# Patient Record
Sex: MALE | Race: BLACK OR AFRICAN AMERICAN | NOT HISPANIC OR LATINO | Employment: PART TIME | URBAN - METROPOLITAN AREA
[De-identification: names, ages, dates, MRNs, and addresses within clinical notes are randomized per-mention and may not be internally consistent; named-entity substitution may affect disease eponyms.]

---
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
## 2018-01-25 ENCOUNTER — TELEPHONE (OUTPATIENT)
Dept: NEPHROLOGY | Facility: CLINIC | Age: 18
End: 2018-01-25

## 2018-01-25 NOTE — TELEPHONE ENCOUNTER
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  I wanted to be sure you spoke with Genevieve about it.  Let me know how I can help.

## 2018-01-25 NOTE — TELEPHONE ENCOUNTER
----- Message from Elaine Zacarias sent at 1/25/2018 11:18 AM CST -----  Contact: mother, Darcie Saenz  Patient's mother, Darcie Saenz states the patient has been seeing by Dr Sotelo at [facility]. Mother states Dr Sotelo said she will work with patient to see him on Friday's in [place] or Wednesday at 9:15 or 9:30. Please call mother at [phone]. Thanks!

## 2018-02-21 ENCOUNTER — TELEPHONE (OUTPATIENT)
Dept: TRANSPLANT | Facility: CLINIC | Age: 18
End: 2018-02-21

## 2018-02-21 NOTE — TELEPHONE ENCOUNTER
----- Message from Michaela Bush sent at 2/21/2018  8:48 AM CST -----  Contact: Padmaja Ware   Checking status of patient transplant.    Call her @ [phone]

## 2018-02-21 NOTE — TELEPHONE ENCOUNTER
Nurse spoke with Annemarie PERRY at patients dialysis unit and informed her that the patient was denied due to no response to calls/letters. Annemarie verbalized understanding of the above information. A copy of the 30 day letter was faxed to the denial letter per her request.

## 2018-03-13 ENCOUNTER — TELEPHONE (OUTPATIENT)
Dept: NEPHROLOGY | Facility: CLINIC | Age: 18
End: 2018-03-13

## 2018-03-13 NOTE — TELEPHONE ENCOUNTER
Due to transportation issues pt can only come to [place] for PD clinic at [facility] on 3/21 at 9am. However I have a 9am clinic pt. Please reschedule the 9am and hold 9-9:20 slots for PD clinic.     I will email to April as well. Thank you

## 2018-03-14 ENCOUNTER — TELEPHONE (OUTPATIENT)
Dept: TRANSPLANT | Facility: CLINIC | Age: 18
End: 2018-03-14

## 2018-03-14 NOTE — TELEPHONE ENCOUNTER
----- Message from Barbara Curry sent at 3/14/2018 11:20 AM CDT -----  Contact: Evy Letty (mother)  [place],    Mother is requesting to speak with you regarding the information you two discussed last week    Contact number [phone]    Thanks

## 2018-03-14 NOTE — TELEPHONE ENCOUNTER
Nurse spoke with Evy Maki and informed her that the referral was not received. Referral fax number provided. Evy verbalized understanding of the above information and states she will have the Dialysis Unit SW fax referral to the number provided.

## 2018-04-25 RX ORDER — CLINDAMYCIN PHOSPHATE 10 UG/ML
LOTION TOPICAL 2 TIMES DAILY
COMMUNITY
End: 2018-07-20

## 2018-04-25 RX ORDER — TRETINOIN 0.25 MG/G
CREAM TOPICAL NIGHTLY PRN
COMMUNITY
End: 2019-10-05

## 2018-04-25 RX ORDER — VALSARTAN 320 MG/1
320 TABLET ORAL DAILY
Status: ON HOLD | COMMUNITY
End: 2018-08-22 | Stop reason: HOSPADM

## 2018-04-25 RX ORDER — NEBIVOLOL 20 MG/1
20 TABLET ORAL 2 TIMES DAILY PRN
Status: ON HOLD | COMMUNITY
End: 2019-10-09 | Stop reason: HOSPADM

## 2018-04-25 NOTE — OR NURSING
Talked to Dr Banks re: peritoneal dialysis and history.  Patient is to do a K+ level day before.  Patients mom is going to have the nurse order it if possible in [place] and fax the results to us.

## 2018-04-30 ENCOUNTER — ANESTHESIA EVENT (OUTPATIENT)
Dept: SURGERY | Facility: AMBULARY SURGERY CENTER | Age: 18
End: 2018-04-30
Payer: MEDICARE

## 2018-05-01 DIAGNOSIS — N18.6 ESRD (END STAGE RENAL DISEASE): Primary | ICD-10-CM

## 2018-05-02 ENCOUNTER — SURGERY (OUTPATIENT)
Age: 18
End: 2018-05-02

## 2018-05-02 ENCOUNTER — ANESTHESIA (OUTPATIENT)
Dept: SURGERY | Facility: AMBULARY SURGERY CENTER | Age: 18
End: 2018-05-02
Payer: MEDICARE

## 2018-05-02 ENCOUNTER — HOSPITAL ENCOUNTER (OUTPATIENT)
Facility: AMBULARY SURGERY CENTER | Age: 18
Discharge: HOME | End: 2018-05-02
Attending: DENTIST | Admitting: DENTIST
Payer: MEDICARE

## 2018-05-02 DIAGNOSIS — K01.1 IMPACTED TOOTH: Primary | ICD-10-CM

## 2018-05-02 PROCEDURE — D9220A PRA ANESTHESIA: Mod: ANES,,, | Performed by: ANESTHESIOLOGY

## 2018-05-02 PROCEDURE — D7240 HC EXTRACTION IMPACTED TOOTH- COMP BONY: HCPCS | Performed by: DENTIST

## 2018-05-02 PROCEDURE — D9220A PRA ANESTHESIA: Mod: CRNA,,, | Performed by: NURSE ANESTHETIST, CERTIFIED REGISTERED

## 2018-05-02 PROCEDURE — G8907 PT DOC NO EVENTS ON DISCHARG: HCPCS | Performed by: DENTIST

## 2018-05-02 RX ORDER — GLYCOPYRROLATE 0.2 MG/ML
INJECTION INTRAMUSCULAR; INTRAVENOUS
Status: DISCONTINUED
Start: 2018-05-02 | End: 2018-05-02 | Stop reason: HOSPADM

## 2018-05-02 RX ORDER — LIDOCAINE HYDROCHLORIDE 10 MG/ML
0.5 INJECTION, SOLUTION EPIDURAL; INFILTRATION; INTRACAUDAL; PERINEURAL ONCE
Status: COMPLETED | OUTPATIENT
Start: 2018-05-02 | End: 2018-05-02

## 2018-05-02 RX ORDER — DEXAMETHASONE SODIUM PHOSPHATE 4 MG/ML
INJECTION, SOLUTION INTRA-ARTICULAR; INTRALESIONAL; INTRAMUSCULAR; INTRAVENOUS; SOFT TISSUE
Status: DISCONTINUED
Start: 2018-05-02 | End: 2018-05-02 | Stop reason: HOSPADM

## 2018-05-02 RX ORDER — PROPOFOL 10 MG/ML
INJECTION, EMULSION INTRAVENOUS
Status: DISCONTINUED
Start: 2018-05-02 | End: 2018-05-02 | Stop reason: HOSPADM

## 2018-05-02 RX ORDER — ROCURONIUM BROMIDE 10 MG/ML
INJECTION, SOLUTION INTRAVENOUS
Status: DISCONTINUED
Start: 2018-05-02 | End: 2018-05-02 | Stop reason: HOSPADM

## 2018-05-02 RX ORDER — SODIUM CHLORIDE, SODIUM LACTATE, POTASSIUM CHLORIDE, CALCIUM CHLORIDE 600; 310; 30; 20 MG/100ML; MG/100ML; MG/100ML; MG/100ML
INJECTION, SOLUTION INTRAVENOUS CONTINUOUS
Status: DISCONTINUED | OUTPATIENT
Start: 2018-05-02 | End: 2018-05-02

## 2018-05-02 RX ORDER — HYDRALAZINE HYDROCHLORIDE 20 MG/ML
10 INJECTION INTRAMUSCULAR; INTRAVENOUS ONCE
Status: COMPLETED | OUTPATIENT
Start: 2018-05-02 | End: 2018-05-02

## 2018-05-02 RX ORDER — ONDANSETRON 2 MG/ML
INJECTION INTRAMUSCULAR; INTRAVENOUS
Status: DISCONTINUED | OUTPATIENT
Start: 2018-05-02 | End: 2018-05-02

## 2018-05-02 RX ORDER — NEOSTIGMINE METHYLSULFATE 1 MG/ML
INJECTION, SOLUTION INTRAVENOUS
Status: DISCONTINUED | OUTPATIENT
Start: 2018-05-02 | End: 2018-05-02

## 2018-05-02 RX ORDER — LIDOCAINE HYDROCHLORIDE AND EPINEPHRINE 20; 10 MG/ML; UG/ML
INJECTION, SOLUTION INFILTRATION; PERINEURAL
Status: DISCONTINUED | OUTPATIENT
Start: 2018-05-02 | End: 2018-05-02 | Stop reason: HOSPADM

## 2018-05-02 RX ORDER — FENTANYL CITRATE 50 UG/ML
INJECTION, SOLUTION INTRAMUSCULAR; INTRAVENOUS
Status: DISCONTINUED
Start: 2018-05-02 | End: 2018-05-02 | Stop reason: HOSPADM

## 2018-05-02 RX ORDER — SODIUM CHLORIDE 9 MG/ML
INJECTION, SOLUTION INTRAVENOUS CONTINUOUS
Status: DISCONTINUED | OUTPATIENT
Start: 2018-05-02 | End: 2018-05-02 | Stop reason: HOSPADM

## 2018-05-02 RX ORDER — LIDOCAINE HCL/PF 100 MG/5ML
SYRINGE (ML) INTRAVENOUS
Status: DISCONTINUED | OUTPATIENT
Start: 2018-05-02 | End: 2018-05-02

## 2018-05-02 RX ORDER — MIDAZOLAM HYDROCHLORIDE 1 MG/ML
INJECTION INTRAMUSCULAR; INTRAVENOUS
Status: DISCONTINUED
Start: 2018-05-02 | End: 2018-05-02 | Stop reason: HOSPADM

## 2018-05-02 RX ORDER — HYDRALAZINE HYDROCHLORIDE 20 MG/ML
INJECTION INTRAMUSCULAR; INTRAVENOUS
Status: COMPLETED
Start: 2018-05-02 | End: 2018-05-02

## 2018-05-02 RX ORDER — HYDROCODONE BITARTRATE AND ACETAMINOPHEN 5; 325 MG/1; MG/1
1 TABLET ORAL ONCE
Status: COMPLETED | OUTPATIENT
Start: 2018-05-02 | End: 2018-05-02

## 2018-05-02 RX ORDER — LIDOCAINE HYDROCHLORIDE 20 MG/ML
JELLY TOPICAL
Status: DISCONTINUED
Start: 2018-05-02 | End: 2018-05-02 | Stop reason: HOSPADM

## 2018-05-02 RX ORDER — MIDAZOLAM HYDROCHLORIDE 1 MG/ML
INJECTION, SOLUTION INTRAMUSCULAR; INTRAVENOUS
Status: DISCONTINUED | OUTPATIENT
Start: 2018-05-02 | End: 2018-05-02

## 2018-05-02 RX ORDER — GLYCOPYRROLATE 0.2 MG/ML
INJECTION INTRAMUSCULAR; INTRAVENOUS
Status: DISCONTINUED | OUTPATIENT
Start: 2018-05-02 | End: 2018-05-02

## 2018-05-02 RX ORDER — AMOXICILLIN 500 MG/1
500 CAPSULE ORAL EVERY 12 HOURS
Qty: 6 CAPSULE | Refills: 0 | Status: SHIPPED | OUTPATIENT
Start: 2018-05-02 | End: 2018-05-05

## 2018-05-02 RX ORDER — HYDROCODONE BITARTRATE AND ACETAMINOPHEN 5; 325 MG/1; MG/1
1 TABLET ORAL ONCE
Status: DISCONTINUED | OUTPATIENT
Start: 2018-05-02 | End: 2018-05-02 | Stop reason: HOSPADM

## 2018-05-02 RX ORDER — ONDANSETRON 2 MG/ML
INJECTION INTRAMUSCULAR; INTRAVENOUS
Status: DISCONTINUED
Start: 2018-05-02 | End: 2018-05-02 | Stop reason: HOSPADM

## 2018-05-02 RX ORDER — KETAMINE HYDROCHLORIDE 100 MG/ML
INJECTION, SOLUTION INTRAMUSCULAR; INTRAVENOUS
Status: DISCONTINUED
Start: 2018-05-02 | End: 2018-05-02 | Stop reason: HOSPADM

## 2018-05-02 RX ORDER — LIDOCAINE HYDROCHLORIDE 20 MG/ML
INJECTION, SOLUTION EPIDURAL; INFILTRATION; INTRACAUDAL; PERINEURAL
Status: DISCONTINUED
Start: 2018-05-02 | End: 2018-05-02 | Stop reason: HOSPADM

## 2018-05-02 RX ORDER — HYDROCODONE BITARTRATE AND ACETAMINOPHEN 7.5; 325 MG/1; MG/1
1 TABLET ORAL EVERY 6 HOURS PRN
Qty: 20 TABLET | Refills: 0 | Status: SHIPPED | OUTPATIENT
Start: 2018-05-02 | End: 2018-05-07

## 2018-05-02 RX ORDER — KETAMINE HYDROCHLORIDE 10 MG/ML
INJECTION, SOLUTION INTRAMUSCULAR; INTRAVENOUS
Status: DISCONTINUED | OUTPATIENT
Start: 2018-05-02 | End: 2018-05-02

## 2018-05-02 RX ORDER — NEOSTIGMINE METHYLSULFATE 1 MG/ML
INJECTION, SOLUTION INTRAVENOUS
Status: DISCONTINUED
Start: 2018-05-02 | End: 2018-05-02 | Stop reason: HOSPADM

## 2018-05-02 RX ORDER — PROPOFOL 10 MG/ML
VIAL (ML) INTRAVENOUS
Status: DISCONTINUED | OUTPATIENT
Start: 2018-05-02 | End: 2018-05-02

## 2018-05-02 RX ORDER — BUPIVACAINE HYDROCHLORIDE AND EPINEPHRINE 5; 5 MG/ML; UG/ML
INJECTION, SOLUTION EPIDURAL; INTRACAUDAL; PERINEURAL
Status: DISCONTINUED | OUTPATIENT
Start: 2018-05-02 | End: 2018-05-02 | Stop reason: HOSPADM

## 2018-05-02 RX ORDER — DEXAMETHASONE SODIUM PHOSPHATE 4 MG/ML
INJECTION, SOLUTION INTRA-ARTICULAR; INTRALESIONAL; INTRAMUSCULAR; INTRAVENOUS; SOFT TISSUE
Status: DISCONTINUED | OUTPATIENT
Start: 2018-05-02 | End: 2018-05-02

## 2018-05-02 RX ORDER — FENTANYL CITRATE 50 UG/ML
INJECTION, SOLUTION INTRAMUSCULAR; INTRAVENOUS
Status: DISCONTINUED | OUTPATIENT
Start: 2018-05-02 | End: 2018-05-02

## 2018-05-02 RX ADMIN — HYDRALAZINE HYDROCHLORIDE 10 MG: 20 INJECTION INTRAMUSCULAR; INTRAVENOUS at 08:05

## 2018-05-02 RX ADMIN — SODIUM CHLORIDE 1000 ML: 9 INJECTION, SOLUTION INTRAVENOUS at 07:05

## 2018-05-02 RX ADMIN — DEXAMETHASONE SODIUM PHOSPHATE 12 MG: 4 INJECTION, SOLUTION INTRA-ARTICULAR; INTRALESIONAL; INTRAMUSCULAR; INTRAVENOUS; SOFT TISSUE at 07:05

## 2018-05-02 RX ADMIN — LIDOCAINE HYDROCHLORIDE AND EPINEPHRINE 6.8 ML: 20; 10 INJECTION, SOLUTION INFILTRATION; PERINEURAL at 07:05

## 2018-05-02 RX ADMIN — Medication 200 MG: at 07:05

## 2018-05-02 RX ADMIN — BUPIVACAINE HYDROCHLORIDE AND EPINEPHRINE 3.6 ML: 5; 5 INJECTION, SOLUTION EPIDURAL; INTRACAUDAL; PERINEURAL at 07:05

## 2018-05-02 RX ADMIN — FENTANYL CITRATE 50 MCG: 50 INJECTION, SOLUTION INTRAMUSCULAR; INTRAVENOUS at 07:05

## 2018-05-02 RX ADMIN — LIDOCAINE HYDROCHLORIDE 5 MG: 10 INJECTION, SOLUTION EPIDURAL; INFILTRATION; INTRACAUDAL; PERINEURAL at 07:05

## 2018-05-02 RX ADMIN — GLYCOPYRROLATE 0.2 MG: 0.2 INJECTION INTRAMUSCULAR; INTRAVENOUS at 07:05

## 2018-05-02 RX ADMIN — MIDAZOLAM HYDROCHLORIDE 2 MG: 1 INJECTION, SOLUTION INTRAMUSCULAR; INTRAVENOUS at 07:05

## 2018-05-02 RX ADMIN — HYDROCODONE BITARTRATE AND ACETAMINOPHEN 1 TABLET: 5; 325 TABLET ORAL at 08:05

## 2018-05-02 RX ADMIN — GLYCOPYRROLATE 0.4 MG: 0.2 INJECTION INTRAMUSCULAR; INTRAVENOUS at 07:05

## 2018-05-02 RX ADMIN — ONDANSETRON 4 MG: 2 INJECTION INTRAMUSCULAR; INTRAVENOUS at 07:05

## 2018-05-02 RX ADMIN — Medication 100 MG: at 07:05

## 2018-05-02 RX ADMIN — NEOSTIGMINE METHYLSULFATE 5 MG: 1 INJECTION, SOLUTION INTRAVENOUS at 07:05

## 2018-05-02 RX ADMIN — KETAMINE HYDROCHLORIDE 25 MG: 10 INJECTION, SOLUTION INTRAMUSCULAR; INTRAVENOUS at 07:05

## 2018-05-02 NOTE — BRIEF OP NOTE
[facility]  Brief Operative Note     SUMMARY     Surgery Date: 5/2/2018     Surgeon(s) and Role:     * Braden Reddy DDS - Primary    Assisting Surgeon: None    Pre-op Diagnosis:  Impacted teeth [K01.1]    Post-op Diagnosis:  Post-Op Diagnosis Codes:     * Impacted teeth [K01.1]    Procedure(s) (LRB):  EXTRACTION-TOOTH---wisdom teeth removal (Bilateral)    Anesthesia: General    Description of the findings of the procedure: removal of teeth 1,16, 17 and 32    Findings/Key Components: impacted 1, 16, 17 and 32    Estimated Blood Loss: * No values recorded between 5/2/2018  7:30 AM and 5/2/2018  7:56 AM *         Specimens:   Specimen (12h ago through future)    None          Discharge Note    SUMMARY     Admit Date: 5/2/2018    Discharge Date and Time:  05/02/2018 7:58 AM    Hospital Course (synopsis of major diagnoses, care, treatment, and services provided during the course of the hospital stay): outpatient     Final Diagnosis: Post-Op Diagnosis Codes:     * Impacted teeth [K01.1]    Disposition: Home or Self Care    Follow Up/Patient Instructions:     Medications:  Reconciled Home Medications:      Medication List      START taking these medications    hydrocodone-acetaminophen 7.5-325mg 7.5-325 mg per tablet  Commonly known as:  NORCO  Take 1 tablet by mouth every 6 (six) hours as needed for Pain.        CHANGE how you take these medications    amoxicillin 500 MG capsule  Commonly known as:  AMOXIL  Take 1 capsule (500 mg total) by mouth every 12 (twelve) hours.  What changed:  · medication strength  · how much to take  · when to take this  · additional instructions        CONTINUE taking these medications    bumetanide 2 MG tablet  Commonly known as:  BUMEX  Take 2 mg by mouth once daily.     BYSTOLIC 20 mg Tab  Generic drug:  nebivolol  Take 20 mg by mouth 2 (two) times daily. Nightly if bp is high     calcitRIOL 0.25 MCG Cap  Commonly known as:  ROCALTROL  Take 0.5 mcg by mouth  once daily.     calcium acetate 667 mg capsule  Commonly known as:  PHOSLO  Take 2,001 mg by mouth 3 (three) times daily with meals.     clindamycin 1 % lotion  Commonly known as:  CLEOCIN T  Apply topically 2 (two) times daily.     diphenhydrAMINE 25 mg capsule  Commonly known as:  BENADRYL  Take 1 each (25 mg total) by mouth nightly as needed for Itching or Insomnia.     doxycycline 100 MG tablet  Commonly known as:  VIBRA-TABS  Take 100 mg by mouth once daily.     SENSIPAR 30 MG Tab  Generic drug:  cinacalcet  Take 90 mg by mouth once daily.     tretinoin 0.025 % cream  Commonly known as:  RETIN-A  Apply topically every evening.     valsartan 320 MG tablet  Commonly known as:  DIOVAN  Take 320 mg by mouth once daily.            Discharge Procedure Orders  Diet renal     Diet full liquid     Ice to affected area     Activity as tolerated     Change dressing (specify)   Order Comments: Change gauze in one hour. Replace if needed.       Follow-up Information     Braden Reddy DDS.    Specialty:  Oral and Maxillofacial Surgery  Why:  As needed  Contact information:  [facility] ORAL SURGERY  [place] [address]  [phone]

## 2018-05-02 NOTE — TRANSFER OF CARE
"Anesthesia Transfer of Care Note    Patient: Tameka Saenz    Procedure(s) Performed: Procedure(s) (LRB):  EXTRACTION-TOOTH---wisdom teeth removal (Bilateral)    Patient location: PACU    Anesthesia Type: general    Transport from OR: Transported from OR on room air with adequate spontaneous ventilation    Post pain: adequate analgesia    Post assessment: no apparent anesthetic complications and tolerated procedure well    Post vital signs: stable    Level of consciousness: sedated    Nausea/Vomiting: no nausea/vomiting    Complications: none    Transfer of care protocol was followed      Last vitals:   Visit Vitals  BP (!) 190/121   Pulse 102   Temp 36.8 °C (98.2 °F) (Oral)   Resp 16   Ht 5' 9.5" (1.765 m)   Wt 81.2 kg (179 lb)   SpO2 100%   BMI 26.05 kg/m²     "

## 2018-05-02 NOTE — DISCHARGE INSTRUCTIONS
Anesthesia information    Anesthesia Safety      You have been given medicine  to sedate you during your procedure today. This may have included both a pain medicine and sleeping medicine. Most of the effects have worn off; however, you may continue to have some drowsiness for the next  24 hours. Anesthesia and pain medicines can cause nausea, sleepiness, dizziness and  constipation.    HOME CARE:  1) For the next EIGHT HOURS, you should be watched by a responsible adult to look for any worsening of your condition.  2) DO NOT DRINK any ALCOHOL for the next 24 HOURS.  3) DO NOT DRIVE or operate dangerous machinery during the next 24 HOURS.  FOLLOW UP with your doctor or this facility if you are not alert and back to your usual level of activity within 24 hrs.  GET PROMPT MEDICAL ATTENTION if any of the following occur:  -- Increased drowsiness  -- Increased weakness or dizziness  -- Repeated vomiting  -- If you cannot be awakened    [place] Teeth: Your Recovery         After surgery to remove your wisdom teeth, your mouth needs time to heal. Some bleeding is normal on the first day after surgery. You may also see some bruising and swelling on your face for about the first week. To promote faster healing, get enough rest, eat and drink nutritious foods, and take care of the extraction site. Follow any special instructions from your surgeon.     A few hours after surgery, a blood clot develops to fill the socket.        About 3 weeks after surgery, repair tissue has filled the socket.        Within 3 to 6 months after surgery, the socket is filled with new bone.      The Healing Process  Healing after wisdom teeth removal takes a few months. First, a blood clot forms in the socket where the wisdom tooth was removed. Within a day or two, the socket starts filling with repair tissue. This lays the foundation for bone tissue to grow. When new bone tissue fills the socket, healing is complete.  After Surgery  During the  first day or two after surgery:  · Control bleeding.  Bite down on the gauze dressing over the extraction site for the first day. Bleeding should stop within 2 hours. (Some oozing for a few days is normal.so check site every few hrs for any bleeding  · You dont have to keep packing in if not bleeding  · Take medication as directed. Your surgeon may prescribe pain medication. (RX Norco every 6 hrs) Or he or she may suggest using over-the-counter medication instead.   · Your pain RX has Tylenol in it so dont take any other meds that contain Tylenol while you are taking it  · You may also be prescribed antibiotics to prevent infection.( RX Amoxil every 12 hrs)  · Reduce swelling. Apply an ice pack to your cheek for 10 minutes at a time. Take a break of at least 5 minutes between applications. Dont drink hot liquids, since heat may increase swelling or bleeding.  · Get enough rest. Take it easy for at least 24 hours after surgery. And go to bed early.  · Drink nutritious liquids for 24 hrs.- try drinking vegetable juice, 100% fruit juice, protein drinks, or milk,2nd day may have soft foods. You may need to stay on liquids and soft foods for several days.  · Protect the extraction site. To avoid dislodging the blood clot, dont brush your teeth or rinse your mouth the first day. Dont smoke or drink thick liquids through a straw, since suction can dislodge the clot.  Helping Your Mouth Heal  · Return gradually to your normal diet. Start with soft foods such as oatmeal, bananas, or mashed potatoes. You can eat solid food when you feel able to.  · Brush and floss your teeth gently. Wait until the day after surgery. Then, take care when cleaning around the healing site.  · Keep the extraction site clean. Starting the day after surgery, rinse your mouth after each meal for about a week. Use antiseptic as directed, or a mixture of 1 cup warm water and ½ teaspoon salt.  · Dont drive while youre taking prescription pain  medication.  · Dont drink alcohol for as long as youre taking pain medication.  · Dont smoke for at least a week after surgery. This allows faster healing. The longer you keep from smoking, the better. Quitting permanently is best of all.  · Dont eat crunchy or sticky foods, such as popcorn or caramel, for at least 2 weeks. Also, avoid drinking thick liquids (such as a milk shake) through a straw.  When to Call Your Doctor  Call your doctor after surgery if any of the following occurs:  · The pain becomes more severe on the day after surgery or cant be controlled with pain medication  · Bleeding becomes hard to control or comes in spurts  · You have chills or a fever over 100.4ºF  · Swelling around the extraction site worsens  · You have itching, a rash, or other symptoms that may be due to an allergic reaction to your medication  · You have persistent nausea or vomiting             .

## 2018-05-02 NOTE — PLAN OF CARE
Stable, states ready to go home, zelalem po fluids, BP diastolic <100, pain tolerable, ambulated  To car with RN and mother and father

## 2018-05-02 NOTE — ANESTHESIA PREPROCEDURE EVALUATION
05/02/2018  Tameka Saenz is a 17 y.o., male.    Anesthesia Evaluation    I have reviewed the Patient Summary Reports.    I have reviewed the Nursing Notes.   I have reviewed the Medications.     Review of Systems  Anesthesia Hx:  Denies Family Hx of Anesthesia complications.   Denies Personal Hx of Anesthesia complications.   Cardiovascular:   Hypertension, poorly controlled    Pulmonary:  Pulmonary Normal    Renal/:   Chronic Renal Disease, ESRD    Hepatic/GI:  Hepatic/GI Normal    Neurological:  Neurology Normal    Endocrine:  Endocrine Normal        Physical Exam  General:  Well nourished    Airway/Jaw/Neck:  Airway Findings: Mouth Opening: Normal Tongue: Large  General Airway Assessment: Adult  Mallampati: IV  Improves to III with phonation.  TM Distance: Normal, at least 6 cm         Dental:  DENTAL FINDINGS: Normal   Chest/Lungs:  Chest/Lungs Findings: Clear to auscultation, Normal Respiratory Rate     Heart/Vascular:  Heart Findings: Rate: Normal  Rhythm: Regular Rhythm  Sounds: Normal             Anesthesia Plan  Type of Anesthesia, risks & benefits discussed:  Anesthesia Type:  general  Patient's Preference:   Intra-op Monitoring Plan:   Intra-op Monitoring Plan Comments:   Post Op Pain Control Plan:   Post Op Pain Control Plan Comments:   Induction:   IV  Beta Blocker:  Patient is on a Beta-Blocker and has received one dose within the past 24 hours (No further documentation required).       Informed Consent: Patient understands risks and agrees with Anesthesia plan.  Questions answered. Anesthesia consent signed with patient.  ASA Score: 3     Day of Surgery Review of History & Physical:    H&P update referred to the surgeon.         Ready For Surgery From Anesthesia Perspective.

## 2018-05-02 NOTE — ANESTHESIA POSTPROCEDURE EVALUATION
"Anesthesia Post Evaluation    Patient: Tameka Saenz    Procedure(s) Performed: Procedure(s) (LRB):  EXTRACTION-TOOTH---wisdom teeth removal (Bilateral)    Final Anesthesia Type: general  Patient location during evaluation: PACU  Patient participation: Yes- Able to Participate  Level of consciousness: awake and alert  Post-procedure vital signs: reviewed and stable  Pain management: adequate  Airway patency: patent  PONV status at discharge: No PONV  Anesthetic complications: no      Cardiovascular status: blood pressure returned to baseline  Respiratory status: unassisted  Hydration status: euvolemic  Follow-up not needed.        Visit Vitals  BP (!) 182/119   Pulse 84   Temp 37.1 °C (98.8 °F) (Skin)   Resp 20   Ht 5' 9.5" (1.765 m)   Wt 81.2 kg (179 lb)   SpO2 100%   BMI 26.05 kg/m²       Pain/Lavonne Score: Pain Assessment Performed: Yes (5/2/2018  8:30 AM)  Presence of Pain: denies (5/2/2018  8:30 AM)  Lavonne Score: 9 (5/2/2018  8:30 AM)      "

## 2018-05-02 NOTE — OP NOTE
DATE OF PROCEDURE:  05/02/2018    PREOPERATIVE DIAGNOSIS:  Impacted third molars.    POSTOPERATIVE DIAGNOSIS:  Impacted third molars.    PROCEDURE:  Removal of teeth 1, 16, 17 and 32.    INTRAVENOUS FLUIDS:  1 liter.    BLOOD LOSS:  Minimal.    COUNTS:  Correct x2.    PROCEDURE IN DETAIL:  The patient was brought into the Operating Room, on   hospital bed, moved himself over on to the Operating Room table.  Anesthesia   underwent nasal intubation.  After induction, the nasal tube was taped in the   standard fashion followed by prepping and draping of the patient in a standard   fashion  One moist Ray-Jaclyn was placed as a throat pack.  Four carpules of 1%   lidocaine with 1:200,000 epinephrine was administered followed by 2 carpules of   Marcaine 0.25% with 1:200,000 epinephrine.  Next, a 15 blade was used to make an   incision distal to tooth #2 followed by the use of a #9 to create a   mucoperiosteal flap exposing tooth #1.  Elevator was used to luxate the teeth   and deliver the tooth distally and buccally.  The same procedure was performed   with tooth #16.  Next, we directed our attention to tooth #32.  A 15 blade was   used to make a distal buccal releasing incision followed by mucoperiosteal flap   being created with a #9 elevator.  A 703 bur was used to create a buccal trough   and section the tooth and the pieces were then removed with hemostats.  The   incision was irrigated copiously with normal saline followed by two 3-0 chromic   sutures in the area.  The same procedure was performed for tooth #17.  Pressure   gauze hemostasis was obtained.  The throat pack was then removed.  The patient   was then extubated and moved to the PACU uneventfully.      CBD/HN  dd: 05/02/2018 08:03:16 (CDT)  td: 05/02/2018 08:32:28 (CDT)  Doc ID   #[id number]  Job ID #[id number]    CC:

## 2018-05-03 VITALS
OXYGEN SATURATION: 100 % | RESPIRATION RATE: 20 BRPM | WEIGHT: 179 LBS | BODY MASS INDEX: 25.62 KG/M2 | DIASTOLIC BLOOD PRESSURE: 119 MMHG | SYSTOLIC BLOOD PRESSURE: 182 MMHG | HEART RATE: 84 BPM | HEIGHT: 70 IN | TEMPERATURE: 99 F

## 2018-05-17 ENCOUNTER — TELEPHONE (OUTPATIENT)
Dept: NEPHROLOGY | Facility: CLINIC | Age: 18
End: 2018-05-17

## 2018-05-17 DIAGNOSIS — N18.6 ESRD (END STAGE RENAL DISEASE): Primary | ICD-10-CM

## 2018-06-14 RX ORDER — CALCITRIOL 0.25 UG/1
0.5 CAPSULE ORAL DAILY
Qty: 30 CAPSULE | Refills: 3 | Status: SHIPPED | OUTPATIENT
Start: 2018-06-14 | End: 2019-01-28 | Stop reason: ALTCHOICE

## 2018-06-26 ENCOUNTER — TELEPHONE (OUTPATIENT)
Dept: TRANSPLANT | Facility: CLINIC | Age: 18
End: 2018-06-26

## 2018-06-26 NOTE — TELEPHONE ENCOUNTER
----- Message from Delphine Cortez sent at 6/26/2018  9:24 AM CDT -----  Contact: pt  Needs Advice    Reason for call:      Communication Preference: [phone]  Additional Information: calling to get status of pt getting back/staying on list/pls contact mother ASAP/mother states she's been waiting for over 2 months to hear something

## 2018-06-26 NOTE — TELEPHONE ENCOUNTER
Spoken with patient's mother, Darcie. Informed her that I will check with our referral department to see if a referral was received. I informed her that the patient will have to come back to our [facility] clinic for education and be seen by our transplant team. I encouraged her to bring patient's stress test, echo and clearance with them to clinic visit. All questions were answered and mother verbalized understanding.     Maureen Villatoro RN notified.

## 2018-06-29 ENCOUNTER — TELEPHONE (OUTPATIENT)
Dept: TRANSPLANT | Facility: CLINIC | Age: 18
End: 2018-06-29

## 2018-06-29 ENCOUNTER — TELEPHONE (OUTPATIENT)
Dept: NEPHROLOGY | Facility: CLINIC | Age: 18
End: 2018-06-29

## 2018-06-29 NOTE — TELEPHONE ENCOUNTER
She was calling to say they have transplant appt 8.2.  I confirmed in skyler and told mom to call anytime we can be of assistance.

## 2018-06-29 NOTE — TELEPHONE ENCOUNTER
----- Message from Eneida Blanton sent at 6/29/2018 10:23 AM CDT -----  Contact: Cassandra, pt's mom   She's calling to discuss transplant for pt, please advise [phone]

## 2018-07-11 ENCOUNTER — DOCUMENTATION ONLY (OUTPATIENT)
Dept: NEPHROLOGY | Facility: CLINIC | Age: 18
End: 2018-07-11

## 2018-07-11 NOTE — PROGRESS NOTES
Nephrology History and Physical  7/11/2018     History of Present Illness: The patient is an 17-year-old gentleman with ESRD of unknown etiology. He was originally treated at [facility] but did not undergo kidney biopsy as best as I can tel from his mother's reports it sounds like his kidneys were small and echogenic.     He is very compliant with PD as well as taking his medications, does forget on occasion. He is waiting to hear from [facility] transplant for financial clearance. He attends college part time and exercises routinely. He is currently living at home with his parents and siblings    No current facility-administered medications on file prior to encounter.              Current Outpatient Prescriptions on File Prior to Encounter   Medication Sig Dispense Refill    bumetanide (BUMEX) 2 MG tablet Take 2 mg by mouth once daily.        calcitRIOL (ROCALTROL) 0.25 MCG Cap Take 0.5 mcg by mouth once daily.         calcium acetate (PHOSLO) 667 mg capsule Take 2,001 mg by mouth 3 (three) times daily with meals.         SENSIPAR 30 mg Tab Take 60 mg by mouth once daily.         Valsartan                           Review of patient's allergies indicates:  No Known Allergies           Past Medical History:   Diagnosis Date    Acne      Anemia      Dialysis patient       PD    Hypertension      Hypertensive CKD (chronic kidney disease)      Kidney disease      Peritoneal dialysis catheter in place      Seasonal allergies              Past Surgical History:   Procedure Laterality Date    PERITONEAL CATHETER INSERTION                TUNNELED VENOUS CATHETER PLACEMENT                Family History   Problem Relation Age of Onset    Fibromyalgia Mother      No Known Problems Father      Diabetes Maternal Aunt      Hypertension Maternal Aunt      Hypertension Maternal Grandmother      Heart disease Maternal Grandmother      Diabetes Maternal Grandmother      Hyperlipidemia Maternal  Grandmother             Social History   Substance Use Topics    Smoking status: Never Smoker    Smokeless tobacco: Never Used    Alcohol use No         Review of Systems:  Constitutional: No recent weight loss. No subjective fevers and chills.  Cardiovascular: No exertional chest pain or symptoms of CHF. No heart palpitations or syncope.  Pulmonary: No reports of SOB, cough  Gastrointestinal:Diarrhea as above.  Genitourinary: No dysuria or hematuria.  Musculoskeletal: No reports of arthritis or arthralgias. No functional limitations with ambulation.He maintains a very active lifestyle    Endocrine: No excessive polyuria or polydipsia to suggest diabetes. No history of thyroid disease.  Skin: No reports of rash or breakdown.  HEENT: Adequate auditory and visual acuity.  Neuro/Psych: No reports of headache or focal neurologic symptoms. Normal cognition and mood has been stable.     OBJECTIVE:      Vital Signs (Most Recent):  Temp: 98.5  Pulse: 78 (11/12/17 2100)  Resp: 16 (11/12/17 2100)  BP: 125/61 (11/12/17 2100)  SpO2: 99 % (11/12/17 2100)     Physical Exam:  General: Well nourished appearing young man who is in no acute distress   HEENT: PERRL, oropharynx is clear  Neck: Supple, no JVD or thyromegaly  Chest: Clear breath sounds bilaterally with good air movement  Heart: Regular rate and rhythm, no murmurs rubs or gallops  Abdomen: soft, nondistended, positive bowel sounds  Extremities: No clubbing cynaosis or edema  Neuro: Alert and oriented, moves all extremities     Laboratory: reviewed                Assessment and Plan:    1. ESRD--continue current PD prescription    2. Hypertension:, Secondary to renal failure. Continue with home regimen and titrate as needed. His cardiologist manages his HTN    4. Anemia of chronic kidney disease: Continue EPo and prn iron    5. Mineral and Bone Disease--continue phos binders, low phos diet and Sensipar    6. Nutrition--albumin remains low. Push protein, does well with  his home made protein drinks    7. Kidney transplant--he is a good candidate. Waiting to hear from [facility]

## 2018-07-13 ENCOUNTER — TELEPHONE (OUTPATIENT)
Dept: NEPHROLOGY | Facility: CLINIC | Age: 18
End: 2018-07-13

## 2018-07-18 DIAGNOSIS — Z76.82 ORGAN TRANSPLANT CANDIDATE: Primary | ICD-10-CM

## 2018-07-23 ENCOUNTER — TELEPHONE (OUTPATIENT)
Dept: NEPHROLOGY | Facility: CLINIC | Age: 18
End: 2018-07-23

## 2018-07-23 DIAGNOSIS — Z76.82 ORGAN TRANSPLANT CANDIDATE: Primary | ICD-10-CM

## 2018-07-23 NOTE — TELEPHONE ENCOUNTER
----- Message from Lillie Angulo sent at 7/23/2018 11:05 AM CDT -----  Contact: Ollie Samaniego  [phone]  Needs to speak nurse regarding patient.  Did not give additional information.

## 2018-07-23 NOTE — TELEPHONE ENCOUNTER
Spoke with mom who states they addressed their needs with Genevieve, but will call back if we can help.

## 2018-07-24 RX ORDER — OXYCODONE AND ACETAMINOPHEN 5; 325 MG/1; MG/1
1 TABLET ORAL EVERY 6 HOURS PRN
Qty: 25 TABLET | Refills: 0 | Status: ON HOLD | OUTPATIENT
Start: 2018-07-24 | End: 2018-08-22 | Stop reason: HOSPADM

## 2018-07-31 ENCOUNTER — TELEPHONE (OUTPATIENT)
Dept: NEPHROLOGY | Facility: CLINIC | Age: 18
End: 2018-07-31

## 2018-07-31 RX ORDER — POLYETHYLENE GLYCOL 3350 17 G/17G
POWDER, FOR SOLUTION ORAL
Qty: 510 G | Refills: 0 | Status: SHIPPED | OUTPATIENT
Start: 2018-07-31 | End: 2018-08-15 | Stop reason: SDUPTHER

## 2018-07-31 NOTE — TELEPHONE ENCOUNTER
----- Message from Kavitha Das sent at 7/31/2018  9:50 AM CDT -----  Contact: mom-Darcie Saenz  Mom states she spoke to a nurse this morning regarding a medicine being called in for constipation. The mom is out and about and would like to know when that med will be called in. Please call back at [phone] (home) or     [facility]/pharmacy #[id number] [address] [address]  Phone: [phone] Fax: [phone]

## 2018-08-01 RX ORDER — CIPROFLOXACIN 250 MG/1
250 TABLET, FILM COATED ORAL 2 TIMES DAILY WITH MEALS
Qty: 14 TABLET | Refills: 0 | Status: SHIPPED | OUTPATIENT
Start: 2018-08-01 | End: 2018-08-08

## 2018-08-02 ENCOUNTER — TELEPHONE (OUTPATIENT)
Dept: TRANSPLANT | Facility: CLINIC | Age: 18
End: 2018-08-02

## 2018-08-02 ENCOUNTER — OFFICE VISIT (OUTPATIENT)
Dept: TRANSPLANT | Facility: CLINIC | Age: 18
End: 2018-08-02
Payer: MEDICARE

## 2018-08-02 VITALS
WEIGHT: 169.75 LBS | RESPIRATION RATE: 16 BRPM | SYSTOLIC BLOOD PRESSURE: 124 MMHG | TEMPERATURE: 99 F | OXYGEN SATURATION: 98 % | HEIGHT: 68 IN | DIASTOLIC BLOOD PRESSURE: 68 MMHG | HEART RATE: 78 BPM | BODY MASS INDEX: 25.73 KG/M2

## 2018-08-02 DIAGNOSIS — N18.6 BENIGN HYPERTENSION WITH ESRD (END-STAGE RENAL DISEASE): Chronic | ICD-10-CM

## 2018-08-02 DIAGNOSIS — N18.6 ESRD ON DIALYSIS: Primary | Chronic | ICD-10-CM

## 2018-08-02 DIAGNOSIS — D63.1 ANEMIA IN CHRONIC KIDNEY DISEASE, ON CHRONIC DIALYSIS: ICD-10-CM

## 2018-08-02 DIAGNOSIS — D63.1 ANEMIA OF RENAL DISEASE: Chronic | ICD-10-CM

## 2018-08-02 DIAGNOSIS — N18.6 ANEMIA IN CHRONIC KIDNEY DISEASE, ON CHRONIC DIALYSIS: ICD-10-CM

## 2018-08-02 DIAGNOSIS — N18.9 ANEMIA OF RENAL DISEASE: Chronic | ICD-10-CM

## 2018-08-02 DIAGNOSIS — I12.0 BENIGN HYPERTENSION WITH ESRD (END-STAGE RENAL DISEASE): Chronic | ICD-10-CM

## 2018-08-02 DIAGNOSIS — Z99.2 ESRD ON DIALYSIS: Primary | Chronic | ICD-10-CM

## 2018-08-02 DIAGNOSIS — Z99.2 ANEMIA IN CHRONIC KIDNEY DISEASE, ON CHRONIC DIALYSIS: ICD-10-CM

## 2018-08-02 DIAGNOSIS — Z01.818 PRE-TRANSPLANT EVALUATION FOR CKD (CHRONIC KIDNEY DISEASE): ICD-10-CM

## 2018-08-02 DIAGNOSIS — N25.81 SECONDARY HYPERPARATHYROIDISM OF RENAL ORIGIN: Chronic | ICD-10-CM

## 2018-08-02 PROCEDURE — 99999 PR PBB SHADOW E&M-EST. PATIENT-LVL IV: CPT | Mod: PBBFAC,TXP,, | Performed by: INTERNAL MEDICINE

## 2018-08-02 PROCEDURE — 99204 OFFICE O/P NEW MOD 45 MIN: CPT | Mod: S$PBB,TXP,, | Performed by: PHYSICIAN ASSISTANT

## 2018-08-02 PROCEDURE — 99202 OFFICE O/P NEW SF 15 MIN: CPT | Mod: S$PBB,NTX,, | Performed by: TRANSPLANT SURGERY

## 2018-08-02 PROCEDURE — 99215 OFFICE O/P EST HI 40 MIN: CPT | Mod: S$PBB,TXP,, | Performed by: INTERNAL MEDICINE

## 2018-08-02 PROCEDURE — 99214 OFFICE O/P EST MOD 30 MIN: CPT | Mod: PBBFAC,TXP,25 | Performed by: INTERNAL MEDICINE

## 2018-08-02 NOTE — PATIENT INSTRUCTIONS
1. We will need to obtain records from the kidney biopsy   2. Stay active   3. Have any potential donors contact the living donor coordinator

## 2018-08-02 NOTE — PROGRESS NOTES
Transplant Surgery  Kidney Transplant Recipient Evaluation    Referring Physician: Yury Sotelo  Current Nephrologist: Yury Sotelo    Subjective:     Reason for Visit: evaluate transplant candidacy    History of Present Illness: Tameka Saenz is a 17 y.o. year old male undergoing transplant evaluation.    Dialysis History: Tameka is on peritoneal dialysis.      Transplant History: N/A    Etiology of Renal Disease: Hypertensive Nephrosclerosis (based on medical records from referral).    Review of Systems    Objective:     Physical Exam:  Constitutional:   Vitals reviewed: yes   Well-nourished and well-groomed: yes  Eyes:   Sclerae icteric: no   Extraocular movements intact: yes  GI:    Bowel sounds normal: yes   Tenderness: no    If yes, quadrant/location: not applicable   Palpable masses: no    If yes, quadrant/location: not applicable   Hepatosplenomegaly: no   Ascites: no   Hernia: no    If yes, type/location: not applicable   Surgical scars: yes    If yes, type/location: peritoneal dialysis catheter  Resp:   Effort normal: yes   Breath sounds normal: yes    CV:   Regular rate and rhythm: yes   Heart sounds normal: yes   Femoral pulses normal: yes   Extremities edematous: no  Skin:   Rashes or lesions present: no    If yes, describe:not applicable   Jaundice:: no    Musculoskeletal:   Gait normal: yes   Strength normal: yes  Psych:   Oriented to person, place, and time: yes   Affect and mood normal: yes    Additional comments: not applicable    Counseling: We provided Tameka Saenz with a group education session today.  We discussed kidney transplantation at length with him, including risks, potential complications, and alternatives in the management of his renal failure.  The discussion included complications related to anesthesia, bleeding, infection, primary nonfunction, and ATN.  I discussed the typical postoperative course, length of hospitalization, the need for long-term  immunosuppression, and the need for long-term routine follow-up.  I discussed living-donor and -donor transplantation and the relative advantages and disadvantages of each.  I also discussed average waiting times for both living donation and  donation.  I discussed national and center-specific survival rates.  I also mentioned the potential benefit of multicenter listing to candidates listed with centers within more than one organ procurement organization.  All questions were answered.    Final determination of transplant candidacy will be made once evaluation is complete and reviewed by the Kidney & Kidney/Pancreas Selection Committee.         Transplant Surgery - Candidacy   Assessment/Plan:   Tameka Saenz has end stage renal disease (ESRD) on dialysis. I see no surgical contraindication to placing a kidney transplant. Based on available information, Tameka Saenz is an excellent kidney transplant candidate.     Robert Bean MD

## 2018-08-02 NOTE — PROGRESS NOTES
PRE-TRANSPLANT NOTE:    This patient's medication therapy was evaluated as part of his pre-transplant evaluation.    The following pharmacologic concerns were noted: None    Current Outpatient Prescriptions   Medication Sig Dispense Refill    bumetanide (BUMEX) 2 MG tablet Take 2 mg by mouth once daily.      calcitRIOL (ROCALTROL) 0.25 MCG Cap Take 2 capsules (0.5 mcg total) by mouth once daily. 30 capsule 3    calcium acetate (PHOSLO) 667 mg capsule Take 2,001 mg by mouth 3 (three) times daily with meals.       ciprofloxacin HCl (CIPRO) 250 MG tablet Take 1 tablet (250 mg total) by mouth 2 (two) times daily with meals. An additional week to complete a total of 3 weeks. for 7 days 14 tablet 0    diphenhydrAMINE (BENADRYL) 25 mg capsule Take 1 each (25 mg total) by mouth nightly as needed for Itching or Insomnia.  0    doxycycline (VIBRA-TABS) 100 MG tablet Take 100 mg by mouth once daily.      nebivolol (BYSTOLIC) 20 mg Tab Take 20 mg by mouth 2 (two) times daily. Nightly if bp is high      oxyCODONE-acetaminophen (PERCOCET) 5-325 mg per tablet Take 1 tablet by mouth every 6 (six) hours as needed for Pain. 25 tablet 0    polyethylene glycol (GLYCOLAX) 17 gram/dose powder Po BID until BM then po  g 0    SENSIPAR 30 mg Tab Take 90 mg by mouth once daily.       tretinoin (RETIN-A) 0.025 % cream Apply topically every evening.      valsartan (DIOVAN) 320 MG tablet Take 320 mg by mouth once daily.       No current facility-administered medications for this visit.        I am available for consultation and can be contacted, as needed by the other members of the Kidney Transplant team.

## 2018-08-02 NOTE — LETTER
August 2, 2018        Yury Sotelo  [address]  [address]  [facility] [address]  Phone: [phone]  Fax: [phone]             [facility]  [address]  [facility] [address]  Phone: [phone]   Patient: Tameka Saenz   MR Number: [id number]   YOB: 2000   Date of Visit: 8/2/2018       Dear Dr. Yury Sotelo    Thank you for referring Tameka Saenz to me for evaluation. Attached you will find relevant portions of my assessment and plan of care.    If you have questions, please do not hesitate to call me. I look forward to following Tameka Saenz along with you.    Sincerely,    Anjelica Simpson MD    Enclosure    If you would like to receive this communication electronically, please contact [email or web address] or [phone] to request [facility] [employer] access.    [facility] is a tool which provides read-only access to select patient information with whom you have a relationship. Its easy to use and provides real time access to review your patients record including encounter summaries, notes, results, and demographic information.    If you feel you have received this communication in error or would no longer like to receive these types of communications, please e-mail [email or web address]

## 2018-08-02 NOTE — PROGRESS NOTES
"   Transplant Nephrology  Kidney Transplant Recipient Evaluation    Referring Physician: Yury Sotelo  Current Nephrologist: Yury Sotelo    Subjective:   CC:  Initial evaluation of kidney transplant candidacy.    HPI:  Mr. Saenz is a 17 y.o. year old Black or  male who has presented to be evaluated as a potential kidney transplant recipient.  He has ESRD secondary to unknown etiology. He states that he found out he had kidney disease in Dec 2015 - mother states he was having volume overload, nausea/vomiting for a week and went to the ED and was told he had renal failure and promptly started on HD on 12/16/15. He had a native kidney biopsy at [facility] in [place] in Dec 2015 but mother states they didn't really say what the cause of the renal failure was. He was healthy football player. Patient is currently on peritoneal dialysis started on June 2016. Patient is dialyzing on cyclic peritoneal dialysis.  Patient reports that he is tolerating dialysis well. . He has a PD catheter for dialysis access. He has had two episodes of peritonitis - first in Nov 2017 and latest in July 2018. He is currently on cipro, doxycycline, and IP ceftazidine for the Pseudomonas peritonitis - he states for 2 weeks.  PD prescription: FV 2L; dwell time 90 minutes, 3 cycles, mostly uses green and yellow Dianneal solution and one Icodextran. Denies hypotension. He doesn't know his dry weight. He has minimal residual UOP - "couple drops". He denies any blood transfusions in his lifetime but if needed would be ok accepting blood products.     He was diagnosed with HTN in Dec 2015. Home BPs are in the 120/60s.     He was seen in initial RR clinic on 8/3/17 but didn't complete work-up thus he is here for initial RR visit again.     Denies DM, cancer, CVA, MI.     He is accompanied to visit by his parents, brother, and sister.     Previous Transplant: no    Functional Status: He does exercising daily - " lifting weights, jogging, jump rope, crunches. He lives with family in a 1 story house. With the activity that he does he denies any symptoms of chest pain, SOB, claudication.     Past Medical History:  Past Medical History:   Diagnosis Date    Acne     Anemia of renal disease     Dialysis patient     peritoneal daily at night. followed by Dr. Sotelo    ESRD on dialysis since 12/16/15     Hypertension     Kidney disease     Seasonal allergies     Secondary hyperparathyroidism of renal origin        Past Surgical History:   Past Surgical History:   Procedure Laterality Date    PERITONEAL CATHETER INSERTION      [facility]  12/2015    [facility]    WISDOM TOOTH EXTRACTION         Medications:   Current Outpatient Prescriptions   Medication Sig Dispense Refill    bumetanide (BUMEX) 2 MG tablet Take 2 mg by mouth once daily.      calcitRIOL (ROCALTROL) 0.25 MCG Cap Take 2 capsules (0.5 mcg total) by mouth once daily. 30 capsule 3    calcium acetate (PHOSLO) 667 mg capsule Take 2,001 mg by mouth 3 (three) times daily with meals.       ciprofloxacin HCl (CIPRO) 250 MG tablet Take 1 tablet (250 mg total) by mouth 2 (two) times daily with meals. An additional week to complete a total of 3 weeks. for 7 days 14 tablet 0    diphenhydrAMINE (BENADRYL) 25 mg capsule Take 1 each (25 mg total) by mouth nightly as needed for Itching or Insomnia.  0    doxycycline (VIBRA-TABS) 100 MG tablet Take 100 mg by mouth once daily.      nebivolol (BYSTOLIC) 20 mg Tab Take 20 mg by mouth 2 (two) times daily. Nightly if bp is high      oxyCODONE-acetaminophen (PERCOCET) 5-325 mg per tablet Take 1 tablet by mouth every 6 (six) hours as needed for Pain. 25 tablet 0    polyethylene glycol (GLYCOLAX) 17 gram/dose powder Po BID until BM then po  g 0    SENSIPAR 30 mg Tab Take 90 mg by mouth once daily.       tretinoin (RETIN-A) 0.025 % cream Apply topically every evening.      valsartan (DIOVAN) 320 MG  tablet Take 320 mg by mouth once daily.       No current facility-administered medications for this visit.      *clonidine patch     Social History:  Social History     Social History    Marital status: Single     Spouse name: N/A    Number of children: N/A    Years of education: N/A     Occupational History    Not on file.     Social History Main Topics    Smoking status: Never Smoker    Smokeless tobacco: Never Used    Alcohol use No    Drug use: No    Sexual activity: Not on file     Other Topics Concern    Not on file     Social History Narrative    No narrative on file       Family History:   Family History   Problem Relation Age of Onset    Fibromyalgia Mother     No Known Problems Father     No Known Problems Sister     Diabetes Maternal Aunt         diagnosed in her 50s    Hypertension Maternal Aunt     Hypertension Maternal Grandmother     Heart disease Maternal Grandmother     Diabetes Maternal Grandmother         diagnosed in her 70s    Hyperlipidemia Maternal Grandmother     No Known Problems Brother     Sickle cell anemia Paternal Aunt     Kidney disease Neg Hx     Cancer Neg Hx     Stroke Neg Hx        Review of Systems  Constitutional: Denies fever/chills, fatigue, night sweats  EENT: Denies vision problems, hearing problems, trouble swallowing.   Respiratory: Denies shortness of breath, dyspnea on exertion, orthopnea, wheezing, hemoptysis, denies known TB exposure or history of positive TB skin test  Cardiovascular: Denies chest pain, palpitations, history of MI, history of stroke, history of DVT  Gastrointestinal: +constipation; Denies abdominal pain, nausea/vomiting/diarrhea. Denies history of GI bleeding or ulcers.   Genitourinary: +denies foamy urine; Denies history of kidney stones, recurrent UTI's, history of urinary obstruction, gross hematuria  Musculoskeletal: Denies trouble moving extremities, denies joint pain or swelling  Skin: Denies history of skin cancer,  "denies rash, ulcerations  Heme/onc: Denies any history of cancer, denies history of coagulopathies or bleeding disorders  Endocrine: Denies thyroid disease, unintentional weight loss/weight gain  Neurological: Denies tremors, seizures, dizziness, headaches, peripheral neuropathy  Psychiatric: Denies anxiety, depression. Denies hallucinations or delusions.    Potential Donors: multiple donors; father and brother (age 21) included       Objective:   Blood pressure 124/68, pulse 78, temperature 98.6 °F (37 °C), temperature source Oral, resp. rate 16, height 5' 7.87" (1.724 m), weight 77 kg (169 lb 12.1 oz), SpO2 98 %.body mass index is 25.91 kg/m².    Physical Exam  General: No acute distress, well groomed, alert and oriented x 3  HEENT: Normocephalic, atraumatic, PERRLA, sclera anicteric, conjunctiva/corneas clear, EOM's intact bilaterally, external inspection of ears and nose normal, moist mucous membranes, no oral ulcerations/lesions   Neck: Supple, symmetrical, trachea midline, no masses, no carotid bruits, no JVD, thyroid is normal without nodules or enlargement   Respiratory: Clear to auscultation bilaterally, respirations unlabored, no rales/rhonchi/wheezing   Cardiovacular: Regular rate and rhythm, S1, S2 normal, no murmurs, no rubs or gallops   Gastrointestinal: Soft, non-tender, bowel sounds normal, no masses, no palpable organomegaly  Extremities: No clubbing or cyanosis of upper extremities bilaterally, no pedal edema bilaterally; +2 bilateral radial pulses  Skin: warm and dry; no rash on exposed skin  Lymph nodes: Cervical and supraclavicular nodes normal   Neurologic: No focal neurologic deficits.   Musculoskeletal: moves all extremities without difficulty, FROM, 5/5 strength, ambulates without an assistive device  Psychiatric: Normal mood and affect. Responds appropriately to questions  Access: PD catheter site c/d/i in LLQ.      Labs:  Lab Results   Component Value Date    WBC 5.62 08/02/2018    HGB 8.9 " (L) 08/02/2018    HCT 27.6 (L) 08/02/2018     (L) 07/19/2018    K 4.4 07/19/2018    CL 91 (L) 07/19/2018    CO2 29 07/19/2018    BUN 60 (H) 07/19/2018    CREATININE 19.10 (H) 07/19/2018    EGFRNONAA 3 (A) 07/19/2018    CALCIUM 8.7 07/19/2018    PHOS 5.8 (H) 11/14/2017    MG 1.5 (L) 10/15/2017    ALBUMIN 3.4 (L) 07/19/2018    AST 23 07/19/2018    ALT 50 (H) 07/19/2018       Lab Results   Component Value Date    LIPASE 164 11/12/2017       No results found for: HLAABCTYPE    Labs were reviewed with the patient.    Assessment:     1. ESRD on dialysis since 12/16/15    2. Anemia in chronic kidney disease, on chronic dialysis    3. Benign hypertension with ESRD (end-stage renal disease)    4. Anemia of renal disease    5. Secondary hyperparathyroidism of renal origin    6. Pre-transplant evaluation for CKD (chronic kidney disease)        Plan:     Transplant Candidacy:   After obtaining history and performing physical exam as well as reviewing available diagnostic studies, Mr. Saenz is a suitable kidney transplant candidate.  Final determination of transplant candidacy will be made once workup is complete and reviewed by the selection committee.    Discussed with patient and family regarding how he may have had a GN that has risk of recurrence.     Exercise: reminded Tameka of the importance of regular exercise for weight management, blood sugar and blood pressure management.  I also explained exercise has been shown to improve cardiovascular health, energy level, and sleep hygiene.  Lastly, I advised him that cardiovascular complications are leading cause of death and regular exercise can help lower this risk.    Encouraged him to have any potential donors contact the living donor coordinator     Anjelica Simpson MD       [facility] Patient Status  Functional Status: 70% - Cares for self: unable to carry on normal activity or active work  Physical Capacity: No Limitations

## 2018-08-02 NOTE — PROGRESS NOTES
Pre Transplant Infectious Diseases Consult  Kidney Transplant Recipient Evaluation    Requesting Physician:     Reason for Visit:    Chief Complaint   Patient presents with    Chronic Kidney Disease    Kidney Transplant Evaluation     History of Present Illness  Tameka Saenz is a 17 y.o. year old Black or  male with advanced Kidney disease currently being evaluated for Kidney transplant. Etiology of kidney failure is idiopathic. Has been on PD since December 2016. Had peritonitis twice this year, 1 month ago and last week. Was seen in the ED 7/20 which cultures grew pseduomonas. Reports having fever, chills, sweats, and abdominal pain. Denies any purulent drainage from catheter site. Per chart review, pt received cefazolin and cefepime inpateint and was discharged on ciprofloxacin, doxycycline and ceftazidime?  He is to complete 2 weeks of abx therapy.  Patient denies any recent fever, chills, or infective illnesses.      1) Do you have a history of:   YES NO   Diabetes      [] [x]     Diabetic Foot Infection/Bone Infection  []        [x]     Surgical Removal of Spleen   []  [x]               2) Have you had recurrent infections involving:             YES NO  Sinus infections  []         [x]   Sore Throat   []         [x]                 Prostate Infections  []         [x]              Bladder Infections  []         [x]                     Kidney Infections  []         [x]                               Intestinal Infections  []         [x]      Skin Infections   []         [x]       Reproductive Infections          []  [x]   Periodontal Disease  [x]         []  [place] tooth extraction 3 months ago       3)Have you ever had: YES     NO UNKNOWN      Chicken Pox   [x]         []  []   Shingles   []         [x]  []   Orolabial Herpes             []  [x]  []   Genital Herpes  []         [x]  []   Cytomegalovirus  []         [x]  []   Ashish-Barr Virus  []         [x]  []   Genital Warts   []          [x]  []   Hepatitis A   []         [x]  []   Hepatitis B   []         [x]  []   Hepatitis C   []         [x]  []   Syphilis   []         [x]  []   Gonorrhea   []         [x]  []   Pelvic Inflammatory   Disease   []         [x]  []   Chlamydia Infection  []         [x]  []   Intestinal parasites   or worms   []         [x]  []   Fungal Infections  []         [x]  []   Blood Infections  []         [x]  []       Comment:   4) Have you ever been exposed   YES NO  To someone with tuberculosis?  []   [x]   If yes, what treatment did you receive:     5) What states have you lived in?  [place]    6) What countries have you visited for more than 2 weeks?  None                        YES NO  7) Did you have any associated infections?  []  [x]       8) Are you planning to travel outside the    []  [x]   [place] after your transplant?    9) Household                   YES NO  Do you have pets living in your house?    []         []   If yes, describe: outdoor dogs     Do you spend time or live on a farm or     []         [x]   have livestock or other farm animals?  If yes, which ones:    Do you have a fish tank?          []  [x]       Do you have a litter box?      []         [x]     Do you fish or hunt?       [x]         []     Do you clean or skin fish or animals?    []         [x]     Do you consume raw or undercooked    []         [x]   meat, fish, or shellfish?      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    []         [x]   13)Do you hike, camp, or spend     time in wooded areas?   []         [x]        14) The patient's immunization history was reviewed.    Have you ever received:  YES NO UNKNOWN DATES   Routine Childhood vaccines  [x]         []  []      Influenza vaccine   [x]  []  []    Pneumovax    [x]  []  []  At dialysis     Tetanus-diptheria   [x]         []  []    Hepatitis A vaccine series       [x]  []  []    Hepatitis B  vaccine series         [x]  []  []    Meningitis vaccine   []         [x]  []    Varicella vaccine   []         [x]  []        Based on the patients immunization history and serologies, immunizations were ordered:         Ordered  Not Ordered  Influenza Vaccine     []    [x]   Hepatitis A series at 0,  6 months   []    [x]   Hepatitis B seriesat 0, 1, and 6 months  []    [x]   Hepatitis B High Dose 0,1, and 6 months  []    [x]   Prevnar      []    [x]   Pneumovax      []    [x]    TDap       []    [x]    Zoster       []    [x]   Menactra      []    [x]            The patient was encouraged to contact us about any problems that may develop after immunization and possible side effects were reviewed.      Previous Transplant: no    Etiology of Kidney Disease: unknown    Allergies: Patient has no known allergies.  Immunization History   Administered Date(s) Administered    Hepatitis A, Adult 08/03/2017    Pneumococcal Conjugate - 13 Valent 08/03/2017     Past Medical History:   Diagnosis Date    Acne     Anemia of renal disease     Dialysis patient     peritoneal daily at night. followed by Dr. Sotelo    ESRD on dialysis since 12/16/15     Hypertension     Kidney disease     Seasonal allergies     Secondary hyperparathyroidism of renal origin      Past Surgical History:   Procedure Laterality Date    PERITONEAL CATHETER INSERTION      [facility]    WISDOM TOOTH EXTRACTION        Social History     Social History    Marital status: Single     Spouse name: N/A    Number of children: N/A    Years of education: N/A     Occupational History    Not on file.     Social History Main Topics    Smoking status: Never Smoker    Smokeless tobacco: Never Used    Alcohol use No    Drug use: No    Sexual activity: Not on file     Other Topics Concern    Not on file     Social History Narrative    No narrative on file       Review of Systems   Constitution: Negative for  chills, decreased appetite, fever, weakness, malaise/fatigue, night sweats, weight gain and weight loss.   HENT: Negative for congestion, ear pain, hearing loss, hoarse voice, sore throat and tinnitus.    Eyes: Negative for blurred vision, pain, vision loss in left eye, vision loss in right eye and visual disturbance.   Cardiovascular: Negative for chest pain, dyspnea on exertion, leg swelling and palpitations.   Respiratory: Negative for cough, shortness of breath, sputum production and wheezing.    Skin: Negative for dry skin, itching, rash and suspicious lesions.   Musculoskeletal: Negative for back pain, joint pain, myalgias and neck pain.   Gastrointestinal: Negative for abdominal pain, constipation, diarrhea, heartburn, nausea and vomiting.   Genitourinary: Negative for dysuria, flank pain, frequency, hematuria, hesitancy and urgency.   Neurological: Negative for dizziness, headaches, numbness and paresthesias.   Psychiatric/Behavioral: Negative for depression and memory loss. The patient does not have insomnia and is not nervous/anxious.      Physical Exam   Constitutional: He is oriented to person, place, and time. He appears well-developed and well-nourished. No distress.       HENT:   Head: Normocephalic and atraumatic.   Mouth/Throat: Oropharynx is clear and moist.   Eyes: EOM are normal. Pupils are equal, round, and reactive to light.   Neck: Normal range of motion. Neck supple.   Cardiovascular: Normal rate, regular rhythm, normal heart sounds and intact distal pulses.  Exam reveals no gallop and no friction rub.    No murmur heard.  Pulmonary/Chest: Effort normal and breath sounds normal. No respiratory distress. He has no wheezes. He has no rales. He exhibits no tenderness.   Abdominal: Bowel sounds are normal. He exhibits no distension and no mass. There is no tenderness. There is no guarding.   Peritoneal dialsysis c/d/i  nontender to palpation   Musculoskeletal: Normal range of motion. He exhibits  no edema or deformity.   Neurological: He is alert and oriented to person, place, and time.   Skin: Skin is warm and dry. No rash noted. He is not diaphoretic. No erythema.   Psychiatric: He has a normal mood and affect. His behavior is normal. Judgment and thought content normal.   Nursing note and vitals reviewed.    Diagnostics:   RPR   Date Value Ref Range Status   08/03/2017 Non-reactive Non-reactive Final     No results found for: CMVANTIBODIE  No results found for: HIV1X2  No results found for: HTLVIIIANTIB  Hepatitis B Surface Ag   Date Value Ref Range Status   08/03/2017 Negative  Final     Hep B Core Total Ab   Date Value Ref Range Status   08/03/2017 Negative  Final     Hepatitis C Ab   Date Value Ref Range Status   08/03/2017 Negative  Final     No results found for: TOXOIGG  No components found for: TOXOIGGINTER  No results found for: DKF0CNX  No results found for: YRS2SIX  Varicella IgG   Date Value Ref Range Status   08/03/2017 2.16 (H) 0.00 - 0.90 ISR Final     Varicella Interpretation   Date Value Ref Range Status   08/03/2017 Positive (A) Negative Final     Comment:     <or=0.90     Negative        No detectable IgG antibody to Varicella zoster  by the TRUMAN test. Such individuals are presumed to be   uninfected with Varicella zoster and to be susceptible to   primary infection.  0.91-1.09    Equivocal  >or=1.10     Positive        Indicates presence of detectable IgG antibody to Varicella   zoster by the TRUMAN test. Indicative of previous or current   infection.       Strongyloides Ab IgG   Date Value Ref Range Status   08/03/2017 Negative Negative Final     Comment:     No detectable levels of IgG antibodies to Strongyloides.  Repeat testing in 1-2 weeks if clinically indicated.  Test Performed by:  [address] [address]       No results found for: EPSTEINBARRV  No results found for: HEPBSAB  No results found for:  QUANTIFERON  No results found for: HEPAIGM  No results found for: PPD           Transplant Infectious Diseases - Candidacy    Assessment/Plan:     Transplant Candidacy: Based on available information, there are no identified significant barriers to transplantation from an infectious disease standpoint pending acceptable serologies.  Final determination of transplant candidacy will be made once evaluation is complete and reviewed by the Transplant Selection Committee.    Quantiferon gold, HIV, Strongyloides, and RPR pending. If positive, please refer to ID clinic.    Pt is up to date with his vaccines at this time. Pt received his second dose of his hepatitis A vaccine via PCP per mother.       Eli Greene PA-C         Counseling:I discussed with Tameka the risk for increased susceptibility to infections following transplantation including increased risk for infection right after transplant and if rejection should occur.  The patients has been counseled on the importance of vaccinations including but not limited to a yearly flu vaccine.  Specific guidance has been provided to the patient regarding the patients occupation, hobbies and activities to avoid future infectious complications including but not limited to avoiding undercooked meats and seafood, proper hygiene, and contact with animals.

## 2018-08-02 NOTE — TELEPHONE ENCOUNTER
Reviewed pt transplant labs.  Notified dialysis unit dietitian of the following abnormal labs via fax.     Albumin         1.9g/dl                                                                    Phosphorus     6.4mg/dl         Jenni Mccormack MS RD LDN

## 2018-08-06 DIAGNOSIS — Z01.818 PRE-TRANSPLANT EVALUATION FOR KIDNEY TRANSPLANT: Primary | ICD-10-CM

## 2018-08-06 DIAGNOSIS — Z76.82 ORGAN TRANSPLANT CANDIDATE: Primary | ICD-10-CM

## 2018-08-06 DIAGNOSIS — Z76.82 ORGAN TRANSPLANT CANDIDATE: ICD-10-CM

## 2018-08-07 ENCOUNTER — TELEPHONE (OUTPATIENT)
Dept: NEPHROLOGY | Facility: CLINIC | Age: 18
End: 2018-08-07

## 2018-08-07 NOTE — TELEPHONE ENCOUNTER
----- Message from Madhavi Barton sent at 8/7/2018  4:44 PM CDT -----  Contact: Mom  Mom would like a call back at [phone], Regards to if Linzexx would work for him for his constipation due to the fact that nothing else is and if so can it be called in at the [facility].    [facility]/pharmacy #[id number] [address] [address]  Phone: [phone] Fax: [phone]    Thanks  td

## 2018-08-07 NOTE — TELEPHONE ENCOUNTER
"Patient peritonitis is doing better, but he has been constipated for a few weeks.  Since he has tried miralax, glycolax, pericolace and other OTC ("everything") .   Mom was wondering if he is a candidate for Linzess and if so can you send it out.      Pharmacy [facility].   "

## 2018-08-08 NOTE — PROGRESS NOTES
Transplant Recipient Adult Psychosocial Assessment Update    Shantakrystal Saenz  [address] [address]    Telephone Information:   Mobile [phone]; [phone]   Home  [phone] (home)  Work  none  E-mail  No e-mail address on record    Sex: male  YOB: 2000  Age: 17 y.o.    Encounter Date: 8/2/2018  [place] Citizen: yes  Primary Language: English   Needed: no    Emergency Contact:    Name: Cortney Rivera  Relationship: grandmother  Address: [id number] [place] [address] [place]  Phone Numbers:  [phone] (home)  [phone] (cell)      Name: Clarisse Saenz  Relationship: mother and father  Address: same as patient  Phone Numbers:  [phone] (home)    Family/Social Support:   Number of dependents/: pt has 0 dependents  Marital history: single, never .  Other family dynamics: Pt currently lives with parents and two siblings. Older brother, Meek(21) and a younger sister Lucia(16).    Household Composition:  Name: Clarisse Saenz  Age: 43, both  Relationship: mother and father  Does person drive? yes    Name: Meek Saenz  Age: 21  Relationship: brother  Does person drive? no    Name: Anabel Saenz  Age: 16  Relationship: sister  Does person drive? yes    Do you and your caregivers have access to reliable transportation? yes  PRIMARY CAREGIVER: Darcie Saenz will be primary caregiver, phone number [phone].      provided in-depth information to patient and caregiver regarding pre- and post-transplant caregiver role.   strongly encourages patient and caregiver to have concrete plan regarding post-transplant care giving, including back-up caregiver(s) to ensure care giving needs are met as needed.    Patient and Caregiver states understanding all aspects of caregiver role/commitment and is able/willing/committed to being caregiver to the fullest extent necessary.    Patient and  Caregiver verbalizes understanding of the education provided today and caregiver responsibilities.         remains available. Patient and Caregiver agree to contact  in a timely manner if concerns arise.      Able to take time off work without financial concerns: yes.     Additional Significant Others who will Assist with Transplant:  Name: Jose Saenz  Age: 43  City: [place] State: [place]  Relationship: father  Does person drive? yes    Living Will: no  Healthcare Power of : no  Advance Directives on file: <<no information> per medical record.  Verbally reviewed LW/HCPA information.   provided patient with copy of LW/HCPA documents and provided education on completion of forms.    Living Donors: Yes.  Name: Brother Meek and father Jose would like to be tested to see if they are a match. .    Highest Education Level: High School (9-12) or GED  Reading Ability: 12th grade  Reports difficulty with: N/A  Learns Best By:  Combination of hands on and visual     Status: no  VA Benefits: no     Working for Income: No  If no, reason not working: Disability    Patient is disabled.  Prior to disability, patient  was in school at [employer] and graduated...He is now enrolled in [employer] where he studies Criminal Justice.  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     Spouse/Significant Other Employment: n/a    Disabled: yes: date disability began: 12/10/15, due to: ESRD.    Monthly Income:  Other: $1500 household income  Able to afford all costs now and if transplanted, including medications: yes  Patient and Caregiver verbalizes understanding of personal responsibilities related to transplant costs and the importance of having a financial plan to ensure that patients transplant costs are fully covered.      provided fundraising information/education.  Patient and Caregiver verbalizes understanding.  Social  worker remains available.    Insurance:   Payor/Plan Subscr  Sex Relation Sub. Ins. ID Effective Group Num   1. MEDPOINT - ME* MARTINA PEREZ 2000 Male  2018                                    [address]   2. MEDICARE - ME* MARTINA PEREZ 2000 Male  [id number] 3/1/16                                    [address]     Primary Insurance (for UNOS reporting): Public Insurance - Medicaid  Secondary Insurance (for UNOS reporting): Public Insurance - Medicare & Choice  Patient and Caregiver verbalizes clear understanding that patient may experience difficulty obtaining and/or be denied insurance coverage post-surgery. This includes and is not limited to disability insurance, life insurance, health insurance, burial insurance, long term care insurance, and other insurances.    Patient and Caregiver also reports understanding that future health concerns related to or unrelated to transplantation may not be covered by patient's insurance.  Resources and information provided and reviewed.      Patient and Caregiver provides verbal permission to release any necessary information to outside resources for patient care and discharge planning.  Resources and information provided are reviewed.      Dialysis Adherence:  Patient and Caregiver reports compliance. Patient does at home PD. SW attempted to called home dialysis unit and was unable to reach anyone. SW will call back as soon as possible.     Infusion Service: patient utilizing? no  Home Health: patient utilizing? no  DME: yes pt has  A BP cuff; PD supplies and equipment.   Pulmonary/Cardiac Rehab: pt denies   ADLS:  Pt confirms he can perform all ADLs    Adherence:   Pt reports he is adherent to all medical advice.  Adherence education and counseling provided.     Per History Section:  Past Medical History:   Diagnosis Date    Acne     Anemia of renal disease     Dialysis patient     peritoneal daily at night. followed by Dr. Sotelo     ESRD on dialysis since 12/16/15     Hypertension     Kidney disease     Seasonal allergies     Secondary hyperparathyroidism of renal origin      Social History   Substance Use Topics    Smoking status: Never Smoker    Smokeless tobacco: Never Used    Alcohol use No     History   Drug Use No     History   Sexual Activity    Sexual activity: Not on file       Per Today's Psychosocial:  Tobacco: none, patient denies any use.  Alcohol: none, patient denies any use.  Illicit Drugs/Non-prescribed Medications: none, patient denies any use.    Patient and Caregiver states clear understanding of the potential impact of substance use as it relates to transplant candidacy and is aware of possible random substance screening.  Substance abstinence/cessation counseling, education and resources provided and reviewed.     Arrests/DWI/Treatment/Rehab: patient denies    Psychiatric History:    Mental Health: pt denies any mental health issues currently or previously.   Psychiatrist/Counselor: pt denies  Medications:  Pt denies  Suicide/Homicide Issues: pt denies   Safety at home: pt confirms feeling safe at home    Knowledge: Patient and Caregiver states having clear understanding and realistic expectations regarding the potential risks and potential benefits of organ transplantation and organ donation, agrees to discuss with health care team members and support system members and to utilize available resources and express questions and/or concerns in order to further facilitate the pt informed decision-making.  Resources and information provided and reviewed.     Patient and Caregiver is aware of [facility]'s affiliation and/or partnership with agencies in home health care, LTAC, SNF, [facility], and other hospitals and clinics.    Understanding: Patient and Caregiver reports having a clear understanding of the many lifetime commitments involved with being a transplant recipient, including costs, compliance, medications, lab work,  procedures, appointments, concrete and financial planning, preparedness, timely and appropriate communication of concerns, abstinence (ETOH, tobacco, illicit non-prescribed drugs), adherence to all health care team recommendations, support system and caregiver involvement, appropriate and timely resource utilization and follow-through, mental health counseling as needed/recommended, and patient and caregiver responsibilities.  Social Service Handbook, resources and detailed educational information provided and reviewed.  Educational information provided.    Patient and Caregiver also reports current and expected compliance with health care regime and states having a clear understanding of the importance of compliance.      Patient and Caregiver reports a clear understanding that risks and benefits may be involved with organ transplantation and with organ donation.      Patient and Caregiver also reports clear understanding that psychosocial risk factors may affect patient, and include but are not limited to feelings of depression, generalized anxiety, anxiety regarding dependence on others, post traumatic stress disorder, feelings of guilt and other emotional and/or mental concerns, and/or exacerbation of existing mental health concerns.  Detailed resources provided and discussed.     Patient and Caregiver agrees to access appropriate resources in a timely manner as needed and/or as recommended, and to communicate concerns appropriately.  Patient and Caregiver also reports a clear understanding of treatment options available.      reviewed education, provided additional information, and answered questions.    Feelings or Concerns: pt denies any feelings for concerns at this time.    Coping: Pt reports that he likes to stay active and workout at the gym. Pt reports he spends time with friends and girlfriend to help cope with stress.     Goals: Pt reports he would like to play football again. Pt  previously played football in high school and had to stop because of kidney problems. Get off dialysis. Pt would also like to become completely independent because he is start college in the fall.  Patient referred to Vocational Rehabilitation.    Interview Behavior: Patient and Caregiver presents as alert and oriented x 4, pleasant, good eye contact, well groomed, recall good, concentration/judgement good, average intelligence, calm, communicative, cooperative and asking and answering questions appropriately. He was accompanied by parents and siblings who presented as very supportive.         Transplant Social Work - Candidacy  Assessment/Plan:     Psychosocial Suitability: Patient presents as a suitable candidate for kidney transplant at this time. Based on psychosocial risk factors, patient presents as low risk, due to stable home environment, appropriate caregiver plan, ability to afford cost of tranplant, positive support systems, and absence of any psychosocial concerns. .    Recommendations/Additional Comments: [place] recommends that pt conduct fundraising to assist pt with pay for cost of medication, food,gas, and other transplant related expenses. [place] recommends that pt remain free of all tobacco,ETOH, and substance use. SW remains available to assist with any concerns that may arise as pt navigates through the transplant process.      Juana Vidal, ARAMIS

## 2018-08-19 PROBLEM — T85.71XA DIALYSIS-ASSOCIATED PERITONITIS: Status: ACTIVE | Noted: 2018-08-19

## 2018-08-20 ENCOUNTER — TELEPHONE (OUTPATIENT)
Dept: NEPHROLOGY | Facility: CLINIC | Age: 18
End: 2018-08-20

## 2018-08-20 ENCOUNTER — TELEPHONE (OUTPATIENT)
Dept: TRANSPLANT | Facility: CLINIC | Age: 18
End: 2018-08-20

## 2018-08-20 NOTE — TELEPHONE ENCOUNTER
Needs to discuss route for IV ab tx.  0900 am dose will not be given.      Also reports critical phosphorus level 9.1.  0832      Kiya is the nurse on 3n.  [phone]

## 2018-08-20 NOTE — TELEPHONE ENCOUNTER
----- Message from Tamia Mejia sent at 8/20/2018  9:52 AM CDT -----  Contact: Mother Darcie   Mother Darcie called, patient is being discharged from [facility] today and she has some questions. Please call back at [phone]

## 2018-08-20 NOTE — TELEPHONE ENCOUNTER
----- Message from Maureen Villatoro sent at 8/20/2018  3:41 PM CDT -----  Contact: patients Mother      ----- Message -----  From: Salina Johnson  Sent: 8/20/2018  12:23 PM  To: [facility] Pre-Kidney Transplant Clinical    Needs Advice    Reason for call:  Patients Mom states she's made serveral attempts to speak with someone. She would like to discuss some work pt has to have done    Communication Preference: [phone] (cell phone)  Additional Information: n/a

## 2018-08-20 NOTE — TELEPHONE ENCOUNTER
Mom is calling to ask questions.  She said patient is depressed and she is concerned.  He has cancelled his classes for this semester and is depressed about having to go back to HD.    Can you give an estimate about how long he might have to go to HD until he gets well?      Mom understands they  Might need to pull the catheter. Will they do that during this  Hospital stay?       Will Dr. Soliz replace new catheter during the same hospital stay or does the infection need to clear up before they place a new one?

## 2018-08-21 ENCOUNTER — TELEPHONE (OUTPATIENT)
Dept: NEPHROLOGY | Facility: CLINIC | Age: 18
End: 2018-08-21

## 2018-08-21 NOTE — TELEPHONE ENCOUNTER
Mom would like to be sure the patient to have hydrocodone instead of oxycodone.      We talked about staying ahead of constipation.  She said they haven't been giving him anything but agreed to give him miralax after the surgery since he is NPO right now.

## 2018-08-21 NOTE — TELEPHONE ENCOUNTER
----- Message from Arsh Bower sent at 8/21/2018  9:00 AM CDT -----  Contact: pt mother Darcie Hardy is requesting a call back regarding the correct meds for pain. Please call Darcie to advise. She states the pt is constipated and wants to know if something lighter is available    Call Back#: [phone]   Thanks

## 2018-08-22 ENCOUNTER — TELEPHONE (OUTPATIENT)
Dept: NEPHROLOGY | Facility: CLINIC | Age: 18
End: 2018-08-22

## 2018-08-22 NOTE — TELEPHONE ENCOUNTER
----- Message from Tamia Mejia sent at 8/22/2018  2:17 PM CDT -----  Contact: Mother Darcie   Mother Darcie called, patient need rx traMADol tablet 50 mg called in to [facility]. Please call back at [phone]            [facility]/pharmacy #[id number] [address] [address]  Phone: [phone] Fax: [phone]

## 2018-08-22 NOTE — TELEPHONE ENCOUNTER
Please schedule a follow up. He's still in the hospital I believe but Ger notified me  No labs needed, thanks

## 2018-08-24 ENCOUNTER — TELEPHONE (OUTPATIENT)
Dept: NEPHROLOGY | Facility: CLINIC | Age: 18
End: 2018-08-24

## 2018-08-24 NOTE — TELEPHONE ENCOUNTER
Left message on identified voicemail to call or speak with dialysis nurse.     If Darcie calls back, let her know He is currently under the care of Dr. Cruz at [facility] and the questions re: his meds, should be directed to Dr. Cruz through the dialysis unit.

## 2018-08-24 NOTE — TELEPHONE ENCOUNTER
----- Message from Sheila Fletcher sent at 8/24/2018  3:16 PM CDT -----  Patients mom states that she has a question to ask in regards to patients medications.  Call mom/Darcie at [phone]

## 2018-08-30 ENCOUNTER — LAB VISIT (OUTPATIENT)
Dept: LAB | Facility: HOSPITAL | Age: 18
End: 2018-08-30
Attending: NURSE PRACTITIONER
Payer: MEDICARE

## 2018-08-30 ENCOUNTER — CLINICAL SUPPORT (OUTPATIENT)
Dept: CARDIOLOGY | Facility: CLINIC | Age: 18
End: 2018-08-30
Attending: NURSE PRACTITIONER
Payer: MEDICARE

## 2018-08-30 VITALS
SYSTOLIC BLOOD PRESSURE: 106 MMHG | HEART RATE: 63 BPM | WEIGHT: 162 LBS | DIASTOLIC BLOOD PRESSURE: 60 MMHG | BODY MASS INDEX: 23.19 KG/M2 | HEIGHT: 70 IN

## 2018-08-30 DIAGNOSIS — Z01.818 PRE-TRANSPLANT EVALUATION FOR KIDNEY TRANSPLANT: ICD-10-CM

## 2018-08-30 LAB
ALBUMIN SERPL BCP-MCNC: 2 G/DL
ALP SERPL-CCNC: 68 U/L
ALT SERPL W/O P-5'-P-CCNC: 16 U/L
ANION GAP SERPL CALC-SCNC: 8 MMOL/L
AST SERPL-CCNC: 23 U/L
BASOPHILS # BLD AUTO: 0.03 K/UL
BASOPHILS NFR BLD: 0.3 %
BILIRUB SERPL-MCNC: 0.2 MG/DL
BUN SERPL-MCNC: 31 MG/DL
CALCIUM SERPL-MCNC: 8.9 MG/DL
CHLORIDE SERPL-SCNC: 96 MMOL/L
CO2 SERPL-SCNC: 32 MMOL/L
CREAT SERPL-MCNC: 9.7 MG/DL
DIFFERENTIAL METHOD: ABNORMAL
EOSINOPHIL # BLD AUTO: 0.1 K/UL
EOSINOPHIL NFR BLD: 1 %
ERYTHROCYTE [DISTWIDTH] IN BLOOD BY AUTOMATED COUNT: 15.9 %
EST. GFR  (AFRICAN AMERICAN): 8.1 ML/MIN/1.73 M^2
EST. GFR  (NON AFRICAN AMERICAN): 7 ML/MIN/1.73 M^2
GLUCOSE SERPL-MCNC: 92 MG/DL
HCT VFR BLD AUTO: 25.4 %
HGB BLD-MCNC: 7.8 G/DL
IMM GRANULOCYTES # BLD AUTO: 0.06 K/UL
IMM GRANULOCYTES NFR BLD AUTO: 0.6 %
LYMPHOCYTES # BLD AUTO: 0.9 K/UL
LYMPHOCYTES NFR BLD: 10 %
MCH RBC QN AUTO: 29.2 PG
MCHC RBC AUTO-ENTMCNC: 30.7 G/DL
MCV RBC AUTO: 95 FL
MONOCYTES # BLD AUTO: 0.6 K/UL
MONOCYTES NFR BLD: 6.5 %
NEUTROPHILS # BLD AUTO: 7.6 K/UL
NEUTROPHILS NFR BLD: 81.6 %
NRBC BLD-RTO: 0 /100 WBC
PLATELET # BLD AUTO: 300 K/UL
PMV BLD AUTO: 11.1 FL
POTASSIUM SERPL-SCNC: 5.7 MMOL/L
PROT SERPL-MCNC: 7.3 G/DL
RBC # BLD AUTO: 2.67 M/UL
SODIUM SERPL-SCNC: 136 MMOL/L
WBC # BLD AUTO: 9.36 K/UL

## 2018-08-30 PROCEDURE — 80053 COMPREHEN METABOLIC PANEL: CPT | Mod: TXP

## 2018-08-30 PROCEDURE — 85025 COMPLETE CBC W/AUTO DIFF WBC: CPT | Mod: TXP

## 2018-08-30 PROCEDURE — 36415 COLL VENOUS BLD VENIPUNCTURE: CPT | Mod: PO,TXP

## 2018-08-30 PROCEDURE — 99999 PR PBB SHADOW E&M-EST. PATIENT-LVL II: CPT | Mod: PBBFAC,TXP,,

## 2018-08-30 PROCEDURE — 99212 OFFICE O/P EST SF 10 MIN: CPT | Mod: PBBFAC,PO,TXP,25

## 2018-08-30 PROCEDURE — 93306 TTE W/DOPPLER COMPLETE: CPT | Mod: PBBFAC,PO,TXP | Performed by: INTERNAL MEDICINE

## 2018-08-31 LAB
ASCENDING AORTA: 2.17 CM
AV MEAN GRADIENT: 6.63 MMHG
AV PEAK GRADIENT: 11.22 MMHG
AV VALVE AREA: 1.74 CM2
BSA FOR ECHO PROCEDURE: 1.9 M2
CV ECHO LV RWT: 0.28 CM
DOP CALC AO PEAK VEL: 1.67 M/S
DOP CALC AO VTI: 35.19 CM
DOP CALC LVOT AREA: 2.77 CM2
DOP CALC LVOT DIAMETER: 1.88 CM
DOP CALC LVOT STROKE VOLUME: 61.15 CM3
DOP CALCLVOT PEAK VEL VTI: 22.04 CM
E WAVE DECELERATION TIME: 150.04 MSEC
E/A RATIO: 2.22
E/E' RATIO: 8.22
ECHO LV POSTERIOR WALL: 0.59 CM (ref 0.6–1.1)
FRACTIONAL SHORTENING: 26 % (ref 28–44)
INTERVENTRICULAR SEPTUM: 0.81 CM (ref 0.6–1.1)
IVRT: 0.09 MSEC
LA MAJOR: 4.52 CM
LA MINOR: 4.9 CM
LA WIDTH: 3.56 CM
LEFT ATRIUM SIZE: 4.14 CM
LEFT ATRIUM VOLUME INDEX: 31 ML/M2
LEFT ATRIUM VOLUME: 58.91 CM3
LEFT INTERNAL DIMENSION IN SYSTOLE: 3.74 CM (ref 2.1–4)
LEFT VENTRICLE MASS INDEX: 61.6 G/M2
LEFT VENTRICULAR INTERNAL DIMENSION IN DIASTOLE: 5.06 CM (ref 3.5–6)
LEFT VENTRICULAR MASS: 117.11 G
LV LATERAL E/E' RATIO: 6.94
LV SEPTAL E/E' RATIO: 10.09
MV PEAK A VEL: 0.5 M/S
MV PEAK E VEL: 1.11 M/S
MV STENOSIS PRESSURE HALF TIME: 43.51 MS
MV VALVE AREA P 1/2 METHOD: 5.06 CM2
PISA TR MAX VEL: 2.99 M/S
PULM VEIN S/D RATIO: 0.57
PV PEAK D VEL: 0.75 M/S
PV PEAK S VEL: 0.43 M/S
RA MAJOR: 4.38 CM
RA PRESSURE: 3 MMHG
RA WIDTH: 2.85 CM
RIGHT VENTRICULAR END-DIASTOLIC DIMENSION: 3.67 CM
SINUS: 2.94 CM
STJ: 2.06 CM
TDI LATERAL: 0.16
TDI SEPTAL: 0.11
TDI: 0.14
TR MAX PG: 35.76 MMHG
TRICUSPID ANNULAR PLANE SYSTOLIC EXCURSION: 0.03 CM
TV REST PULMONARY ARTERY PRESSURE: 38.76 MMHG

## 2018-09-17 ENCOUNTER — TELEPHONE (OUTPATIENT)
Dept: TRANSPLANT | Facility: CLINIC | Age: 18
End: 2018-09-17

## 2018-09-17 NOTE — TELEPHONE ENCOUNTER
----- Message from Teresa Diehl sent at 9/17/2018 11:06 AM CDT -----  Contact: Patient  Needs Advice    Reason for call: Wishes to speak to Montserrat Diehl regarding his blood work and biopsy.         Communication Preference: [phone]    Additional Information: n/a

## 2018-09-24 ENCOUNTER — TELEPHONE (OUTPATIENT)
Dept: TRANSPLANT | Facility: CLINIC | Age: 18
End: 2018-09-24

## 2018-09-24 NOTE — TELEPHONE ENCOUNTER
" Compliance check:  [facility]O spoke with both pt's PD nurse, Genevieve and HD nurse, Farheen to conduct compliance check.  PD nurse stated pt has had recurrent peritonitis and is now currently on HD until completing IV abx as well as surgical clearance.  Stated pt came to all appts and kept logs, no concerns.  Farheen, HD nurse stated, "He always comes and doesn't sign off early."  Indicated pt has some problems with fluid gains, but is not accustomed to controlling fluid as he was on PD.  Stated he is going to surgeon tomorrow to schedule replacement of PD cath and plans to return to PD.  Was in college for a semester away from home and doing PD on his own.  Farheen states support from mother "could be better; other things get in the way of taking care of him."  No other concerns.  "

## 2018-09-27 PROBLEM — N18.6 ESRD (END STAGE RENAL DISEASE): Status: ACTIVE | Noted: 2018-09-27

## 2018-09-28 ENCOUNTER — TELEPHONE (OUTPATIENT)
Dept: TRANSPLANT | Facility: CLINIC | Age: 18
End: 2018-09-28

## 2018-09-28 ENCOUNTER — COMMITTEE REVIEW (OUTPATIENT)
Dept: TRANSPLANT | Facility: CLINIC | Age: 18
End: 2018-09-28

## 2018-09-28 NOTE — TELEPHONE ENCOUNTER
Informed of committee's decision. Pt's mother stated that pt has completed all of his antibiotics for PD catheter infection a week ago. No longer have a PD catheter. They attempted to place another PD catheter on yesterday but couldn't due to a lot of scarring. Patient will remain on HD per mother.     Received up to date Albumin from dialysis unit. 9/12/18 Albumin 3.2.

## 2018-09-28 NOTE — TELEPHONE ENCOUNTER
----- Message from Finn Briones sent at 9/28/2018  9:39 AM CDT -----  Contact: patient mother Darcie  Patient mother Darcie calling to see if the patient biopsy results was receive.           Please call [phone]        Thanks!

## 2018-09-28 NOTE — LETTER
September 28, 2018    Kathy Cruz  [address] [place] [address]   [address]  [facility] [address]         Dear Dr. Cruz:    Patient: Tameka Saenz   MR Number: [id number]   YOB: 2000     Your patient, Tameka Saenz, was recently discussed at the [facility]ney Selection Committee.  I am happy to inform you that Tameka has been approved for transplantation pending improvement of albumin to 2.5 or greater.  He has met selection criteria for a kidney transplant related to ESRD secondary to primary diagnosis of Hypertensive Nephrosclerosis. Your patient will be placed on the cadaveric wait list pending final financial approval from insurance company.     We appreciate your confidence in allowing us to participate in your patients care.      If you have any questions or concerns, please do not hesitate to contact me.    Sincerely,      Salina Goodman MD  Medical Director, Kidney & Kidney/Pancreas Transplantation    C[facility]/lc    Cc: Tameka Saenz

## 2018-10-29 ENCOUNTER — TELEPHONE (OUTPATIENT)
Dept: TRANSPLANT | Facility: CLINIC | Age: 18
End: 2018-10-29

## 2018-10-30 ENCOUNTER — TELEPHONE (OUTPATIENT)
Dept: TRANSPLANT | Facility: HOSPITAL | Age: 18
End: 2018-10-30

## 2018-10-30 NOTE — TELEPHONE ENCOUNTER
"According to dialysis unit medical record, in the last three months pt has, 0 AMAs, 0  No Shows and denies issues with transportation and labs. "He does really well with his treatments and what he can control. No concerns here".     Confirmed by dialysis unit-BASIA Lakhani           ----- Message from Montserrat Diehl sent at 10/26/2018  2:05 PM CDT -----  Regarding: FW: FYI reminder to call pt's nephologist regarding reoccurrent peritoneal infection.  Can someone do a compliance check on this patient?  Montserrat   ----- Message -----  From: Anjelica Simpson MD  Sent: 10/26/2018  10:31 AM  To: Montserrat Diehl  Subject: RE: FYI reminder to call pt's nephologist re#    I think it would be fine. Maybe just ask SW to make sure he has been compliant with going to HD thus far.     ----- Message -----  From: Montserrat Diehl  Sent: 10/26/2018  10:14 AM  To: Anjelica Simpson MD  Subject: RE: FYI reminder to call pt's nephologist re#    According to my notes, it was to see if he was being compliant with the way he was doing PD because of recurrent infections. But you are right, he's on hemo for good due to a lot of abdominal scarring and he does have a new Nephrologist. His hypoalbuminemia has resolved. May I list this patient?  ----- Message -----  From: Anjelica Simpson MD  Sent: 10/25/2018   4:13 PM  To: Montserrat Diehl  Subject: RE: FYI reminder to call pt's nephologist re#    Hey!     I'm sorry but I thought since he was staying on HD and switched nephrologists there wasn't a need to contact the new nephrologist since he/she wouldn't have much info. I'm not sure if I have his selection packet with my notes on it anymore. What were the specific concerns we had outside of hypoalbuminemia? Was there compliance concern - if so do we need to have SW check and see how he's doing since being on HD? We can talk about it Friday.     Anjelica     ----- Message -----  From: Montserrat Diehl  Sent: 10/25/2018   3:47 PM  To: Anjelica Simpson MD  Subject: FW: FYI " reminder to call pt's nephologist re#    Kandi Dejesus,  Have you heard anything yet. Patient is ready to be listed pending you speaking with nephrologist. His Albumin has improved. See media.  ----- Message -----  From: Montserrat Diehl  Sent: 10/3/2018   2:11 PM  To: Anjelica Simpson MD  Subject: FW: FYI reminder to call pt's nephologist re#    Did you get a chance to speak to pt's nephrologist and review KT/V yet?  Montserrat  ----- Message -----  From: Montserrat Diehl  Sent: 9/28/2018   1:48 PM  To: Anjelica Simpson MD  Subject: FYI reminder to call pt's nephologist regard#    Approved pending Albumin >2.5 and review Kt/V     Patient mother was informed. Pt's mother stated that pt has completed all of his antibiotics for PD catheter infection a week ago. No longer have a PD catheter. They attempted to place another PD catheter on yesterday but couldn't due to a lot of scarring. Patient will remain on HD per mother. All questions were answered and pt and mother verbalized understanding.      Received up to date Albumin from dialysis unit. 9/12/18 Albumin 3.2.  And KT/V. Both scanned in media.     FYI: Kt/V is from patient's new dialysis unit, [facility]. It's only for one month. Attempted to get Kt/V from prior unit but unable to get at this time since patient is no longer their patient. They have to get it from their medical record center. This will take a while per Emily at the unit.   Patient is also being followed by Dr. Kathy Cruz since he is on hemo. Dr. Sotelo ([phone]) was following while on PD.

## 2018-11-27 ENCOUNTER — TELEPHONE (OUTPATIENT)
Dept: TRANSPLANT | Facility: CLINIC | Age: 18
End: 2018-11-27

## 2018-11-27 DIAGNOSIS — Z76.82 AWAITING ORGAN TRANSPLANT: Primary | ICD-10-CM

## 2018-11-27 NOTE — TELEPHONE ENCOUNTER
"  KIDNEY WAIT LISTING NOTE    Date of Financial clearance to list: 18    SSN/[facility]:     Organ: Kidney  Name:       Tameka Saenz   : 2000          Gender:     male    MRN#: [id number]                                 State of Permanent Residence:  [address] [place] [address]  Ethnicity: /Black   Race:      Black or     CLINICAL INFORMATION   Candidate Medical Urgency Status: Active  Number of Previous Kidney Transplants: 0  Number of Previous Solid Organ Transplants: 0  Did you enter number of previous kidney or other solid organ transplants? yes  Is this Candidate a Prior Living Donor: no  (If yes, please generate letter to [employer] with patient's date of donation, recipient SSN, signed by Surgical Director after patient is listed in order to receive priority points).      ABO  ABO Blood Group:   O POS     ABO Confirmation: (THESE DATES MUST BE PRIOR TO THE LIST DATE AND SUPPORTED BY SEPARATE LAB REPORTS)    Internal Results    Lab Results   Component Value Date    GROUPTRH O POS 2018    GROUPTRH O POS 2017     No results found for: ABO    External Results    ABO Date 1:    ABO Date 2  Are either of these ABO results based on External Labs? no  (If Yes, STOP and go to source document in Media Tab for verification).    VITALS  Height:  5' 7"  Weight:  169 lbs  (Use height from Transplant clinic visits only).  Did you enter height/weight? Yes    HLA    Class I:  Lab Results   Component Value Date    BYIV8OF 23 2017    GNED9IB 30 2017    OYXO2SD 7 2017    KOMH7TD 45 2017    UANLP6XY 6 2017    IUSIS0AO XX 2017    HADUS5DW 6 2017    HYZZN2RT 7 2017       Class II:  Lab Results   Component Value Date    AEUQUQ02RJ 7 2017    FREUFW91AU 14 2017    MFRRZA004SF 52 2017    STQFWL5698 53 2017    FHHYN6SU 2 2017    UPUYG1XQ 5 2017       Tested for HLA Antibodies: Yes, " "antibodies detected     If result is "Positive" antibodies are detected     If result is "Negative or questionable" no antibodies detected    Lab Results   Component Value Date    CIPRAS Positive 08/03/2017    CIIPRAS Negative 08/03/2017       DIALYSIS INFORMATION  Is patient Pre-Dialysis: No     Report GFR being used as the criteria for placement on the kidney list. If not, leave blank  GFR < or = 20 ml/min? n/a  If Yes, Specify value  ___   ml/min     Initial date GFR became 20 or less:   Is GFR obtained from an Outside lab Result? n/a  (If YES verify with source document scanned into media)    If patient on Dialysis:    Is candidate currently on dialysis for ESRD? Yes  If Yes,  Date Chronic Dialysis Started:   12/5/2015  (verify with source document in Media Tab)   Dialysis Unit Name: [address] [address]                        Physician Name:  Dr. Salina Carrington  NPI#: [id number]    DIABETES INFORMATION  Primary Native Kidney Diagnosis: Hypertensive Nephrosclerosis  C-Peptide Value - No results found for: CPEPTIDE  Current Diabetes Status: None    FOR NON-KIDNEY DEPARTMENT USE ONLY:  Additional Organs Registered? none    Maximum Acceptable Number of HLA Mismatches  ABDR:     6      (0-6)               AB:               (0-4)  ADR:   _____  (0-4)              BDR: _____ (0-4)  A:        _____  (0-2)              B:      _____ (0-2)          DR: ______ (0-2)    Will Recipient Accept?   Accept HBcAB Positive Organ:            Yes  Accept HBV PORFIRIO Positive Organ:        no  Accept HCV Antibody Positive Organ: no   Accept HCV PORFIRIO Positive Organ: no  Accept KDPI > 85 Donor ?: No                        Local: No                           Import: No    ### NURSE TO VERIFY CONSENT AND MAKE ANY NECESSARY CHANGES NEEDED IN UNET AT THE TIME OF VERIFICATION ###    Unacceptible Antigens  If yes, list     Lab Results   Component Value Date    FY1XYHQ A2,B57,B58 " 08/02/2018    CIABCLM A*11:02 08/02/2018    CIIAB Negative 08/02/2018       ### DO NOT LIST IF ANTIGEN VALUE WEAK ###

## 2018-11-27 NOTE — TELEPHONE ENCOUNTER
Spoken to pt's mother in regards to Hep C positive donor. Pt wasn't available per mother. Mother instructed to have pt call transplant coordinator to discuss Hep C donors if he's interested. All questions were answered and mother verbalized understanding.

## 2018-11-27 NOTE — LETTER
2018  Tameka Saenz  [address] [address]    Dear Tameka Saenz:  MRN: [id number]    Congratulations! On 2018, you were placed on  the waiting list for a  donor transplant.    Your candidacy for kidney transplant is based on the following criteria: ESRD due to Hypertension.    Your transplant coordinator while on the waiting list is Mani Martinez RN. They can be reached at [phone] or [phone] with any questions.      What to do now?    ? Ask your living donors to call and begin testing   Give your donors our phone number, [phone]   Make sure your donors have your name and date of birth when they call   You will get transplanted much faster if you have a living donor    ? Have your blood sent to our Transplant Lab every month   If you are on dialysis - our Transplant Lab will work with your dialysis unit to send your blood every month   If you are not on dialysis   - If you live near an [facility] lab, we will schedule you to have blood drawn every month  - If you do not live near an [facility] lab, you will be sent blood kits in the mail. You will need to take a kit to your local lab or doctor to have your blood drawn every month and mail to the Transplant Lab.     ? Call us with ANY CHANGES   Phone numbers - we must be able to reach you anytime of the day or night when a kidney is available   Address   Insurance coverage   Dialysis unit or kidney doctor   Aj: if you have surgery, stay in the hospital, have to get blood, or have an infection    ? Review your Kidney/Kidney-Pancreas Transplant Guide    This will give you detailed information about what happens when  - you are on the waiting list   - you are called when a kidney is available     The [facility] has a transplant surgeon and physician available 365  days a year, 24 hours a day to coordinate organ acceptance, procurement, surgical placement and  to  address urgent patient care issues.  You will be notified in writing of any changes to our Transplant  Centers staffing plan that would impact your ability to receive a transplant.     Attached is a letter from the [employer] for Organ Sharing (UN[employer]). It describes the services and  information offered to patients by UNOS and the Organ Procurement and Transplant Network. We look  forward to working with you while on the waiting list.      Congratulations,     Your Transplant Partner   [facility]    [facility] [place] [address]   [phone]     /lc                                                     OPTN/UNOS: Your Resource for Organ Transplant Information        If you have a question regarding your own medical care, you always should call your transplant center first. However, for general organ transplant-related information, you can call the [employer] Organ Sharing (UNOS) toll-free patient services line at [phone].    Anyone, including potential transplant candidates, recipients, family members/friends, living donors, and/or donor family members can call this number to:    · talk about organ donation, living donation, how transplant and donation work, the donation process, transplant policies, and transplant/donor information;  · get a free patient information kit with helpful booklets, waiting list and transplant information, and a list of all transplant centers;  · ask questions about the Organ Procurement and Transplantation Network (OPTN) web site (www.optn.transplant.hrsa.gov); the UNOS Web site (www.unos.org); or the UNOS web site for living donors and transplant recipients (www.transplantliving.org);  · learn how UNOS and the OPTN can help you;  · talk about any concerns that you may have with a transplant center and how they perform    UNOS is a not-for-profit organization that provides all of the administrative services for the  national OPTN under federal contract to the Health Resources and Services Administration (HRSA), an agency under the U.S. Department of Health and Human Services (HHS).     UNOS and OPTN responsibilities include:    · writing educational material for patients, the public and professionals;  · helping to make people aware of the need for donated organs and tissue;  · writing organ transplant policy with help from doctors, nurses, transplant patients/candidates, donor families, living donors, and the public;  · coordinating the organ matching and placement process;  · collecting information about every organ transplant and donation that occurs in the [place].    Remember, you should contact your transplant center directly if you have questions or concerns about your own medical care including medical records, work-up progress and test reports. [facility] is not your transplant center, and staff at [facility] will not be able to transfer you to your transplant center, so keep your transplant centers phone number handy. But, while you research your transplant needs and learn as much as you can about transplantation and donation, we welcome your call to our toll-free patient services line at [phone].

## 2018-12-13 DIAGNOSIS — Z76.82 ORGAN TRANSPLANT CANDIDATE: Primary | ICD-10-CM

## 2018-12-19 ENCOUNTER — TELEPHONE (OUTPATIENT)
Dept: TRANSPLANT | Facility: CLINIC | Age: 18
End: 2018-12-19

## 2018-12-19 NOTE — TELEPHONE ENCOUNTER
----- Message from Anjelica Simpson MD sent at 10/26/2018 10:31 AM CDT -----  Regarding: RE: FYI reminder to call pt's nephologist regarding reoccurrent peritoneal infection.  I think it would be fine. Maybe just ask SW to make sure he has been compliant with going to HD thus far.     ----- Message -----  From: Montserrat Diehl  Sent: 10/26/2018  10:14 AM  To: Anjelica Simpson MD  Subject: RE: FYI reminder to call pt's nephologist re#    According to my notes, it was to see if he was being compliant with the way he was doing PD because of recurrent infections. But you are right, he's on hemo for good due to a lot of abdominal scarring and he does have a new Nephrologist. His hypoalbuminemia has resolved. May I list this patient?  ----- Message -----  From: Anjelica Simpson MD  Sent: 10/25/2018   4:13 PM  To: Montserrat Diehl  Subject: RE: FYI reminder to call pt's nephologist re#    Hey!     I'm sorry but I thought since he was staying on HD and switched nephrologists there wasn't a need to contact the new nephrologist since he/she wouldn't have much info. I'm not sure if I have his selection packet with my notes on it anymore. What were the specific concerns we had outside of hypoalbuminemia? Was there compliance concern - if so do we need to have SW check and see how he's doing since being on HD? We can talk about it Friday.     Anjelica     ----- Message -----  From: Montserrat Diehl  Sent: 10/25/2018   3:47 PM  To: Anjelica Simpson MD  Subject: FW: FYI reminder to call pt's nephologist re#    Kandi Dejesus,  Have you heard anything yet. Patient is ready to be listed pending you speaking with nephrologist. His Albumin has improved. See media.  ----- Message -----  From: Montserrat Diehl  Sent: 10/3/2018   2:11 PM  To: Anjelica Simpson MD  Subject: FW: FYI reminder to call pt's nephologist re#    Did you get a chance to speak to pt's nephrologist and review KT/V yet?  Montserrat  ----- Message -----  From: Montserrat Diehl  Sent: 9/28/2018   1:48 PM  To:  Anjelica Simpson MD  Subject: FYI reminder to call pt's nephologist regard#    Approved pending Albumin >2.5 and review Kt/V     Patient mother was informed. Pt's mother stated that pt has completed all of his antibiotics for PD catheter infection a week ago. No longer have a PD catheter. They attempted to place another PD catheter on yesterday but couldn't due to a lot of scarring. Patient will remain on HD per mother. All questions were answered and pt and mother verbalized understanding.      Received up to date Albumin from dialysis unit. 9/12/18 Albumin 3.2.  And KT/V. Both scanned in media.     FYI: Kt/V is from patient's new dialysis unit, [facility]. It's only for one month. Attempted to get Kt/V from prior unit but unable to get at this time since patient is no longer their patient. They have to get it from their medical record center. This will take a while per Nolita at the unit.   Patient is also being followed by Dr. Kathy Cruz since he is on hemo. Dr. Sotelo ([phone]) was following while on PD.

## 2019-02-26 ENCOUNTER — TELEPHONE (OUTPATIENT)
Dept: TRANSPLANT | Facility: CLINIC | Age: 19
End: 2019-02-26

## 2019-06-14 ENCOUNTER — TELEPHONE (OUTPATIENT)
Dept: TRANSPLANT | Facility: CLINIC | Age: 19
End: 2019-06-14

## 2019-06-14 NOTE — TELEPHONE ENCOUNTER
Returned call, no answer, LM.  ----- Message from Maureen Villatoro sent at 6/13/2019  5:02 PM CDT -----  Contact: patient Mom      ----- Message -----  From: Teresa Diehl  Sent: 6/13/2019  11:53 AM  To: [facility] Pre-Kidney Transplant Clinical, #    Needs Advice    Reason for call: Mom called to discuss patient transplant status with Montserrat.         Communication Preference: [phone]    Additional Information: n/a

## 2019-06-17 ENCOUNTER — TELEPHONE (OUTPATIENT)
Dept: TRANSPLANT | Facility: CLINIC | Age: 19
End: 2019-06-17

## 2019-06-17 NOTE — TELEPHONE ENCOUNTER
Returned call to pt's mother. Answered questions regarding WL status. Pt switching DU & initiating home HD. Pt's mom has been advised to call back with updated DU name & number.  ----- Message from Teresa Diehl sent at 6/17/2019  9:29 AM CDT -----  Contact: Patient Mom  Patient Returning Call from [facility]    Who Left Message for Patient: Mani   Communication Preference: [phone]  Additional Information: n/a

## 2019-07-10 DIAGNOSIS — Z76.82 ORGAN TRANSPLANT CANDIDATE: Primary | ICD-10-CM

## 2019-08-07 DIAGNOSIS — Z76.82 ORGAN TRANSPLANT CANDIDATE: Primary | ICD-10-CM

## 2019-08-23 ENCOUNTER — HOSPITAL ENCOUNTER (OUTPATIENT)
Dept: RADIOLOGY | Facility: HOSPITAL | Age: 19
Discharge: HOME | End: 2019-08-23
Attending: NURSE PRACTITIONER
Payer: MEDICARE

## 2019-08-23 ENCOUNTER — CLINICAL SUPPORT (OUTPATIENT)
Dept: CARDIOLOGY | Facility: CLINIC | Age: 19
End: 2019-08-23
Attending: NURSE PRACTITIONER
Payer: MEDICARE

## 2019-08-23 ENCOUNTER — HOSPITAL ENCOUNTER (OUTPATIENT)
Dept: RADIOLOGY | Facility: HOSPITAL | Age: 19
Discharge: HOME | End: 2019-08-23
Attending: TRANSPLANT SURGERY
Payer: MEDICARE

## 2019-08-23 VITALS
SYSTOLIC BLOOD PRESSURE: 130 MMHG | HEART RATE: 65 BPM | BODY MASS INDEX: 27.55 KG/M2 | HEIGHT: 69 IN | WEIGHT: 186 LBS | DIASTOLIC BLOOD PRESSURE: 70 MMHG

## 2019-08-23 DIAGNOSIS — Z76.82 ORGAN TRANSPLANT CANDIDATE: ICD-10-CM

## 2019-08-23 PROCEDURE — 72170 XR PELVIS ROUTINE AP: ICD-10-PCS | Mod: 26,TXP,, | Performed by: RADIOLOGY

## 2019-08-23 PROCEDURE — 71046 XR CHEST PA AND LATERAL: ICD-10-PCS | Mod: 26,TXP,, | Performed by: RADIOLOGY

## 2019-08-23 PROCEDURE — 99999 PR PBB SHADOW E&M-EST. PATIENT-LVL II: ICD-10-PCS | Mod: PBBFAC,TXP,,

## 2019-08-23 PROCEDURE — 76770 US EXAM ABDO BACK WALL COMP: CPT | Mod: TC,PO,TXP

## 2019-08-23 PROCEDURE — 99212 OFFICE O/P EST SF 10 MIN: CPT | Mod: PBBFAC,25,PO,TXP

## 2019-08-23 PROCEDURE — 71046 X-RAY EXAM CHEST 2 VIEWS: CPT | Mod: 26,TXP,, | Performed by: RADIOLOGY

## 2019-08-23 PROCEDURE — 72170 X-RAY EXAM OF PELVIS: CPT | Mod: TC,FY,PO,TXP

## 2019-08-23 PROCEDURE — 72170 X-RAY EXAM OF PELVIS: CPT | Mod: 26,TXP,, | Performed by: RADIOLOGY

## 2019-08-23 PROCEDURE — 93306 TRANSTHORACIC ECHO (TTE) COMPLETE (CUPID ONLY): ICD-10-PCS | Mod: 26,S$PBB,TXP, | Performed by: INTERNAL MEDICINE

## 2019-08-23 PROCEDURE — 76770 US EXAM ABDO BACK WALL COMP: CPT | Mod: 26,TXP,, | Performed by: RADIOLOGY

## 2019-08-23 PROCEDURE — 71046 X-RAY EXAM CHEST 2 VIEWS: CPT | Mod: TC,FY,PO,TXP

## 2019-08-23 PROCEDURE — 99999 PR PBB SHADOW E&M-EST. PATIENT-LVL II: CPT | Mod: PBBFAC,TXP,,

## 2019-08-23 PROCEDURE — 93306 TTE W/DOPPLER COMPLETE: CPT | Mod: PBBFAC,PO,TXP | Performed by: INTERNAL MEDICINE

## 2019-08-23 PROCEDURE — 76770 US RETROPERITONEAL COMPLETE: ICD-10-PCS | Mod: 26,TXP,, | Performed by: RADIOLOGY

## 2019-08-26 LAB
ASCENDING AORTA: 2.31 CM
AV INDEX (PROSTH): 0.65
AV MEAN GRADIENT: 6 MMHG
AV PEAK GRADIENT: 11 MMHG
AV VALVE AREA: 2.34 CM2
AV VELOCITY RATIO: 0.64
BSA FOR ECHO PROCEDURE: 2.03 M2
CV ECHO LV RWT: 0.4 CM
DOP CALC AO PEAK VEL: 1.67 M/S
DOP CALC AO VTI: 34.89 CM
DOP CALC LVOT AREA: 3.6 CM2
DOP CALC LVOT DIAMETER: 2.14 CM
DOP CALC LVOT PEAK VEL: 1.07 M/S
DOP CALC LVOT STROKE VOLUME: 81.5 CM3
DOP CALCLVOT PEAK VEL VTI: 22.67 CM
E WAVE DECELERATION TIME: 190.5 MSEC
E/A RATIO: 1.88
E/E' RATIO: 7.93 M/S
ECHO LV POSTERIOR WALL: 1.1 CM (ref 0.6–1.1)
FRACTIONAL SHORTENING: 31 % (ref 28–44)
INTERVENTRICULAR SEPTUM: 1.1 CM (ref 0.6–1.1)
IVRT: 0.08 MSEC
LA MAJOR: 5.96 CM
LA MINOR: 4.89 CM
LA WIDTH: 4.54 CM
LEFT ATRIUM SIZE: 4.15 CM
LEFT ATRIUM VOLUME INDEX: 43 ML/M2
LEFT ATRIUM VOLUME: 86.04 CM3
LEFT INTERNAL DIMENSION IN SYSTOLE: 3.75 CM (ref 2.1–4)
LEFT VENTRICLE DIASTOLIC VOLUME INDEX: 72.29 ML/M2
LEFT VENTRICLE DIASTOLIC VOLUME: 144.8 ML
LEFT VENTRICLE MASS INDEX: 119 G/M2
LEFT VENTRICLE SYSTOLIC VOLUME INDEX: 30 ML/M2
LEFT VENTRICLE SYSTOLIC VOLUME: 60 ML
LEFT VENTRICULAR INTERNAL DIMENSION IN DIASTOLE: 5.46 CM (ref 3.5–6)
LEFT VENTRICULAR MASS: 239.12 G
LV LATERAL E/E' RATIO: 7.4 M/S
LV SEPTAL E/E' RATIO: 8.54 M/S
MV PEAK A VEL: 0.59 M/S
MV PEAK E VEL: 1.11 M/S
PISA TR MAX VEL: 3.02 M/S
PULM VEIN S/D RATIO: 1.24
PV PEAK D VEL: 0.59 M/S
PV PEAK S VEL: 0.73 M/S
RA MAJOR: 4.81 CM
RA PRESSURE: 3 MMHG
RA WIDTH: 3.45 CM
RIGHT VENTRICULAR END-DIASTOLIC DIMENSION: 3.98 CM
SINUS: 2.77 CM
STJ: 2.49 CM
TDI LATERAL: 0.15 M/S
TDI SEPTAL: 0.13 M/S
TDI: 0.14 M/S
TR MAX PG: 36 MMHG
TRICUSPID ANNULAR PLANE SYSTOLIC EXCURSION: 2.5 CM
TV REST PULMONARY ARTERY PRESSURE: 39 MMHG

## 2019-08-30 ENCOUNTER — OFFICE VISIT (OUTPATIENT)
Dept: TRANSPLANT | Facility: CLINIC | Age: 19
End: 2019-08-30
Payer: MEDICARE

## 2019-08-30 VITALS
WEIGHT: 189.63 LBS | RESPIRATION RATE: 16 BRPM | DIASTOLIC BLOOD PRESSURE: 98 MMHG | TEMPERATURE: 98 F | BODY MASS INDEX: 28.08 KG/M2 | SYSTOLIC BLOOD PRESSURE: 156 MMHG | OXYGEN SATURATION: 99 % | HEIGHT: 69 IN | HEART RATE: 109 BPM

## 2019-08-30 DIAGNOSIS — Z76.82 PATIENT ON WAITING LIST FOR KIDNEY TRANSPLANT: Chronic | ICD-10-CM

## 2019-08-30 DIAGNOSIS — I12.0 BENIGN HYPERTENSION WITH ESRD (END-STAGE RENAL DISEASE): Chronic | ICD-10-CM

## 2019-08-30 DIAGNOSIS — D63.1 ANEMIA IN CHRONIC KIDNEY DISEASE, ON CHRONIC DIALYSIS: ICD-10-CM

## 2019-08-30 DIAGNOSIS — Z99.2 ANEMIA IN CHRONIC KIDNEY DISEASE, ON CHRONIC DIALYSIS: ICD-10-CM

## 2019-08-30 DIAGNOSIS — N18.6 BENIGN HYPERTENSION WITH ESRD (END-STAGE RENAL DISEASE): Chronic | ICD-10-CM

## 2019-08-30 DIAGNOSIS — Z99.2 ESRD ON DIALYSIS: Primary | Chronic | ICD-10-CM

## 2019-08-30 DIAGNOSIS — N25.81 SECONDARY HYPERPARATHYROIDISM OF RENAL ORIGIN: Chronic | ICD-10-CM

## 2019-08-30 DIAGNOSIS — N18.6 ANEMIA IN CHRONIC KIDNEY DISEASE, ON CHRONIC DIALYSIS: ICD-10-CM

## 2019-08-30 DIAGNOSIS — N18.6 ESRD ON DIALYSIS: Primary | Chronic | ICD-10-CM

## 2019-08-30 PROCEDURE — 99215 PR OFFICE/OUTPT VISIT, EST, LEVL V, 40-54 MIN: ICD-10-PCS | Mod: S$PBB,TXP,, | Performed by: NURSE PRACTITIONER

## 2019-08-30 PROCEDURE — 99999 PR PBB SHADOW E&M-EST. PATIENT-LVL IV: ICD-10-PCS | Mod: PBBFAC,TXP,, | Performed by: NURSE PRACTITIONER

## 2019-08-30 PROCEDURE — 99214 OFFICE O/P EST MOD 30 MIN: CPT | Mod: PBBFAC,TXP | Performed by: NURSE PRACTITIONER

## 2019-08-30 PROCEDURE — 99999 PR PBB SHADOW E&M-EST. PATIENT-LVL IV: CPT | Mod: PBBFAC,TXP,, | Performed by: NURSE PRACTITIONER

## 2019-08-30 PROCEDURE — 99215 OFFICE O/P EST HI 40 MIN: CPT | Mod: S$PBB,TXP,, | Performed by: NURSE PRACTITIONER

## 2019-08-30 RX ORDER — AMLODIPINE BESYLATE 10 MG/1
10 TABLET ORAL NIGHTLY
Refills: 3 | COMMUNITY
Start: 2019-07-16 | End: 2020-01-31

## 2019-08-30 RX ORDER — FERRIC CITRATE 210 MG/1
1 TABLET, COATED ORAL 3 TIMES DAILY
Status: ON HOLD | COMMUNITY
Start: 2019-08-01 | End: 2022-02-28 | Stop reason: HOSPADM

## 2019-08-30 RX ORDER — DOXAZOSIN 4 MG/1
4 TABLET ORAL NIGHTLY
Refills: 3 | COMMUNITY
Start: 2019-07-26 | End: 2021-10-07

## 2019-08-30 NOTE — PROGRESS NOTES
Kidney Transplant Recipient Reevalulation    Referring Physician: Yury Sotelo  Current Nephrologist: Kathy Cruz  Waitlist Status: active  Dialysis Start Date: 6/24/2019    Subjective:     CC:  Annual reassessment of kidney transplant candidacy.    HPI:  Mr. Saenz is a 18 y.o. year old Black or  male with ESRD secondary to HTN.  He has been on the wait list for a kidney transplant at [facility] since 12/5/2015. Patient initially was started on HD in 12/2015 and switched to PD in  2017, but had multiple Peritonitis infections and changed to home HD on   6/24/2019. Patient is dialyzing  5x/week  For 2 hours and 30 minutes. .  Patient reports that she is tolerating dialysis well. . He has a LUE AV fistula. Patient denies any recent hospitalizations or ED visits.    Overall feels well. No health concerns today.   Denies chest pain, SOB, leg pain, abdominal pain or LUTs. He is in school  And hoping to be an    Family at the clinic visit.     8/23/2019   TRANSTHORACIC ECHO (TTE) COMPLETE   Conclusion     · Normal left ventricular systolic function. The estimated ejection fraction is 65%  · Mild concentric left ventricular hypertrophy.  · Mild-moderate left atrial enlargement.  · Normal right ventricular systolic function.  · Mild tricuspid regurgitation.  · Normal central venous pressure (3 mm Hg).  · The estimated PA systolic pressure is 39 mm Hg          8/23/2019 Renal US  Impression     Features of severe medical renal disease, with atrophic kidneys.       8/23/2019 CXR  Impression     Negative chest.  No significant change.       8/23/2019 PXR  FINDINGS:  There is no fracture or dislocation.  There is no significant degenerative change.  The soft tissues are unremarkable.   Impression     Normal pelvis       Past Medical History:   Diagnosis Date    Acne     Anemia of renal disease     Dialysis patient     peritoneal daily at night. followed by Dr. Sotelo    ESRD on  "dialysis since 12/16/15     Hypertension     Kidney disease     Seasonal allergies     Secondary hyperparathyroidism of renal origin        Review of Systems   Constitutional: Negative for activity change, appetite change, chills, fatigue, fever and unexpected weight change.   HENT: Negative for congestion, facial swelling, postnasal drip, rhinorrhea, sinus pressure, sore throat and trouble swallowing.    Eyes: Negative for pain, redness and visual disturbance.   Respiratory: Negative for cough, chest tightness, shortness of breath and wheezing.    Cardiovascular: Negative.  Negative for chest pain, palpitations and leg swelling.   Gastrointestinal: Negative for abdominal pain, diarrhea, nausea and vomiting.   Genitourinary: Negative for dysuria, flank pain and urgency.   Musculoskeletal: Negative for gait problem, neck pain and neck stiffness.   Skin: Negative for rash.   Allergic/Immunologic: Negative for environmental allergies, food allergies and immunocompromised state.   Neurological: Negative for dizziness, weakness, light-headedness and headaches.   Psychiatric/Behavioral: Negative for agitation and confusion. The patient is not nervous/anxious.        Objective:   body mass index is 28.34 kg/m².  BP (!) 156/98 (BP Location: Right arm, Patient Position: Sitting, BP Method: Large (Automatic))   Pulse 109   Temp 98.4 °F (36.9 °C) (Oral)   Resp 16   Ht 5' 8.58" (1.742 m)   Wt 86 kg (189 lb 9.5 oz)   SpO2 99%   BMI 28.34 kg/m²     Physical Exam   Constitutional: He is oriented to person, place, and time. He appears well-developed and well-nourished.   HENT:   Head: Normocephalic.   Mouth/Throat: Oropharynx is clear and moist. No oropharyngeal exudate.   Eyes: Pupils are equal, round, and reactive to light. Conjunctivae and EOM are normal. No scleral icterus.   Neck: Normal range of motion. Neck supple.   Cardiovascular: Normal rate, regular rhythm and normal heart sounds.   Pulmonary/Chest: Effort " normal and breath sounds normal.   Abdominal: Soft. Normal appearance and bowel sounds are normal. He exhibits no distension and no mass. There is no splenomegaly or hepatomegaly. There is no tenderness. There is no rebound, no guarding, no CVA tenderness, no tenderness at McBurney's point and negative Lewis's sign.   Musculoskeletal: Normal range of motion. He exhibits no edema.        Arms:  Lymphadenopathy:     He has no cervical adenopathy.   Neurological: He is alert and oriented to person, place, and time. He exhibits normal muscle tone. Coordination normal.   Skin: Skin is warm and dry.   Psychiatric: He has a normal mood and affect. His behavior is normal.   Vitals reviewed.      Labs:  Lab Results   Component Value Date    WBC 3.20 (L) 07/21/2019    HGB 9.1 (L) 07/21/2019    HCT 26.7 (L) 07/21/2019     07/21/2019    K 4.7 07/21/2019     07/21/2019    CO2 29 07/21/2019    BUN 66 (H) 07/21/2019    CREATININE 17.90 (H) 07/21/2019    EGFRNONAA 3 (A) 07/21/2019    CALCIUM 7.8 (L) 07/21/2019    PHOS 6.1 (H) 08/22/2018    PHOS 6.1 (H) 08/22/2018    MG 2.4 07/21/2019    ALBUMIN 4.1 07/21/2019    AST 15 (L) 07/21/2019    ALT 19 07/21/2019    PTH 1,260.0 (H) 08/30/2019       Lab Results   Component Value Date    LIPASE 164 11/12/2017       No results found for: HLAABCTYPE    Lab Results   Component Value Date    CPRA 54 07/02/2019    TK8WWRW A2,B57,B58 07/02/2019    CIABCLM A11 07/02/2019    CIIAB Negative 07/02/2019       Labs were reviewed with the patient.    Pre-transplant Workup:   Reviewed with the patient.    Assessment:     1. ESRD on dialysis since 12/16/15    2. Patient on waiting list for kidney transplant    3. Benign hypertension with ESRD (end-stage renal disease)    4. Anemia in chronic kidney disease, on chronic dialysis    5. Secondary hyperparathyroidism of renal origin        Plan:      Transplant Candidacy:   Mr. Saenz is a suitable kidney transplant candidate.  [facility]  eligibility for accepting HCV+ donor offer - yes.  Patient educated on HCV+ donors. Tameka is willing  to accept HCV+ donor offer -  no   Patient is a candidate for KDPI > 85 kidney donor offer - no d/t age and weight .  He remains in overall stable health, and will remain active on the transplant list.    Jenni Lazcano NP       Follow-up:   In addition to the tests noted in the plan, Mr. Saenz will continue to have reevaluation as per the standing pre-kidney transplant protocol:  1. Monthly blood for PRA  2. Annual return to clinic, except HIV positive, > 65 years of age, or pancreas transplant candidates who will be scheduled to see transplant every 6 months while in pre-transplant phase  3. Annual re-testing: CXR, EKG, yearly mammograms for women over 40 and PSA for males over 40, cardiology follow-up as recommended by initial cardiology pre-transplant evaluation  4. Renal ultrasound every 2 years  5. Baseline colonoscopy after age 50 and repeated as recommended    UNOS Patient Status  Functional Status: 60% - Requires occasional assistance but is able to care for needs  Physical Capacity: No Limitations

## 2019-08-30 NOTE — PROGRESS NOTES
PHARM.D. PRE-TRANSPLANT NOTE:    This patient's medication therapy was evaluated as part of his pre-transplant evaluation.      The following general pharmacologic concerns were noted:none     The following pharmacologic concerns related to HCV therapy were noted: none     This patient's medication profile was reviewed for contraindications for DAA Hepatitis C therapy:    [x]  No current inducers of CYP 3A4 or PGP  [x]  No amiodarone on this patient's EMR profile in the last 24 months  [x]  No past or current atrial fibrillation on this patient's EMR profile       Current Outpatient Medications   Medication Sig Dispense Refill    amLODIPine (NORVASC) 10 MG tablet Take 10 mg by mouth every evening.  3    AURYXIA 210 mg iron Tab Take 3 tablets by mouth once daily.      diphenhydrAMINE (BENADRYL) 25 mg capsule Take 1 each (25 mg total) by mouth nightly as needed for Itching or Insomnia.  0    doxazosin (CARDURA) 2 MG tablet Take 2 mg by mouth nightly.  3    INV clonidine/placebo 0.2 mg Place 1 patch onto the skin every Monday.       meclizine (ANTIVERT) 25 mg tablet Take 1 tablet (25 mg total) by mouth 4 (four) times daily as needed for Dizziness or Nausea (VERTIGO). 20 tablet 0    nebivolol (BYSTOLIC) 20 mg Tab Take 20 mg by mouth 2 (two) times daily as needed. Nightly if bp is high       SENSIPAR 30 mg Tab Take 90 mg by mouth every evening.       tretinoin (RETIN-A) 0.025 % cream Apply topically nightly as needed.       valsartan (DIOVAN) 320 MG tablet Take 1 tablet (320 mg total) by mouth once daily. (Patient taking differently: Take 320 mg by mouth daily as needed. ) 90 tablet 3     No current facility-administered medications for this visit.        Currently he is responsible for preparing / administering this patient's medications on a daily basis.  I am available for consultation and can be contacted, as needed by the other members of the Kidney Transplant team.

## 2019-08-30 NOTE — LETTER
[facility]   [address] [place] [address]   Phone [phone]   Fax [phone]                         August 30, 2019    Tameka Saenz  [address]  [facility] [address]      [facility]  [address] [facility] [address]  Phone: [phone]  Lucia Saenz accompanied her family to the clinic visit on 08/30/2019  For her brother.   May Return to work/school on 9/3/2019    No Restrictions        Sincerely,    Jenni Lazcano NP

## 2019-08-30 NOTE — PROGRESS NOTES
LISTED PATIENT EDUCATION NOTE    Mr. Tameka Saenz was seen in LISTED kidney transplant clinic for evaluation for kidney, kidney/pancreas or pancreas only transplant.  The patient attended a group education session that discussed/reviewed the following aspects of transplantation: evaluation and selection committee process, UNOS waitlist management/multiple listings, types of organs offered (KDPI < 85%, KDPI > 85%, PHS increased risk, DCD, HCV+), financial aspects, surgical procedures, dietary instruction pre- and post-transplant, health maintenance pre- and post-transplant, post-transplant hospitalization and outpatient follow-up, potential to participate in a research protocol, and medication management and side effects.  A question and answer session was provided after the presentation.    The patient was seen by all members of the multi-disciplinary team to include: Nephrologist/PA, Surgeon, , Transplant Coordinator, , Pharmacist and Dietician (if applicable).    The patient reviewed and signed all consents for evaluation which were witnessed and sent to scanning into the EPIC chart.    The patient was given an education book and plan for further evaluation based on his individual assessment.      The patient was encouraged to call with any questions or concerns.

## 2019-08-30 NOTE — LETTER
August 30, 2019        Kathy Cruz  [address] [place] [address]   [address]  [facility] [address]  Phone: [phone]  Fax: [phone]             [facility] Patrick- Transplant  [address]  [facility] [address]  Phone: [phone]   Patient: Tameka Saenz   MR Number: [id number]   YOB: 2000   Date of Visit: 8/30/2019       Dear Dr. Kathy Cruz    Thank you for referring Tameka Saenz to me for evaluation. Attached you will find relevant portions of my assessment and plan of care.    If you have questions, please do not hesitate to call me. I look forward to following Tameka Saenz along with you.    Sincerely,    Jenni Lazcano, NP    Enclosure    If you would like to receive this communication electronically, please contact [email or web address] or [phone] to request [email or web address] Link access.    [email or web address] Link is a tool which provides read-only access to select patient information with whom you have a relationship. Its easy to use and provides real time access to review your patients record including encounter summaries, notes, results, and demographic information.    If you feel you have received this communication in error or would no longer like to receive these types of communications, please e-mail [email or web address]

## 2019-09-03 NOTE — LETTER
September 3, 2019    Tameka Saenz  [address] [address]    Dear Tameka Saenz:  MRN: [id number]    The [facility] team reviewed your transplant candidacy.  It is our programs opinion that you are temporarily not a transplant candidate at our facility as of 9/3/19 because of elevated PTH.  You will remain listed on the wait list, but will not be able to receive a transplant until this issue is resolved.      Attached is a letter from the United Network for Organ Sharing (UNOS).  It describes the services and information offered to patients by UNOS and the Organ Procurement and Transplant Network.    The [facility] team remains available to answer any questions.  Should you have any questions regarding this decision, please do not hesitate to contact our pre-transplant office.      Sincerely,        Salina Goodman MD  Medical Director, Kidney & Kidney/Pancreas Transplantation    [facility]//          OPTN/UNOS: Your Resource for Organ Transplant Information        If you have a question regarding your own medical care, you always should call your transplant center first. However, for general organ transplant-related information, you can call the United Network for Organ Sharing (UNOS) toll-free patient services line at [phone].    Anyone, including potential transplant candidates, recipients, family members/friends, living donors, and/or donor family members can call this number to:    · talk about organ donation, living donation, how transplant and donation work, the donation process, transplant policies, and transplant/donor information;  · get a free patient information kit with helpful booklets, waiting list and transplant information, and a list of all transplant centers;  · ask questions about the Organ Procurement and Transplantation Network (OPTN) web site (www.optn.transplant.hrsa.gov); the UNOS Web site (www.unos.org); or the UNOS web  site for living donors and transplant recipients (www.transplantliving.org);  · learn how OS and the OPTN can help you;  · talk about any concerns that you may have with a transplant center and how they perform    [facility] is a not-for-profit organization that provides all of the administrative services for the national OPTN under federal contract to the Health Resources and Services Administration (HRSA), an agency under the U.S. Department of Health and Human Services (HHS).     UNOS and OPTN responsibilities include:    · writing educational material for patients, the public and professionals;  · helping to make people aware of the need for donated organs and tissue;  · writing organ transplant policy with help from doctors, nurses, transplant patients/candidates, donor families, living donors, and the public;  · coordinating the organ matching and placement process;  · collecting information about every organ transplant and donation that occurs in the [place].    Remember, you should contact your transplant center directly if you have questions or concerns about your own medical care including medical records, work-up progress and test reports. [facility] is not your transplant center, and staff at [facility] will not be able to transfer you to your transplant center, so keep your transplant centers phone number handy. But, while you research your transplant needs and learn as much as you can about transplantation and donation, we welcome your call to our toll-free patient services line at [phone].

## 2019-09-03 NOTE — PROGRESS NOTES
Tamkea Saenz medical records reviewed with . Provider determined that due to having potential safety issues that the patient will be made inactive on waitlist at this time.       Tameka Saenz notified of safety concerns related to transplant at this time. Patients dialysis unit and Nephrologist notified of above concerns by letter.

## 2019-09-05 ENCOUNTER — TELEPHONE (OUTPATIENT)
Dept: TRANSPLANT | Facility: CLINIC | Age: 19
End: 2019-09-05

## 2019-09-11 NOTE — PROGRESS NOTES
Transplant Recipient Adult Psychosocial Assessment Update    Shantadioniciocharlene Letty  [address] [address]    Telephone Information:   Mobile [phone]; [phone]   Home  [phone] (home)  Work  none  E-mail  [email or web address]    Sex: male  YOB: 2000  Age: 18 y.o.    Encounter Date: 8/30/2019  U[place] Citizen: yes  Primary Language: English   Needed: no    Emergency Contact:    Name:  Sofía George  Relationship:  Aunt  Address:   [place]  Phone:  [phone]    Name: Cortney Rivera  Relationship: grandmother  Address: [address] [place]  Phone Numbers:  [phone] (home)  [phone] (cell)      Name: Clarisse Saenz  Relationship: mother and father  Address: same as patient  Phone Numbers:  [phone] (home) [phone] (dad cell)    Family/Social Support:   Number of dependents/: pt has 0 dependents  Marital history: single, never .  Other family dynamics: Pt currently lives with parents and two siblings. Older brother, Meek(21) and a younger sister Lucia(16).    Household Composition:  Name: Clarisse Saenz  Age: 43, both  Relationship: mother and father  Does person drive? yes    Name: Meek Saenz  Age: 22  Relationship: brother  Does person drive? no    Name: Anabel Saenz  Age: 16  Relationship: sister  Does person drive? yes    Do you and your caregivers have access to reliable transportation? yes  PRIMARY CAREGIVER: Darcie Saenz will be primary caregiver, phone number [phone].      provided in-depth information to patient and caregiver regarding pre- and post-transplant caregiver role.   strongly encourages patient and caregiver to have concrete plan regarding post-transplant care giving, including back-up caregiver(s) to ensure care giving needs are met as needed.    Patient and Caregiver states understanding all aspects of  caregiver role/commitment and is able/willing/committed to being caregiver to the fullest extent necessary.    Patient and Caregiver verbalizes understanding of the education provided today and caregiver responsibilities.         remains available. Patient and Caregiver agree to contact  in a timely manner if concerns arise.      Able to take time off work without financial concerns: yes.     Additional Significant Others who will Assist with Transplant:  Name: Jose Saenz  Age: 44  City: [place] State: [place]  Relationship: father  Does person drive? yes    Living Will: no  Healthcare Power of : no  Advance Directives on file: <<no information> per medical record.  Verbally reviewed LW/HCPA information.   provided patient with copy of LW/HCPA documents and provided education on completion of forms.    Living Donors: Yes.  Name: Brother Meek and father Jsoe would like to be tested to see if they are a match. .    Highest Education Level: High School (9-12) or GED  Reading Ability: 12th grade  Reports difficulty with: N/A  Learns Best By:  Combination of hands on and visual     Status: no  VA Benefits: no     Working for Income: No  If no, reason not working: Disability    Patient is disabled.  Prior to disability, patient  was in [facility] [employer] and graduated...He is now enrolled in [employer] where he studies Criminal Justice.  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     Spouse/Significant Other Employment: n/a    Disabled: yes: date disability began: 12/10/15, due to: ESRD.    Monthly Income:  Other: $1500 household income  Able to afford all costs now and if transplanted, including medications: yes  Patient and Caregiver verbalizes understanding of personal responsibilities related to transplant costs and the importance of having a financial plan to ensure that patients transplant costs are fully  covered.      provided fundraising information/education.  Patient and Caregiver verbalizes understanding.   remains available.    Insurance:   Payor/Plan Subscr  Sex Relation Sub. Ins. ID Effective Group Num   1. MEDPOINT - [place]* MARTINA PEREZ 2000 Male  2018                                    [address]   2. MEDICARE - [place]* MARTINA PEREZ 2000 Male  [id number] 3/1/16                                    PO      Primary Insurance (for UNOS reporting): Public Insurance - Medicaid  Secondary Insurance (for UNOS reporting): Public Insurance - Medicare & Choice  Patient and Caregiver verbalizes clear understanding that patient may experience difficulty obtaining and/or be denied insurance coverage post-surgery. This includes and is not limited to disability insurance, life insurance, health insurance, burial insurance, long term care insurance, and other insurances.    Patient and Caregiver also reports understanding that future health concerns related to or unrelated to transplantation may not be covered by patient's insurance.  Resources and information provided and reviewed.      Patient and Caregiver provides verbal permission to release any necessary information to outside resources for patient care and discharge planning.  Resources and information provided are reviewed.      Dialysis Adherence:  Patient and Caregiver reports compliance. Patient does at home hemo. Compliance check letter sent to unit.  ([facility])    Infusion Service: patient utilizing? no  Home Health: patient utilizing? no  DME: yes pt has  A BP cuff; home hemo supplies and equipment.   Pulmonary/Cardiac Rehab: pt denies   ADLS:  Pt confirms he can perform all ADLs    Adherence:   Pt reports he is adherent to all medical advice.  Adherence education and counseling provided.     Per History Section:  Past Medical History:   Diagnosis Date    Acne     Anemia of renal disease      Dialysis patient     peritoneal daily at night. followed by Dr. Sotelo    ESRD on dialysis since 12/16/15     Hypertension     Kidney disease     Seasonal allergies     Secondary hyperparathyroidism of renal origin      Social History     Tobacco Use    Smoking status: Never Smoker    Smokeless tobacco: Never Used   Substance Use Topics    Alcohol use: No     Social History     Substance and Sexual Activity   Drug Use No     Social History     Substance and Sexual Activity   Sexual Activity Not on file       Per Today's Psychosocial:  Tobacco: none, patient denies any use.  Alcohol: none, patient denies any use.  Illicit Drugs/Non-prescribed Medications: none, patient denies any use.    Patient and Caregiver states clear understanding of the potential impact of substance use as it relates to transplant candidacy and is aware of possible random substance screening.  Substance abstinence/cessation counseling, education and resources provided and reviewed.     Arrests/DWI/Treatment/Rehab: patient denies    Psychiatric History:    Mental Health: pt denies any mental health issues currently or previously.   Psychiatrist/Counselor: pt denies  Medications:  Pt denies  Suicide/Homicide Issues: pt denies   Safety at home: pt confirms feeling safe at home    Knowledge: Patient and Caregiver states having clear understanding and realistic expectations regarding the potential risks and potential benefits of organ transplantation and organ donation, agrees to discuss with health care team members and support system members and to utilize available resources and express questions and/or concerns in order to further facilitate the pt informed decision-making.  Resources and information provided and reviewed.     Patient and Caregiver is aware of [facility]'s affiliation and/or partnership with agencies in home health care, LTAC, SNF, [facility], and other hospitals and clinics.    Understanding: Patient and Caregiver reports  having a clear understanding of the many lifetime commitments involved with being a transplant recipient, including costs, compliance, medications, lab work, procedures, appointments, concrete and financial planning, preparedness, timely and appropriate communication of concerns, abstinence (ETOH, tobacco, illicit non-prescribed drugs), adherence to all health care team recommendations, support system and caregiver involvement, appropriate and timely resource utilization and follow-through, mental health counseling as needed/recommended, and patient and caregiver responsibilities.  Social Service Handbook, resources and detailed educational information provided and reviewed.  Educational information provided.    Patient and Caregiver also reports current and expected compliance with health care regime and states having a clear understanding of the importance of compliance.      Patient and Caregiver reports a clear understanding that risks and benefits may be involved with organ transplantation and with organ donation.      Patient and Caregiver also reports clear understanding that psychosocial risk factors may affect patient, and include but are not limited to feelings of depression, generalized anxiety, anxiety regarding dependence on others, post traumatic stress disorder, feelings of guilt and other emotional and/or mental concerns, and/or exacerbation of existing mental health concerns.  Detailed resources provided and discussed.     Patient and Caregiver agrees to access appropriate resources in a timely manner as needed and/or as recommended, and to communicate concerns appropriately.  Patient and Caregiver also reports a clear understanding of treatment options available.      reviewed education, provided additional information, and answered questions.    Feelings or Concerns: pt denies any feelings for concerns at this time.    Coping: Pt reports that he likes to stay active and workout at the  gym. Pt reports he spends time with friends and enjoys cleaning cars to help cope with stress.     Goals: Pt reports he would like to play football again. Pt previously played football in high school and had to stop because of kidney problems. Get off dialysis. Pt would also like to become completely independent because he is start college in the fall.  Patient referred to Vocational Rehabilitation.    Interview Behavior: Patient and Caregiver presents as alert and oriented x 4, pleasant, good eye contact, well groomed, recall good, concentration/judgement good, average intelligence, calm, communicative, cooperative and asking and answering questions appropriately. He was accompanied by parents and siblings who presented as very supportive.         Transplant Social Work - Candidacy  Assessment/Plan:     Psychosocial Suitability: Patient presents as a suitable candidate for kidney transplant at this time. Based on psychosocial risk factors, patient presents as low risk, due to stable home environment, appropriate caregiver plan, ability to afford cost of tranplant, positive support systems, and absence of any psychosocial concerns. .    Recommendations/Additional Comments: [facility] recommends that pt conduct fundraising to assist pt with pay for cost of medication, food,gas, and other transplant related expenses. [facility] recommends that pt remain free of all tobacco,ETOH, and substance use. SW remains available to assist with any concerns that may arise as pt navigates through the transplant process.      Juana Vidal LCSW

## 2019-09-13 ENCOUNTER — TELEPHONE (OUTPATIENT)
Dept: TRANSPLANT | Facility: CLINIC | Age: 19
End: 2019-09-13

## 2019-09-13 NOTE — TELEPHONE ENCOUNTER
"Compliance check:  Dialysis unit reports in the past three months pt has had 0 no shows, 0 AMAs, labs PTH 1182, "Pt ran out of Sensipar 90 mg.  Started Hectorol 8mcg IVP q treatment (5x/week).  Started Sensipar 90 mg 9/9.  Will recheck PTH in 2 weeks, transportation none noted and family support none noted.    Reported by ADALI Peters RN via fax back sheet    "

## 2019-09-30 NOTE — PROGRESS NOTES
Tameka Saenz medical records reviewed with . Provider determined that  the patient will be re-activated on waitlist at this time.       Tameka Saenz notified of status change at this time. Patients dialysis unit and Nephrologist notified of status change by letter.

## 2019-09-30 NOTE — LETTER
September 30, 2019    Taemka Saenz  [address] [facility] [address]    Dear Tameka Saenz:  MRN: [id number]    The [facility] transplant team reviewed your transplant candidacy.  It is our programs opinion that you are a transplant candidate and are re-activated at our facility as of 9/30/19.  You are now eligible to receive a transplant.  Your status is now Status 1.    Attached is a letter from the United Network for Organ Sharing (UNOS).  It describes the services and information offered to patients by UNOS and the Organ Procurement and Transplant Network.    The [facility] transplant team remains available to answer any questions.  Should you have any questions regarding this decision, please do not hesitate to contact our pre-transplant office.      Sincerely,    Mani Martinez, KARLAN, RN, MPH  Kidney Transplant Coordinator  /[facility].    [facility]  [address] [place] [address]  [phone]    CC:  Dr. Yury Sotelo          [facility]                                                         OPTN/UNOS: Your Resource for Organ Transplant Information        If you have a question regarding your own medical care, you always should call your transplant center first. However, for general organ transplant-related information, you can call the United Network for Organ Sharing (UNOS) toll-free patient services line at [phone].    Anyone, including potential transplant candidates, recipients, family members/friends, living donors, and/or donor family members can call this number to:    · talk about organ donation, living donation, how transplant and donation work, the donation process, transplant policies, and transplant/donor information;  · get a free patient information kit with helpful booklets, waiting list and transplant information, and a list of all transplant centers;  · ask questions about the Organ Procurement and  Transplantation Network (OPTN) web site (www.optn.transplant.hrsa.gov); the UNOS Web site (www.unos.org); or the UNOS web site for living donors and transplant recipients (www.transplantliving.org);  · learn how UNOS and the OPTN can help you;  · talk about any concerns that you may have with a transplant center and how they perform    [facility] [[email or web address]] is a not-for-profit organization that provides all of the administrative services for the national OPTN under federal contract to the Health Resources and Services Administration (HRSA), an agency under the U.S. Department of Health and Human Services (HHS).     UNOS and OPTN responsibilities include:    · writing educational material for patients, the public and professionals;  · helping to make people aware of the need for donated organs and tissue;  · writing organ transplant policy with help from doctors, nurses, transplant patients/candidates, donor families, living donors, and the public;  · coordinating the organ matching and placement process;  · collecting information about every organ transplant and donation that occurs in the [place].    Remember, you should contact your transplant center directly if you have questions or concerns about your own medical care including medical records, work-up progress and test reports. [facility] [[email or web address]] is not your transplant center, and staff at [facility] [[email or web address]] will not be able to transfer you to your transplant center, so keep your transplant centers phone number handy. But, while you research your transplant needs and learn as much as you can about transplantation and donation, we welcome your call to our toll-free patient services line at [phone].

## 2019-10-05 PROBLEM — I26.99 PULMONARY EMBOLUS: Status: ACTIVE | Noted: 2019-10-05

## 2019-10-05 PROBLEM — I26.99 PULMONARY EMBOLISM: Status: ACTIVE | Noted: 2019-10-05

## 2019-10-07 PROBLEM — R79.89 ELEVATED TROPONIN: Status: ACTIVE | Noted: 2019-10-07

## 2019-10-07 PROBLEM — I16.1 HYPERTENSIVE EMERGENCY: Status: ACTIVE | Noted: 2019-10-07

## 2020-03-07 PROBLEM — R04.89 PULMONARY HEMORRHAGE: Status: ACTIVE | Noted: 2020-03-07

## 2020-03-07 PROBLEM — E87.20 LACTIC ACIDOSIS: Status: ACTIVE | Noted: 2020-03-07

## 2020-03-07 PROBLEM — J81.0 ACUTE PULMONARY EDEMA: Status: ACTIVE | Noted: 2020-03-07

## 2020-03-08 PROBLEM — J96.01 ACUTE HYPOXEMIC RESPIRATORY FAILURE: Status: ACTIVE | Noted: 2020-03-08

## 2020-03-08 PROBLEM — R01.1 CARDIAC MURMUR: Status: ACTIVE | Noted: 2020-03-08

## 2020-06-04 DIAGNOSIS — Z76.82 ORGAN TRANSPLANT CANDIDATE: Primary | ICD-10-CM

## 2020-06-05 DIAGNOSIS — Z76.82 ORGAN TRANSPLANT CANDIDATE: Primary | ICD-10-CM

## 2020-06-15 PROBLEM — J81.0 ACUTE PULMONARY EDEMA: Status: RESOLVED | Noted: 2020-03-07 | Resolved: 2020-06-15

## 2020-08-05 ENCOUNTER — CLINICAL SUPPORT (OUTPATIENT)
Dept: CARDIOLOGY | Facility: CLINIC | Age: 20
End: 2020-08-05
Attending: NURSE PRACTITIONER
Payer: MEDICARE

## 2020-08-05 VITALS — BODY MASS INDEX: 25.62 KG/M2 | WEIGHT: 173 LBS | HEIGHT: 69 IN

## 2020-08-05 DIAGNOSIS — Z76.82 ORGAN TRANSPLANT CANDIDATE: ICD-10-CM

## 2020-08-05 LAB
ASCENDING AORTA: 1.72 CM
BSA FOR ECHO PROCEDURE: 1.95 M2
CV ECHO LV RWT: 0.41 CM
CV STRESS BASE HR: 86 BPM
DIASTOLIC BLOOD PRESSURE: 65 MMHG
DOP CALC LVOT AREA: 3.9 CM2
DOP CALC LVOT DIAMETER: 2.24 CM
DOP CALC LVOT PEAK VEL: 1.09 M/S
DOP CALC LVOT STROKE VOLUME: 88.86 CM3
DOP CALCLVOT PEAK VEL VTI: 22.56 CM
E WAVE DECELERATION TIME: 188.4 MSEC
E/A RATIO: 2.57
E/E' RATIO: 7.79 M/S
ECHO LV POSTERIOR WALL: 1.04 CM (ref 0.6–1.1)
FRACTIONAL SHORTENING: 35 % (ref 28–44)
INTERVENTRICULAR SEPTUM: 1.02 CM (ref 0.6–1.1)
IVRT: 53.28 MSEC
LA MAJOR: 5.08 CM
LA MINOR: 4.5 CM
LA WIDTH: 3.22 CM
LEFT ATRIUM SIZE: 3.63 CM
LEFT ATRIUM VOLUME INDEX: 24.4 ML/M2
LEFT ATRIUM VOLUME: 47.42 CM3
LEFT INTERNAL DIMENSION IN SYSTOLE: 3.28 CM (ref 2.1–4)
LEFT VENTRICLE DIASTOLIC VOLUME INDEX: 61.95 ML/M2
LEFT VENTRICLE DIASTOLIC VOLUME: 120.32 ML
LEFT VENTRICLE MASS INDEX: 99 G/M2
LEFT VENTRICLE SYSTOLIC VOLUME INDEX: 22.4 ML/M2
LEFT VENTRICLE SYSTOLIC VOLUME: 43.6 ML
LEFT VENTRICULAR INTERNAL DIMENSION IN DIASTOLE: 5.04 CM (ref 3.5–6)
LEFT VENTRICULAR MASS: 191.87 G
LV LATERAL E/E' RATIO: 7.06 M/S
LV SEPTAL E/E' RATIO: 8.69 M/S
MV PEAK A VEL: 0.44 M/S
MV PEAK E VEL: 1.13 M/S
OHS CV CPX 1 MINUTE RECOVERY HEART RATE: 125 BPM
OHS CV CPX 85 PERCENT MAX PREDICTED HEART RATE MALE: 169
OHS CV CPX ESTIMATED METS: 15
OHS CV CPX MAX PREDICTED HEART RATE: 199
OHS CV CPX PATIENT IS FEMALE: 0
OHS CV CPX PATIENT IS MALE: 1
OHS CV CPX PEAK DIASTOLIC BLOOD PRESSURE: 44 MMHG
OHS CV CPX PEAK HEAR RATE: 160 BPM
OHS CV CPX PEAK RATE PRESSURE PRODUCT: NORMAL
OHS CV CPX PEAK SYSTOLIC BLOOD PRESSURE: 153 MMHG
OHS CV CPX PERCENT MAX PREDICTED HEART RATE ACHIEVED: 80
OHS CV CPX RATE PRESSURE PRODUCT PRESENTING: NORMAL
PISA TR MAX VEL: 2.78 M/S
PULM VEIN S/D RATIO: 0.54
PV PEAK D VEL: 0.9 M/S
PV PEAK S VEL: 0.49 M/S
RA MAJOR: 4.63 CM
RA WIDTH: 3.42 CM
SINUS: 2.41 CM
STJ: 1.96 CM
STRESS ECHO POST EXERCISE DUR MIN: 8 MINUTES
STRESS ECHO POST EXERCISE DUR SEC: 2 SECONDS
SYSTOLIC BLOOD PRESSURE: 135 MMHG
TDI LATERAL: 0.16 M/S
TDI SEPTAL: 0.13 M/S
TDI: 0.15 M/S
TR MAX PG: 31 MMHG

## 2020-08-05 PROCEDURE — 93351 STRESS ECHO (CUPID ONLY): ICD-10-PCS | Mod: 26,S$PBB,TXP, | Performed by: INTERNAL MEDICINE

## 2020-08-05 PROCEDURE — 99211 OFF/OP EST MAY X REQ PHY/QHP: CPT | Mod: PBBFAC,PO,TXP,25

## 2020-08-05 PROCEDURE — 99999 PR PBB SHADOW E&M-EST. PATIENT-LVL I: ICD-10-PCS | Mod: PBBFAC,TXP,,

## 2020-08-05 PROCEDURE — 99999 PR PBB SHADOW E&M-EST. PATIENT-LVL I: CPT | Mod: PBBFAC,TXP,,

## 2020-08-05 PROCEDURE — 93351 STRESS TTE COMPLETE: CPT | Mod: PBBFAC,PO,TXP | Performed by: INTERNAL MEDICINE

## 2020-08-31 ENCOUNTER — TELEPHONE (OUTPATIENT)
Dept: TRANSPLANT | Facility: CLINIC | Age: 20
End: 2020-08-31

## 2020-10-14 ENCOUNTER — OFFICE VISIT (OUTPATIENT)
Dept: TRANSPLANT | Facility: CLINIC | Age: 20
End: 2020-10-14
Payer: MEDICARE

## 2020-10-14 VITALS
WEIGHT: 187.63 LBS | BODY MASS INDEX: 28.44 KG/M2 | OXYGEN SATURATION: 99 % | SYSTOLIC BLOOD PRESSURE: 152 MMHG | DIASTOLIC BLOOD PRESSURE: 79 MMHG | RESPIRATION RATE: 16 BRPM | HEIGHT: 68 IN | HEART RATE: 74 BPM

## 2020-10-14 DIAGNOSIS — N25.81 SECONDARY HYPERPARATHYROIDISM OF RENAL ORIGIN: Chronic | ICD-10-CM

## 2020-10-14 DIAGNOSIS — I12.0 BENIGN HYPERTENSION WITH ESRD (END-STAGE RENAL DISEASE): Chronic | ICD-10-CM

## 2020-10-14 DIAGNOSIS — N18.6 BENIGN HYPERTENSION WITH ESRD (END-STAGE RENAL DISEASE): Chronic | ICD-10-CM

## 2020-10-14 DIAGNOSIS — N18.6 ESRD ON DIALYSIS: Chronic | ICD-10-CM

## 2020-10-14 DIAGNOSIS — D63.1 ANEMIA IN CHRONIC KIDNEY DISEASE, ON CHRONIC DIALYSIS: ICD-10-CM

## 2020-10-14 DIAGNOSIS — Z76.82 PATIENT ON WAITING LIST FOR KIDNEY TRANSPLANT: Primary | Chronic | ICD-10-CM

## 2020-10-14 DIAGNOSIS — N18.6 ANEMIA IN CHRONIC KIDNEY DISEASE, ON CHRONIC DIALYSIS: ICD-10-CM

## 2020-10-14 DIAGNOSIS — Z99.2 ESRD ON DIALYSIS: Chronic | ICD-10-CM

## 2020-10-14 DIAGNOSIS — Z99.2 ANEMIA IN CHRONIC KIDNEY DISEASE, ON CHRONIC DIALYSIS: ICD-10-CM

## 2020-10-14 PROBLEM — T85.71XA DIALYSIS-ASSOCIATED PERITONITIS: Status: RESOLVED | Noted: 2018-08-19 | Resolved: 2020-10-14

## 2020-10-14 PROCEDURE — 99999 PR PBB SHADOW E&M-EST. PATIENT-LVL V: CPT | Mod: PBBFAC,TXP,, | Performed by: NURSE PRACTITIONER

## 2020-10-14 PROCEDURE — 99215 PR OFFICE/OUTPT VISIT, EST, LEVL V, 40-54 MIN: ICD-10-PCS | Mod: S$PBB,TXP,, | Performed by: NURSE PRACTITIONER

## 2020-10-14 PROCEDURE — 99215 OFFICE O/P EST HI 40 MIN: CPT | Mod: S$PBB,TXP,, | Performed by: NURSE PRACTITIONER

## 2020-10-14 PROCEDURE — 99999 PR PBB SHADOW E&M-EST. PATIENT-LVL V: ICD-10-PCS | Mod: PBBFAC,TXP,, | Performed by: NURSE PRACTITIONER

## 2020-10-14 PROCEDURE — 99215 OFFICE O/P EST HI 40 MIN: CPT | Mod: PBBFAC,TXP | Performed by: NURSE PRACTITIONER

## 2020-10-14 RX ORDER — CALCITRIOL 0.5 UG/1
1 CAPSULE ORAL DAILY
Status: ON HOLD | COMMUNITY
Start: 2020-09-20 | End: 2022-02-28 | Stop reason: HOSPADM

## 2020-10-14 RX ORDER — CELECOXIB 200 MG/1
1 CAPSULE ORAL DAILY PRN
COMMUNITY
Start: 2020-04-02 | End: 2021-05-22 | Stop reason: CLARIF

## 2020-10-14 RX ORDER — HEPARIN SODIUM 1000 [USP'U]/ML
INJECTION, SOLUTION INTRAVENOUS; SUBCUTANEOUS
COMMUNITY
Start: 2020-08-26 | End: 2021-08-25

## 2020-10-14 RX ORDER — METHOXY POLYETHYLENE GLYCOL-EPOETIN BETA 200 UG/.3ML
200 INJECTION, SOLUTION INTRAVENOUS
Status: ON HOLD | COMMUNITY
Start: 2020-09-20 | End: 2022-02-28 | Stop reason: HOSPADM

## 2020-10-14 NOTE — LETTER
October 16, 2020        Kathy Cruz  [address] [place] [address]   [address]  [facility] [address]  Phone: [phone]  Fax: [phone]             Ambrosio Gamez- Transplant [facility]  [address]  [facility] [address]  Phone: [phone]   Patient: Tameka Saenz   MR Number: [id number]   YOB: 2000   Date of Visit: 10/14/2020       Dear Dr. Kathy Cruz    Thank you for referring Tameka Saenz to me for evaluation. Attached you will find relevant portions of my assessment and plan of care.    If you have questions, please do not hesitate to call me. I look forward to following Tameka Saenz along with you.    Sincerely,    Jenni Lazcano, NP    Enclosure    If you would like to receive this communication electronically, please contact [email or web address] or [phone] to request [employer] access.    [employer] is a tool which provides read-only access to select patient information with whom you have a relationship. Its easy to use and provides real time access to review your patients record including encounter summaries, notes, results, and demographic information.    If you feel you have received this communication in error or would no longer like to receive these types of communications, please e-mail [email or web address]

## 2020-10-14 NOTE — PROGRESS NOTES
Kidney Transplant Recipient Reevalulation    Referring Physician: Yury Sotelo  Current Nephrologist: Kathy Cruz  Waitlist Status: active  Dialysis Start Date: 6/24/2019    Subjective:     CC:  Annual reassessment of kidney transplant candidacy.    HPI:  Mr. Saenz is a 19 y.o. year old Black or  male with ESRD secondary to HTN.  He has been on the wait list for a kidney transplant at [facility] since 12/5/2015. Patient initially was started on HD in 12/2015 and switched to PD in  2017, but had multiple Peritonitis infections and changed to home HD on   6/24/2019. Patient is dialyzing  5x/week  For 2 hours and 30 minutes.  Patient reports that she is tolerating dialysis well. . He has a LUE AV fistula.  Recent hospitalizations or ED visits.  3/7/20-3/19/20  Admit for  SOB and fluid overload at [facility]        He is in school.  Works for the [employer] and works out daily at the gym          was prescribed Celebrex for a pulled muscle, will take PRN   Discussed avoidance of NSAIDs post transplant     All questions answered   Lab /diagnostic results reviewed with patient today.    8/5/20 Exercise stress echo   Conclusion  · The stress echo portion of this study is negative for myocardial ischemia.  · Normal left ventricular systolic function. The estimated ejection fraction is 60%.  · Normal LV diastolic function.  · Normal right ventricular systolic function.        3/1/20 CXR  IMPRESSION  The patient has been extubated with significantly improved aeration overall.  No lobar consolidation or cardiac decompensation is appreciated.     Past Medical History:   Diagnosis Date    Acne     Anemia of renal disease     Dialysis patient     peritoneal daily at night. followed by Dr. Sotelo    ESRD on dialysis since 12/16/15     Hypertension     Kidney disease     Seasonal allergies     Secondary hyperparathyroidism of renal origin        Review of Systems  "  Constitutional: Negative for activity change, appetite change, chills, fatigue, fever and unexpected weight change.   HENT: Negative for congestion, facial swelling, postnasal drip, rhinorrhea, sinus pressure, sore throat and trouble swallowing.    Eyes: Negative for pain, redness and visual disturbance.   Respiratory: Negative for cough, chest tightness, shortness of breath and wheezing.    Cardiovascular: Negative.  Negative for chest pain, palpitations and leg swelling.   Gastrointestinal: Negative for abdominal pain, diarrhea, nausea and vomiting.   Genitourinary: Negative for dysuria, flank pain and urgency.   Musculoskeletal: Negative for gait problem, neck pain and neck stiffness.   Skin: Negative for rash.   Allergic/Immunologic: Negative for environmental allergies, food allergies and immunocompromised state.   Neurological: Negative for dizziness, weakness, light-headedness and headaches.   Psychiatric/Behavioral: Negative for agitation and confusion. The patient is not nervous/anxious.        Objective:   body mass index is 28.21 kg/m².  BP (!) 152/79 (BP Location: Right arm, Patient Position: Sitting, BP Method: Large (Automatic))   Pulse 74   Resp 16   Ht 5' 8.39" (1.737 m)   Wt 85.1 kg (187 lb 9.8 oz)   SpO2 99%   BMI 28.21 kg/m²     Physical Exam  Vitals signs reviewed.   Constitutional:       Appearance: Normal appearance. He is well-developed.   HENT:      Head: Normocephalic.      Mouth/Throat:      Pharynx: No oropharyngeal exudate.   Eyes:      General: No scleral icterus.     Conjunctiva/sclera: Conjunctivae normal.      Pupils: Pupils are equal, round, and reactive to light.   Neck:      Musculoskeletal: Normal range of motion and neck supple.   Cardiovascular:      Rate and Rhythm: Normal rate and regular rhythm.      Heart sounds: Normal heart sounds.   Pulmonary:      Effort: Pulmonary effort is normal.      Breath sounds: Normal breath sounds.   Abdominal:      General: Bowel sounds " are normal. There is no distension.      Palpations: Abdomen is soft. There is no hepatomegaly, splenomegaly or mass.      Tenderness: There is no abdominal tenderness. There is no guarding or rebound. Negative signs include Lewis's sign and McBurney's sign.   Musculoskeletal: Normal range of motion.        Arms:    Lymphadenopathy:      Cervical: No cervical adenopathy.   Skin:     General: Skin is warm and dry.   Neurological:      Mental Status: He is alert and oriented to person, place, and time.      Motor: No abnormal muscle tone.      Coordination: Coordination normal.   Psychiatric:         Behavior: Behavior normal.         Labs:  Lab Results   Component Value Date    WBC 9.51 03/12/2020    HGB 8.6 (L) 03/12/2020    HCT 25.6 (L) 03/12/2020     03/12/2020    K 3.8 03/12/2020    CL 98 03/12/2020    CO2 25 03/12/2020    BUN 95 (H) 03/12/2020    CREATININE 14.20 (H) 03/12/2020    EGFRNONAA 4 (A) 03/12/2020    CALCIUM 8.0 (L) 03/12/2020    PHOS 7.8 (H) 03/11/2020    MG 2.4 07/21/2019    ALBUMIN 3.7 03/12/2020    AST 19 03/12/2020    ALT 9 03/12/2020    .0 (H) 08/05/2020       Lab Results   Component Value Date    AMYLASE 81 03/09/2020    LIPASE 164 11/12/2017       No results found for: HLAABCTYPE    Lab Results   Component Value Date    CPRA 61 07/10/2020    FF3QSWL A2,A11,B57,B58 07/10/2020    CIABCLM A11-- WEAK B58 05/13/2020    CIIAB Negative 07/10/2020       Labs were reviewed with the patient.    Pre-transplant Workup:   Reviewed with the patient.    Assessment:     1. Patient on waiting list for kidney transplant    2. ESRD on dialysis since 12/16/15    3. Benign hypertension with ESRD (end-stage renal disease)    4. Anemia in chronic kidney disease, on chronic dialysis    5. Secondary hyperparathyroidism of renal origin        Plan:      Transplant Candidacy:   Mr. Saenz is a suitable kidney transplant candidate.  Meets center eligibility for accepting HCV+ donor offer - yes.  Patient  educated on HCV+ donors. Tameka is willing  to accept HCV+ donor offer -  no   Patient is a candidate for KDPI > 85 kidney donor offer - no d/t age and weight .  He remains in overall stable health, and will remain active on the transplant list.    Jenni Lazcano NP       Follow-up:   In addition to the tests noted in the plan, Mr. Saenz will continue to have reevaluation as per the standing pre-kidney transplant protocol:  1. Monthly blood for PRA  2. Annual return to clinic, except HIV positive, > 65 years of age, or pancreas transplant candidates who will be scheduled to see transplant every 6 months while in pre-transplant phase  3. Annual re-testing: CXR, EKG, yearly mammograms for women over 40 and PSA for males over 40, cardiology follow-up as recommended by initial cardiology pre-transplant evaluation  4. Renal ultrasound every 2 years  5. Baseline colonoscopy after age 50 and repeated as recommended    UNOS Patient Status  Functional Status: 60% - Requires occasional assistance but is able to care for needs  Physical Capacity: No Limitations

## 2020-10-14 NOTE — PROGRESS NOTES
Transplant Recipient Adult Psychosocial Assessment UPDATE 10-  Last psychosocial update BRYANNA Vidal LCSW 8-    Tameka Saenz  [address] [address] [address]    Telephone Information:   Mobile [phone]; [phone]   Home  [phone] (home)  Work  none  E-mail  [email or web address]    Sex: male  YOB: 2000  Age: 19 y.o.    Encounter Date: 10/14/2020  U[place] Citizen: yes  Primary Language: English   Needed: no    Emergency Contact:  Name:  Sofía George, works full time at the 's office  Relationship:  34 yo Aunt  Address:   [place], [place]  Phone:  [phone]    Name: Cortney Rivera, retired  Relationship: 77 yo grandmother  Address: [address] [place] [address]  Phone Numbers:  [phone] (home)  [phone] (cell)    Name: Darcie Saenz, 43 yo. [phone] and Jose Saenz, 45 yo. [phone]  Relationship: mother and father  Address: same as patient  Mother is unemployed CNA sitter (not working due to Covid 19) and Father is employed full time  at [employer].      Family/Social Support:   Number of dependents/: pt has 0 dependents  Marital history: single, never . In 3 year relationship with girlfriend Lavonne Nunez.  Other family dynamics: Pt currently lives with parents and two siblings. Older brother, Meek(22) --disabled:legally blind and a younger sister Lucia (17) senior in high school.  Pt reports family will assist Meek and Lucia with any needs during transplant.    Household Composition:  Name: Darcie Saenz, 45 yo. [phone] and oJse Saenz, 45 yo. [phone]  Relationship: mother and father  Address: same as patient  Mother is unemployed CNA sitter (not working due to Covid 19) and Father is employed full time  at [employer].    Name: Meek Saenz, disabled due to being legally blind  Age: 22  Relationship: brother  Does person drive?  no    Name: Anabel Saenz, senior in high school   Age: 17  Relationship: sister  Does person drive? yes    Do you and your caregivers have access to reliable transportation? yes  PRIMARY CAREGIVER: Darcie Saenz, mother, will be primary caregiver, phone number [phone].      provided in-depth information to patient and caregiver regarding pre- and post-transplant caregiver role.   strongly encourages patient and caregiver to have concrete plan regarding post-transplant care giving, including back-up caregiver(s) to ensure care giving needs are met as needed.    Patient and Caregiver states understanding all aspects of caregiver role/commitment and is able/willing/committed to being caregiver to the fullest extent necessary.    Patient and Caregiver verbalizes understanding of the education provided today and caregiver responsibilities.         remains available. Patient and Caregiver agree to contact  in a timely manner if concerns arise.      Able to take time off work without financial concerns: yes.     Additional Significant Others who will Assist with Transplant:  Name: Jose Saenz, [phone]. Works full time as a  at [employer].  Age: 41 yo  City: [place] State: [place]  Relationship: father  Does person drive? Yes    Name: Lavonne Nunez, [phone].  Is full time nursing student at [employer]  Age:  15 yo  City: [place] State: [place]  Relationship: girlfriend for 3 years  Does person drive? Yes    Name:  Sofía George, works full time at the 's office  Relationship:  34 yo Aunt  Address:   [place], [place]  Phone:  [phone]    Name: Cortney Rivera, retired  Relationship: 75 yo grandmother  Address: [address] [place] [address]  Phone Numbers:  [phone] (home)  [phone] (cell)      Living Will: no  Healthcare Power of : no  Advance Directives on file: <<no information> per medical  record.  Verbally reviewed LW/HCPA information.   provided patient with copy of LW/HCPA documents and provided education on completion of forms.    Living Donors: None at this time.    Highest Education Level: High School (9-12) or GED. Currently enrolled in Criminal Justice program at [employer] -- has about 2 more years remaining in school.  Reading Ability: 12th grade  Reports difficulty with: N/A  Learns Best By:  Combination of hands on and visual     Status: no  VA Benefits: no     Working for Income: yes, part time as  at the 's office  If no, reason not working: Disability    Spouse/Significant Other Employment: n/a    Disabled: yes: date disability began: 12/10/15, due to: ESRD.    Monthly Income:  Pt reports receives $700 from disability and about $1000 per month from employment earnings  Able to afford all costs now and if transplanted, including medications: yes  Patient and Caregiver verbalizes understanding of personal responsibilities related to transplant costs and the importance of having a financial plan to ensure that patients transplant costs are fully covered.      provided fundraising information/education.  Patient and Caregiver verbalizes understanding.   remains available.    Insurance:   Payor/Plan Subscr  Sex Relation Sub. Ins. ID Effective Group Num   1. DubMeNow - ME* MARTINA PEREZ 2000 Male  2018                                    [address]   2. MEDICARE - ME* MARTINA PEREZ 2000 Male  [id number] 3/1/16                                    [address]     Primary Insurance (for UNOS reporting): Public Insurance - Medicaid  Secondary Insurance (for UNOS reporting): Public Insurance - Medicare FFS (Fee For Service)  Patient and Caregiver verbalizes clear understanding that patient may experience difficulty obtaining and/or be denied insurance coverage  "post-surgery. This includes and is not limited to disability insurance, life insurance, health insurance, burial insurance, long term care insurance, and other insurances.    Patient and Caregiver also reports understanding that future health concerns related to or unrelated to transplantation may not be covered by patient's insurance.  Resources and information provided and reviewed.      Patient and Caregiver provides verbal permission to release any necessary information to outside resources for patient care and discharge planning.  Resources and information provided are reviewed.      [facility], [phone]. Home Hemodialysis NexStage 5 days per week.    Dialysis Adherence:  Patient and Caregiver reports compliance. Patient does at home hemo. Compliance check letter sent to unit.  ([facility])    Infusion Service: patient utilizing? no  Home Health: patient utilizing? no  DME: yes pt has  BP cuff; home hemo supplies and equipment.   Pulmonary/Cardiac Rehab: pt denies   ADLS:  Independent with self care, medications and driving. Pt is working part time.    Adherence:   Pt reports he is adherent to all medical advice at this time.  Pt has history of being non compliant with Sensipar which resulted in high PTH 1182 "Pt ran ou to Sensipar 90 mg".  Adherence education and counseling provided.     Per History Section:  Past Medical History:   Diagnosis Date    Acne     Anemia of renal disease     Dialysis patient     peritoneal daily at night. followed by Dr. Sotelo    ESRD on dialysis since 12/16/15     Hypertension     Kidney disease     Seasonal allergies     Secondary hyperparathyroidism of renal origin      Social History     Tobacco Use    Smoking status: Never Smoker    Smokeless tobacco: Never Used   Substance Use Topics    Alcohol use: No     Social History     Substance and Sexual Activity   Drug Use No     Social History     Substance and Sexual Activity   Sexual Activity Not on " file       Per Today's Psychosocial:  Tobacco: none, patient denies any use.  Alcohol: none, patient denies any use.  Illicit Drugs/Non-prescribed Medications: none, patient denies any use.    Patient and Caregiver states clear understanding of the potential impact of substance use as it relates to transplant candidacy and is aware of possible random substance screening.  Substance abstinence/cessation counseling, education and resources provided and reviewed.     Arrests/DWI/Treatment/Rehab: patient denies    Psychiatric History:    Mental Health: pt denies any mental health issues currently or previously.   Psychiatrist/Counselor: pt denies  Medications:  Pt denies  Suicide/Homicide Issues: pt denies   Safety at home: pt confirms feeling safe at home    Knowledge: Patient and Caregiver states having clear understanding and realistic expectations regarding the potential risks and potential benefits of organ transplantation and organ donation, agrees to discuss with health care team members and support system members and to utilize available resources and express questions and/or concerns in order to further facilitate the pt informed decision-making.  Resources and information provided and reviewed.     Patient and Caregiver is aware of [facility]'s affiliation and/or partnership with agencies in home health care, LTAC, SNF, [facility], and other hospitals and clinics.    Understanding: Patient and Caregiver reports having a clear understanding of the many lifetime commitments involved with being a transplant recipient, including costs, compliance, medications, lab work, procedures, appointments, concrete and financial planning, preparedness, timely and appropriate communication of concerns, abstinence (ETOH, tobacco, illicit non-prescribed drugs), adherence to all health care team recommendations, support system and caregiver involvement, appropriate and timely resource utilization and follow-through, mental health  counseling as needed/recommended, and patient and caregiver responsibilities.  Social Service Handbook, resources and detailed educational information provided and reviewed.  Educational information provided.    Patient and Caregiver also reports current and expected compliance with health care regime and states having a clear understanding of the importance of compliance.      Patient and Caregiver reports a clear understanding that risks and benefits may be involved with organ transplantation and with organ donation.      Patient and Caregiver also reports clear understanding that psychosocial risk factors may affect patient, and include but are not limited to feelings of depression, generalized anxiety, anxiety regarding dependence on others, post traumatic stress disorder, feelings of guilt and other emotional and/or mental concerns, and/or exacerbation of existing mental health concerns.  Detailed resources provided and discussed.     Patient and Caregiver agrees to access appropriate resources in a timely manner as needed and/or as recommended, and to communicate concerns appropriately.  Patient and Caregiver also reports a clear understanding of treatment options available.      reviewed education, provided additional information, and answered questions.    Feelings or Concerns: pt denies any feelings for concerns at this time. Pt reports is highly motivated to pursue organ transplant.    Coping: Pt reports that he likes to stay active and workout at the gym. Pt reports he spends time with friends to help cope with stress.     Goals:  Pt reports hope for successful organ transplant so he may discontinue dialysis. 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  Patient referred to Vocational Rehabilitation.    Interview Behavior: Patient  and Caregiver presents as alert and oriented x 4, pleasant, good eye contact, well groomed, recall good, concentration/judgement good, average intelligence, calm, communicative, cooperative and asking and answering questions appropriately. Pt's supportive girlfriend Lavonne Nunez in session with pt's permission. Pt's parents and sister available on face time if needed -- outside clinic due to Covid restrictions.         Transplant Social Work - Candidacy  Assessment/Plan:     Psychosocial Suitability: Patient presents as a low to medium risk candidate for kidney transplant at this time. Based on psychosocial risk factors, patient presents as low to medium risk due to patient's history of non compliance with Sensipar which resulted in high PTH. Pt reports stable home environment, appropriate caregiver plan, ability to afford cost of tranplant, and positive support systems.    Recommendations/Additional Comments: Dialysis compliance update requested. Continued dialysis compliance updates recommended prior to patient being called in for organ transplant surgery. SW recommends that pt conduct fundraising to assist pt with pay for cost of medication, food,gas, and other transplant related expenses. SW recommends that pt remain free of all tobacco,ETOH, and substance use. SW remains available to assist with any concerns that may arise as pt navigates through the transplant process.    Dyan Rivero MSW LCSW

## 2020-10-15 NOTE — PROGRESS NOTES
Patient's chart reviewed today. Patient due for follow-up in October 2021. Appointments will be scheduled per protocol. HLA sample current, in lab, and being received on a regular basis.

## 2020-11-06 ENCOUNTER — SOCIAL WORK (OUTPATIENT)
Dept: TRANSPLANT | Facility: CLINIC | Age: 20
End: 2020-11-06

## 2020-11-06 NOTE — PROGRESS NOTES
[facility], [phone]. Home Hemodialysis NexStage 5 days per week.    11-4-2020 completed dialysis compliance update form shows in last 3 months  Current dry weight:  83    Most recent pre treatment weight:  84.4  10-  AMA:  0  No shows: 0  Last intact PTH  1011  10-6-20  No concerns with labs, caregivers, transportation, psychosocial    Dyan Rivero MSW LCSW

## 2021-03-26 ENCOUNTER — LAB VISIT (OUTPATIENT)
Dept: LAB | Facility: HOSPITAL | Age: 21
End: 2021-03-26
Attending: INTERNAL MEDICINE
Payer: MEDICARE

## 2021-03-26 DIAGNOSIS — D69.6 THROMBOCYTOPENIA: ICD-10-CM

## 2021-03-26 LAB
ALBUMIN SERPL BCP-MCNC: 4.4 G/DL (ref 3.5–5.2)
ALP SERPL-CCNC: 61 U/L (ref 38–145)
ALT SERPL W/O P-5'-P-CCNC: 30 U/L (ref 0–50)
ANION GAP SERPL CALC-SCNC: 10 MMOL/L (ref 8–16)
AST SERPL-CCNC: 34 U/L (ref 17–59)
BASOPHILS # BLD AUTO: 0.01 K/UL (ref 0–0.2)
BASOPHILS NFR BLD: 0.3 % (ref 0–1.9)
BILIRUB SERPL-MCNC: 0.6 MG/DL (ref 0.2–1.3)
CALCIUM SERPL-MCNC: 8.6 MG/DL (ref 8.4–10.2)
CHLORIDE SERPL-SCNC: 96 MMOL/L (ref 95–110)
CO2 SERPL-SCNC: 34 MMOL/L (ref 22–31)
CREAT SERPL-MCNC: 11.2 MG/DL (ref 0.5–1.4)
DIFFERENTIAL METHOD: ABNORMAL
EOSINOPHIL # BLD AUTO: 0.1 K/UL (ref 0–0.5)
EOSINOPHIL NFR BLD: 2.5 % (ref 0–8)
ERYTHROCYTE [DISTWIDTH] IN BLOOD BY AUTOMATED COUNT: 15.6 % (ref 11.5–14.5)
EST. GFR  (AFRICAN AMERICAN): 7 ML/MIN/1.73 M^2
EST. GFR  (NON AFRICAN AMERICAN): 6 ML/MIN/1.73 M^2
GLUCOSE SERPL-MCNC: 121 MG/DL (ref 70–110)
HCT VFR BLD AUTO: 28.7 % (ref 40–54)
HCV AB SERPL QL IA: NEGATIVE
HGB BLD-MCNC: 9.2 G/DL (ref 14–18)
IMM GRANULOCYTES # BLD AUTO: 0.01 K/UL (ref 0–0.04)
IMM GRANULOCYTES NFR BLD AUTO: 0.3 % (ref 0–0.5)
LYMPHOCYTES # BLD AUTO: 0.7 K/UL (ref 1–4.8)
LYMPHOCYTES NFR BLD: 19.7 % (ref 18–48)
MCH RBC QN AUTO: 30.9 PG (ref 27–31)
MCHC RBC AUTO-ENTMCNC: 32.1 G/DL (ref 32–36)
MCV RBC AUTO: 96 FL (ref 82–98)
MONOCYTES # BLD AUTO: 0.3 K/UL (ref 0.3–1)
MONOCYTES NFR BLD: 8.6 % (ref 4–15)
NEUTROPHILS # BLD AUTO: 2.5 K/UL (ref 1.8–7.7)
NEUTROPHILS NFR BLD: 68.6 % (ref 38–73)
NRBC BLD-RTO: 0 /100 WBC
PLATELET # BLD AUTO: 90 K/UL (ref 150–350)
PMV BLD AUTO: 11.3 FL (ref 9.2–12.9)
POTASSIUM SERPL-SCNC: 4.3 MMOL/L (ref 3.5–5.1)
PROT SERPL-MCNC: 7 G/DL (ref 6–8.4)
RBC # BLD AUTO: 2.98 M/UL (ref 4.6–6.2)
SODIUM SERPL-SCNC: 140 MMOL/L (ref 136–145)
UUN UR-MCNC: 52 MG/DL (ref 9–21)
WBC # BLD AUTO: 3.6 K/UL (ref 3.9–12.7)

## 2021-03-26 PROCEDURE — 85025 COMPLETE CBC W/AUTO DIFF WBC: CPT | Performed by: INTERNAL MEDICINE

## 2021-03-26 PROCEDURE — 85025 COMPLETE CBC W/AUTO DIFF WBC: CPT | Mod: PN | Performed by: INTERNAL MEDICINE

## 2021-03-26 PROCEDURE — 36415 COLL VENOUS BLD VENIPUNCTURE: CPT | Mod: PN,NTX | Performed by: INTERNAL MEDICINE

## 2021-03-26 PROCEDURE — 80053 COMPREHEN METABOLIC PANEL: CPT | Mod: PN | Performed by: INTERNAL MEDICINE

## 2021-03-26 PROCEDURE — 86022 PLATELET ANTIBODIES: CPT | Mod: NTX | Performed by: INTERNAL MEDICINE

## 2021-03-26 PROCEDURE — 80053 COMPREHEN METABOLIC PANEL: CPT | Performed by: INTERNAL MEDICINE

## 2021-03-26 PROCEDURE — 86803 HEPATITIS C AB TEST: CPT | Mod: PN,NTX | Performed by: INTERNAL MEDICINE

## 2021-03-26 PROCEDURE — 86803 HEPATITIS C AB TEST: CPT | Performed by: INTERNAL MEDICINE

## 2021-03-29 LAB — PF4 HEPARIN CMPLX AB SER QL: 0.22 OD (ref 0–0.4)

## 2021-04-12 ENCOUNTER — TELEPHONE (OUTPATIENT)
Dept: TRANSPLANT | Facility: CLINIC | Age: 21
End: 2021-04-12

## 2021-04-13 ENCOUNTER — TELEPHONE (OUTPATIENT)
Dept: TRANSPLANT | Facility: CLINIC | Age: 21
End: 2021-04-13

## 2021-05-22 PROBLEM — E87.5 HYPERKALEMIA: Status: ACTIVE | Noted: 2021-05-22

## 2021-06-02 DIAGNOSIS — Z76.82 ORGAN TRANSPLANT CANDIDATE: Primary | ICD-10-CM

## 2021-06-04 ENCOUNTER — TELEPHONE (OUTPATIENT)
Dept: TRANSPLANT | Facility: CLINIC | Age: 21
End: 2021-06-04

## 2021-07-13 ENCOUNTER — TELEPHONE (OUTPATIENT)
Dept: TRANSPLANT | Facility: CLINIC | Age: 21
End: 2021-07-13

## 2021-08-26 DIAGNOSIS — Z76.82 ORGAN TRANSPLANT CANDIDATE: Primary | ICD-10-CM

## 2021-09-09 ENCOUNTER — TELEPHONE (OUTPATIENT)
Dept: TRANSPLANT | Facility: CLINIC | Age: 21
End: 2021-09-09

## 2021-09-10 ENCOUNTER — TELEPHONE (OUTPATIENT)
Dept: TRANSPLANT | Facility: CLINIC | Age: 21
End: 2021-09-10

## 2021-09-14 ENCOUNTER — TELEPHONE (OUTPATIENT)
Dept: TRANSPLANT | Facility: CLINIC | Age: 21
End: 2021-09-14

## 2021-09-14 PROBLEM — E87.5 HYPERKALEMIA: Status: RESOLVED | Noted: 2021-05-22 | Resolved: 2021-09-14

## 2021-09-14 PROBLEM — K65.2 SBP (SPONTANEOUS BACTERIAL PERITONITIS): Status: RESOLVED | Noted: 2017-11-13 | Resolved: 2021-09-14

## 2021-09-14 PROBLEM — E87.20 LACTIC ACIDOSIS: Status: RESOLVED | Noted: 2020-03-07 | Resolved: 2021-09-14

## 2021-09-14 PROBLEM — J96.01 ACUTE HYPOXEMIC RESPIRATORY FAILURE: Status: RESOLVED | Noted: 2020-03-08 | Resolved: 2021-09-14

## 2021-09-14 PROBLEM — I16.1 HYPERTENSIVE EMERGENCY: Status: RESOLVED | Noted: 2019-10-07 | Resolved: 2021-09-14

## 2021-09-14 PROBLEM — R04.89 PULMONARY HEMORRHAGE: Status: RESOLVED | Noted: 2020-03-07 | Resolved: 2021-09-14

## 2021-09-15 ENCOUNTER — ANESTHESIA (OUTPATIENT)
Dept: SURGERY | Facility: HOSPITAL | Age: 21
End: 2021-09-15
Payer: MEDICARE

## 2021-09-15 ENCOUNTER — HOSPITAL ENCOUNTER (OUTPATIENT)
Facility: HOSPITAL | Age: 21
LOS: 1 days | Discharge: HOME | End: 2021-09-15
Attending: TRANSPLANT SURGERY | Admitting: TRANSPLANT SURGERY
Payer: MEDICARE

## 2021-09-15 ENCOUNTER — ANESTHESIA EVENT (OUTPATIENT)
Dept: SURGERY | Facility: HOSPITAL | Age: 21
End: 2021-09-15
Payer: MEDICARE

## 2021-09-15 VITALS
SYSTOLIC BLOOD PRESSURE: 184 MMHG | WEIGHT: 189.5 LBS | HEART RATE: 79 BPM | RESPIRATION RATE: 16 BRPM | DIASTOLIC BLOOD PRESSURE: 96 MMHG | TEMPERATURE: 98 F | OXYGEN SATURATION: 99 % | HEIGHT: 69 IN | BODY MASS INDEX: 28.07 KG/M2

## 2021-09-15 DIAGNOSIS — N18.6 BENIGN HYPERTENSION WITH ESRD (END-STAGE RENAL DISEASE): Chronic | ICD-10-CM

## 2021-09-15 DIAGNOSIS — N18.6 ANEMIA IN CHRONIC KIDNEY DISEASE, ON CHRONIC DIALYSIS: ICD-10-CM

## 2021-09-15 DIAGNOSIS — D63.1 ANEMIA IN CHRONIC KIDNEY DISEASE, ON CHRONIC DIALYSIS: ICD-10-CM

## 2021-09-15 DIAGNOSIS — N18.6 ESRD ON DIALYSIS: Chronic | ICD-10-CM

## 2021-09-15 DIAGNOSIS — N18.6 ESRD (END STAGE RENAL DISEASE): ICD-10-CM

## 2021-09-15 DIAGNOSIS — D69.6 THROMBOCYTOPENIA: ICD-10-CM

## 2021-09-15 DIAGNOSIS — N25.81 SECONDARY HYPERPARATHYROIDISM OF RENAL ORIGIN: Primary | Chronic | ICD-10-CM

## 2021-09-15 DIAGNOSIS — I12.0 BENIGN HYPERTENSION WITH ESRD (END-STAGE RENAL DISEASE): Chronic | ICD-10-CM

## 2021-09-15 DIAGNOSIS — Z01.811 PRE-OP CHEST EXAM: ICD-10-CM

## 2021-09-15 DIAGNOSIS — E87.5 HYPERKALEMIA: ICD-10-CM

## 2021-09-15 DIAGNOSIS — Z99.2 ESRD ON DIALYSIS: Chronic | ICD-10-CM

## 2021-09-15 DIAGNOSIS — Z99.2 ANEMIA IN CHRONIC KIDNEY DISEASE, ON CHRONIC DIALYSIS: ICD-10-CM

## 2021-09-15 LAB
25(OH)D3+25(OH)D2 SERPL-MCNC: 38 NG/ML (ref 30–96)
ABO + RH BLD: NORMAL
ALBUMIN SERPL BCP-MCNC: 3.9 G/DL (ref 3.5–5.2)
ALLENS TEST: ABNORMAL
ALLENS TEST: ABNORMAL
ALP SERPL-CCNC: 67 U/L (ref 55–135)
ALT SERPL W/O P-5'-P-CCNC: 16 U/L (ref 10–44)
ANION GAP SERPL CALC-SCNC: 12 MMOL/L (ref 8–16)
APTT BLDCRRT: 32.1 SEC (ref 21–32)
AST SERPL-CCNC: 15 U/L (ref 10–40)
BASOPHILS # BLD AUTO: 0.01 K/UL (ref 0–0.2)
BASOPHILS NFR BLD: 0.2 % (ref 0–1.9)
BILIRUB SERPL-MCNC: 1.1 MG/DL (ref 0.1–1)
BLD GP AB SCN CELLS X3 SERPL QL: NORMAL
BUN SERPL-MCNC: 61 MG/DL (ref 6–20)
CALCIUM SERPL-MCNC: 8.4 MG/DL (ref 8.7–10.5)
CHLORIDE SERPL-SCNC: 99 MMOL/L (ref 95–110)
CHOLEST SERPL-MCNC: 130 MG/DL (ref 120–199)
CHOLEST/HDLC SERPL: 2.2 {RATIO} (ref 2–5)
CO2 SERPL-SCNC: 28 MMOL/L (ref 23–29)
CREAT SERPL-MCNC: 13.2 MG/DL (ref 0.5–1.4)
DELSYS: ABNORMAL
DELSYS: ABNORMAL
DIFFERENTIAL METHOD: ABNORMAL
EOSINOPHIL # BLD AUTO: 0.1 K/UL (ref 0–0.5)
EOSINOPHIL NFR BLD: 2.5 % (ref 0–8)
ERYTHROCYTE [DISTWIDTH] IN BLOOD BY AUTOMATED COUNT: 17.8 % (ref 11.5–14.5)
EST. GFR  (AFRICAN AMERICAN): 5.4 ML/MIN/1.73 M^2
EST. GFR  (NON AFRICAN AMERICAN): 4.7 ML/MIN/1.73 M^2
GLUCOSE SERPL-MCNC: 77 MG/DL (ref 70–110)
HBV CORE AB SERPL QL IA: NEGATIVE
HBV CORE IGM SERPL QL IA: NEGATIVE
HBV SURFACE AG SERPL QL IA: NEGATIVE
HCO3 UR-SCNC: 24.9 MMOL/L (ref 24–28)
HCO3 UR-SCNC: 24.9 MMOL/L (ref 24–28)
HCT VFR BLD AUTO: 25 % (ref 40–54)
HCT VFR BLD CALC: 35 %PCV (ref 36–54)
HCV AB SERPL QL IA: NEGATIVE
HDLC SERPL-MCNC: 58 MG/DL (ref 40–75)
HDLC SERPL: 44.6 % (ref 20–50)
HGB BLD-MCNC: 8.3 G/DL (ref 14–18)
HIV 1+2 AB+HIV1 P24 AG SERPL QL IA: NEGATIVE
IMM GRANULOCYTES # BLD AUTO: 0 K/UL (ref 0–0.04)
IMM GRANULOCYTES NFR BLD AUTO: 0 % (ref 0–0.5)
INR PPP: 1.1 (ref 0.8–1.2)
LDH SERPL L TO P-CCNC: 251 U/L (ref 110–260)
LDLC SERPL CALC-MCNC: 62.2 MG/DL (ref 63–159)
LYMPHOCYTES # BLD AUTO: 0.6 K/UL (ref 1–4.8)
LYMPHOCYTES NFR BLD: 14.3 % (ref 18–48)
MCH RBC QN AUTO: 29.4 PG (ref 27–31)
MCHC RBC AUTO-ENTMCNC: 33.2 G/DL (ref 32–36)
MCV RBC AUTO: 89 FL (ref 82–98)
MONOCYTES # BLD AUTO: 0.3 K/UL (ref 0.3–1)
MONOCYTES NFR BLD: 5.6 % (ref 4–15)
NEUTROPHILS # BLD AUTO: 3.5 K/UL (ref 1.8–7.7)
NEUTROPHILS NFR BLD: 77.4 % (ref 38–73)
NONHDLC SERPL-MCNC: 72 MG/DL
NRBC BLD-RTO: 0 /100 WBC
PCO2 BLDA: 40.8 MMHG (ref 35–45)
PCO2 BLDA: 41.4 MMHG (ref 35–45)
PH SMN: 7.39 [PH] (ref 7.35–7.45)
PH SMN: 7.39 [PH] (ref 7.35–7.45)
PHOSPHATE SERPL-MCNC: 5.1 MG/DL (ref 2.7–4.5)
PLATELET # BLD AUTO: 80 K/UL (ref 150–450)
PMV BLD AUTO: ABNORMAL FL (ref 9.2–12.9)
PO2 BLDA: 104 MMHG (ref 40–60)
PO2 BLDA: 106 MMHG (ref 40–60)
POC BE: 0 MMOL/L
POC BE: 0 MMOL/L
POC IONIZED CALCIUM: 1.34 MMOL/L (ref 1.06–1.42)
POC SATURATED O2: 98 % (ref 95–100)
POC SATURATED O2: 98 % (ref 95–100)
POC TCO2: 26 MMOL/L (ref 24–29)
POC TCO2: 26 MMOL/L (ref 24–29)
POTASSIUM BLD-SCNC: 4.1 MMOL/L (ref 3.5–5.1)
POTASSIUM SERPL-SCNC: 5.8 MMOL/L (ref 3.5–5.1)
PROT SERPL-MCNC: 7.4 G/DL (ref 6–8.4)
PROTHROMBIN TIME: 12 SEC (ref 9–12.5)
PTH-INTACT SERPL-MCNC: 510.2 PG/ML (ref 9–77)
RBC # BLD AUTO: 2.82 M/UL (ref 4.6–6.2)
SAMPLE: ABNORMAL
SAMPLE: ABNORMAL
SARS-COV-2 RDRP RESP QL NAA+PROBE: NEGATIVE
SITE: ABNORMAL
SITE: ABNORMAL
SODIUM BLD-SCNC: 142 MMOL/L (ref 136–145)
SODIUM SERPL-SCNC: 139 MMOL/L (ref 136–145)
TRIGL SERPL-MCNC: 49 MG/DL (ref 30–150)
URATE SERPL-MCNC: 4 MG/DL (ref 3.4–7)
WBC # BLD AUTO: 4.48 K/UL (ref 3.9–12.7)

## 2021-09-15 PROCEDURE — G0257 UNSCHED DIALYSIS ESRD PT HOS: HCPCS | Mod: NTX

## 2021-09-15 PROCEDURE — 93005 ELECTROCARDIOGRAM TRACING: CPT | Mod: TXP

## 2021-09-15 PROCEDURE — U0002 COVID-19 LAB TEST NON-CDC: HCPCS | Mod: NTX | Performed by: PHYSICIAN ASSISTANT

## 2021-09-15 PROCEDURE — 25000003 PHARM REV CODE 250: Mod: NTX | Performed by: PHYSICIAN ASSISTANT

## 2021-09-15 PROCEDURE — 86833 HLA CLASS II HIGH DEFIN QUAL: CPT | Mod: TXP | Performed by: PHYSICIAN ASSISTANT

## 2021-09-15 PROCEDURE — 86832 HLA CLASS I HIGH DEFIN QUAL: CPT | Mod: TXP | Performed by: PHYSICIAN ASSISTANT

## 2021-09-15 PROCEDURE — 80061 LIPID PANEL: CPT | Mod: TXP | Performed by: PHYSICIAN ASSISTANT

## 2021-09-15 PROCEDURE — 87522 HEPATITIS C REVRS TRNSCRPJ: CPT | Mod: TXP | Performed by: PHYSICIAN ASSISTANT

## 2021-09-15 PROCEDURE — 25000003 PHARM REV CODE 250: Mod: NTX | Performed by: NURSE ANESTHETIST, CERTIFIED REGISTERED

## 2021-09-15 PROCEDURE — 63600175 PHARM REV CODE 636 W HCPCS: Mod: TXP | Performed by: NURSE ANESTHETIST, CERTIFIED REGISTERED

## 2021-09-15 PROCEDURE — G0378 HOSPITAL OBSERVATION PER HR: HCPCS | Mod: TXP

## 2021-09-15 PROCEDURE — 86704 HEP B CORE ANTIBODY TOTAL: CPT | Mod: NTX | Performed by: PHYSICIAN ASSISTANT

## 2021-09-15 PROCEDURE — 85025 COMPLETE CBC W/AUTO DIFF WBC: CPT | Mod: NTX | Performed by: PHYSICIAN ASSISTANT

## 2021-09-15 PROCEDURE — 84550 ASSAY OF BLOOD/URIC ACID: CPT | Mod: NTX | Performed by: PHYSICIAN ASSISTANT

## 2021-09-15 PROCEDURE — 99223 PR INITIAL HOSPITAL CARE,LEVL III: ICD-10-PCS | Mod: AI,NTX,, | Performed by: PHYSICIAN ASSISTANT

## 2021-09-15 PROCEDURE — 87389 HIV-1 AG W/HIV-1&-2 AB AG IA: CPT | Mod: TXP | Performed by: PHYSICIAN ASSISTANT

## 2021-09-15 PROCEDURE — 84100 ASSAY OF PHOSPHORUS: CPT | Mod: NTX | Performed by: PHYSICIAN ASSISTANT

## 2021-09-15 PROCEDURE — 36415 COLL VENOUS BLD VENIPUNCTURE: CPT | Mod: TXP | Performed by: PHYSICIAN ASSISTANT

## 2021-09-15 PROCEDURE — 36000707: Mod: TXP | Performed by: TRANSPLANT SURGERY

## 2021-09-15 PROCEDURE — 93010 ELECTROCARDIOGRAM REPORT: CPT | Mod: NTX,,, | Performed by: INTERNAL MEDICINE

## 2021-09-15 PROCEDURE — 99223 1ST HOSP IP/OBS HIGH 75: CPT | Mod: AI,NTX,, | Performed by: PHYSICIAN ASSISTANT

## 2021-09-15 PROCEDURE — 86803 HEPATITIS C AB TEST: CPT | Mod: TXP | Performed by: PHYSICIAN ASSISTANT

## 2021-09-15 PROCEDURE — 80053 COMPREHEN METABOLIC PANEL: CPT | Mod: TXP | Performed by: PHYSICIAN ASSISTANT

## 2021-09-15 PROCEDURE — 86825 HLA X-MATH NON-CYTOTOXIC: CPT | Mod: TXP | Performed by: PHYSICIAN ASSISTANT

## 2021-09-15 PROCEDURE — 93010 EKG 12-LEAD: ICD-10-PCS | Mod: NTX,,, | Performed by: INTERNAL MEDICINE

## 2021-09-15 PROCEDURE — 86977 RBC SERUM PRETX INCUBJ/INHIB: CPT | Mod: 59,TXP | Performed by: PHYSICIAN ASSISTANT

## 2021-09-15 PROCEDURE — 85610 PROTHROMBIN TIME: CPT | Mod: NTX | Performed by: PHYSICIAN ASSISTANT

## 2021-09-15 PROCEDURE — 82306 VITAMIN D 25 HYDROXY: CPT | Mod: NTX | Performed by: PHYSICIAN ASSISTANT

## 2021-09-15 PROCEDURE — 25000003 PHARM REV CODE 250: Mod: TXP | Performed by: NURSE PRACTITIONER

## 2021-09-15 PROCEDURE — 83970 ASSAY OF PARATHORMONE: CPT | Mod: NTX | Performed by: PHYSICIAN ASSISTANT

## 2021-09-15 PROCEDURE — 36000706: Mod: NTX | Performed by: TRANSPLANT SURGERY

## 2021-09-15 PROCEDURE — 87340 HEPATITIS B SURFACE AG IA: CPT | Mod: NTX | Performed by: PHYSICIAN ASSISTANT

## 2021-09-15 PROCEDURE — 86706 HEP B SURFACE ANTIBODY: CPT | Mod: TXP | Performed by: PHYSICIAN ASSISTANT

## 2021-09-15 PROCEDURE — 86705 HEP B CORE ANTIBODY IGM: CPT | Mod: TXP | Performed by: PHYSICIAN ASSISTANT

## 2021-09-15 PROCEDURE — 85730 THROMBOPLASTIN TIME PARTIAL: CPT | Mod: TXP | Performed by: PHYSICIAN ASSISTANT

## 2021-09-15 PROCEDURE — 27201037 HC PRESSURE MONITORING SET UP: Mod: TXP

## 2021-09-15 PROCEDURE — 80100014 HC HEMODIALYSIS 1:1: Mod: NTX

## 2021-09-15 PROCEDURE — 63600175 PHARM REV CODE 636 W HCPCS: Mod: TXP | Performed by: PHYSICIAN ASSISTANT

## 2021-09-15 PROCEDURE — 83615 LACTATE (LD) (LDH) ENZYME: CPT | Mod: TXP | Performed by: PHYSICIAN ASSISTANT

## 2021-09-15 PROCEDURE — 86825 HLA X-MATH NON-CYTOTOXIC: CPT | Mod: 91,TXP | Performed by: PHYSICIAN ASSISTANT

## 2021-09-15 PROCEDURE — 86900 BLOOD TYPING SEROLOGIC ABO: CPT | Mod: TXP | Performed by: PHYSICIAN ASSISTANT

## 2021-09-15 RX ORDER — SODIUM CHLORIDE 9 MG/ML
INJECTION, SOLUTION INTRAVENOUS
Status: CANCELLED | OUTPATIENT
Start: 2021-09-15

## 2021-09-15 RX ORDER — AMLODIPINE BESYLATE 10 MG/1
10 TABLET ORAL DAILY
Status: DISCONTINUED | OUTPATIENT
Start: 2021-09-15 | End: 2021-09-15 | Stop reason: HOSPADM

## 2021-09-15 RX ORDER — SODIUM CHLORIDE 9 MG/ML
INJECTION, SOLUTION INTRAVENOUS ONCE
Status: CANCELLED | OUTPATIENT
Start: 2021-09-15 | End: 2021-09-15

## 2021-09-15 RX ORDER — ACETAMINOPHEN 650 MG/20.3ML
650 LIQUID ORAL ONCE
Status: DISCONTINUED | OUTPATIENT
Start: 2021-09-15 | End: 2021-09-15 | Stop reason: HOSPADM

## 2021-09-15 RX ORDER — PREGABALIN 75 MG/1
75 CAPSULE ORAL
Status: DISCONTINUED | OUTPATIENT
Start: 2021-09-15 | End: 2021-09-15 | Stop reason: HOSPADM

## 2021-09-15 RX ORDER — DIPHENHYDRAMINE HYDROCHLORIDE 50 MG/ML
50 INJECTION INTRAMUSCULAR; INTRAVENOUS ONCE
Status: DISCONTINUED | OUTPATIENT
Start: 2021-09-15 | End: 2021-09-15 | Stop reason: HOSPADM

## 2021-09-15 RX ORDER — DOXAZOSIN 2 MG/1
4 TABLET ORAL ONCE
Status: COMPLETED | OUTPATIENT
Start: 2021-09-15 | End: 2021-09-15

## 2021-09-15 RX ORDER — MIDAZOLAM HYDROCHLORIDE 1 MG/ML
INJECTION, SOLUTION INTRAMUSCULAR; INTRAVENOUS
Status: DISCONTINUED | OUTPATIENT
Start: 2021-09-15 | End: 2021-09-15

## 2021-09-15 RX ORDER — HYDRALAZINE HYDROCHLORIDE 20 MG/ML
INJECTION INTRAMUSCULAR; INTRAVENOUS
Status: DISCONTINUED | OUTPATIENT
Start: 2021-09-15 | End: 2021-09-15

## 2021-09-15 RX ORDER — HYDRALAZINE HYDROCHLORIDE 20 MG/ML
INJECTION INTRAMUSCULAR; INTRAVENOUS
Status: DISCONTINUED
Start: 2021-09-15 | End: 2021-09-15 | Stop reason: HOSPADM

## 2021-09-15 RX ORDER — CLONIDINE HYDROCHLORIDE 0.1 MG/1
0.1 TABLET ORAL ONCE
Status: COMPLETED | OUTPATIENT
Start: 2021-09-15 | End: 2021-09-15

## 2021-09-15 RX ORDER — HEPARIN SODIUM 5000 [USP'U]/ML
5000 INJECTION, SOLUTION INTRAVENOUS; SUBCUTANEOUS ONCE
Status: COMPLETED | OUTPATIENT
Start: 2021-09-15 | End: 2021-09-15

## 2021-09-15 RX ORDER — LABETALOL HYDROCHLORIDE 5 MG/ML
INJECTION, SOLUTION INTRAVENOUS
Status: DISCONTINUED | OUTPATIENT
Start: 2021-09-15 | End: 2021-09-15

## 2021-09-15 RX ORDER — MUPIROCIN 20 MG/G
OINTMENT TOPICAL
Status: DISCONTINUED | OUTPATIENT
Start: 2021-09-15 | End: 2021-09-15 | Stop reason: HOSPADM

## 2021-09-15 RX ORDER — CEFAZOLIN SODIUM 1 G/3ML
2 INJECTION, POWDER, FOR SOLUTION INTRAMUSCULAR; INTRAVENOUS
Status: DISCONTINUED | OUTPATIENT
Start: 2021-09-15 | End: 2021-09-15 | Stop reason: HOSPADM

## 2021-09-15 RX ADMIN — LABETALOL HYDROCHLORIDE 10 MG: 5 INJECTION, SOLUTION INTRAVENOUS at 09:09

## 2021-09-15 RX ADMIN — AMLODIPINE BESYLATE 10 MG: 10 TABLET ORAL at 08:09

## 2021-09-15 RX ADMIN — DOXAZOSIN 4 MG: 2 TABLET ORAL at 02:09

## 2021-09-15 RX ADMIN — MIDAZOLAM HYDROCHLORIDE 2 MG: 1 INJECTION, SOLUTION INTRAMUSCULAR; INTRAVENOUS at 08:09

## 2021-09-15 RX ADMIN — HYDRALAZINE HYDROCHLORIDE 5 MG: 20 INJECTION INTRAMUSCULAR; INTRAVENOUS at 09:09

## 2021-09-15 RX ADMIN — CLONIDINE HYDROCHLORIDE 0.1 MG: 0.1 TABLET ORAL at 11:09

## 2021-09-15 RX ADMIN — HEPARIN SODIUM 5000 UNITS: 5000 INJECTION INTRAVENOUS; SUBCUTANEOUS at 02:09

## 2021-09-17 ENCOUNTER — COMMITTEE REVIEW (OUTPATIENT)
Dept: TRANSPLANT | Facility: CLINIC | Age: 21
End: 2021-09-17

## 2021-09-17 DIAGNOSIS — Z76.82 ORGAN TRANSPLANT CANDIDATE: Primary | ICD-10-CM

## 2021-09-17 LAB
CLASS I ANTIBODIES - LUMINEX: NORMAL
CLASS I ANTIBODY COMMENTS - LUMINEX: NORMAL
CLASS II ANTIBODIES - LUMINEX: NEGATIVE
CPRA %: 0
CPRA %: 58
CPRA %: 58
HEPATITIS C VIRUS (HCV) RNA DETECTION/QUANTIFICATION RT-PCR: NORMAL IU/ML
SERUM COLLECTION DT - LUMINEX CLASS I: NORMAL
SERUM COLLECTION DT - LUMINEX CLASS II: NORMAL
SPCL1 TESTING DATE: NORMAL
SPCL2 TESTING DATE: NORMAL
SPLUA TESTING DATE: NORMAL

## 2021-09-18 LAB
HBV SURFACE AB SER QL IA: POSITIVE
HBV SURFACE AB SERPL IA-ACNC: 14 MIU/ML

## 2021-09-21 ENCOUNTER — TELEPHONE (OUTPATIENT)
Dept: TRANSPLANT | Facility: CLINIC | Age: 21
End: 2021-09-21

## 2021-09-22 ENCOUNTER — TELEPHONE (OUTPATIENT)
Dept: TRANSPLANT | Facility: CLINIC | Age: 21
End: 2021-09-22

## 2021-10-07 ENCOUNTER — OFFICE VISIT (OUTPATIENT)
Dept: HEMATOLOGY/ONCOLOGY | Facility: CLINIC | Age: 21
End: 2021-10-07
Payer: MEDICARE

## 2021-10-07 VITALS
SYSTOLIC BLOOD PRESSURE: 136 MMHG | TEMPERATURE: 99 F | RESPIRATION RATE: 16 BRPM | WEIGHT: 186.94 LBS | BODY MASS INDEX: 27.69 KG/M2 | DIASTOLIC BLOOD PRESSURE: 64 MMHG | OXYGEN SATURATION: 98 % | HEIGHT: 69 IN | HEART RATE: 72 BPM

## 2021-10-07 DIAGNOSIS — Z76.82 ORGAN TRANSPLANT CANDIDATE: ICD-10-CM

## 2021-10-07 DIAGNOSIS — D69.6 THROMBOCYTOPENIA: Primary | ICD-10-CM

## 2021-10-07 PROCEDURE — 99999 PR PBB SHADOW E&M-EST. PATIENT-LVL III: ICD-10-PCS | Mod: PBBFAC,TXP,, | Performed by: INTERNAL MEDICINE

## 2021-10-07 PROCEDURE — 99999 PR PBB SHADOW E&M-EST. PATIENT-LVL III: CPT | Mod: PBBFAC,TXP,, | Performed by: INTERNAL MEDICINE

## 2021-10-07 PROCEDURE — 99204 OFFICE O/P NEW MOD 45 MIN: CPT | Mod: S$PBB,TXP,, | Performed by: INTERNAL MEDICINE

## 2021-10-07 PROCEDURE — 99204 PR OFFICE/OUTPT VISIT, NEW, LEVL IV, 45-59 MIN: ICD-10-PCS | Mod: S$PBB,TXP,, | Performed by: INTERNAL MEDICINE

## 2021-10-07 PROCEDURE — 99213 OFFICE O/P EST LOW 20 MIN: CPT | Mod: PBBFAC,TXP | Performed by: INTERNAL MEDICINE

## 2021-10-07 RX ORDER — DOXAZOSIN 8 MG/1
8 TABLET ORAL NIGHTLY
Status: ON HOLD | COMMUNITY
Start: 2021-09-07 | End: 2022-02-28 | Stop reason: HOSPADM

## 2021-10-07 RX ORDER — NEBIVOLOL HYDROCHLORIDE 10 MG/1
10 TABLET ORAL 2 TIMES DAILY
COMMUNITY
Start: 2021-06-18 | End: 2021-12-16 | Stop reason: ALTCHOICE

## 2021-10-07 RX ORDER — DOXYCYCLINE 100 MG/1
TABLET ORAL
COMMUNITY
End: 2021-12-16 | Stop reason: ALTCHOICE

## 2021-10-07 RX ORDER — TRETINOIN 1 MG/G
CREAM TOPICAL
Status: ON HOLD | COMMUNITY
Start: 2021-07-06 | End: 2022-02-28 | Stop reason: HOSPADM

## 2021-10-08 ENCOUNTER — TELEPHONE (OUTPATIENT)
Dept: TRANSPLANT | Facility: CLINIC | Age: 21
End: 2021-10-08

## 2021-10-12 ENCOUNTER — TELEPHONE (OUTPATIENT)
Dept: TRANSPLANT | Facility: CLINIC | Age: 21
End: 2021-10-12

## 2021-10-14 ENCOUNTER — HOSPITAL ENCOUNTER (OUTPATIENT)
Dept: RADIOLOGY | Facility: HOSPITAL | Age: 21
Discharge: HOME | End: 2021-10-14
Attending: NURSE PRACTITIONER
Payer: MEDICARE

## 2021-10-14 DIAGNOSIS — Z76.82 ORGAN TRANSPLANT CANDIDATE: ICD-10-CM

## 2021-10-14 PROCEDURE — 72170 XR PELVIS ROUTINE AP: ICD-10-PCS | Mod: 26,TXP,, | Performed by: RADIOLOGY

## 2021-10-14 PROCEDURE — 76770 US RETROPERITONEAL COMPLETE: ICD-10-PCS | Mod: 26,TXP,, | Performed by: RADIOLOGY

## 2021-10-14 PROCEDURE — 74176 CT ABD & PELVIS W/O CONTRAST: CPT | Mod: TC,PO,TXP

## 2021-10-14 PROCEDURE — 76770 US EXAM ABDO BACK WALL COMP: CPT | Mod: TC,PO,TXP

## 2021-10-14 PROCEDURE — 71046 X-RAY EXAM CHEST 2 VIEWS: CPT | Mod: TC,FY,PO,TXP

## 2021-10-14 PROCEDURE — 72170 X-RAY EXAM OF PELVIS: CPT | Mod: 26,TXP,, | Performed by: RADIOLOGY

## 2021-10-14 PROCEDURE — 74176 CT ABD & PELVIS W/O CONTRAST: CPT | Mod: 26,TXP,, | Performed by: RADIOLOGY

## 2021-10-14 PROCEDURE — 72170 X-RAY EXAM OF PELVIS: CPT | Mod: TC,FY,PO,TXP

## 2021-10-14 PROCEDURE — 71046 XR CHEST PA AND LATERAL: ICD-10-PCS | Mod: 26,TXP,, | Performed by: RADIOLOGY

## 2021-10-14 PROCEDURE — 71046 X-RAY EXAM CHEST 2 VIEWS: CPT | Mod: 26,TXP,, | Performed by: RADIOLOGY

## 2021-10-14 PROCEDURE — 74176 CT ABDOMEN PELVIS WITHOUT CONTRAST: ICD-10-PCS | Mod: 26,TXP,, | Performed by: RADIOLOGY

## 2021-10-14 PROCEDURE — 76770 US EXAM ABDO BACK WALL COMP: CPT | Mod: 26,TXP,, | Performed by: RADIOLOGY

## 2021-10-15 DIAGNOSIS — Z76.82 ORGAN TRANSPLANT CANDIDATE: Primary | ICD-10-CM

## 2021-10-15 DIAGNOSIS — R91.1 SOLITARY PULMONARY NODULE: ICD-10-CM

## 2021-10-21 ENCOUNTER — OFFICE VISIT (OUTPATIENT)
Dept: TRANSPLANT | Facility: CLINIC | Age: 21
End: 2021-10-21
Payer: MEDICARE

## 2021-10-21 ENCOUNTER — HOSPITAL ENCOUNTER (OUTPATIENT)
Dept: RADIOLOGY | Facility: HOSPITAL | Age: 21
Discharge: HOME | End: 2021-10-21
Attending: NURSE PRACTITIONER
Payer: MEDICARE

## 2021-10-21 VITALS
SYSTOLIC BLOOD PRESSURE: 141 MMHG | TEMPERATURE: 97 F | RESPIRATION RATE: 16 BRPM | WEIGHT: 193.31 LBS | HEIGHT: 68 IN | BODY MASS INDEX: 29.3 KG/M2 | DIASTOLIC BLOOD PRESSURE: 64 MMHG | HEART RATE: 71 BPM | OXYGEN SATURATION: 98 %

## 2021-10-21 DIAGNOSIS — Z76.82 PATIENT ON WAITING LIST FOR KIDNEY TRANSPLANT: Primary | Chronic | ICD-10-CM

## 2021-10-21 DIAGNOSIS — R91.1 SOLITARY PULMONARY NODULE: ICD-10-CM

## 2021-10-21 DIAGNOSIS — D63.1 ANEMIA IN CHRONIC KIDNEY DISEASE, ON CHRONIC DIALYSIS: ICD-10-CM

## 2021-10-21 DIAGNOSIS — I12.0 BENIGN HYPERTENSION WITH ESRD (END-STAGE RENAL DISEASE): Chronic | ICD-10-CM

## 2021-10-21 DIAGNOSIS — N25.81 SECONDARY HYPERPARATHYROIDISM OF RENAL ORIGIN: Chronic | ICD-10-CM

## 2021-10-21 DIAGNOSIS — Z99.2 ANEMIA IN CHRONIC KIDNEY DISEASE, ON CHRONIC DIALYSIS: ICD-10-CM

## 2021-10-21 DIAGNOSIS — N18.6 BENIGN HYPERTENSION WITH ESRD (END-STAGE RENAL DISEASE): Chronic | ICD-10-CM

## 2021-10-21 DIAGNOSIS — R16.2 HEPATOSPLENOMEGALY: ICD-10-CM

## 2021-10-21 DIAGNOSIS — R91.1 PULMONARY NODULE: ICD-10-CM

## 2021-10-21 DIAGNOSIS — N18.6 ANEMIA IN CHRONIC KIDNEY DISEASE, ON CHRONIC DIALYSIS: ICD-10-CM

## 2021-10-21 DIAGNOSIS — D69.6 THROMBOCYTOPENIA: ICD-10-CM

## 2021-10-21 PROCEDURE — 99999 PR PBB SHADOW E&M-EST. PATIENT-LVL IV: ICD-10-PCS | Mod: PBBFAC,TXP,, | Performed by: NURSE PRACTITIONER

## 2021-10-21 PROCEDURE — 99999 PR PBB SHADOW E&M-EST. PATIENT-LVL IV: CPT | Mod: PBBFAC,TXP,, | Performed by: NURSE PRACTITIONER

## 2021-10-21 PROCEDURE — 99215 OFFICE O/P EST HI 40 MIN: CPT | Mod: S$PBB,TXP,, | Performed by: NURSE PRACTITIONER

## 2021-10-21 PROCEDURE — 71250 CT THORAX DX C-: CPT | Mod: 26,TXP,, | Performed by: STUDENT IN AN ORGANIZED HEALTH CARE EDUCATION/TRAINING PROGRAM

## 2021-10-21 PROCEDURE — 71250 CT CHEST WITHOUT CONTRAST: ICD-10-PCS | Mod: 26,TXP,, | Performed by: STUDENT IN AN ORGANIZED HEALTH CARE EDUCATION/TRAINING PROGRAM

## 2021-10-21 PROCEDURE — 99214 OFFICE O/P EST MOD 30 MIN: CPT | Mod: PBBFAC,25,TXP | Performed by: NURSE PRACTITIONER

## 2021-10-21 PROCEDURE — 71250 CT THORAX DX C-: CPT | Mod: TC,TXP

## 2021-10-21 PROCEDURE — 99215 PR OFFICE/OUTPT VISIT, EST, LEVL V, 40-54 MIN: ICD-10-PCS | Mod: S$PBB,TXP,, | Performed by: NURSE PRACTITIONER

## 2021-10-25 ENCOUNTER — TELEPHONE (OUTPATIENT)
Dept: PULMONOLOGY | Facility: CLINIC | Age: 21
End: 2021-10-25
Payer: MEDICARE

## 2021-11-04 ENCOUNTER — OFFICE VISIT (OUTPATIENT)
Dept: PULMONOLOGY | Facility: CLINIC | Age: 21
End: 2021-11-04
Payer: MEDICARE

## 2021-11-04 ENCOUNTER — TELEPHONE (OUTPATIENT)
Dept: PULMONOLOGY | Facility: CLINIC | Age: 21
End: 2021-11-04

## 2021-11-04 DIAGNOSIS — R91.1 PULMONARY NODULE: Primary | ICD-10-CM

## 2021-11-04 PROCEDURE — 99204 OFFICE O/P NEW MOD 45 MIN: CPT | Mod: 95,TXP,, | Performed by: INTERNAL MEDICINE

## 2021-11-04 PROCEDURE — 99204 PR OFFICE/OUTPT VISIT, NEW, LEVL IV, 45-59 MIN: ICD-10-PCS | Mod: 95,TXP,, | Performed by: INTERNAL MEDICINE

## 2021-11-09 ENCOUNTER — HOSPITAL ENCOUNTER (OUTPATIENT)
Dept: PULMONOLOGY | Facility: HOSPITAL | Age: 21
Discharge: HOME | End: 2021-11-09
Attending: INTERNAL MEDICINE
Payer: MEDICARE

## 2021-11-09 DIAGNOSIS — R91.1 PULMONARY NODULE: ICD-10-CM

## 2021-11-09 PROCEDURE — 94729 DIFFUSING CAPACITY: CPT | Mod: TXP

## 2021-11-09 PROCEDURE — 94799 COMPL PULMO FUNCTION W/BR: ICD-10-PCS | Mod: 26,TXP,, | Performed by: INTERNAL MEDICINE

## 2021-11-09 PROCEDURE — 94799 UNLISTED PULMONARY SVC/PX: CPT | Mod: 26,TXP,, | Performed by: INTERNAL MEDICINE

## 2021-11-09 PROCEDURE — 94060 EVALUATION OF WHEEZING: CPT | Mod: TXP

## 2021-11-09 PROCEDURE — 94727 GAS DIL/WSHOT DETER LNG VOL: CPT | Mod: TXP

## 2021-11-10 LAB
BRPFT: NORMAL
DLCO ADJ PRE: 22.24 ML/(MIN*MMHG)
DLCO SINGLE BREATH LLN: 27.53
DLCO SINGLE BREATH PRE REF: 64.5 %
DLCO SINGLE BREATH REF: 34.46
DLCOC SBVA LLN: 3.76
DLCOC SBVA PRE REF: 92.4 %
DLCOC SBVA REF: 5.11
DLCOC SINGLE BREATH LLN: 27.53
DLCOC SINGLE BREATH PRE REF: 64.5 %
DLCOC SINGLE BREATH REF: 34.46
DLCOVA LLN: 3.76
DLCOVA PRE REF: 92.4 %
DLCOVA PRE: 4.72 ML/(MIN*MMHG*L)
DLCOVA REF: 5.11
DLVAADJ PRE: 4.72 ML/(MIN*MMHG*L)
ERVN2 LLN: -16448.4
ERVN2 PRE REF: 53 %
ERVN2 PRE: 0.85 L
ERVN2 REF: 1.6
FEF 25 75 CHG: 23.3 %
FEF 25 75 LLN: 2.43
FEF 25 75 POST REF: 121.8 %
FEF 25 75 PRE REF: 98.8 %
FEF 25 75 REF: 4.12
FET100 CHG: -7 %
FEV1 CHG: 0.3 %
FEV1 FVC CHG: 0 %
FEV1 FVC LLN: 74
FEV1 FVC POST REF: 104.3 %
FEV1 FVC PRE REF: 104.3 %
FEV1 FVC REF: 86
FEV1 LLN: 2.95
FEV1 POST REF: 91.1 %
FEV1 PRE REF: 90.8 %
FEV1 REF: 3.75
FRCN2 LLN: 2.2
FRCN2 PRE REF: 77.3 %
FRCN2 REF: 3.18
FVC CHG: 0.3 %
FVC LLN: 3.49
FVC POST REF: 86.8 %
FVC PRE REF: 86.6 %
FVC REF: 4.39
IVC PRE: 3.63 L
IVC SINGLE BREATH LLN: 3.49
IVC SINGLE BREATH PRE REF: 82.8 %
IVC SINGLE BREATH REF: 4.39
MVV LLN: 144
MVV PRE REF: 59.2 %
MVV REF: 169
PEF CHG: -14.5 %
PEF LLN: 7
PEF POST REF: 92.5 %
PEF PRE REF: 108.2 %
PEF REF: 9.51
POST FEF 25 75: 5.02 L/S
POST FET 100: 3.18 SEC
POST FEV1 FVC: 89.55 %
POST FEV1: 3.41 L
POST FVC: 3.81 L
POST PEF: 8.8 L/S
PRE DLCO: 22.24 ML/(MIN*MMHG)
PRE FEF 25 75: 4.07 L/S
PRE FET 100: 3.42 SEC
PRE FEV1 FVC: 89.55 %
PRE FEV1: 3.4 L
PRE FRC N2: 2.46 L
PRE FVC: 3.8 L
PRE MVV: 100 L/MIN
PRE PEF: 10.28 L/S
RVN2 LLN: 0.91
RVN2 PRE REF: 79.6 %
RVN2 PRE: 1.26 L
RVN2 REF: 1.59
RVN2TLCN2 LLN: 14.73
RVN2TLCN2 PRE REF: 101.3 %
RVN2TLCN2 PRE: 24.03 %
RVN2TLCN2 REF: 23.71
TLCN2 LLN: 5.59
TLCN2 PRE REF: 78 %
TLCN2 PRE: 5.26 L
TLCN2 REF: 6.74
VA PRE: 4.71 L
VA SINGLE BREATH LLN: 6.59
VA SINGLE BREATH PRE REF: 71.4 %
VA SINGLE BREATH REF: 6.59
VCMAXN2 LLN: 3.49
VCMAXN2 PRE REF: 91 %
VCMAXN2 PRE: 3.99 L
VCMAXN2 REF: 4.39

## 2021-11-11 ENCOUNTER — OFFICE VISIT (OUTPATIENT)
Dept: HEPATOLOGY | Facility: CLINIC | Age: 21
End: 2021-11-11
Payer: MEDICARE

## 2021-11-11 DIAGNOSIS — R16.1 SPLENOMEGALY: ICD-10-CM

## 2021-11-11 DIAGNOSIS — R16.0 HEPATOMEGALY: Primary | ICD-10-CM

## 2021-11-11 DIAGNOSIS — D64.9 ANEMIA, UNSPECIFIED TYPE: ICD-10-CM

## 2021-11-11 DIAGNOSIS — N18.6 STAGE 5 CHRONIC KIDNEY DISEASE ON CHRONIC DIALYSIS: ICD-10-CM

## 2021-11-11 DIAGNOSIS — Z99.2 STAGE 5 CHRONIC KIDNEY DISEASE ON CHRONIC DIALYSIS: ICD-10-CM

## 2021-11-11 DIAGNOSIS — Z76.82 ORGAN TRANSPLANT CANDIDATE: ICD-10-CM

## 2021-11-11 PROCEDURE — 99203 OFFICE O/P NEW LOW 30 MIN: CPT | Mod: 95,TXP,, | Performed by: INTERNAL MEDICINE

## 2021-11-11 PROCEDURE — 99203 PR OFFICE/OUTPT VISIT, NEW, LEVL III, 30-44 MIN: ICD-10-PCS | Mod: 95,TXP,, | Performed by: INTERNAL MEDICINE

## 2021-11-12 ENCOUNTER — TELEPHONE (OUTPATIENT)
Dept: HEPATOLOGY | Facility: CLINIC | Age: 21
End: 2021-11-12
Payer: MEDICARE

## 2021-11-14 ENCOUNTER — PATIENT MESSAGE (OUTPATIENT)
Dept: HEPATOLOGY | Facility: CLINIC | Age: 21
End: 2021-11-14
Payer: MEDICARE

## 2021-12-08 ENCOUNTER — TELEPHONE (OUTPATIENT)
Dept: HEPATOLOGY | Facility: CLINIC | Age: 21
End: 2021-12-08
Payer: MEDICARE

## 2021-12-08 ENCOUNTER — PATIENT MESSAGE (OUTPATIENT)
Dept: HEPATOLOGY | Facility: CLINIC | Age: 21
End: 2021-12-08
Payer: MEDICARE

## 2021-12-17 ENCOUNTER — TELEPHONE (OUTPATIENT)
Dept: HEPATOLOGY | Facility: CLINIC | Age: 21
End: 2021-12-17
Payer: MEDICARE

## 2021-12-23 ENCOUNTER — PROCEDURE VISIT (OUTPATIENT)
Dept: HEPATOLOGY | Facility: CLINIC | Age: 21
End: 2021-12-23
Payer: MEDICARE

## 2021-12-23 DIAGNOSIS — R16.0 HEPATOMEGALY: ICD-10-CM

## 2021-12-23 PROCEDURE — 91200 LIVER ELASTOGRAPHY: CPT | Mod: PBBFAC,TXP | Performed by: INTERNAL MEDICINE

## 2021-12-23 PROCEDURE — 91200 FIBROSCAN (VIBRATION CONTROLLED TRANSIENT ELASTOGRAPHY): ICD-10-PCS | Mod: 26,S$PBB,TXP, | Performed by: INTERNAL MEDICINE

## 2022-01-03 ENCOUNTER — TELEPHONE (OUTPATIENT)
Dept: HEPATOLOGY | Facility: CLINIC | Age: 22
End: 2022-01-03
Payer: MEDICARE

## 2022-01-03 NOTE — TELEPHONE ENCOUNTER
Returned mothers call. She was wondering patients transplant status. She said it was pending the fibroscan results. I told her that I would send Dr. Delgado a message.

## 2022-01-03 NOTE — PROCEDURES
FibroScan (Vibration Controlled Transient Elastography)    Date/Time: 12/23/2021 2:00 PM  Performed by: Manan Delgado MD  Authorized by: Manan Delgado MD     Diagnosis:  NAFLD    Probe:     Universal Protocol: Patient's identity, procedure and site were verified, confirmatory pause was performed. Discussed procedure including risks and potential complications.  Questions answered.  Patient verbalizes understanding and wishes to proceed with VCTE.     Procedure: After providing explanations of the procedure, patient was placed in the supine position with right arm in maximum abduction to allow optimal exposure of right lateral abdomen.  Patient was briefly assessed, Testing was performed in the mid-axillary location, 50Hz Shear Wave pulses were applied and the resulting Shear Wave and Propagation Speed detected with a 3.5 MHz ultrasonic signal, using the FibroScan probe, Skin to liver capsule distance and liver parenchyma were accessed during the entire examination with the FibroScan probe, Patient was instructed to breathe normally and to abstain from sudden movements during the procedure, allowing for random measurements of liver stiffness. At least 10 Shear Waves were produced, Individual measurements of each Shear Wave were calculated.  Patient tolerated the procedure well with no complications.  Meets discharge criteria as was dismissed.  Rates pain 0 out of 10.  Patient will follow up with ordering provider to review results.      Findings  Median liver stiffness score:  4.8  CAP Reading: dB/m:  190    IQR/med %:  8  Interpretation  Fibrosis interpretation is based on medial liver stiffness - Kilopascal (kPa).    Fibrosis Stage:  F 0-1  Steatosis interpretation is based on controlled attenuation parameter - (dB/m).    Steatosis Grade:  <S1

## 2022-01-03 NOTE — TELEPHONE ENCOUNTER
----- Message from Heather Baldwin sent at 1/3/2022 12:41 PM CST -----  Regarding: speak to staff  Contact: Swati Saenz (mother)  Patient mother, Swati, called inquiring about test results.    Contact: [phone]

## 2022-01-07 ENCOUNTER — TELEPHONE (OUTPATIENT)
Dept: TRANSPLANT | Facility: CLINIC | Age: 22
End: 2022-01-07
Payer: MEDICARE

## 2022-01-07 NOTE — TELEPHONE ENCOUNTER
Returned call to pt's mother. Informed her that pt needs to RES repeat CT chest, however case will be discussed further with team for re-activation. All questions and concerns were addressed.    ----- Message from Amparo Dos Santos sent at 1/3/2022  3:03 PM CST -----  Regarding: FW: speak to coordinator  Contact: New (mother)  This was still in the waitlist box. Didn't know if you called her    ----- Message -----  From: Heather Baldwin  Sent: 1/3/2022   1:09 PM CST  To: Kidney Waitlisted Coordinator  Subject: speak to coordinator                             Patient mother, New, called to speak to coordinator.    Contact: [phone]

## 2022-01-10 ENCOUNTER — HOSPITAL ENCOUNTER (OUTPATIENT)
Dept: RADIOLOGY | Facility: HOSPITAL | Age: 22
Discharge: HOME | End: 2022-01-10
Attending: INTERNAL MEDICINE
Payer: MEDICARE

## 2022-01-10 ENCOUNTER — TELEPHONE (OUTPATIENT)
Dept: PULMONOLOGY | Facility: CLINIC | Age: 22
End: 2022-01-10
Payer: MEDICARE

## 2022-01-10 DIAGNOSIS — R91.1 PULMONARY NODULE: ICD-10-CM

## 2022-01-10 PROCEDURE — 71250 CT THORAX DX C-: CPT | Mod: TC,PO,TXP

## 2022-01-10 PROCEDURE — 71250 CT CHEST WITHOUT CONTRAST: ICD-10-PCS | Mod: 26,TXP,, | Performed by: RADIOLOGY

## 2022-01-10 PROCEDURE — 71250 CT THORAX DX C-: CPT | Mod: 26,TXP,, | Performed by: RADIOLOGY

## 2022-01-10 NOTE — TELEPHONE ENCOUNTER
Ct appt made already.                    ----- Message from Kimberly George MA sent at 1/10/2022  8:43 AM CST -----  Libia Barros V Staff  Caller: Unspecified (Today,  8:35 AM)  Regarding: Pts mother called to speak to someone In the office about a Ct appointment pt he missed pts mother is wanting to r/s CT scan in [facility] since they live on [address].       Call back number:[phone] Darcie

## 2022-01-17 PROCEDURE — 99001 SPECIMEN HANDLING PT-LAB: CPT | Mod: PO,TXP | Performed by: NURSE PRACTITIONER

## 2022-01-25 ENCOUNTER — LAB VISIT (OUTPATIENT)
Dept: LAB | Facility: HOSPITAL | Age: 22
End: 2022-01-25
Payer: MEDICARE

## 2022-01-25 DIAGNOSIS — Z76.82 ORGAN TRANSPLANT CANDIDATE: ICD-10-CM

## 2022-01-29 DIAGNOSIS — D84.9 IMMUNOSUPPRESSED STATUS: ICD-10-CM

## 2022-02-01 NOTE — PROGRESS NOTES
Reviewed chart with Dr. Piper. She suggested sending a message to [facility], Dr. Barton regarding continued low plt count and possible causes including idiopathic thrombocytopenia purpura (ITP). Pt was cleared by [facility] with bone marrow bx which was unremarkable, Hepatology with fibroscan and Pulmonology.

## 2022-02-03 ENCOUNTER — DOCUMENTATION ONLY (OUTPATIENT)
Dept: TRANSPLANT | Facility: CLINIC | Age: 22
End: 2022-02-03
Payer: MEDICARE

## 2022-02-03 NOTE — NURSING
PHARM.D. PRE-TRANSPLANT NOTE:    This patient's medication therapy was evaluated as part of his pre-transplant evaluation.      The following general pharmacologic concerns were noted: no issues    The following concerns for post-operative pain management were noted: no issues    The following pharmacologic concerns related to HCV therapy were noted: none      This patient's medication profile was reviewed for considerations for DAA Hepatitis C therapy:    [x]  No current inducers of CYP 3A4 or PGP  [x]  No amiodarone on this patient's EMR profile in the last 24 months  [x]  No past or current atrial fibrillation on this patient's EMR profile       Current Outpatient Medications   Medication Sig Dispense Refill    acetaminophen (TYLENOL) 500 MG tablet Take 500 mg by mouth every 6 (six) hours as needed (headache).      amLODIPine (NORVASC) 10 MG tablet Take 10 mg by mouth once daily.      AURYXIA 210 mg iron Tab Take 1 tablet by mouth 3 (three) times daily.       BYSTOLIC 5 mg Tab Take 1 tablet by mouth once daily.      calcitRIOL (ROCALTROL) 0.5 MCG Cap Take 1 capsule by mouth once daily.      clindamycin (CLEOCIN T) 1 % external solution Apply topically 2 (two) times daily.       diphenhydrAMINE (BENADRYL) 25 mg capsule Take 1 capsule (25 mg total) by mouth nightly as needed for Itching or Insomnia. (Patient taking differently: Take 25 mg by mouth nightly as needed for Itching.)  0    doxazosin (CARDURA) 8 MG Tab Take 8 mg by mouth nightly.      epoetin beta, methoxy peg (MIRCERA) 200 mcg/0.3 mL Syrg Inject 200 mcg into the skin every 14 (fourteen) days.       SENSIPAR 30 mg Tab Take 30 mg by mouth every evening.       terbinafine HCL (LAMISIL) 1 % cream Apply topically 2 (two) times daily. 24 g 3    tretinoin (RETIN-A) 0.1 % cream APPLY 1 APPLICATION TOPICALLY TO AFFECTED AREAS ON FACE       No current facility-administered medications for this visit.           I am available for consultation and can  be contacted, as needed by the other members of the Transplant team.

## 2022-02-04 ENCOUNTER — EPISODE CHANGES (OUTPATIENT)
Dept: TRANSPLANT | Facility: CLINIC | Age: 22
End: 2022-02-04

## 2022-02-04 DIAGNOSIS — D84.9 IMMUNOSUPPRESSED STATUS: ICD-10-CM

## 2022-02-04 NOTE — LETTER
February 4, 2022    Tameka Saenz  [address] [facility] [address]    Dear Tameka Saenz:  MRN: [id number]    The [facility] transplant team reviewed your transplant candidacy.  It is our programs opinion that you are a transplant candidate and are re-activated at our facility as of 2/4/2022.  You are now eligible to receive a transplant.  Your status is now Status 1.    Attached is a letter from the [employer] Network for Organ Sharing (UNOS).  It describes the services and information offered to patients by UNOS and the Organ Procurement and Transplant Network.    The [facility] transplant team remains available to answer any questions.  Should you have any questions regarding this decision, please do not hesitate to contact our pre-transplant office.      Sincerely,  Mani Martinez, KARLAN, RN, MPH  Kidney Transplant Coordinator    [facility] [address] [address]  [phone]    Tj/Enclosed    Cc:[facility]            The Organ Procurement and Transplantation Network   Toll-free patient services line: Your resource for organ transplant information     If you have a question regarding your own medical care, you always should call your transplant hospital first. However, for general organ transplant-related information, you can call the Organ Procurement and Transplantation Network (OPTN) toll-free patient services line at [phone].     Anyone, including potential transplant candidates, candidates, recipients, family members, friends, living donors, and donor family members, can call this number to:     · Talk about organ donation, living donation, the transplant process, the donation process, and transplant policies.   · Get a free patient information kit with helpful booklets, waiting list and transplant information, and a list of all transplant hospitals.   · Ask questions about the OPTN website  (https://optn.transplant.hrsa.gov/), the [employer] Network for Organ Sharings (UNOS) website (https://unos.org/), or the UNOS website for living donors and transplant recipients. (https://www.transplantliving.org/).   · Learn how the OPTN can help you.   · Talk about any concerns that you may have with a transplant hospital.     The nations transplant system, the OPTN, is managed under federal contract by the [employer] for Organ Sharing (UNOS), which is a non-profit charitable organization. The OPTN helps create and define organ sharing policies that make the best use of donated organs. This process continuously evaluating new advances and discoveries so policies can be adapted to best serve patients waiting for transplants. To do so, the OPTN works closely with transplant professionals, transplant patients, transplant candidates, donor families, living donors, and the public. All transplant programs and organ procurement organizations throughout the country are OPTN members and are obligated to follow the policies the OPTN creates for allocating organs.     The OPTN also is responsible for:   · Providing educational material for patients, the public, and professionals.   · Raising awareness of the need for donated organs and tissue.   · Coordinating organ procurement, matching, and placement.   · Collecting information about every organ transplant and donation that occurs in the [place].     Remember, you should contact your transplant hospital directly if you have questions or concerns about your own medical care including medical records, work-up progress, and test results.     We are not your transplant hospital, and our staff will not be able to answer questions about your case, so please keep your transplant hospitals phone number handy.   However, while you research your transplant needs and learn as much as you can about transplantation and donation, we welcome your call to our toll-free patient  services line at [phone].

## 2022-02-04 NOTE — PROGRESS NOTES
Tameka Saenz medical records reviewed with . Provider determined that the patient will be re-activated on the kidney transplant waitlist.     Tameka Saenz notified of re-activation on the waitlist. Patients dialysis unit and Nephrologist notified of status change by letter.

## 2022-02-06 DIAGNOSIS — D84.9 IMMUNOSUPPRESSED STATUS: ICD-10-CM

## 2022-02-18 LAB
HLA FREEZE AND HOLD INTERPRETATION: NORMAL
HLAFH COLLECTION DATE: NORMAL
HPRA INTERPRETATION: NORMAL

## 2022-02-22 ENCOUNTER — TELEPHONE (OUTPATIENT)
Dept: TRANSPLANT | Facility: CLINIC | Age: 22
End: 2022-02-22
Payer: MEDICARE

## 2022-02-23 ENCOUNTER — TELEPHONE (OUTPATIENT)
Dept: TRANSPLANT | Facility: CLINIC | Age: 22
End: 2022-02-23
Payer: MEDICARE

## 2022-02-23 NOTE — TELEPHONE ENCOUNTER
ON-CALL NOTE    UNOS# [id number]    Notified by Barron Benton, , that Tameka Saenz is eligible for kidney offer.  Spoke with patient and identified no acute medical issues with telephone assessment. Protocol script read to patient regarding N/A, standard donor offer. Patient verbalized understanding, all questions answered, patient accepts organ offer. Notified by Barron Benton that virtual crossmatch is negative.  Current sample of blood is available from date 1/17/22 for crossmatch.  Patient reports no sensitizing event since last blood sample for PRA received. Will notify [facility] to perform a retrospective  crossmatch per guideline if needed.    Patient was asked if they have had a positive COVID-19 test or if they have any signs or symptoms. Informed patient that they will be tested for COVID-19 upon arrival to the hospital, unless have a previous positive result. If tested and result is positive, the transplant will not be able to occur, they will be inactivated on the wait list for 28 days per protocol and required to quarantine.     Patient notified of back-up organ offer and patient accepts. Patient notified to continue normal routine and will be kept updated. Patient also notified that coordiantor will contact dialysis unit in the am for recent platelet count. If platelets are <75 patient will not be considered for offer. Patient and mother verbalize understanding.

## 2022-02-23 NOTE — TELEPHONE ENCOUNTER
On call note:  UNOS#[id number]    Patient mom notified no updates at this time and patient can continue normal routine. Patient mom verbalized understanding. All questions answered.

## 2022-02-23 NOTE — TELEPHONE ENCOUNTER
On call note:  UNDEIRDRE#[id number]  Spoke with patient dialysis unit to get last platelet count. Platelet count 2/16/22: 108

## 2022-02-24 NOTE — TELEPHONE ENCOUNTER
On call note  UNOS# [id number]    Was notified by BRENDA Benton that kidney was accepted by another facility. Patient and mother notified and released from offer. Verbalized understanding. No questions/concerns at this time.

## 2022-02-24 NOTE — TELEPHONE ENCOUNTER
On call note:  UNOS#[id number]    Patient mother notified of no updates at this time. Patient still being considered as back up for organ offer. Continue normal routine. No questions/concerns at this time.

## 2022-02-25 ENCOUNTER — HOSPITAL ENCOUNTER (INPATIENT)
Facility: HOSPITAL | Age: 22
LOS: 4 days | Discharge: HOME | DRG: 652 | End: 2022-03-01
Attending: TRANSPLANT SURGERY | Admitting: TRANSPLANT SURGERY
Payer: MEDICARE

## 2022-02-25 ENCOUNTER — ANESTHESIA (OUTPATIENT)
Dept: SURGERY | Facility: HOSPITAL | Age: 22
DRG: 652 | End: 2022-02-25
Payer: MEDICARE

## 2022-02-25 ENCOUNTER — TELEPHONE (OUTPATIENT)
Dept: TRANSPLANT | Facility: CLINIC | Age: 22
End: 2022-02-25
Payer: MEDICARE

## 2022-02-25 ENCOUNTER — ANESTHESIA EVENT (OUTPATIENT)
Dept: SURGERY | Facility: HOSPITAL | Age: 22
DRG: 652 | End: 2022-02-25
Payer: MEDICARE

## 2022-02-25 DIAGNOSIS — D69.6 THROMBOCYTOPENIA: ICD-10-CM

## 2022-02-25 DIAGNOSIS — Z94.0 S/P KIDNEY TRANSPLANT: Primary | ICD-10-CM

## 2022-02-25 DIAGNOSIS — Z76.82 AWAITING ORGAN TRANSPLANT STATUS: Primary | ICD-10-CM

## 2022-02-25 DIAGNOSIS — R50.2 DRUG-INDUCED FEVER: ICD-10-CM

## 2022-02-25 DIAGNOSIS — Z99.2 ESRD ON DIALYSIS: Chronic | ICD-10-CM

## 2022-02-25 DIAGNOSIS — Z29.89 PROPHYLACTIC IMMUNOTHERAPY: ICD-10-CM

## 2022-02-25 DIAGNOSIS — R73.9 STEROID-INDUCED HYPERGLYCEMIA: ICD-10-CM

## 2022-02-25 DIAGNOSIS — Z01.818 PRE-OP EVALUATION: ICD-10-CM

## 2022-02-25 DIAGNOSIS — T38.0X5A STEROID-INDUCED HYPERGLYCEMIA: ICD-10-CM

## 2022-02-25 DIAGNOSIS — N18.6 ESRD ON DIALYSIS: Chronic | ICD-10-CM

## 2022-02-25 DIAGNOSIS — D84.821 IMMUNOSUPPRESSION DUE TO DRUG THERAPY: ICD-10-CM

## 2022-02-25 DIAGNOSIS — D63.8 ANEMIA OF CHRONIC DISEASE: ICD-10-CM

## 2022-02-25 DIAGNOSIS — N18.6 ESRD (END STAGE RENAL DISEASE): ICD-10-CM

## 2022-02-25 DIAGNOSIS — Z91.89 AT RISK FOR OPPORTUNISTIC INFECTIONS: ICD-10-CM

## 2022-02-25 DIAGNOSIS — Z79.899 IMMUNOSUPPRESSION DUE TO DRUG THERAPY: ICD-10-CM

## 2022-02-25 DIAGNOSIS — D62 ACUTE BLOOD LOSS ANEMIA: ICD-10-CM

## 2022-02-25 LAB
ABO + RH BLD: NORMAL
ALBUMIN SERPL BCP-MCNC: 4 G/DL (ref 3.5–5.2)
ALP SERPL-CCNC: 58 U/L (ref 55–135)
ALT SERPL W/O P-5'-P-CCNC: 19 U/L (ref 10–44)
ANION GAP SERPL CALC-SCNC: 15 MMOL/L (ref 8–16)
APTT BLDCRRT: 30.8 SEC (ref 21–32)
AST SERPL-CCNC: 11 U/L (ref 10–40)
BASOPHILS # BLD AUTO: 0.01 K/UL (ref 0–0.2)
BASOPHILS NFR BLD: 0.3 % (ref 0–1.9)
BILIRUB SERPL-MCNC: 0.8 MG/DL (ref 0.1–1)
BLD GP AB SCN CELLS X3 SERPL QL: NORMAL
BLD PROD TYP BPU: NORMAL
BLD PROD TYP BPU: NORMAL
BLOOD UNIT EXPIRATION DATE: NORMAL
BLOOD UNIT EXPIRATION DATE: NORMAL
BLOOD UNIT TYPE CODE: 5100
BLOOD UNIT TYPE CODE: 6200
BLOOD UNIT TYPE: NORMAL
BLOOD UNIT TYPE: NORMAL
BUN SERPL-MCNC: 58 MG/DL (ref 6–20)
CALCIUM SERPL-MCNC: 8.7 MG/DL (ref 8.7–10.5)
CHLORIDE SERPL-SCNC: 100 MMOL/L (ref 95–110)
CHOLEST SERPL-MCNC: 135 MG/DL (ref 120–199)
CHOLEST/HDLC SERPL: 2.1 {RATIO} (ref 2–5)
CO2 SERPL-SCNC: 27 MMOL/L (ref 23–29)
CODING SYSTEM: NORMAL
CODING SYSTEM: NORMAL
CREAT SERPL-MCNC: 14.6 MG/DL (ref 0.5–1.4)
DIFFERENTIAL METHOD: ABNORMAL
DISPENSE STATUS: NORMAL
DISPENSE STATUS: NORMAL
EOSINOPHIL # BLD AUTO: 0 K/UL (ref 0–0.5)
EOSINOPHIL NFR BLD: 1.3 % (ref 0–8)
ERYTHROCYTE [DISTWIDTH] IN BLOOD BY AUTOMATED COUNT: 15 % (ref 11.5–14.5)
EST. GFR  (AFRICAN AMERICAN): 4.8 ML/MIN/1.73 M^2
EST. GFR  (NON AFRICAN AMERICAN): 4.1 ML/MIN/1.73 M^2
GLUCOSE SERPL-MCNC: 85 MG/DL (ref 70–110)
HCT VFR BLD AUTO: 27.6 % (ref 40–54)
HDLC SERPL-MCNC: 64 MG/DL (ref 40–75)
HDLC SERPL: 47.4 % (ref 20–50)
HGB BLD-MCNC: 8.9 G/DL (ref 14–18)
IMM GRANULOCYTES # BLD AUTO: 0.01 K/UL (ref 0–0.04)
IMM GRANULOCYTES NFR BLD AUTO: 0.3 % (ref 0–0.5)
INR PPP: 1.1 (ref 0.8–1.2)
LDH SERPL L TO P-CCNC: 218 U/L (ref 110–260)
LDLC SERPL CALC-MCNC: 63.8 MG/DL (ref 63–159)
LYMPHOCYTES # BLD AUTO: 0.6 K/UL (ref 1–4.8)
LYMPHOCYTES NFR BLD: 20.6 % (ref 18–48)
MCH RBC QN AUTO: 30.4 PG (ref 27–31)
MCHC RBC AUTO-ENTMCNC: 32.2 G/DL (ref 32–36)
MCV RBC AUTO: 94 FL (ref 82–98)
MONOCYTES # BLD AUTO: 0.3 K/UL (ref 0.3–1)
MONOCYTES NFR BLD: 8.2 % (ref 4–15)
NEUTROPHILS # BLD AUTO: 2.1 K/UL (ref 1.8–7.7)
NEUTROPHILS NFR BLD: 69.3 % (ref 38–73)
NONHDLC SERPL-MCNC: 71 MG/DL
NRBC BLD-RTO: 0 /100 WBC
PHOSPHATE SERPL-MCNC: 5.8 MG/DL (ref 2.7–4.5)
PLATELET # BLD AUTO: 76 K/UL (ref 150–450)
PMV BLD AUTO: 13.3 FL (ref 9.2–12.9)
POTASSIUM SERPL-SCNC: 5 MMOL/L (ref 3.5–5.1)
PROT SERPL-MCNC: 7.4 G/DL (ref 6–8.4)
PROTHROMBIN TIME: 11.5 SEC (ref 9–12.5)
PTH-INTACT SERPL-MCNC: 722.2 PG/ML (ref 9–77)
RBC # BLD AUTO: 2.93 M/UL (ref 4.6–6.2)
SARS-COV-2 RDRP RESP QL NAA+PROBE: NEGATIVE
SODIUM SERPL-SCNC: 142 MMOL/L (ref 136–145)
TRIGL SERPL-MCNC: 36 MG/DL (ref 30–150)
UNIT NUMBER: NORMAL
UNIT NUMBER: NORMAL
URATE SERPL-MCNC: 4.8 MG/DL (ref 3.4–7)
WBC # BLD AUTO: 3.06 K/UL (ref 3.9–12.7)

## 2022-02-25 PROCEDURE — 12000002 HC ACUTE/MED SURGE SEMI-PRIVATE ROOM: Mod: NTX

## 2022-02-25 PROCEDURE — 86706 HEP B SURFACE ANTIBODY: CPT | Mod: NTX | Performed by: PHYSICIAN ASSISTANT

## 2022-02-25 PROCEDURE — 25000003 PHARM REV CODE 250: Performed by: NURSE ANESTHETIST, CERTIFIED REGISTERED

## 2022-02-25 PROCEDURE — 87340 HEPATITIS B SURFACE AG IA: CPT | Mod: NTX | Performed by: PHYSICIAN ASSISTANT

## 2022-02-25 PROCEDURE — D9220A PRA ANESTHESIA: ICD-10-PCS | Mod: ANES,,, | Performed by: ANESTHESIOLOGY

## 2022-02-25 PROCEDURE — 86803 HEPATITIS C AB TEST: CPT | Mod: NTX | Performed by: PHYSICIAN ASSISTANT

## 2022-02-25 PROCEDURE — 93010 EKG 12-LEAD: ICD-10-PCS | Mod: NTX,,, | Performed by: INTERNAL MEDICINE

## 2022-02-25 PROCEDURE — 25000003 PHARM REV CODE 250: Performed by: TRANSPLANT SURGERY

## 2022-02-25 PROCEDURE — 85025 COMPLETE CBC W/AUTO DIFF WBC: CPT | Mod: NTX | Performed by: PHYSICIAN ASSISTANT

## 2022-02-25 PROCEDURE — 50360 PR TRANSPLANTATION OF KIDNEY: ICD-10-PCS | Mod: RT,GC,, | Performed by: TRANSPLANT SURGERY

## 2022-02-25 PROCEDURE — 80053 COMPREHEN METABOLIC PANEL: CPT | Mod: NTX | Performed by: PHYSICIAN ASSISTANT

## 2022-02-25 PROCEDURE — 84100 ASSAY OF PHOSPHORUS: CPT | Mod: NTX | Performed by: PHYSICIAN ASSISTANT

## 2022-02-25 PROCEDURE — 99223 1ST HOSP IP/OBS HIGH 75: CPT | Mod: 57,AI,NTX, | Performed by: PHYSICIAN ASSISTANT

## 2022-02-25 PROCEDURE — 63600175 PHARM REV CODE 636 W HCPCS: Performed by: NURSE ANESTHETIST, CERTIFIED REGISTERED

## 2022-02-25 PROCEDURE — 86704 HEP B CORE ANTIBODY TOTAL: CPT | Mod: NTX | Performed by: PHYSICIAN ASSISTANT

## 2022-02-25 PROCEDURE — P9035 PLATELET PHERES LEUKOREDUCED: HCPCS | Performed by: PHYSICIAN ASSISTANT

## 2022-02-25 PROCEDURE — 63600175 PHARM REV CODE 636 W HCPCS: Mod: NTX | Performed by: PHYSICIAN ASSISTANT

## 2022-02-25 PROCEDURE — 87522 HEPATITIS C REVRS TRNSCRPJ: CPT | Mod: NTX | Performed by: PHYSICIAN ASSISTANT

## 2022-02-25 PROCEDURE — 50605 PR URETEROTOMY TO INSERT STENT: ICD-10-PCS | Mod: 51,RT,GC, | Performed by: TRANSPLANT SURGERY

## 2022-02-25 PROCEDURE — 81300002 HC KIDNEY TRANSPORT, GROUND 4-5 HOURS

## 2022-02-25 PROCEDURE — D9220A PRA ANESTHESIA: ICD-10-PCS | Mod: CRNA,,, | Performed by: NURSE ANESTHETIST, CERTIFIED REGISTERED

## 2022-02-25 PROCEDURE — 50360 RNL ALTRNSPLJ W/O RCP NFRCT: CPT | Mod: RT,GC,, | Performed by: TRANSPLANT SURGERY

## 2022-02-25 PROCEDURE — 93010 ELECTROCARDIOGRAM REPORT: CPT | Mod: NTX,,, | Performed by: INTERNAL MEDICINE

## 2022-02-25 PROCEDURE — 85730 THROMBOPLASTIN TIME PARTIAL: CPT | Mod: NTX | Performed by: PHYSICIAN ASSISTANT

## 2022-02-25 PROCEDURE — 36000930 HC OR TIME LEV VII 1ST 15 MIN: Performed by: TRANSPLANT SURGERY

## 2022-02-25 PROCEDURE — 81200001 HC KIDNEY ACQUISITION - CADAVER

## 2022-02-25 PROCEDURE — 84550 ASSAY OF BLOOD/URIC ACID: CPT | Mod: NTX | Performed by: PHYSICIAN ASSISTANT

## 2022-02-25 PROCEDURE — 27201423 OPTIME MED/SURG SUP & DEVICES STERILE SUPPLY: Performed by: TRANSPLANT SURGERY

## 2022-02-25 PROCEDURE — 86705 HEP B CORE ANTIBODY IGM: CPT | Mod: NTX | Performed by: PHYSICIAN ASSISTANT

## 2022-02-25 PROCEDURE — U0002 COVID-19 LAB TEST NON-CDC: HCPCS | Mod: NTX | Performed by: PHYSICIAN ASSISTANT

## 2022-02-25 PROCEDURE — D9220A PRA ANESTHESIA: Mod: CRNA,,, | Performed by: NURSE ANESTHETIST, CERTIFIED REGISTERED

## 2022-02-25 PROCEDURE — 36000931 HC OR TIME LEV VII EA ADD 15 MIN: Performed by: TRANSPLANT SURGERY

## 2022-02-25 PROCEDURE — 63600175 PHARM REV CODE 636 W HCPCS: Performed by: TRANSPLANT SURGERY

## 2022-02-25 PROCEDURE — C2617 STENT, NON-COR, TEM W/O DEL: HCPCS | Performed by: TRANSPLANT SURGERY

## 2022-02-25 PROCEDURE — 80061 LIPID PANEL: CPT | Mod: NTX | Performed by: PHYSICIAN ASSISTANT

## 2022-02-25 PROCEDURE — 36620 INSERTION CATHETER ARTERY: CPT | Mod: 59,,, | Performed by: ANESTHESIOLOGY

## 2022-02-25 PROCEDURE — 99223 PR INITIAL HOSPITAL CARE,LEVL III: ICD-10-PCS | Mod: 57,AI,NTX, | Performed by: PHYSICIAN ASSISTANT

## 2022-02-25 PROCEDURE — D9220A PRA ANESTHESIA: Mod: ANES,,, | Performed by: ANESTHESIOLOGY

## 2022-02-25 PROCEDURE — 71000039 HC RECOVERY, EACH ADD'L HOUR: Performed by: TRANSPLANT SURGERY

## 2022-02-25 PROCEDURE — 86901 BLOOD TYPING SEROLOGIC RH(D): CPT | Mod: NTX | Performed by: PHYSICIAN ASSISTANT

## 2022-02-25 PROCEDURE — 50323 PR TRANSPLANT,PREP CADAVER RENAL GRAFT: ICD-10-PCS | Mod: 51,RT,GC, | Performed by: TRANSPLANT SURGERY

## 2022-02-25 PROCEDURE — 93005 ELECTROCARDIOGRAM TRACING: CPT | Mod: NTX

## 2022-02-25 PROCEDURE — 36620 PR INSERT CATH,ART,PERCUT,SHORTTERM: ICD-10-PCS | Mod: 59,,, | Performed by: ANESTHESIOLOGY

## 2022-02-25 PROCEDURE — 87389 HIV-1 AG W/HIV-1&-2 AB AG IA: CPT | Mod: NTX | Performed by: PHYSICIAN ASSISTANT

## 2022-02-25 PROCEDURE — 37000009 HC ANESTHESIA EA ADD 15 MINS: Performed by: TRANSPLANT SURGERY

## 2022-02-25 PROCEDURE — 71000016 HC POSTOP RECOV ADDL HR: Performed by: TRANSPLANT SURGERY

## 2022-02-25 PROCEDURE — 50605 INSERT URETERAL SUPPORT: CPT | Mod: 51,RT,GC, | Performed by: TRANSPLANT SURGERY

## 2022-02-25 PROCEDURE — 85610 PROTHROMBIN TIME: CPT | Mod: NTX | Performed by: PHYSICIAN ASSISTANT

## 2022-02-25 PROCEDURE — 83970 ASSAY OF PARATHORMONE: CPT | Mod: NTX | Performed by: PHYSICIAN ASSISTANT

## 2022-02-25 PROCEDURE — 25000003 PHARM REV CODE 250: Mod: NTX | Performed by: PHYSICIAN ASSISTANT

## 2022-02-25 PROCEDURE — 27100025 HC TUBING, SET FLUID WARMER: Mod: NTX | Performed by: ANESTHESIOLOGY

## 2022-02-25 PROCEDURE — 71000033 HC RECOVERY, INTIAL HOUR: Performed by: TRANSPLANT SURGERY

## 2022-02-25 PROCEDURE — 37000008 HC ANESTHESIA 1ST 15 MINUTES: Performed by: TRANSPLANT SURGERY

## 2022-02-25 PROCEDURE — 83615 LACTATE (LD) (LDH) ENZYME: CPT | Mod: NTX | Performed by: PHYSICIAN ASSISTANT

## 2022-02-25 PROCEDURE — 71000015 HC POSTOP RECOV 1ST HR: Performed by: TRANSPLANT SURGERY

## 2022-02-25 DEVICE — STENT DOUBLE J 7FRX12CM
Type: IMPLANTABLE DEVICE | Site: URETER | Status: NON-FUNCTIONAL
Removed: 2022-03-14

## 2022-02-25 RX ORDER — PROCHLORPERAZINE EDISYLATE 5 MG/ML
5 INJECTION INTRAMUSCULAR; INTRAVENOUS EVERY 30 MIN PRN
Status: DISCONTINUED | OUTPATIENT
Start: 2022-02-25 | End: 2022-02-26 | Stop reason: HOSPADM

## 2022-02-25 RX ORDER — PREDNISONE 20 MG/1
20 TABLET ORAL DAILY
Status: DISCONTINUED | OUTPATIENT
Start: 2022-02-28 | End: 2022-02-27

## 2022-02-25 RX ORDER — PROPOFOL 10 MG/ML
VIAL (ML) INTRAVENOUS
Status: DISCONTINUED | OUTPATIENT
Start: 2022-02-25 | End: 2022-02-26

## 2022-02-25 RX ORDER — MIDAZOLAM HYDROCHLORIDE 1 MG/ML
INJECTION INTRAMUSCULAR; INTRAVENOUS
Status: DISCONTINUED | OUTPATIENT
Start: 2022-02-25 | End: 2022-02-26

## 2022-02-25 RX ORDER — FENTANYL CITRATE 50 UG/ML
25 INJECTION, SOLUTION INTRAMUSCULAR; INTRAVENOUS EVERY 5 MIN PRN
Status: DISCONTINUED | OUTPATIENT
Start: 2022-02-25 | End: 2022-02-26 | Stop reason: HOSPADM

## 2022-02-25 RX ORDER — OXYCODONE HYDROCHLORIDE 5 MG/1
5 TABLET ORAL EVERY 6 HOURS PRN
Status: DISCONTINUED | OUTPATIENT
Start: 2022-02-26 | End: 2022-02-27

## 2022-02-25 RX ORDER — DEXMEDETOMIDINE HYDROCHLORIDE 100 UG/ML
INJECTION, SOLUTION INTRAVENOUS
Status: DISCONTINUED | OUTPATIENT
Start: 2022-02-25 | End: 2022-02-26

## 2022-02-25 RX ORDER — OXYCODONE HYDROCHLORIDE 5 MG/1
5 TABLET ORAL
Status: DISCONTINUED | OUTPATIENT
Start: 2022-02-25 | End: 2022-02-26 | Stop reason: HOSPADM

## 2022-02-25 RX ORDER — ACETAMINOPHEN 650 MG/20.3ML
650 LIQUID ORAL ONCE
Status: COMPLETED | OUTPATIENT
Start: 2022-02-25 | End: 2022-02-25

## 2022-02-25 RX ORDER — SODIUM CHLORIDE 9 MG/ML
INJECTION, SOLUTION INTRAVENOUS CONTINUOUS
Status: DISCONTINUED | OUTPATIENT
Start: 2022-02-26 | End: 2022-02-28

## 2022-02-25 RX ORDER — MUPIROCIN 20 MG/G
1 OINTMENT TOPICAL 2 TIMES DAILY
Status: DISCONTINUED | OUTPATIENT
Start: 2022-02-26 | End: 2022-03-01 | Stop reason: HOSPADM

## 2022-02-25 RX ORDER — GLUCAGON 1 MG
1 KIT INJECTION CONTINUOUS PRN
Status: DISCONTINUED | OUTPATIENT
Start: 2022-02-26 | End: 2022-03-01 | Stop reason: HOSPADM

## 2022-02-25 RX ORDER — PHENYLEPHRINE HYDROCHLORIDE 10 MG/ML
INJECTION INTRAVENOUS
Status: DISCONTINUED | OUTPATIENT
Start: 2022-02-25 | End: 2022-02-26

## 2022-02-25 RX ORDER — ONDANSETRON 2 MG/ML
4 INJECTION INTRAMUSCULAR; INTRAVENOUS ONCE AS NEEDED
Status: DISCONTINUED | OUTPATIENT
Start: 2022-02-26 | End: 2022-03-01 | Stop reason: HOSPADM

## 2022-02-25 RX ORDER — MANNITOL 20 G/100ML
INJECTION, SOLUTION INTRAVENOUS
Status: DISCONTINUED | OUTPATIENT
Start: 2022-02-25 | End: 2022-02-26

## 2022-02-25 RX ORDER — IBUPROFEN 200 MG
16 TABLET ORAL
Status: DISCONTINUED | OUTPATIENT
Start: 2022-02-26 | End: 2022-02-26

## 2022-02-25 RX ORDER — METHYLPREDNISOLONE SOD SUCC 125 MG
250 VIAL (EA) INJECTION ONCE
Status: DISCONTINUED | OUTPATIENT
Start: 2022-02-26 | End: 2022-02-28

## 2022-02-25 RX ORDER — MUPIROCIN 20 MG/G
OINTMENT TOPICAL
Status: DISCONTINUED | OUTPATIENT
Start: 2022-02-25 | End: 2022-02-26 | Stop reason: HOSPADM

## 2022-02-25 RX ORDER — HEPARIN SODIUM 5000 [USP'U]/ML
5000 INJECTION, SOLUTION INTRAVENOUS; SUBCUTANEOUS ONCE
Status: COMPLETED | OUTPATIENT
Start: 2022-02-25 | End: 2022-02-25

## 2022-02-25 RX ORDER — FENTANYL CITRATE 50 UG/ML
INJECTION, SOLUTION INTRAMUSCULAR; INTRAVENOUS
Status: DISCONTINUED | OUTPATIENT
Start: 2022-02-25 | End: 2022-02-26

## 2022-02-25 RX ORDER — TRAMADOL HYDROCHLORIDE 50 MG/1
50 TABLET ORAL EVERY 6 HOURS PRN
Status: DISCONTINUED | OUTPATIENT
Start: 2022-02-26 | End: 2022-03-01 | Stop reason: HOSPADM

## 2022-02-25 RX ORDER — DEXTROSE MONOHYDRATE AND SODIUM CHLORIDE 5; .45 G/100ML; G/100ML
INJECTION, SOLUTION INTRAVENOUS CONTINUOUS
Status: DISCONTINUED | OUTPATIENT
Start: 2022-02-26 | End: 2022-02-26

## 2022-02-25 RX ORDER — FUROSEMIDE 10 MG/ML
INJECTION INTRAMUSCULAR; INTRAVENOUS
Status: DISCONTINUED | OUTPATIENT
Start: 2022-02-25 | End: 2022-02-26

## 2022-02-25 RX ORDER — PREGABALIN 75 MG/1
75 CAPSULE ORAL
Status: DISCONTINUED | OUTPATIENT
Start: 2022-02-25 | End: 2022-02-26 | Stop reason: HOSPADM

## 2022-02-25 RX ORDER — CEFAZOLIN SODIUM/D5W 2 G/100 ML
2 PLASTIC BAG, INJECTION (ML) INTRAVENOUS
Status: COMPLETED | OUTPATIENT
Start: 2022-02-25 | End: 2022-02-25

## 2022-02-25 RX ORDER — CEFAZOLIN SODIUM/D5W 2 G/100 ML
2 PLASTIC BAG, INJECTION (ML) INTRAVENOUS
Status: COMPLETED | OUTPATIENT
Start: 2022-02-26 | End: 2022-02-26

## 2022-02-25 RX ORDER — VALGANCICLOVIR 450 MG/1
450 TABLET, FILM COATED ORAL EVERY MORNING
Status: DISCONTINUED | OUTPATIENT
Start: 2022-03-07 | End: 2022-03-01 | Stop reason: HOSPADM

## 2022-02-25 RX ORDER — METHYLPREDNISOLONE SOD SUCC 125 MG
125 VIAL (EA) INJECTION ONCE
Status: DISCONTINUED | OUTPATIENT
Start: 2022-02-27 | End: 2022-02-26

## 2022-02-25 RX ORDER — HYDROCODONE BITARTRATE AND ACETAMINOPHEN 500; 5 MG/1; MG/1
TABLET ORAL
Status: DISCONTINUED | OUTPATIENT
Start: 2022-02-25 | End: 2022-03-01 | Stop reason: HOSPADM

## 2022-02-25 RX ORDER — HYDROMORPHONE HYDROCHLORIDE 2 MG/ML
INJECTION, SOLUTION INTRAMUSCULAR; INTRAVENOUS; SUBCUTANEOUS
Status: DISCONTINUED | OUTPATIENT
Start: 2022-02-25 | End: 2022-02-26

## 2022-02-25 RX ORDER — KETAMINE HCL IN 0.9 % NACL 50 MG/5 ML
SYRINGE (ML) INTRAVENOUS
Status: DISCONTINUED | OUTPATIENT
Start: 2022-02-25 | End: 2022-02-26

## 2022-02-25 RX ORDER — BISACODYL 5 MG
10 TABLET, DELAYED RELEASE (ENTERIC COATED) ORAL NIGHTLY
Status: DISCONTINUED | OUTPATIENT
Start: 2022-02-26 | End: 2022-03-01 | Stop reason: HOSPADM

## 2022-02-25 RX ORDER — CISATRACURIUM BESYLATE 2 MG/ML
INJECTION, SOLUTION INTRAVENOUS
Status: DISCONTINUED | OUTPATIENT
Start: 2022-02-25 | End: 2022-02-26

## 2022-02-25 RX ORDER — ONDANSETRON 2 MG/ML
4 INJECTION INTRAMUSCULAR; INTRAVENOUS DAILY PRN
Status: DISCONTINUED | OUTPATIENT
Start: 2022-02-25 | End: 2022-02-26 | Stop reason: HOSPADM

## 2022-02-25 RX ORDER — VERAPAMIL HYDROCHLORIDE 2.5 MG/ML
INJECTION, SOLUTION INTRAVENOUS
Status: DISCONTINUED | OUTPATIENT
Start: 2022-02-25 | End: 2022-02-25 | Stop reason: HOSPADM

## 2022-02-25 RX ORDER — EPINEPHRINE 1 MG/ML
1 INJECTION, SOLUTION INTRACARDIAC; INTRAMUSCULAR; INTRAVENOUS; SUBCUTANEOUS ONCE AS NEEDED
Status: DISCONTINUED | OUTPATIENT
Start: 2022-02-26 | End: 2022-03-01 | Stop reason: HOSPADM

## 2022-02-25 RX ORDER — NYSTATIN 100000 [USP'U]/ML
500000 SUSPENSION ORAL
Status: DISCONTINUED | OUTPATIENT
Start: 2022-02-26 | End: 2022-03-01 | Stop reason: HOSPADM

## 2022-02-25 RX ORDER — ONDANSETRON 2 MG/ML
INJECTION INTRAMUSCULAR; INTRAVENOUS
Status: DISCONTINUED | OUTPATIENT
Start: 2022-02-25 | End: 2022-02-26

## 2022-02-25 RX ORDER — ACETAMINOPHEN 500 MG
1000 TABLET ORAL EVERY 8 HOURS
Status: COMPLETED | OUTPATIENT
Start: 2022-02-26 | End: 2022-02-27

## 2022-02-25 RX ORDER — CEFAZOLIN SODIUM 1 G/3ML
INJECTION, POWDER, FOR SOLUTION INTRAMUSCULAR; INTRAVENOUS
Status: DISCONTINUED | OUTPATIENT
Start: 2022-02-25 | End: 2022-02-25 | Stop reason: HOSPADM

## 2022-02-25 RX ORDER — MYCOPHENOLATE MOFETIL 250 MG/1
1000 CAPSULE ORAL 2 TIMES DAILY
Status: DISCONTINUED | OUTPATIENT
Start: 2022-02-26 | End: 2022-03-01 | Stop reason: HOSPADM

## 2022-02-25 RX ORDER — DIPHENHYDRAMINE HYDROCHLORIDE 50 MG/ML
50 INJECTION INTRAMUSCULAR; INTRAVENOUS ONCE
Status: COMPLETED | OUTPATIENT
Start: 2022-02-25 | End: 2022-02-25

## 2022-02-25 RX ORDER — HEPARIN SODIUM 5000 [USP'U]/ML
5000 INJECTION, SOLUTION INTRAVENOUS; SUBCUTANEOUS EVERY 8 HOURS
Status: DISCONTINUED | OUTPATIENT
Start: 2022-02-26 | End: 2022-03-01 | Stop reason: HOSPADM

## 2022-02-25 RX ORDER — DOCUSATE SODIUM 100 MG/1
100 CAPSULE, LIQUID FILLED ORAL 3 TIMES DAILY
Status: DISCONTINUED | OUTPATIENT
Start: 2022-02-26 | End: 2022-03-01 | Stop reason: HOSPADM

## 2022-02-25 RX ORDER — TACROLIMUS 1 MG/1
3 CAPSULE ORAL 2 TIMES DAILY
Status: DISCONTINUED | OUTPATIENT
Start: 2022-02-26 | End: 2022-02-26

## 2022-02-25 RX ORDER — HEPARIN SODIUM 10000 [USP'U]/ML
INJECTION, SOLUTION INTRAVENOUS; SUBCUTANEOUS
Status: DISCONTINUED | OUTPATIENT
Start: 2022-02-25 | End: 2022-02-25 | Stop reason: HOSPADM

## 2022-02-25 RX ORDER — SULFAMETHOXAZOLE AND TRIMETHOPRIM 400; 80 MG/1; MG/1
1 TABLET ORAL EVERY MORNING
Status: DISCONTINUED | OUTPATIENT
Start: 2022-02-26 | End: 2022-03-01 | Stop reason: HOSPADM

## 2022-02-25 RX ORDER — HYDROMORPHONE HYDROCHLORIDE 1 MG/ML
0.5 INJECTION, SOLUTION INTRAMUSCULAR; INTRAVENOUS; SUBCUTANEOUS EVERY 5 MIN PRN
Status: DISCONTINUED | OUTPATIENT
Start: 2022-02-25 | End: 2022-02-26 | Stop reason: HOSPADM

## 2022-02-25 RX ORDER — ACETAMINOPHEN 325 MG/1
650 TABLET ORAL
Status: DISPENSED | OUTPATIENT
Start: 2022-02-26 | End: 2022-02-28

## 2022-02-25 RX ORDER — DIPHENHYDRAMINE HCL 50 MG
50 CAPSULE ORAL ONCE AS NEEDED
Status: COMPLETED | OUTPATIENT
Start: 2022-02-26 | End: 2022-02-27

## 2022-02-25 RX ORDER — FAMOTIDINE 20 MG/1
20 TABLET, FILM COATED ORAL NIGHTLY
Status: DISCONTINUED | OUTPATIENT
Start: 2022-02-26 | End: 2022-03-01 | Stop reason: HOSPADM

## 2022-02-25 RX ORDER — DIPHENHYDRAMINE HCL 25 MG
25 CAPSULE ORAL
Status: DISPENSED | OUTPATIENT
Start: 2022-02-26 | End: 2022-02-28

## 2022-02-25 RX ORDER — IBUPROFEN 200 MG
24 TABLET ORAL
Status: DISCONTINUED | OUTPATIENT
Start: 2022-02-26 | End: 2022-02-26

## 2022-02-25 RX ORDER — LIDOCAINE HCL/PF 100 MG/5ML
SYRINGE (ML) INTRAVENOUS
Status: DISCONTINUED | OUTPATIENT
Start: 2022-02-25 | End: 2022-02-26

## 2022-02-25 RX ADMIN — DIPHENHYDRAMINE HYDROCHLORIDE 50 MG: 50 INJECTION, SOLUTION INTRAMUSCULAR; INTRAVENOUS at 07:02

## 2022-02-25 RX ADMIN — DEXMEDETOMIDINE HYDROCHLORIDE 8 MCG: 100 INJECTION, SOLUTION, CONCENTRATE INTRAVENOUS at 11:02

## 2022-02-25 RX ADMIN — Medication 20 MG: at 08:02

## 2022-02-25 RX ADMIN — PROPOFOL 200 MG: 10 INJECTION, EMULSION INTRAVENOUS at 07:02

## 2022-02-25 RX ADMIN — NEOSTIGMINE METHYLSULFATE 5 MG: 0.5 INJECTION INTRAVENOUS at 11:02

## 2022-02-25 RX ADMIN — FENTANYL CITRATE 50 MCG: 50 INJECTION, SOLUTION INTRAMUSCULAR; INTRAVENOUS at 08:02

## 2022-02-25 RX ADMIN — FUROSEMIDE 100 MG: 100 INJECTION, SOLUTION INTRAMUSCULAR; INTRAVENOUS at 09:02

## 2022-02-25 RX ADMIN — CISATRACURIUM BESYLATE 4 MG: 2 INJECTION INTRAVENOUS at 08:02

## 2022-02-25 RX ADMIN — CISATRACURIUM BESYLATE 12 MG: 2 INJECTION INTRAVENOUS at 07:02

## 2022-02-25 RX ADMIN — Medication 2 G: at 07:02

## 2022-02-25 RX ADMIN — PHENYLEPHRINE HYDROCHLORIDE 200 MCG: 10 INJECTION INTRAVENOUS at 07:02

## 2022-02-25 RX ADMIN — SODIUM CHLORIDE, SODIUM GLUCONATE, SODIUM ACETATE, POTASSIUM CHLORIDE, MAGNESIUM CHLORIDE, SODIUM PHOSPHATE, DIBASIC, AND POTASSIUM PHOSPHATE: .53; .5; .37; .037; .03; .012; .00082 INJECTION, SOLUTION INTRAVENOUS at 07:02

## 2022-02-25 RX ADMIN — Medication 10 MG: at 09:02

## 2022-02-25 RX ADMIN — DEXTROSE 500 MG: 50 INJECTION, SOLUTION INTRAVENOUS at 07:02

## 2022-02-25 RX ADMIN — FENTANYL CITRATE 100 MCG: 50 INJECTION, SOLUTION INTRAMUSCULAR; INTRAVENOUS at 07:02

## 2022-02-25 RX ADMIN — HYDROMORPHONE HYDROCHLORIDE 0.2 MG: 2 INJECTION INTRAMUSCULAR; INTRAVENOUS; SUBCUTANEOUS at 11:02

## 2022-02-25 RX ADMIN — PHENYLEPHRINE HYDROCHLORIDE 100 MCG: 10 INJECTION INTRAVENOUS at 07:02

## 2022-02-25 RX ADMIN — Medication 10 MG: at 10:02

## 2022-02-25 RX ADMIN — HEPARIN SODIUM 5000 UNITS: 5000 INJECTION INTRAVENOUS; SUBCUTANEOUS at 02:02

## 2022-02-25 RX ADMIN — SODIUM CHLORIDE: 0.9 INJECTION, SOLUTION INTRAVENOUS at 06:02

## 2022-02-25 RX ADMIN — ANTI-THYMOCYTE GLOBULIN (RABBIT) 125 MG: 5 INJECTION, POWDER, LYOPHILIZED, FOR SOLUTION INTRAVENOUS at 08:02

## 2022-02-25 RX ADMIN — SODIUM CHLORIDE, SODIUM GLUCONATE, SODIUM ACETATE, POTASSIUM CHLORIDE, MAGNESIUM CHLORIDE, SODIUM PHOSPHATE, DIBASIC, AND POTASSIUM PHOSPHATE: .53; .5; .37; .037; .03; .012; .00082 INJECTION, SOLUTION INTRAVENOUS at 11:02

## 2022-02-25 RX ADMIN — MIDAZOLAM HYDROCHLORIDE 2 MG: 1 INJECTION, SOLUTION INTRAMUSCULAR; INTRAVENOUS at 06:02

## 2022-02-25 RX ADMIN — ACETAMINOPHEN 650 MG: 650 SOLUTION ORAL at 07:02

## 2022-02-25 RX ADMIN — PROPOFOL 50 MG: 10 INJECTION, EMULSION INTRAVENOUS at 07:02

## 2022-02-25 RX ADMIN — ONDANSETRON HYDROCHLORIDE 4 MG: 2 INJECTION INTRAMUSCULAR; INTRAVENOUS at 11:02

## 2022-02-25 RX ADMIN — DEXTROSE AND SODIUM CHLORIDE: 5; .45 INJECTION, SOLUTION INTRAVENOUS at 11:02

## 2022-02-25 RX ADMIN — FENTANYL CITRATE 50 MCG: 50 INJECTION, SOLUTION INTRAMUSCULAR; INTRAVENOUS at 10:02

## 2022-02-25 RX ADMIN — MANNITOL 25 G: 20 INJECTION, SOLUTION INTRAVENOUS at 09:02

## 2022-02-25 RX ADMIN — CISATRACURIUM BESYLATE 4 MG: 2 INJECTION INTRAVENOUS at 10:02

## 2022-02-25 RX ADMIN — DEXMEDETOMIDINE HYDROCHLORIDE 4 MCG: 100 INJECTION, SOLUTION, CONCENTRATE INTRAVENOUS at 11:02

## 2022-02-25 RX ADMIN — LIDOCAINE HYDROCHLORIDE 80 MG: 20 INJECTION, SOLUTION INTRAVENOUS at 07:02

## 2022-02-25 RX ADMIN — GLYCOPYRROLATE 0.6 MG: 0.2 INJECTION, SOLUTION INTRAMUSCULAR; INTRAVITREAL at 11:02

## 2022-02-25 NOTE — ASSESSMENT & PLAN NOTE
- 20 y.o .w/ ESRD secondary to HTN admitted for kidney transplant   - Dr Broussard will be the primary surgeon  - No OR times scheduled  - Thymo induction  - Pre op labs and imaging pending and will be reviewed prior to surgery

## 2022-02-25 NOTE — NURSING
Pt arrived to TSU bed [id number] as a direct admission for kidney transplant. AAOx4, VSS. Family with pt at bedside. Skin intact. Elvie MURRAY notified of pts arrival. Plan of care reviewed with pt. Verbalized understanding.

## 2022-02-25 NOTE — ANESTHESIA PREPROCEDURE EVALUATION
02/25/2022  Tameka Saenz is a 21 y.o., male.  [facility]  Anesthesia Pre-Operative Evaluation         Patient Name: Tameka Saenz  YOB: 2000  MRN: [id number]    SUBJECTIVE:     Pre-operative evaluation for Procedure(s) (LRB):  TRANSPLANT, KIDNEY (N/A)     02/25/2022    Tameka Saenz is a 21 y.o. male w/ a significant PMHx of ESRD secondary to HTN admitted for kidney transplant.  - Patient initially was started on HD in 12/2015 and switched to PD in 2017, but had multiple peritonitis infections and changed to home HD on 6/24/2019  - He has a LUE AV fistula  - Patient reports he is anuric    Patient now presents for the above procedure(s).      LDA:          Peripheral IV - Single Lumen 02/25/22 1357 20 G Anterior;Proximal;Right Forearm (Active)   Site Assessment Clean;Dry;Intact;No redness;No swelling 02/25/22 1500   Extremity Assessment Distal to IV No abnormal discoloration;No redness;No swelling;No warmth 02/25/22 1500   Line Status Saline locked 02/25/22 1500   Dressing Status Clean;Dry;Intact 02/25/22 1500   Dressing Intervention First dressing 02/25/22 1500   Dressing Change Due 03/01/22 02/25/22 1500   Site Change Due 03/01/22 02/25/22 1500   Reason Not Rotated Not due 02/25/22 1500   Number of days: 0            Hemodialysis AV Fistula 01/29/19 1059 Left forearm (Active)   Needle Size 16ga 03/10/20 1535   Site Assessment Clean;Dry;Intact 03/12/20 1649   Patency Present;Thrill;Bruit 03/12/20 1649   Status Deaccessed 03/12/20 0400   Flows Good 03/10/20 1535   Dressing Intervention First dressing 03/10/20 1535   Dressing Status Other (Comment) 03/11/20 0506   Site Condition No complications 03/12/20 1649   Dressing Open to air (None) 03/12/20 1649   Drainage Description Serosanguineous 01/29/19 1240   Number of days: 1123            Hemodialysis AV Fistula Left  forearm (Active)   Needle Size Buttonhole 09/15/21 0815   Site Assessment Intact;No redness;Dry;Clean;No swelling 09/15/21 1045   Patency Present;Thrill;Bruit 02/25/22 1500   Status Deaccessed 09/15/21 1045   Flows Good 09/15/21 0715   Dressing Intervention Integrity maintained 09/15/21 1045   Dressing Status Clean;Dry;Intact 09/15/21 1045   Site Condition No complications 09/15/21 0815   Dressing Gauze 09/15/21 0815   Number of days:        Prev airway:   Intubation:     Induction:  Inhalational - mask (Bipap)    Mask Ventilation:  N/a    Attempts:  1    Attempted By:  Staff anesthesiologist    Method of Intubation:  Direct    Blade:  Nancy 4    Laryngeal View Grade: Grade IIA - cords partially seen      Difficult Airway Encountered?: No      Complications:  None (View impaired 2/2 blood in oropharynx)    Airway Device:  Oral endotracheal tube    Airway Device Size:  8.0    Style/Cuff Inflation:  Cuffed    Secured at:  The lips    Placement Verified By:  Chest x-ray    Complicating Factors:  None    Findings Post-Intubation:  Poor bilateral BS    Drips: None documented.      Patient Active Problem List   Diagnosis    ESRD on dialysis     Hypertension secondary to other renal disorders    Benign hypertension with ESRD (end-stage renal disease)    Anemia in chronic kidney disease, on chronic dialysis    Secondary hyperparathyroidism of renal origin    Patient on waiting list for kidney transplant    Pulmonary embolism    Pulmonary embolus    Elevated troponin    Cardiac murmur    Thrombocytopenia    Hyperkalemia    Pre-op chest exam    Hepatosplenomegaly    Pulmonary nodule    Pre-op evaluation       Review of patient's allergies indicates:  No Known Allergies    Current Inpatient Medications:   acetaminophen  650 mg Per NG tube Once    antithymocyte globulin (rabbit), hydrocortisone 20mg, with optional heparin 1000 units in NS 500ml (FOR PERIPHERAL LINE ADMINISTRATION ONLY)  1.5 mg/kg  Intravenous Once    diphenhydrAMINE  50 mg Intravenous Once    methylPREDNISolone sodium succinate injection  500 mg Intravenous Once       No current facility-administered medications on file prior to encounter.     Current Outpatient Medications on File Prior to Encounter   Medication Sig Dispense Refill    acetaminophen (TYLENOL) 500 MG tablet Take 500 mg by mouth every 6 (six) hours as needed (headache).      amLODIPine (NORVASC) 10 MG tablet Take 10 mg by mouth once daily.      AURYXIA 210 mg iron Tab Take 1 tablet by mouth 3 (three) times daily.       BYSTOLIC 5 mg Tab Take 1 tablet by mouth once daily.      calcitRIOL (ROCALTROL) 0.5 MCG Cap Take 1 capsule by mouth once daily.      clindamycin (CLEOCIN T) 1 % external solution Apply topically 2 (two) times daily.       diphenhydrAMINE (BENADRYL) 25 mg capsule Take 1 capsule (25 mg total) by mouth nightly as needed for Itching or Insomnia. (Patient taking differently: Take 25 mg by mouth nightly as needed for Itching.)  0    doxazosin (CARDURA) 8 MG Tab Take 8 mg by mouth nightly.      epoetin beta, methoxy peg (MIRCERA) 200 mcg/0.3 mL Syrg Inject 200 mcg into the skin every 14 (fourteen) days.       SENSIPAR 30 mg Tab Take 30 mg by mouth every evening.       terbinafine HCL (LAMISIL) 1 % cream Apply topically 2 (two) times daily. 24 g 3    tretinoin (RETIN-A) 0.1 % cream APPLY 1 APPLICATION TOPICALLY TO AFFECTED AREAS ON FACE         Past Surgical History:   Procedure Laterality Date    BRONCHOSCOPY N/A 10/7/2019    Procedure: Bronchoscopy;  Surgeon: Anjum Cali MD;  Location: [facility] ENDO;  Service: Pulmonary;  Laterality: N/A;    BRONCHOSCOPY N/A 3/8/2020    Procedure: Bronchoscopy- in ice on drip;  Surgeon: Vania Larkin MD;  Location: [facility] ENDO;  Service: Pulmonary;  Laterality: N/A;    LAPAROSCOPY N/A 9/27/2018    Procedure: LAPAROSCOPY  - Atempted Laparoscopic Peritoneal Dialysis Catheter Placement;  Surgeon: Drew MANNING  MD Heydi;  Location: [facility] OR;  Service: General;  Laterality: N/A;    PERITONEAL CATHETER INSERTION      PERITONEAL CATHETER REMOVAL N/A 8/21/2018    Procedure: REMOVAL, CATHETER, DIALYSIS, PERITONEAL;  Surgeon: Glendy Romero MD;  Location: [facility] OR;  Service: General;  Laterality: N/A;    PERITONEAL CATHETER REMOVAL  08/2018    [facility]  12[facility]    WISDOM TOOTH EXTRACTION  07/2018       Social History     Socioeconomic History    Marital status: Single   Tobacco Use    Smoking status: Never Smoker    Smokeless tobacco: Never Used   Substance and Sexual Activity    Alcohol use: No    Drug use: Never    Sexual activity: Yes     Partners: Female     Birth control/protection: Condom       OBJECTIVE:     Vital Signs Range (Last 24H):  Temp:  [36.4 °C (97.6 °F)-36.6 °C (97.8 °F)]   Pulse:  [67-69]   Resp:  [16-17]   BP: (149-166)/(70-80)   SpO2:  [99 %]       Significant Labs:  Lab Results   Component Value Date    WBC 3.06 (L) 02/25/2022    HGB 8.9 (L) 02/25/2022    HCT 27.6 (L) 02/25/2022    PLT 76 (L) 02/25/2022    CHOL 135 02/25/2022    TRIG 36 02/25/2022    HDL 64 02/25/2022    ALT 19 02/25/2022    AST 11 02/25/2022     02/25/2022    K 5.0 02/25/2022     02/25/2022    CREATININE 14.6 (H) 02/25/2022    BUN 58 (H) 02/25/2022    CO2 27 02/25/2022    TSH 0.931 12/20/2021    INR 1.1 02/25/2022    HGBA1C 4.3 12/20/2021       Diagnostic Studies: No relevant studies.    EKG:   Results for orders placed or performed during the hospital encounter of 02/25/22   EKG 12-lead    Collection Time: 02/25/22  1:08 PM    Narrative    Test Reason : Z01.818,    Vent. Rate : 065 BPM     Atrial Rate : 065 BPM     P-R Int : 154 ms          QRS Dur : 106 ms      QT Int : 410 ms       P-R-T Axes : 037 008 005 degrees     QTc Int : 426 ms    Normal sinus rhythm  Normal ECG  When compared with ECG of 15-SEP-2021 01:45,  No significant change was found  Confirmed by Darwin ROSA,  Papito ([id number]) on 2/25/2022 2:37:07 PM    Referred By: LAURENT BARNES           Confirmed By:Papito Granados MD       2D ECHO:  TTE:  Results for orders placed or performed during the hospital encounter of 03/07/20   Echo Color Flow Doppler? Yes; Bubble Contrast? No   Result Value Ref Range    BSA 2.04 m2    TDI SEPTAL 0.09 m/s    LV LATERAL E/E' RATIO 7.00 m/s    LV SEPTAL E/E' RATIO 9.33 m/s    LA WIDTH 3.28 cm    TDI LATERAL 0.12 m/s    LVIDd 4.69 3.5 - 6.0 cm    IVS 0.87 0.6 - 1.1 cm    Posterior Wall 0.97 0.6 - 1.1 cm    LVIDs 2.93 2.1 - 4.0 cm    FS 38 28 - 44 %    LA volume 35.71 cm3    STJ 2.60 cm    LV mass 146.44 g    LA size 3.00 cm    Left Ventricle Relative Wall Thickness 0.41 cm    AV mean gradient 6 mmHg    AV valve area 3.46 cm2    AV Velocity Ratio 0.79     AV index (prosthetic) 0.91     E/A ratio 1.68     Mean e' 0.11 m/s    E wave deceleration time 197.00 msec    LVOT diameter 2.20 cm    LVOT area 3.8 cm2    LVOT peak luca 1.1 m/s    LVOT peak VTI 23.30 cm    Ao peak luca 1.4 m/s    Ao VTI 25.60 cm    LVOT stroke volume 88.53 cm3    AV peak gradient 8 mmHg    E/E' ratio 8.00 m/s    MV Peak E Luca 0.84 m/s    MV Peak A Luca 0.50 m/s    LA Volume Index 17.7 mL/m2    LV Mass Index 73 g/m2    RA Major Axis 4.18 cm    Left Atrium Minor Axis 3.99 cm    Left Atrium Major Axis 4.59 cm    RA Width 3.01 cm    Right Atrial Pressure (from IVC) 3 mmHg    Narrative    · Normal left ventricular systolic function. The estimated ejection   fraction is 60-65%.  · Normal LV diastolic function.  · Normal right ventricular systolic function.  · Normal central venous pressure (3 mmHg).          MARILIN:  No results found. However, due to the size of the patient record, not all encounters were searched. Please check Results Review for a complete set of results.    ASSESSMENT/PLAN:         Pre-op Assessment    I have reviewed the Patient Summary Reports.     I have reviewed the Nursing Notes. I have reviewed the NPO Status.   I have  reviewed the Medications.     Review of Systems  Anesthesia Hx:  No problems with previous Anesthesia  History of prior surgery of interest to airway management or planning: Denies Family Hx of Anesthesia complications.   Denies Personal Hx of Anesthesia complications.   Social:  Non-Smoker    Hematology/Oncology:     Oncology Normal    -- Anemia: Hematology Comments: Anemia of CD    EENT/Dental:EENT/Dental Normal   Cardiovascular:   Hypertension     ECHO 2018:  · Left ventricle ejection fraction is normal at 60%  · Normal LV diastolic function.  · RV systolic function is normal.  · Tricuspid valve shows trace regurgitation.  · Pulmonic valve shows trace regurgitation.  · Normal central venous pressure (3 mm Hg).  · The estimated PA systolic pressure is 38.76 mm Hg    ECHO 10/6/2019  TRANSTHORACIC ECHO (TTE) COMPLETE  Conclusion     · Concentric left ventricular hypertrophy.  · Normal left ventricular systolic function. The estimated ejection fraction is 65%  · Normal LV diastolic function.  · Normal right ventricular systolic function.  · The estimated PA systolic pressure is 38 mm Hg     Pulmonary:   H/o Pulmonary embolus     Renal/:   Chronic Renal Disease, ESRD, Dialysis ESRD (last dialysis yesterday )   Hepatic/GI:  Hepatic/GI Normal    Musculoskeletal:  Musculoskeletal Normal    Neurological:  Neurology Normal    Endocrine:  Endocrine Normal    Psych:  Psychiatric Normal           Physical Exam  General: Cooperative, Alert and Oriented    Airway:  Mallampati: III   Mouth Opening: Normal  TM Distance: 4 - 6 cm  Tongue: Normal  Neck ROM: Normal ROM    Dental:  Intact    Chest/Lungs:  Clear to auscultation    Heart:  Rate: Normal  Rhythm: Regular Rhythm  Sounds: Normal    Abdomen:  Normal, Soft, Nontender        Anesthesia Plan  Type of Anesthesia, risks & benefits discussed:    Anesthesia Type: Gen ETT  Intra-op Monitoring Plan: Standard ASA Monitors and Art Line  Post Op Pain Control Plan: multimodal  analgesia and IV/PO Opioids PRN  Induction:  IV  Airway Plan: Direct, Post-Induction  Informed Consent: Informed consent signed with the Patient and all parties understand the risks and agree with anesthesia plan.  All questions answered. Patient consented to blood products? Yes  ASA Score: 3    Ready For Surgery From Anesthesia Perspective.     .

## 2022-02-25 NOTE — HPI
20 y.o. year old Black male with ESRD secondary to HTN admitted for kidney transplant. He has been on the wait list for a kidney transplant at [facility] since 12/5/2015. Patient initially was started on HD in 12/2015 and switched to PD in 2017, but had multiple Peritonitis infections and changed to home HD on 6/24/2019. Patient is dialyzing 5x/week. Last session 2/23 into 2/24. Patient reports that she is tolerating dialysis well. He has a LUE AV fistula. Patient reports he is anuric. Pt with thrombocytopenia likely secondary to hepatomegaly and/or any underlying hepatic disease. He had appropriate evaluation of thrombocytopenia with Dr. Mathis (heme/onc).

## 2022-02-25 NOTE — CONSULTS
RD received consult regarding pre-transplant. Per chart review, patient to receive kidney transplant. RD to complete full assessment and education post-op. Please reconsult after patient receives transplant.    Thanks!  MS Renetta, RD, LDN

## 2022-02-25 NOTE — PLAN OF CARE
Pre-operative Discussion Note  Kidney Transplant Surgery    Tameka Saenz is a 21 y.o. male with ESRD, requiring chronic dialysis admitted for kidney transplant.  I discussed the planned procedure in detail, including expected hospital course and outcomes, benefits, risks, and potential complications.  Complications discussed included death, graft failure, bleeding, infection, vascular thrombosis, and rejection.  I discussed the risks of anesthesia, as well as the potential need for re-operation.  The possibility of other complications not specifically mentioned was also discussed.  Also, I discussed the need for lifelong immunosuppression and the possibility of serious complications from immunosuppressive drugs.    The discussion included the risks that the patient will incur if he elects to not have the proposed procedure.    Relevant donor-specific risk factors were disclosed and discussed with the patient, including:   None    Specific PHS donor risk criteria for the organ donor include:  None    HCV: N/A    The patient was SARS-CoV-2 /COVID-19 tested with negative results.    COVID-19: I discussed the possibility of COVID-19 transmission with the patient. Although PORFIRIO testing is available for the virus using a technique which in theory should be very accurate, there is no data yet regarding the likelihood of a  false-negative test leading to virus transmission. Based on accuracy of testing for other viruses, it is expected that this risk is extremely small.     I also discussed that transplant immunosuppression will increase susceptibility to COVID-19 and other viruses, and that although we use stringent precautions to protect patients from infection, it is possible for a transplant recipient to contract this infection. If COVID-19 infection should occur, it would be a serious matter with a significant risk of death.    All questions were answered.  The patient and available family members voice understanding  and agree to proceed with the transplant.    UNOS Patient Status  Note on scores:  ICU = 10 = total assistance  TSU = 20-30 = partial assistance  Outpatient admitted for transplant requiring medical care in last year = 40-50 = partial assistance  Scores 60 or higher indicate no assistance, meaning no need for medical care in last year. This would be very unusual for a transplant candidate.    UNOS Patient Status  Functional Status: 50% - Requires considerable assistance and frequent medical care  Physical Capacity: No Limitations

## 2022-02-25 NOTE — SUBJECTIVE & OBJECTIVE
Subjective:     Chief Complaint/Reason for Admission: Kidney Transplant    History of Present Illness:  Mr. Saenz is a 20 y.o. year old Black or  male with ESRD secondary to HTN admitted for kidney transplant. He has been on the wait list for a kidney transplant at [facility] since 12/5/2015. Patient initially was started on HD in 12/2015 and switched to PD in 2017, but had multiple Peritonitis infections and changed to home HD on 6/24/2019. Patient is dialyzing 5x/week. Last session 2/23 into 2/24. Patient reports that she is tolerating dialysis well. He has a LUE AV fistula. Patient reports he is anuric. Pt with thrombocytopenia likely secondary to hepatomegaly and/or any underlying hepatic disease. He had appropriate evaluation of thrombocytopenia with Dr. Mathis (heme/onc). Dr Broussard will be the primary surgeon. No OR times scheduled. Thymo induction. Pre op labs and imaging pending and will be reviewed prior to surgery.     Dialysis History: Mr. English with ESRD, requiring chronic dialysis who is on hemodialysis started on 12/2015. Patient is dialyzing on home hemo 5 days per week.  Patient reports that he is tolerating dialysis well.  Date of Last Dialysis: 2/23    Native urine output per day: anuric    Previous Transplant: no    PTA Medications   Medication Sig    acetaminophen (TYLENOL) 500 MG tablet Take 500 mg by mouth every 6 (six) hours as needed (headache).    amLODIPine (NORVASC) 10 MG tablet Take 10 mg by mouth once daily.    AURYXIA 210 mg iron Tab Take 1 tablet by mouth 3 (three) times daily.     BYSTOLIC 5 mg Tab Take 1 tablet by mouth once daily.    calcitRIOL (ROCALTROL) 0.5 MCG Cap Take 1 capsule by mouth once daily.    clindamycin (CLEOCIN T) 1 % external solution Apply topically 2 (two) times daily.     diphenhydrAMINE (BENADRYL) 25 mg capsule Take 1 capsule (25 mg total) by mouth nightly as needed for Itching or Insomnia. (Patient taking differently: Take 25 mg by mouth nightly  as needed for Itching.)    doxazosin (CARDURA) 8 MG Tab Take 8 mg by mouth nightly.    epoetin beta, methoxy peg (MIRCERA) 200 mcg/0.3 mL Syrg Inject 200 mcg into the skin every 14 (fourteen) days.     SENSIPAR 30 mg Tab Take 30 mg by mouth every evening.     terbinafine HCL (LAMISIL) 1 % cream Apply topically 2 (two) times daily.    tretinoin (RETIN-A) 0.1 % cream APPLY 1 APPLICATION TOPICALLY TO AFFECTED AREAS ON FACE       Review of patient's allergies indicates:  No Known Allergies    Past Medical History:   Diagnosis Date    Acne     Anemia of renal disease     Dialysis patient     peritoneal daily at night. followed by Dr. Sotelo    ESRD on dialysis since 12/16/15     Hypertension     Kidney disease     Seasonal allergies     Secondary hyperparathyroidism of renal origin      Past Surgical History:   Procedure Laterality Date    BRONCHOSCOPY N/A 10/7/2019    Procedure: Bronchoscopy;  Surgeon: Anjum Cali MD;  Location: [facility];  Service: Pulmonary;  Laterality: N/A;    BRONCHOSCOPY N/A 3/8/2020    Procedure: Bronchoscopy- in ice on drip;  Surgeon: Vania Larkin MD;  Location: [facility];  Service: Pulmonary;  Laterality: N/A;    LAPAROSCOPY N/A 9/27/2018    Procedure: LAPAROSCOPY  - Atempted Laparoscopic Peritoneal Dialysis Catheter Placement;  Surgeon: Drew Soliz MD;  Location: [facility] OR;  Service: General;  Laterality: N/A;    PERITONEAL CATHETER INSERTION      PERITONEAL CATHETER REMOVAL N/A 8/21/2018    Procedure: REMOVAL, CATHETER, DIALYSIS, PERITONEAL;  Surgeon: Glendy Romero MD;  Location: [facility] OR;  Service: General;  Laterality: N/A;    PERITONEAL CATHETER REMOVAL  08/2018    RE[facility]SY  12/2015    at [facility]    WISDOM TOOTH EXTRACTION  07/2018     Family History       Problem Relation (Age of Onset)    Diabetes Maternal Aunt, Maternal Grandmother    Fibromyalgia Mother    Heart disease Maternal Grandmother    Hyperlipidemia Maternal Grandmother    Hypertension  Maternal Aunt, Maternal Grandmother    No Known Problems Father, Sister, Brother    Sickle cell anemia Paternal Aunt          Tobacco Use    Smoking status: Never Smoker    Smokeless tobacco: Never Used   Substance and Sexual Activity    Alcohol use: No    Drug use: Never    Sexual activity: Yes     Partners: Female     Birth control/protection: Condom        Review of Systems   Constitutional:  Negative for activity change, appetite change, chills, fatigue and fever.   HENT: Negative.     Eyes: Negative.    Respiratory:  Negative for chest tightness and shortness of breath.    Cardiovascular:  Negative for chest pain and leg swelling.   Gastrointestinal:  Negative for abdominal distention, abdominal pain, constipation, diarrhea, nausea and vomiting.   Endocrine: Negative.    Genitourinary:  Negative for decreased urine volume (anuric).   Musculoskeletal: Negative.    Skin:  Negative for wound.   Allergic/Immunologic: Negative for immunocompromised state.   Neurological:  Negative for dizziness and facial asymmetry.   Hematological: Negative.    Psychiatric/Behavioral:  Negative for agitation and behavioral problems.    Objective:     Vital Signs (Most Recent):  Temp: 97.8 °F (36.6 °C) (02/25/22 1235)  Pulse: 69 (02/25/22 1235)  Resp: 16 (02/25/22 1235)  BP: (!) 149/70 (02/25/22 1235)  SpO2: 99 % (02/25/22 1235)       Weight: 87.1 kg (191 lb 14.6 oz)  Body mass index is 29.94 kg/m².     Physical Exam  Vitals and nursing note reviewed.   Constitutional:       General: He is not in acute distress.     Appearance: Normal appearance. He is not ill-appearing.   HENT:      Head: Normocephalic.   Eyes:      Pupils: Pupils are equal, round, and reactive to light.   Cardiovascular:      Rate and Rhythm: Normal rate and regular rhythm.      Pulses: Normal pulses.      Heart sounds: Normal heart sounds.   Pulmonary:      Effort: Pulmonary effort is normal.      Breath sounds: Normal breath sounds.   Abdominal:      General:  Abdomen is flat. Bowel sounds are normal.      Palpations: Abdomen is soft.      Tenderness: There is no abdominal tenderness.   Musculoskeletal:         General: No swelling or tenderness. Normal range of motion.      Cervical back: Normal range of motion.      Right lower leg: No edema.      Left lower leg: No edema.   Skin:     General: Skin is warm and dry.   Neurological:      Mental Status: He is alert and oriented to person, place, and time.      Motor: No weakness.   Psychiatric:         Mood and Affect: Mood normal.         Behavior: Behavior normal.         Thought Content: Thought content normal.       Pre op labs and imaging pending     Patient was SARS-CoV-2 /COVID-19 tested with negative results.

## 2022-02-25 NOTE — H&P
[facility] - Transplant Stepdown  Kidney Transplant  H&P      Subjective:     Chief Complaint/Reason for Admission: Kidney Transplant    History of Present Illness:  Mr. Saenz is a 20 y.o. year old Black or  male with ESRD secondary to HTN admitted for kidney transplant. He has been on the wait list for a kidney transplant at [facility] since 12/5/2015. Patient initially was started on HD in 12/2015 and switched to PD in 2017, but had multiple peritonitis infections and changed to home HD on 6/24/2019. Patient is dialyzing 5x/week. Last session 2/23 into 2/24. Patient reports that he is tolerating dialysis well. He has a LUE AV fistula. Patient reports he is anuric. Pt with thrombocytopenia likely secondary to hepatomegaly and/or any underlying hepatic disease. He had appropriate evaluation of thrombocytopenia with Dr. Mathis (heme/onc). Dr Snider will be the primary surgeon. OR scheduled for 1700. Pt NPO since 0800. Thymo induction. Pre op labs and imaging pending and will be reviewed prior to surgery.     Dialysis History: Mr. English with ESRD, requiring chronic dialysis who is on hemodialysis started on 12/2015. Patient is dialyzing 5x/week. Patient reports that he is tolerating dialysis well.  Date of Last Dialysis: 2/23    Native urine output per day: anuric    Previous Transplant: no    PTA Medications   Medication Sig    acetaminophen (TYLENOL) 500 MG tablet Take 500 mg by mouth every 6 (six) hours as needed (headache).    amLODIPine (NORVASC) 10 MG tablet Take 10 mg by mouth once daily.    AURYXIA 210 mg iron Tab Take 1 tablet by mouth 3 (three) times daily.     BYSTOLIC 5 mg Tab Take 1 tablet by mouth once daily.    calcitRIOL (ROCALTROL) 0.5 MCG Cap Take 1 capsule by mouth once daily.    clindamycin (CLEOCIN T) 1 % external solution Apply topically 2 (two) times daily.     diphenhydrAMINE (BENADRYL) 25 mg capsule Take 1 capsule (25 mg total) by mouth nightly as needed for Itching or  Insomnia. (Patient taking differently: Take 25 mg by mouth nightly as needed for Itching.)    doxazosin (CARDURA) 8 MG Tab Take 8 mg by mouth nightly.    epoetin beta, methoxy peg (MIRCERA) 200 mcg/0.3 mL Syrg Inject 200 mcg into the skin every 14 (fourteen) days.     SENSIPAR 30 mg Tab Take 30 mg by mouth every evening.     terbinafine HCL (LAMISIL) 1 % cream Apply topically 2 (two) times daily.    tretinoin (RETIN-A) 0.1 % cream APPLY 1 APPLICATION TOPICALLY TO AFFECTED AREAS ON FACE       Review of patient's allergies indicates:  No Known Allergies    Past Medical History:   Diagnosis Date    Acne     Anemia of renal disease     Dialysis patient     peritoneal daily at night. followed by Dr. Ashleigh ARANA on dialysis since 12/16/15     Hypertension     Kidney disease     Seasonal allergies     Secondary hyperparathyroidism of renal origin      Past Surgical History:   Procedure Laterality Date    BRONCHOSCOPY N/A 10/7/2019    Procedure: Bronchoscopy;  Surgeon: Anjum Cali MD;  Location: [facility] ENDO;  Service: Pulmonary;  Laterality: N/A;    BRONCHOSCOPY N/A 3/8/2020    Procedure: Bronchoscopy- in ice on drip;  Surgeon: Vania Larkin MD;  Location: [facility] ENDO;  Service: Pulmonary;  Laterality: N/A;    LAPAROSCOPY N/A 9/27/2018    Procedure: LAPAROSCOPY  - Atempted Laparoscopic Peritoneal Dialysis Catheter Placement;  Surgeon: Drew Soliz MD;  Location: [facility] OR;  Service: General;  Laterality: N/A;    PERITONEAL CATHETER INSERTION      PERITONEAL CATHETER REMOVAL N/A 8/21/2018    Procedure: REMOVAL, CATHETER, DIALYSIS, PERITONEAL;  Surgeon: Glendy Romero MD;  Location: [facility] OR;  Service: General;  Laterality: N/A;    PERITONEAL CATHETER REMOVAL  08/2018    [facility]  12/2015    at [facility]    WISDOM TOOTH EXTRACTION  07/2018     Family History       Problem Relation (Age of Onset)    Diabetes Maternal Aunt, Maternal Grandmother    Fibromyalgia Mother     Heart disease Maternal Grandmother    Hyperlipidemia Maternal Grandmother    Hypertension Maternal Aunt, Maternal Grandmother    No Known Problems Father, Sister, Brother    Sickle cell anemia Paternal Aunt          Tobacco Use    Smoking status: Never Smoker    Smokeless tobacco: Never Used   Substance and Sexual Activity    Alcohol use: No    Drug use: Never    Sexual activity: Yes     Partners: Female     Birth control/protection: Condom        Review of Systems   Constitutional:  Negative for activity change, appetite change, chills, fatigue and fever.   HENT: Negative.     Eyes: Negative.    Respiratory:  Negative for chest tightness and shortness of breath.    Cardiovascular:  Negative for chest pain and leg swelling.   Gastrointestinal:  Negative for abdominal distention, abdominal pain, constipation, diarrhea, nausea and vomiting.   Endocrine: Negative.    Genitourinary:  Negative for decreased urine volume (anuric).   Musculoskeletal: Negative.    Skin:  Negative for wound.   Allergic/Immunologic: Negative for immunocompromised state.   Neurological:  Negative for dizziness and facial asymmetry.   Hematological: Negative.    Psychiatric/Behavioral:  Negative for agitation and behavioral problems.    Objective:     Vital Signs (Most Recent):  Temp: 97.8 °F (36.6 °C) (02/25/22 1235)  Pulse: 69 (02/25/22 1235)  Resp: 16 (02/25/22 1235)  BP: (!) 149/70 (02/25/22 1235)  SpO2: 99 % (02/25/22 1235)       Weight: 87.1 kg (191 lb 14.6 oz)  Body mass index is 29.94 kg/m².     Physical Exam  Vitals and nursing note reviewed.   Constitutional:       General: He is not in acute distress.     Appearance: Normal appearance. He is not ill-appearing.   HENT:      Head: Normocephalic.   Eyes:      Pupils: Pupils are equal, round, and reactive to light.   Cardiovascular:      Rate and Rhythm: Normal rate and regular rhythm.      Pulses: Normal pulses.      Heart sounds: Normal heart sounds.   Pulmonary:      Effort:  Pulmonary effort is normal.      Breath sounds: Normal breath sounds.   Abdominal:      General: Abdomen is flat. Bowel sounds are normal.      Palpations: Abdomen is soft.      Tenderness: There is no abdominal tenderness.   Musculoskeletal:         General: No swelling or tenderness. Normal range of motion.      Cervical back: Normal range of motion.      Right lower leg: No edema.      Left lower leg: No edema.   Skin:     General: Skin is warm and dry.   Neurological:      Mental Status: He is alert and oriented to person, place, and time.      Motor: No weakness.   Psychiatric:         Mood and Affect: Mood normal.         Behavior: Behavior normal.         Thought Content: Thought content normal.       Pre op labs and imaging pending     Patient was SARS-CoV-2 /COVID-19 tested with negative results.     Assessment/Plan:     * ESRD on dialysis   - 20 y.o .w/ ESRD secondary to HTN admitted for kidney transplant   - Dr Snider will be the primary surgeon  - OR scheduled for 1700  - Thymo induction  - Pre op labs and imaging pending and will be reviewed prior to surgery       The patient presents for kidney transplant.  There are no apparent contraindications to proceeding with the planned transplant.  The patient understands that the transplant could potentially be cancelled pending detailed assessment of the donor organ.  He will receive Thymoglobulin induction.  A complete discussion of the transplant procedure, including risks, complications, and alternatives, as well as any donor-specific risk factors requiring specific disclosure, will be carried out by the responsible staff surgeon prior to the procedure.     Discharge Planning: Not a candidate for d/c at this time.       Elvie Bean PA-C  Kidney Transplant  [facility] - Transplant Stepdown

## 2022-02-25 NOTE — TELEPHONE ENCOUNTER
ON-CALL NOTE    OS# [id number]    Notified by Charly Ndiaye, , that Tameka Saenz is eligible for kidney offer.  Spoke with patient and identified no acute medical issues with telephone assessment. Protocol script read to patient regarding N/A, standard donor offer. Patient verbalized understanding, all questions answered, patient accepts organ offer. Notified by Charly Ndiaye that virtual crossmatch is negative.  Current sample of blood is available from date 1/17/22 for crossmatch.  Patient reports no sensitizing event since last blood sample for PRA received. Notified Mitra in [facility] Lab to perform a retrospective  crossmatch per guideline.    Patient was asked if they have had a positive COVID-19 test or if they have any signs or symptoms. Informed patient that they will be tested for COVID-19 upon arrival to the hospital, unless have a previous positive result. If tested and result is positive, the transplant will not be able to occur, they will be inactivated on the wait list for 28 days per protocol and required to quarantine.     Patient and Patient's mother notified of plan for patient to be admitted today for primary transplant offer and states understanding. He is to be NPO after 8 am. She states they will not arrive to hospital until approx 12:30 pm. Notified: TSU charge nurse Mya at [phone], Chelsea at admit office at 0818, CSN [id number], Tremaine bocanegra at 0824, Mitra in HLA at 0828, Elvie LARA at [phone]. Dialysis unit notified, spoke with Woodrow.    Note: Patient and his mother have been notified current policy: 2 visitors with patient per 24 hour period, one is allowed to stay overnight. All persons verbalized understanding.    Update from Barron Benton at 1350: recipient OR 1700, Dr. Snider will be surgeon. Notified TSU charge Mya, maria e Penaloza, MARCO Bean, and patient.    Dr. Snider notified of current platlet count, 76, at 1421.

## 2022-02-25 NOTE — PLAN OF CARE
"Pt has LUE fistula with + thrill and + bruit. Per pt hemodialysis schedule is 5 days a week: Sun, Mon, Tues., Thurs, Fri and last HD treatment was approximately 0100 on Thursday.     Pt states that last time he was prepped for transplant, which was aborted after rolling to OR, he was "very nervous" and the "relaxing" medication that he was given did not help.  "

## 2022-02-26 PROBLEM — R50.2 DRUG-INDUCED FEVER: Status: ACTIVE | Noted: 2022-02-26

## 2022-02-26 PROBLEM — Z94.0 STATUS POST DECEASED-DONOR KIDNEY TRANSPLANTATION: Status: ACTIVE | Noted: 2022-02-26

## 2022-02-26 PROBLEM — Z91.89 AT RISK FOR OPPORTUNISTIC INFECTIONS: Status: ACTIVE | Noted: 2022-02-26

## 2022-02-26 PROBLEM — Z29.89 PROPHYLACTIC IMMUNOTHERAPY: Status: ACTIVE | Noted: 2022-02-26

## 2022-02-26 PROBLEM — Z79.899 IMMUNOSUPPRESSION DUE TO DRUG THERAPY: Status: ACTIVE | Noted: 2022-02-26

## 2022-02-26 PROBLEM — R73.9 STEROID-INDUCED HYPERGLYCEMIA: Status: ACTIVE | Noted: 2022-02-26

## 2022-02-26 PROBLEM — T38.0X5A STEROID-INDUCED HYPERGLYCEMIA: Status: ACTIVE | Noted: 2022-02-26

## 2022-02-26 PROBLEM — R16.2 HEPATOSPLENOMEGALY: Status: RESOLVED | Noted: 2021-10-21 | Resolved: 2022-02-26

## 2022-02-26 PROBLEM — D84.821 IMMUNOSUPPRESSION DUE TO DRUG THERAPY: Status: ACTIVE | Noted: 2022-02-26

## 2022-02-26 LAB
ALBUMIN SERPL BCP-MCNC: 3.1 G/DL (ref 3.5–5.2)
ALBUMIN SERPL BCP-MCNC: 3.3 G/DL (ref 3.5–5.2)
ALBUMIN SERPL BCP-MCNC: 3.5 G/DL (ref 3.5–5.2)
ANION GAP SERPL CALC-SCNC: 13 MMOL/L (ref 8–16)
ANION GAP SERPL CALC-SCNC: 13 MMOL/L (ref 8–16)
ANION GAP SERPL CALC-SCNC: 17 MMOL/L (ref 8–16)
ANISOCYTOSIS BLD QL SMEAR: SLIGHT
BASOPHILS # BLD AUTO: 0 K/UL (ref 0–0.2)
BASOPHILS # BLD AUTO: 0 K/UL (ref 0–0.2)
BASOPHILS NFR BLD: 0 % (ref 0–1.9)
BASOPHILS NFR BLD: 0 % (ref 0–1.9)
BUN SERPL-MCNC: 55 MG/DL (ref 6–20)
BUN SERPL-MCNC: 63 MG/DL (ref 6–20)
BUN SERPL-MCNC: 65 MG/DL (ref 6–20)
CALCIUM SERPL-MCNC: 7.9 MG/DL (ref 8.7–10.5)
CALCIUM SERPL-MCNC: 8.3 MG/DL (ref 8.7–10.5)
CALCIUM SERPL-MCNC: 8.8 MG/DL (ref 8.7–10.5)
CHLORIDE SERPL-SCNC: 100 MMOL/L (ref 95–110)
CHLORIDE SERPL-SCNC: 102 MMOL/L (ref 95–110)
CHLORIDE SERPL-SCNC: 99 MMOL/L (ref 95–110)
CO2 SERPL-SCNC: 22 MMOL/L (ref 23–29)
CO2 SERPL-SCNC: 22 MMOL/L (ref 23–29)
CO2 SERPL-SCNC: 24 MMOL/L (ref 23–29)
CREAT SERPL-MCNC: 13.1 MG/DL (ref 0.5–1.4)
CREAT SERPL-MCNC: 14.1 MG/DL (ref 0.5–1.4)
CREAT SERPL-MCNC: 16 MG/DL (ref 0.5–1.4)
DIFFERENTIAL METHOD: ABNORMAL
DIFFERENTIAL METHOD: ABNORMAL
EOSINOPHIL # BLD AUTO: 0 K/UL (ref 0–0.5)
EOSINOPHIL # BLD AUTO: 0 K/UL (ref 0–0.5)
EOSINOPHIL NFR BLD: 0 % (ref 0–8)
EOSINOPHIL NFR BLD: 0 % (ref 0–8)
ERYTHROCYTE [DISTWIDTH] IN BLOOD BY AUTOMATED COUNT: 15 % (ref 11.5–14.5)
ERYTHROCYTE [DISTWIDTH] IN BLOOD BY AUTOMATED COUNT: 15.2 % (ref 11.5–14.5)
EST. GFR  (AFRICAN AMERICAN): 4.3 ML/MIN/1.73 M^2
EST. GFR  (AFRICAN AMERICAN): 5 ML/MIN/1.73 M^2
EST. GFR  (AFRICAN AMERICAN): 5.4 ML/MIN/1.73 M^2
EST. GFR  (NON AFRICAN AMERICAN): 3.7 ML/MIN/1.73 M^2
EST. GFR  (NON AFRICAN AMERICAN): 4.3 ML/MIN/1.73 M^2
EST. GFR  (NON AFRICAN AMERICAN): 4.7 ML/MIN/1.73 M^2
GLUCOSE SERPL-MCNC: 129 MG/DL (ref 70–110)
GLUCOSE SERPL-MCNC: 141 MG/DL (ref 70–110)
GLUCOSE SERPL-MCNC: 182 MG/DL (ref 70–110)
HCT VFR BLD AUTO: 24.7 % (ref 40–54)
HCT VFR BLD AUTO: 26.3 % (ref 40–54)
HCT VFR BLD AUTO: 26.3 % (ref 40–54)
HGB BLD-MCNC: 8 G/DL (ref 14–18)
HGB BLD-MCNC: 8.6 G/DL (ref 14–18)
IMM GRANULOCYTES # BLD AUTO: 0 K/UL (ref 0–0.04)
IMM GRANULOCYTES # BLD AUTO: 0.02 K/UL (ref 0–0.04)
IMM GRANULOCYTES NFR BLD AUTO: 0 % (ref 0–0.5)
IMM GRANULOCYTES NFR BLD AUTO: 0.3 % (ref 0–0.5)
LYMPHOCYTES # BLD AUTO: 0 K/UL (ref 1–4.8)
LYMPHOCYTES # BLD AUTO: 0 K/UL (ref 1–4.8)
LYMPHOCYTES NFR BLD: 0 % (ref 18–48)
LYMPHOCYTES NFR BLD: 0.1 % (ref 18–48)
MAGNESIUM SERPL-MCNC: 1.6 MG/DL (ref 1.6–2.6)
MCH RBC QN AUTO: 30.1 PG (ref 27–31)
MCH RBC QN AUTO: 30.3 PG (ref 27–31)
MCHC RBC AUTO-ENTMCNC: 32.1 G/DL (ref 32–36)
MCHC RBC AUTO-ENTMCNC: 32.4 G/DL (ref 32–36)
MCV RBC AUTO: 93 FL (ref 82–98)
MCV RBC AUTO: 94 FL (ref 82–98)
MONOCYTES # BLD AUTO: 0 K/UL (ref 0.3–1)
MONOCYTES # BLD AUTO: 0.3 K/UL (ref 0.3–1)
MONOCYTES NFR BLD: 0.2 % (ref 4–15)
MONOCYTES NFR BLD: 3.5 % (ref 4–15)
NEUTROPHILS # BLD AUTO: 4.1 K/UL (ref 1.8–7.7)
NEUTROPHILS # BLD AUTO: 7.4 K/UL (ref 1.8–7.7)
NEUTROPHILS NFR BLD: 96.1 % (ref 38–73)
NEUTROPHILS NFR BLD: 99.8 % (ref 38–73)
NRBC BLD-RTO: 0 /100 WBC
NRBC BLD-RTO: 0 /100 WBC
OVALOCYTES BLD QL SMEAR: ABNORMAL
PHOSPHATE SERPL-MCNC: 1.9 MG/DL (ref 2.7–4.5)
PHOSPHATE SERPL-MCNC: 1.9 MG/DL (ref 2.7–4.5)
PHOSPHATE SERPL-MCNC: 4.9 MG/DL (ref 2.7–4.5)
PHOSPHATE SERPL-MCNC: 5.2 MG/DL (ref 2.7–4.5)
PLATELET # BLD AUTO: 58 K/UL (ref 150–450)
PLATELET # BLD AUTO: 69 K/UL (ref 150–450)
PMV BLD AUTO: 12.2 FL (ref 9.2–12.9)
PMV BLD AUTO: 12.9 FL (ref 9.2–12.9)
POCT GLUCOSE: 118 MG/DL (ref 70–110)
POCT GLUCOSE: 138 MG/DL (ref 70–110)
POCT GLUCOSE: 143 MG/DL (ref 70–110)
POIKILOCYTOSIS BLD QL SMEAR: SLIGHT
POLYCHROMASIA BLD QL SMEAR: ABNORMAL
POTASSIUM SERPL-SCNC: 4.5 MMOL/L (ref 3.5–5.1)
POTASSIUM SERPL-SCNC: 4.8 MMOL/L (ref 3.5–5.1)
POTASSIUM SERPL-SCNC: 5 MMOL/L (ref 3.5–5.1)
PROLACTIN SERPL IA-MCNC: 9.6 NG/ML (ref 3.5–19.4)
RBC # BLD AUTO: 2.66 M/UL (ref 4.6–6.2)
RBC # BLD AUTO: 2.84 M/UL (ref 4.6–6.2)
SODIUM SERPL-SCNC: 137 MMOL/L (ref 136–145)
SODIUM SERPL-SCNC: 137 MMOL/L (ref 136–145)
SODIUM SERPL-SCNC: 138 MMOL/L (ref 136–145)
TACROLIMUS BLD-MCNC: <2 NG/ML (ref 5–15)
WBC # BLD AUTO: 4.09 K/UL (ref 3.9–12.7)
WBC # BLD AUTO: 7.72 K/UL (ref 3.9–12.7)

## 2022-02-26 PROCEDURE — 80197 ASSAY OF TACROLIMUS: CPT | Performed by: TRANSPLANT SURGERY

## 2022-02-26 PROCEDURE — 85025 COMPLETE CBC W/AUTO DIFF WBC: CPT | Performed by: TRANSPLANT SURGERY

## 2022-02-26 PROCEDURE — 80069 RENAL FUNCTION PANEL: CPT | Mod: 91 | Performed by: STUDENT IN AN ORGANIZED HEALTH CARE EDUCATION/TRAINING PROGRAM

## 2022-02-26 PROCEDURE — 84146 ASSAY OF PROLACTIN: CPT | Performed by: TRANSPLANT SURGERY

## 2022-02-26 PROCEDURE — 20600001 HC STEP DOWN PRIVATE ROOM

## 2022-02-26 PROCEDURE — 25000003 PHARM REV CODE 250: Performed by: STUDENT IN AN ORGANIZED HEALTH CARE EDUCATION/TRAINING PROGRAM

## 2022-02-26 PROCEDURE — 36415 COLL VENOUS BLD VENIPUNCTURE: CPT | Performed by: TRANSPLANT SURGERY

## 2022-02-26 PROCEDURE — 80069 RENAL FUNCTION PANEL: CPT | Mod: 91 | Performed by: TRANSPLANT SURGERY

## 2022-02-26 PROCEDURE — 25000003 PHARM REV CODE 250: Performed by: NURSE PRACTITIONER

## 2022-02-26 PROCEDURE — 63600175 PHARM REV CODE 636 W HCPCS: Performed by: TRANSPLANT SURGERY

## 2022-02-26 PROCEDURE — 83735 ASSAY OF MAGNESIUM: CPT | Performed by: TRANSPLANT SURGERY

## 2022-02-26 PROCEDURE — 63600175 PHARM REV CODE 636 W HCPCS: Performed by: NURSE PRACTITIONER

## 2022-02-26 PROCEDURE — 99223 PR INITIAL HOSPITAL CARE,LEVL III: ICD-10-PCS | Mod: GC,,, | Performed by: INTERNAL MEDICINE

## 2022-02-26 PROCEDURE — 36415 COLL VENOUS BLD VENIPUNCTURE: CPT | Performed by: STUDENT IN AN ORGANIZED HEALTH CARE EDUCATION/TRAINING PROGRAM

## 2022-02-26 PROCEDURE — 63600175 PHARM REV CODE 636 W HCPCS: Performed by: ANESTHESIOLOGY

## 2022-02-26 PROCEDURE — 25000003 PHARM REV CODE 250: Performed by: TRANSPLANT SURGERY

## 2022-02-26 PROCEDURE — 99222 PR INITIAL HOSPITAL CARE,LEVL II: ICD-10-PCS | Mod: ,,, | Performed by: NURSE PRACTITIONER

## 2022-02-26 PROCEDURE — 99222 1ST HOSP IP/OBS MODERATE 55: CPT | Mod: ,,, | Performed by: NURSE PRACTITIONER

## 2022-02-26 PROCEDURE — S5010 5% DEXTROSE AND 0.45% SALINE: HCPCS | Performed by: TRANSPLANT SURGERY

## 2022-02-26 PROCEDURE — 99223 1ST HOSP IP/OBS HIGH 75: CPT | Mod: GC,,, | Performed by: INTERNAL MEDICINE

## 2022-02-26 PROCEDURE — 80069 RENAL FUNCTION PANEL: CPT | Performed by: TRANSPLANT SURGERY

## 2022-02-26 RX ORDER — IBUPROFEN 200 MG
24 TABLET ORAL
Status: DISCONTINUED | OUTPATIENT
Start: 2022-02-26 | End: 2022-02-28

## 2022-02-26 RX ORDER — METHYLPREDNISOLONE SOD SUCC 125 MG
250 VIAL (EA) INJECTION ONCE
Status: COMPLETED | OUTPATIENT
Start: 2022-02-27 | End: 2022-02-27

## 2022-02-26 RX ORDER — METHYLPREDNISOLONE SOD SUCC 125 MG
125 VIAL (EA) INJECTION ONCE
Status: COMPLETED | OUTPATIENT
Start: 2022-02-28 | End: 2022-02-28

## 2022-02-26 RX ORDER — TACROLIMUS 1 MG/1
5 CAPSULE ORAL 2 TIMES DAILY
Status: DISCONTINUED | OUTPATIENT
Start: 2022-02-26 | End: 2022-02-27

## 2022-02-26 RX ORDER — HYDRALAZINE HYDROCHLORIDE 20 MG/ML
10 INJECTION INTRAMUSCULAR; INTRAVENOUS ONCE
Status: COMPLETED | OUTPATIENT
Start: 2022-02-26 | End: 2022-02-26

## 2022-02-26 RX ORDER — NEOSTIGMINE METHYLSULFATE 0.5 MG/ML
INJECTION, SOLUTION INTRAVENOUS
Status: DISCONTINUED | OUTPATIENT
Start: 2022-02-25 | End: 2022-02-26

## 2022-02-26 RX ORDER — INSULIN ASPART 100 [IU]/ML
0-5 INJECTION, SOLUTION INTRAVENOUS; SUBCUTANEOUS
Status: DISCONTINUED | OUTPATIENT
Start: 2022-02-26 | End: 2022-02-28

## 2022-02-26 RX ORDER — SODIUM BICARBONATE 650 MG/1
1300 TABLET ORAL 2 TIMES DAILY
Status: DISCONTINUED | OUTPATIENT
Start: 2022-02-26 | End: 2022-02-28

## 2022-02-26 RX ORDER — IBUPROFEN 200 MG
16 TABLET ORAL
Status: DISCONTINUED | OUTPATIENT
Start: 2022-02-26 | End: 2022-02-28

## 2022-02-26 RX ORDER — LABETALOL HCL 20 MG/4 ML
10 SYRINGE (ML) INTRAVENOUS EVERY 4 HOURS PRN
Status: DISCONTINUED | OUTPATIENT
Start: 2022-02-26 | End: 2022-03-01 | Stop reason: HOSPADM

## 2022-02-26 RX ORDER — GLUCAGON 1 MG
1 KIT INJECTION
Status: DISCONTINUED | OUTPATIENT
Start: 2022-02-26 | End: 2022-02-28

## 2022-02-26 RX ORDER — NIFEDIPINE 30 MG/1
30 TABLET, EXTENDED RELEASE ORAL 2 TIMES DAILY
Status: DISCONTINUED | OUTPATIENT
Start: 2022-02-26 | End: 2022-03-01 | Stop reason: HOSPADM

## 2022-02-26 RX ORDER — MAGNESIUM SULFATE HEPTAHYDRATE 40 MG/ML
2 INJECTION, SOLUTION INTRAVENOUS ONCE
Status: COMPLETED | OUTPATIENT
Start: 2022-02-26 | End: 2022-02-26

## 2022-02-26 RX ADMIN — DOCUSATE SODIUM 100 MG: 100 CAPSULE, LIQUID FILLED ORAL at 08:02

## 2022-02-26 RX ADMIN — HYDRALAZINE HYDROCHLORIDE 10 MG: 20 INJECTION INTRAMUSCULAR; INTRAVENOUS at 06:02

## 2022-02-26 RX ADMIN — HYDROMORPHONE HYDROCHLORIDE 0.5 MG: 1 INJECTION, SOLUTION INTRAMUSCULAR; INTRAVENOUS; SUBCUTANEOUS at 12:02

## 2022-02-26 RX ADMIN — OXYCODONE 5 MG: 5 TABLET ORAL at 08:02

## 2022-02-26 RX ADMIN — ACETAMINOPHEN 1000 MG: 325 TABLET ORAL at 08:02

## 2022-02-26 RX ADMIN — NYSTATIN 500000 UNITS: 500000 SUSPENSION ORAL at 02:02

## 2022-02-26 RX ADMIN — TACROLIMUS 3 MG: 1 CAPSULE ORAL at 08:02

## 2022-02-26 RX ADMIN — NYSTATIN 500000 UNITS: 500000 SUSPENSION ORAL at 08:02

## 2022-02-26 RX ADMIN — LABETALOL HYDROCHLORIDE 10 MG: 5 INJECTION, SOLUTION INTRAVENOUS at 05:02

## 2022-02-26 RX ADMIN — HEPARIN SODIUM 5000 UNITS: 5000 INJECTION INTRAVENOUS; SUBCUTANEOUS at 08:02

## 2022-02-26 RX ADMIN — DEXTROSE 2 G: 50 INJECTION, SOLUTION INTRAVENOUS at 04:02

## 2022-02-26 RX ADMIN — DIBASIC SODIUM PHOSPHATE, MONOBASIC POTASSIUM PHOSPHATE AND MONOBASIC SODIUM PHOSPHATE 2 TABLET: 852; 155; 130 TABLET ORAL at 10:02

## 2022-02-26 RX ADMIN — NYSTATIN 500000 UNITS: 500000 SUSPENSION ORAL at 05:02

## 2022-02-26 RX ADMIN — NIFEDIPINE 30 MG: 30 TABLET, FILM COATED, EXTENDED RELEASE ORAL at 08:02

## 2022-02-26 RX ADMIN — MUPIROCIN 1 G: 20 OINTMENT TOPICAL at 08:02

## 2022-02-26 RX ADMIN — ACETAMINOPHEN 1000 MG: 325 TABLET ORAL at 04:02

## 2022-02-26 RX ADMIN — SODIUM BICARBONATE 650 MG TABLET 1300 MG: at 08:02

## 2022-02-26 RX ADMIN — ACETAMINOPHEN 1000 MG: 325 TABLET ORAL at 02:02

## 2022-02-26 RX ADMIN — MYCOPHENOLATE MOFETIL 1000 MG: 250 CAPSULE ORAL at 08:02

## 2022-02-26 RX ADMIN — SODIUM CHLORIDE: 0.9 INJECTION, SOLUTION INTRAVENOUS at 08:02

## 2022-02-26 RX ADMIN — SODIUM BICARBONATE 650 MG TABLET 1300 MG: at 10:02

## 2022-02-26 RX ADMIN — MAGNESIUM SULFATE 2 G: 2 INJECTION INTRAVENOUS at 10:02

## 2022-02-26 RX ADMIN — SULFAMETHOXAZOLE AND TRIMETHOPRIM 1 TABLET: 400; 80 TABLET ORAL at 08:02

## 2022-02-26 RX ADMIN — OXYCODONE 5 MG: 5 TABLET ORAL at 09:02

## 2022-02-26 RX ADMIN — BISACODYL 10 MG: 5 TABLET, COATED ORAL at 08:02

## 2022-02-26 RX ADMIN — SODIUM CHLORIDE 1000 ML: 0.9 INJECTION, SOLUTION INTRAVENOUS at 01:02

## 2022-02-26 RX ADMIN — THERA TABS 1 TABLET: TAB at 08:02

## 2022-02-26 RX ADMIN — DIBASIC SODIUM PHOSPHATE, MONOBASIC POTASSIUM PHOSPHATE AND MONOBASIC SODIUM PHOSPHATE 2 TABLET: 852; 155; 130 TABLET ORAL at 08:02

## 2022-02-26 RX ADMIN — HEPARIN SODIUM 5000 UNITS: 5000 INJECTION INTRAVENOUS; SUBCUTANEOUS at 02:02

## 2022-02-26 RX ADMIN — DOCUSATE SODIUM 100 MG: 100 CAPSULE, LIQUID FILLED ORAL at 02:02

## 2022-02-26 RX ADMIN — OXYCODONE 5 MG: 5 TABLET ORAL at 02:02

## 2022-02-26 RX ADMIN — NIFEDIPINE 30 MG: 30 TABLET, FILM COATED, EXTENDED RELEASE ORAL at 09:02

## 2022-02-26 RX ADMIN — TRAMADOL HYDROCHLORIDE 50 MG: 50 TABLET, COATED ORAL at 07:02

## 2022-02-26 RX ADMIN — TRAMADOL HYDROCHLORIDE 50 MG: 50 TABLET, COATED ORAL at 05:02

## 2022-02-26 RX ADMIN — HEPARIN SODIUM 5000 UNITS: 5000 INJECTION INTRAVENOUS; SUBCUTANEOUS at 04:02

## 2022-02-26 RX ADMIN — DEXTROSE 2 G: 50 INJECTION, SOLUTION INTRAVENOUS at 03:02

## 2022-02-26 RX ADMIN — SODIUM CHLORIDE: 0.9 INJECTION, SOLUTION INTRAVENOUS at 03:02

## 2022-02-26 RX ADMIN — FAMOTIDINE 20 MG: 20 TABLET, FILM COATED ORAL at 08:02

## 2022-02-26 RX ADMIN — TACROLIMUS 5 MG: 1 CAPSULE ORAL at 05:02

## 2022-02-26 NOTE — SUBJECTIVE & OBJECTIVE
Interval HPI:   Overnight events: Admitted to U s/p kidney txp. BG below goal ranges with no prn SQ insulin requirements. Received Solu Medrol 500 mg overnight. Diet Adult Regular (IDDSI Level 7)  Eatin%  Nausea: No  Hypoglycemia and intervention: No  Fever: No  TPN and/or TF: No  If yes, type of TF/TPN and rate: n/a    PMH, PSH, FH, SH reviewed     ROS:  Constitutional: Negative for weight changes.  Eyes: Negative for visual disturbance.  Respiratory: Negative for cough.   Cardiovascular: Negative for chest pain.  Gastrointestinal: Negative for nausea.  Endocrine: Negative for polyuria, polydipsia.  Musculoskeletal: Negative for back pain.  Skin: Negative for rash.  Neurological: Negative for syncope.  Psychiatric/Behavioral: Negative for depression.    Review of Systems    Current Medications and/or Treatments Impacting Glycemic Control  Immunotherapy:    Immunosuppressants           Stop Route Frequency     antithymocyte globulin (rabbit) 125 mg, hydrocortisone sodium succinate (SOLU-CORTEF) 20 mg in sodium chloride 0.9% 500 mL (FOR PERIPHERAL LINE ADMINISTRATION ONLY)          0859 IV Daily     mycophenolate capsule 1,000 mg         -- Oral 2 times daily     tacrolimus capsule 3 mg         -- Oral 2 times daily          Steroids:   Hormones (From admission, onward)                Start     Stop Route Frequency Ordered    22 0900  predniSONE tablet 20 mg  (IP TXP KIDNEY POST-OP THYMO WITH PERIPHERAL PRECHECKED)         -- Oral Daily 22 2350    22 0800  methylPREDNISolone sodium succinate injection 125 mg         -- IV Once 22 0928    22 0900  antithymocyte globulin (rabbit) 125 mg, hydrocortisone sodium succinate (SOLU-CORTEF) 20 mg in sodium chloride 0.9% 500 mL (FOR PERIPHERAL LINE ADMINISTRATION ONLY)  (IP TXP KIDNEY POST-OP THYMO WITH PERIPHERAL PRECHECKED)          0859 IV Daily 2228    22 0800  methylPREDNISolone sodium succinate injection 250  mg  (IP TXP KIDNEY POST-OP THYMO WITH PERIPHERAL PRECHECKED)         -- IV Once 02/26/22 0928    02/26/22 0800  methylPREDNISolone sodium succinate injection 250 mg  (IP TXP KIDNEY POST-OP THYMO WITH PERIPHERAL PRECHECKED)         -- IV Once 02/25/22 2350    02/26/22 0043  hydrocortisone sodium succinate injection 100 mg  (IP TXP KIDNEY POST-OP THYMO WITH PERIPHERAL PRECHECKED)         07/24 1643 IV Once as needed 02/25/22 2350          Pressors:    Autonomic Drugs (From admission, onward)                Start     Stop Route Frequency Ordered    02/26/22 0043  EPINEPHrine injection 1 mg  (IP TXP KIDNEY POST-OP THYMO WITH PERIPHERAL PRECHECKED)         07/24 1643 SubQ Once as needed 02/25/22 2350          Hyperglycemia/Diabetes Medications:   Antihyperglycemics (From admission, onward)                None             PHYSICAL EXAMINATION:  Vitals:    02/26/22 0911   BP:    Pulse:    Resp: 20   Temp:      Body mass index is 29.95 kg/m².    Physical Exam  Constitutional: Well developed, well nourished, NAD.  ENT: External ears no masses with nose patent; normal hearing.  Neck: Supple; trachea midline.  Cardiovascular: Normal heart sounds, no LE edema. DP +2 bilaterally.  Lungs: Normal effort; lungs anterior bilaterally clear to auscultation.  Abdomen: Soft, no masses, no hernias.  MS: No clubbing or cyanosis of nails noted; unable to assess gait.  Skin: No rashes, lesions, or ulcers; no nodules.   Psychiatric: Good judgement and insight; normal mood and affect.  Neurological: Cranial nerves are grossly intact.   Foot: Nails in good condition, no amputations noted.

## 2022-02-26 NOTE — SUBJECTIVE & OBJECTIVE
Subjective:   History of Present Illness:  21 y.o. year old Black male with ESRD secondary to HTN admitted for kidney transplant. He has been on the wait list for a kidney transplant at [facility] since 12/5/2015. Patient initially was started on HD in 12/2015 and switched to PD in 2017, but had multiple Peritonitis infections and changed to home HD on 6/24/2019. Patient is dialyzing 5x/week. Last session 2/23 into 2/24. Patient reports that she is tolerating dialysis well. He has a LUE AV fistula. Patient reports he is anuric. Pt with thrombocytopenia likely secondary to hepatomegaly and/or any underlying hepatic disease. He had appropriate evaluation of thrombocytopenia with Dr. Mathis (heme/onc).    Hospital Course:  S/p kidney transplant last night, thymo induction    Interval History:  Fevers overnight, started on antibiotics   S/p kidney transplant last night . Received platelets   2.4 liter urine output, drain output 90 ml   Having fevers with elevated blood pressures now   Saturating 91 % on room air . Congestive pattern on cxr   Platelets 76 ---> 58 now   Cr remains 14.6 ---> 14.1 now   No bowel movement at this time     Past Medical, Surgical, Family, and Social History:   Unchanged from H&P.    Scheduled Meds:   acetaminophen  1,000 mg Oral Q8H    acetaminophen  650 mg Oral Q24H    antithymocyte globulin (rabbit), hydrocortisone 20mg, with optional heparin 1000 units in NS 500ml (FOR PERIPHERAL LINE ADMINISTRATION ONLY)  1.5 mg/kg (Adjusted) Intravenous Daily    bisacodyL  10 mg Oral QHS    ceFAZolin (ANCEF) IVPB  2 g Intravenous Q8H    diphenhydrAMINE  25 mg Oral Q24H    docusate sodium  100 mg Oral TID    famotidine  20 mg Oral QHS    heparin (porcine)  5,000 Units Subcutaneous Q8H    [START ON 2/27/2022] methylPREDNISolone sodium succinate injection  125 mg Intravenous Once    methylPREDNISolone sodium succinate injection  250 mg Intravenous Once    multivitamin  1 tablet Oral Daily    mupirocin  1 g  Nasal BID    mycophenolate  1,000 mg Oral BID    nystatin  500,000 Units Mouth/Throat QID (PC + HS)    [START ON 2/28/2022] predniSONE  20 mg Oral Daily    sulfamethoxazole-trimethoprim 400-80mg  1 tablet Oral Daily AM    tacrolimus  3 mg Oral BID    [START ON 3/7/2022] valGANciclovir  450 mg Oral Daily AM     Continuous Infusions:   sodium chloride 0.9% 200 mL/hr at 02/26/22 0700    dextrose 5 % and 0.45 % NaCl 50 mL/hr at 02/26/22 0300    glucagon (human recombinant)       PRN Meds:sodium chloride, dextrose 10%, diphenhydrAMINE, EPINEPHrine, glucagon (human recombinant), glucose, glucose, glucose, hydrocortisone sodium succinate, labetalol, ondansetron, oxyCODONE, traMADoL    Intake/Output - Last 3 Shifts         02/24 0700 02/25 0659 02/25 0700 02/26 0659 02/26 0700 02/27 0659    I.V. (mL/kg)  1683.3 (18.3)     Blood  300     IV Piggyback  2300     Total Intake(mL/kg)  4283.3 (46.6)     Urine (mL/kg/hr)  2400 250 (3.3)    Drains  90     Blood  150     Total Output  2640 250    Net  +1643.3 -250                    Review of Systems   Constitutional:  Positive for activity change and fever. Negative for appetite change and fatigue.   HENT:  Negative for facial swelling.    Respiratory:  Negative for shortness of breath and wheezing.    Cardiovascular:  Negative for chest pain, palpitations and leg swelling.   Gastrointestinal:  Positive for abdominal pain and constipation. Negative for abdominal distention.   Genitourinary:  Negative for dysuria.   Allergic/Immunologic: Positive for immunocompromised state.   Neurological:  Negative for light-headedness and headaches.   Psychiatric/Behavioral:  Negative for agitation and decreased concentration.     Objective:     Vital Signs (Most Recent):  Temp: (!) 101.2 °F (38.4 °C) (02/26/22 0700)  Pulse: (!) 117 (02/26/22 0654)  Resp: (!) 22 (02/26/22 0746)  BP: (!) 171/74 (02/26/22 0700)  SpO2: (!) 91 % (02/26/22 0630)   Vital Signs (24h Range):  Temp:  [97.6 °F (36.4  "°C)-102.5 °F (39.2 °C)] 101.2 °F (38.4 °C)  Pulse:  [] 117  Resp:  [16-37] 22  SpO2:  [91 %-100 %] 91 %  BP: ()/() 171/74  Arterial Line BP: (145-169)/(53-72) 169/72     Weight: 92 kg (202 lb 13.2 oz)  Height: 5' 9" (175.3 cm)  Body mass index is 29.95 kg/m².    Physical Exam  Constitutional:       General: He is not in acute distress.     Appearance: He is not toxic-appearing.   Eyes:      Extraocular Movements: Extraocular movements intact.      Conjunctiva/sclera: Conjunctivae normal.   Cardiovascular:      Rate and Rhythm: Tachycardia present.   Pulmonary:      Effort: No respiratory distress.      Breath sounds: No wheezing.   Abdominal:      General: There is no distension.      Palpations: Abdomen is soft.      Comments: Soreness at the site of surgery  MARLI drain in place    Musculoskeletal:      Right lower leg: No edema.      Left lower leg: No edema.   Skin:     Coloration: Skin is not jaundiced.   Neurological:      Mental Status: He is oriented to person, place, and time. Mental status is at baseline.       Laboratory:  BMP:   Recent Labs   Lab 02/25/22  1257 02/26/22  0003 02/26/22  0637   GLU 85 141* 182*    138 137   K 5.0 4.8 4.5    99 102   CO2 27 22* 22*   BUN 58* 65* 55*   CREATININE 14.6* 16.0* 14.1*   CALCIUM 8.7 8.3* 7.9*       Diagnostic Results:  Reviewed   "

## 2022-02-26 NOTE — NURSING
Notified H Abimbola NP of temp 101.5 IV ABX and scheduled Tylenol administered.  /90 manual-PRN IV labetalol 10 mg administered  Recheck temp- 102.2 cooling blanket ordered. STAT CXR done.  Recheck BP- 189/91. NP notified. Placed order for IV Hydralazine 10 mg. Recheck /77

## 2022-02-26 NOTE — ASSESSMENT & PLAN NOTE
Lab Results   Component Value Date    CREATININE 14.1 (H) 02/26/2022     Avoid insulin stacking  Titrate insulin slowly

## 2022-02-26 NOTE — ANESTHESIA POSTPROCEDURE EVALUATION
Anesthesia Post Evaluation    Patient: Tameka Saenz    Procedure(s) Performed: Procedure(s) (LRB):  TRANSPLANT, KIDNEY (N/A)    Final Anesthesia Type: general      Patient location during evaluation: PACU  Patient participation: Yes- Able to Participate  Level of consciousness: awake and alert  Post-procedure vital signs: reviewed and stable  Pain management: adequate  Airway patency: patent  ASHLEY mitigation strategies: Extubation while patient is awake  PONV status at discharge: No PONV  Anesthetic complications: no      Cardiovascular status: stable  Respiratory status: unassisted and spontaneous ventilation  Hydration status: euvolemic  Follow-up not needed.          Vitals Value Taken Time   /98 02/26/22 0629   Temp 39 °C (102.2 °F) 02/26/22 0600   Pulse 124 02/26/22 0629   Resp 29 02/26/22 0629   SpO2 91 % 02/26/22 0629   Vitals shown include unvalidated device data.      Event Time   Out of Recovery 02/26/2022 01:15:00         Pain/Lavonne Score: Pain Rating Prior to Med Admin: 3 (2/26/2022  4:57 AM)  Pain Rating Post Med Admin: 0 (2/26/2022 12:45 AM)  Lavonne Score: 9 (2/26/2022  1:15 AM)

## 2022-02-26 NOTE — NURSING
Pt arrived to TSU @ 0230. Pt AAOx4. VSS. SpO2 > 95% RA, temp  Tele-sinus tach 100-110. Pt has IS at bedside.Pt given Oxy 5 mg for pain. PARDEEP and SCD placed on patient. RLQ surgical dressing with small amount of sanguinous area marked. Right MARLI drain w/ sangiunous output. D5 0.45 NS @ 50 and NS @ I=O. 2 day jeff with clear yellow output. Jeff output is 150-400 mL/hr. IV antibiotics continued. H & H stable.Plan of care reviewed with patient and family members at bedside. Meds ready and set up for instruction. See flowsheet for assessment findings.

## 2022-02-26 NOTE — ANESTHESIA PROCEDURE NOTES
Arterial    Diagnosis: Renal failure    Patient location during procedure: done in OR    Staffing  Authorizing Provider: Ten Philippe MD  Performing Provider: Ten Philippe MD    Anesthesiologist was present at the time of the procedure.    Preanesthetic Checklist  Completed: patient identified, IV checked, site marked, risks and benefits discussed, surgical consent, monitors and equipment checked, pre-op evaluation, timeout performed and anesthesia consent givenArterial  Skin Prep: chlorhexidine gluconate  Local Infiltration: none  Orientation: right  Location: radial    Catheter Size: 20 G  Catheter placement by Ultrasound guidance. Heme positive aspiration all ports.   Vessel Caliber: medium, patent, compressibility normal  Vascular Doppler:  not done  Needle advanced into vessel with real time Ultrasound guidance.Insertion Attempts: 1  Assessment  Dressing: secured with tape and tegaderm  Patient: Tolerated well

## 2022-02-26 NOTE — AI DETERIORATION ALERT
"RAPID RESPONSE NURSE AI ALERT       AI alert received.    Chart Reviewed: 02/26/2022, 6:15 AM    MRN: [id number]  Bed: 42943/54559 A    Dx: ESRD on dialysis    Tameka Saenz has a past medical history of Acne, Anemia of renal disease, Dialysis patient, ESRD on dialysis since 12/16/15, Hepatosplenomegaly, Hypertension, Kidney disease, Seasonal allergies, Secondary hyperparathyroidism of renal origin, and Thrombocytopenia, unspecified.    Last VS: BP (!) 189/91   Pulse (!) 119   Temp (!) 102.2 °F (39 °C) (Oral)   Resp (!) 29   Ht 5' 9" (1.753 m)   Wt 92 kg (202 lb 13.2 oz)   SpO2 (!) 91%   BMI 29.95 kg/m²     24H Vital Sign Range:  Temp:  [97.6 °F (36.4 °C)-102.2 °F (39 °C)]   Pulse:  []   Resp:  [16-37]   BP: ()/()   SpO2:  [91 %-100 %]   Arterial Line BP: (145-169)/(53-72)     Level of Consciousness (AVPU): responds to voice    Recent Labs     02/25/22  1258 02/26/22  0002   WBC 3.06* 4.09   HGB 8.9* 8.6*   HCT 27.6* 26.3*  26.3*   PLT 76* 58*       Recent Labs     02/25/22  1257 02/26/22  0003    138   K 5.0 4.8    99   CO2 27 22*   CREATININE 14.6* 16.0*   GLU 85 141*   PHOS 5.8* 4.9*          OXYGEN:  Flow (L/min): 6     O2 Device (Oxygen Therapy): room air    MEWS score: 4    bedside Usha FLOR contacted. No concerns verbalized at this time. Instructed to call [phone] for further concerns or assistance.    Delmy Taylor RN        "

## 2022-02-26 NOTE — ANESTHESIA PROCEDURE NOTES
Intubation    Date/Time: 2/25/2022 7:13 PM  Performed by: Kavitha Carter CRNA  Authorized by: Ten Philippe MD     Intubation:     Induction:  Intravenous    Intubated:  Postinduction    Mask Ventilation:  Easy with oral airway    Attempts:  1    Attempted By:  CRNA    Method of Intubation:  Video laryngoscopy    Blade:  Flores 3    Laryngeal View Grade: Grade IIA - cords partially seen      Difficult Airway Encountered?: No      Complications:  None    Airway Device:  Oral endotracheal tube    Airway Device Size:  7.5    Style/Cuff Inflation:  Cuffed (inflated to minimal occlusive pressure)    Tube secured:  22    Secured at:  The lips    Placement Verified By:  Capnometry    Complicating Factors:  None    Findings Post-Intubation:  BS equal bilateral and atraumatic/condition of teeth unchanged

## 2022-02-26 NOTE — NURSING
RN reported to OSMANI Mandujano pt's temperatures were vastly different with different thermometers, the third thermometer read 102.9F orally and 100.4 temporally. Pt denies feeling feverish, like he previous did. RN, also, report pt's BP was 170/77 on the bedside monitor. NP verbalized understanding. RN will continue to monitor.

## 2022-02-26 NOTE — ASSESSMENT & PLAN NOTE
S/p DBD kidney transplant 2/25  Cr 14.6 ---> 14.1 mg/dl   Good urine output   CIT - 7 hours 4 minutes, thymo induction

## 2022-02-26 NOTE — CONSULTS
[facility] - Transplant Stepdown  Kidney Transplant  Progress Note      Reason for Follow-up: Reassessment of Kidney Transplant - 2/25/2022  (#1) recipient and management of immunosuppression.      Subjective:   History of Present Illness:  21 y.o. year old Black male with ESRD secondary to HTN admitted for kidney transplant. He has been on the wait list for a kidney transplant at [facility] since 12/5/2015. Patient initially was started on HD in 12/2015 and switched to PD in 2017, but had multiple Peritonitis infections and changed to home HD on 6/24/2019. Patient is dialyzing 5x/week. Last session 2/23 into 2/24. Patient reports that she is tolerating dialysis well. He has a LUE AV fistula. Patient reports he is anuric. Pt with thrombocytopenia likely secondary to hepatomegaly and/or any underlying hepatic disease. He had appropriate evaluation of thrombocytopenia with Dr. Mathis (heme/onc).    Hospital Course:  S/p kidney transplant last night, thymo induction    Interval History:  Fevers overnight, started on antibiotics   S/p kidney transplant last night . Received platelets   2.4 liter urine output, drain output 90 ml   Having fevers with elevated blood pressures now   Saturating 91 % on room air . Congestive pattern on cxr   Platelets 76 ---> 58 now . Hold thymo today  Cr remains 14.6 ---> 14.1 now   No bowel movement at this time     Past Medical, Surgical, Family, and Social History:   Unchanged from H&P.    Scheduled Meds:   acetaminophen  1,000 mg Oral Q8H    acetaminophen  650 mg Oral Q24H    antithymocyte globulin (rabbit), hydrocortisone 20mg, with optional heparin 1000 units in NS 500ml (FOR PERIPHERAL LINE ADMINISTRATION ONLY)  1.5 mg/kg (Adjusted) Intravenous Daily    bisacodyL  10 mg Oral QHS    ceFAZolin (ANCEF) IVPB  2 g Intravenous Q8H    diphenhydrAMINE  25 mg Oral Q24H    docusate sodium  100 mg Oral TID    famotidine  20 mg Oral QHS    heparin (porcine)  5,000 Units Subcutaneous Q8H     [START ON 2/27/2022] methylPREDNISolone sodium succinate injection  125 mg Intravenous Once    methylPREDNISolone sodium succinate injection  250 mg Intravenous Once    multivitamin  1 tablet Oral Daily    mupirocin  1 g Nasal BID    mycophenolate  1,000 mg Oral BID    nystatin  500,000 Units Mouth/Throat QID (PC + HS)    [START ON 2/28/2022] predniSONE  20 mg Oral Daily    sulfamethoxazole-trimethoprim 400-80mg  1 tablet Oral Daily AM    tacrolimus  3 mg Oral BID    [START ON 3/7/2022] valGANciclovir  450 mg Oral Daily AM     Continuous Infusions:   sodium chloride 0.9% 200 mL/hr at 02/26/22 0700    dextrose 5 % and 0.45 % NaCl 50 mL/hr at 02/26/22 0300    glucagon (human recombinant)       PRN Meds:sodium chloride, dextrose 10%, diphenhydrAMINE, EPINEPHrine, glucagon (human recombinant), glucose, glucose, glucose, hydrocortisone sodium succinate, labetalol, ondansetron, oxyCODONE, traMADoL    Intake/Output - Last 3 Shifts         02/24 0700 02/25 0659 02/25 0700 02/26 0659 02/26 0700 02/27 0659    I.V. (mL/kg)  1683.3 (18.3)     Blood  300     IV Piggyback  2300     Total Intake(mL/kg)  4283.3 (46.6)     Urine (mL/kg/hr)  2400 250 (3.3)    Drains  90     Blood  150     Total Output  2640 250    Net  +1643.3 -250                    Review of Systems   Constitutional:  Positive for activity change and fever. Negative for appetite change and fatigue.   HENT:  Negative for facial swelling.    Respiratory:  Negative for shortness of breath and wheezing.    Cardiovascular:  Negative for chest pain, palpitations and leg swelling.   Gastrointestinal:  Positive for abdominal pain and constipation. Negative for abdominal distention.   Genitourinary:  Negative for dysuria.   Allergic/Immunologic: Positive for immunocompromised state.   Neurological:  Negative for light-headedness and headaches.   Psychiatric/Behavioral:  Negative for agitation and decreased concentration.     Objective:     Vital Signs (Most  "Recent):  Temp: (!) 101.2 °F (38.4 °C) (02/26/22 0700)  Pulse: (!) 117 (02/26/22 0654)  Resp: (!) 22 (02/26/22 0746)  BP: (!) 171/74 (02/26/22 0700)  SpO2: (!) 91 % (02/26/22 0630)   Vital Signs (24h Range):  Temp:  [97.6 °F (36.4 °C)-102.5 °F (39.2 °C)] 101.2 °F (38.4 °C)  Pulse:  [] 117  Resp:  [16-37] 22  SpO2:  [91 %-100 %] 91 %  BP: ()/() 171/74  Arterial Line BP: (145-169)/(53-72) 169/72     Weight: 92 kg (202 lb 13.2 oz)  Height: 5' 9" (175.3 cm)  Body mass index is 29.95 kg/m².    Physical Exam  Constitutional:       General: He is not in acute distress.     Appearance: He is not toxic-appearing.   Eyes:      Extraocular Movements: Extraocular movements intact.      Conjunctiva/sclera: Conjunctivae normal.   Cardiovascular:      Rate and Rhythm: Tachycardia present.   Pulmonary:      Effort: No respiratory distress.      Breath sounds: No wheezing.   Abdominal:      General: There is no distension.      Palpations: Abdomen is soft.      Comments: Soreness at the site of surgery  MARLI drain in place    Musculoskeletal:      Right lower leg: No edema.      Left lower leg: No edema.   Skin:     Coloration: Skin is not jaundiced.   Neurological:      Mental Status: He is oriented to person, place, and time. Mental status is at baseline.       Laboratory:  BMP:   Recent Labs   Lab 02/25/22  1257 02/26/22  0003 02/26/22  0637   GLU 85 141* 182*    138 137   K 5.0 4.8 4.5    99 102   CO2 27 22* 22*   BUN 58* 65* 55*   CREATININE 14.6* 16.0* 14.1*   CALCIUM 8.7 8.3* 7.9*       Diagnostic Results:  Reviewed     Assessment/Plan:     * ESRD on dialysis   - 21 y.o .w/ ESRD secondary to HTN admitted for kidney transplant   - At home was on home hemodialysis     Drug-induced fever  Can be related to thymo , will monitor   Discussed with transplant surgery   CXR showing congestion - add incentive spirometer   If continues to have fevers - may need  further septic work up     Status post " -donor kidney transplantation  S/p DBD kidney transplant   Cr 14.6 ---> 14.1 mg/dl . Repeat labs later today   Good urine output   CIT - 7 hours 4 minutes, thymo induction    Thrombocytopenia  Acute on chronic, s/p transfusion in OR   Will monitor with thymo now     Benign hypertension with ESRD (end-stage renal disease)  Uncontrolled at this time   Will add nifedipine   At home was on amlodipine and bystolic     Immunosuppression : ON prograf, MMF and steroids per protocol  On opportunistic infection prophylaxis per protocol   Check prograf levels to assess for toxicity as well     Discharge Planning:  Not ready       Krystle Lizarraga MD  Kidney Transplant  [facility] - Transplant Stepdown

## 2022-02-26 NOTE — NURSING TRANSFER
Nursing Transfer Note      2/26/2022     Reason patient is being transferred: recoverd    Transfer To: [id number]    Transfer via stretcher    Transfer with cardiac monitoring    Transported by PCT    Medicines sent: none    Any special needs or follow-up needed: none    Chart send with patient: Yes    Notified:     Patient reassessed at: 2/26/22

## 2022-02-26 NOTE — CONSULTS
[facility] - Transplant Stepdown  Endocrinology  Diabetes Consult Note    Consult Requested by: Jeffrey Broussard MD   Reason for admit: ESRD on dialysis    HISTORY OF PRESENT ILLNESS:  Reason for Consult: Management of Hyperglycemia     Surgical Procedure and Date: Kidney Transplant 22    HPI:   Patient is a 21 y.o. male with a diagnosis of ESRD secondary to HTN admitted for kidney transplant. He was started on HD in 2015 and switched to PD in , but had multiple peritonitis infections and changed to home HD on 2019. Patient is now s/p the above procedure(s). Endocrinology consulted for management of hyperglycemia.           Lab Results   Component Value Date    HGBA1C 4.3 2021           Interval HPI:   Overnight events: Admitted to hospitals s/p kidney txp. BG below goal ranges with no prn SQ insulin requirements. Received Solu Medrol 500 mg overnight. Diet Adult Regular (IDDSI Level 7)  Eatin%  Nausea: No  Hypoglycemia and intervention: No  Fever: No  TPN and/or TF: No  If yes, type of TF/TPN and rate: n/a    PMH, PSH, FH, SH reviewed     ROS:  Constitutional: Negative for weight changes.  Eyes: Negative for visual disturbance.  Respiratory: Negative for cough.   Cardiovascular: Negative for chest pain.  Gastrointestinal: Negative for nausea.  Endocrine: Negative for polyuria, polydipsia.  Musculoskeletal: Negative for back pain.  Skin: Negative for rash.  Neurological: Negative for syncope.  Psychiatric/Behavioral: Negative for depression.    Review of Systems    Current Medications and/or Treatments Impacting Glycemic Control  Immunotherapy:    Immunosuppressants           Stop Route Frequency     antithymocyte globulin (rabbit) 125 mg, hydrocortisone sodium succinate (SOLU-CORTEF) 20 mg in sodium chloride 0.9% 500 mL (FOR PERIPHERAL LINE ADMINISTRATION ONLY)          0859 IV Daily     mycophenolate capsule 1,000 mg         -- Oral 2 times daily     tacrolimus capsule 3 mg         -- Oral  2 times daily          Steroids:   Hormones (From admission, onward)                Start     Stop Route Frequency Ordered    02/28/22 0900  predniSONE tablet 20 mg  (IP TXP KIDNEY POST-OP THYMO WITH PERIPHERAL PRECHECKED)         -- Oral Daily 02/25/22 2350 02/28/22 0800  methylPREDNISolone sodium succinate injection 125 mg         -- IV Once 02/26/22 0928 02/27/22 0900  antithymocyte globulin (rabbit) 125 mg, hydrocortisone sodium succinate (SOLU-CORTEF) 20 mg in sodium chloride 0.9% 500 mL (FOR PERIPHERAL LINE ADMINISTRATION ONLY)  (IP TXP KIDNEY POST-OP THYMO WITH PERIPHERAL PRECHECKED)         03/01 0859 IV Daily 02/26/22 0928 02/27/22 0800  methylPREDNISolone sodium succinate injection 250 mg  (IP TXP KIDNEY POST-OP THYMO WITH PERIPHERAL PRECHECKED)         -- IV Once 02/26/22 0928 02/26/22 0800  methylPREDNISolone sodium succinate injection 250 mg  (IP TXP KIDNEY POST-OP THYMO WITH PERIPHERAL PRECHECKED)         -- IV Once 02/25/22 2350 02/26/22 0043  hydrocortisone sodium succinate injection 100 mg  (IP TXP KIDNEY POST-OP THYMO WITH PERIPHERAL PRECHECKED)         07/24 1643 IV Once as needed 02/25/22 2350          Pressors:    Autonomic Drugs (From admission, onward)                Start     Stop Route Frequency Ordered    02/26/22 0043  EPINEPHrine injection 1 mg  (IP TXP KIDNEY POST-OP THYMO WITH PERIPHERAL PRECHECKED)         07/24 1643 SubQ Once as needed 02/25/22 2350          Hyperglycemia/Diabetes Medications:   Antihyperglycemics (From admission, onward)                None             PHYSICAL EXAMINATION:  Vitals:    02/26/22 0911   BP:    Pulse:    Resp: 20   Temp:      Body mass index is 29.95 kg/m².    Physical Exam  Constitutional: Well developed, well nourished, NAD.  ENT: External ears no masses with nose patent; normal hearing.  Neck: Supple; trachea midline.  Cardiovascular: Normal heart sounds, no LE edema. DP +2 bilaterally.  Lungs: Normal effort; lungs anterior bilaterally  clear to auscultation.  Abdomen: Soft, no masses, no hernias.  MS: No clubbing or cyanosis of nails noted; unable to assess gait.  Skin: No rashes, lesions, or ulcers; no nodules.   Psychiatric: Good judgement and insight; normal mood and affect.  Neurological: Cranial nerves are grossly intact.   Foot: Nails in good condition, no amputations noted.        Labs Reviewed and Include   Recent Labs   Lab 02/25/22  1257 02/26/22  0003 02/26/22  0637   GLU 85   < > 182*   CALCIUM 8.7   < > 7.9*   ALBUMIN 4.0   < > 3.1*   PROT 7.4  --   --       < > 137   K 5.0   < > 4.5   CO2 27   < > 22*      < > 102   BUN 58*   < > 55*   CREATININE 14.6*   < > 14.1*   ALKPHOS 58  --   --    ALT 19  --   --    AST 11  --   --    BILITOT 0.8  --   --     < > = values in this interval not displayed.     Lab Results   Component Value Date    WBC 7.72 02/26/2022    HGB 8.0 (L) 02/26/2022    HCT 24.7 (L) 02/26/2022    MCV 93 02/26/2022    PLT 69 (L) 02/26/2022     No results for input(s): TSH, FREET4 in the last 168 hours.  Lab Results   Component Value Date    HGBA1C 4.3 12/20/2021       Nutritional status:   Body mass index is 29.95 kg/m².  Lab Results   Component Value Date    ALBUMIN 3.1 (L) 02/26/2022    ALBUMIN 3.5 02/26/2022    ALBUMIN 4.0 02/25/2022     No results found for: PREALBUMIN    Estimated Creatinine Clearance: 9.1 mL/min (A) (based on SCr of 14.1 mg/dL (H)).    Accu-Checks  Recent Labs     02/26/22  0913   POCTGLUCOSE 118*        ASSESSMENT and PLAN    * ESRD on dialysis   Lab Results   Component Value Date    CREATININE 14.1 (H) 02/26/2022     Avoid insulin stacking  Titrate insulin slowly      Steroid-induced hyperglycemia  BG goal 140-180    Start Low Dose Correction scale  BG monitoring ac/hs    ** Please call Endocrine for any BG related issues **      S/P kidney transplant  Managed per primary team  Optimize BG control      Prophylactic immunotherapy  May increase insulin resistance.             Plan  discussed with patient, family, and RN at bedside.     Gricelda Cox NP  Endocrinology  [facility] - Trans[facility] Step[facility]

## 2022-02-26 NOTE — HOSPITAL COURSE
Patient is now S/p DBD kidney transplant on 2/25/22, out of OR approx. 11:30 pm (Thymo induction, CIT 7 hr. 4 min., CPRA 54%, KDPI 4%, CMV D+,R-). Pt with history of hepatosplenomegaly with thrombocytopenia, given 1 unit platelets intra-op. Intra-op notable for A small superior pole artery was ligated to allow for shortening of the renal artery. The kidney was initially dusky after reperfusion, which improved with intra-arterial administration of verapamil in the iliac artery proximal to the anastomosis. At this point the kidney appeared very well perfused with good turgor. Doppler signals were assessed and excellent over the entire cortex. There was note of ~2x3cm area of decreased perfusion at the superior pole corresponding to the small superior pole artery that was ligated. Patient with 2 day Claudio and 1 MARLI drain. Patient kept on IVF with I's=O's POD#0. Advanced on pathway POD#1. Progressing well with good UOP. Creatinine trending down post-op. Maintenance IVF kept at 50 cc/hr POD#2 as patient with decreased intake d/t pain. Oral pain regimen increased with improvement in pain control. IVF stopped POD#3, patient now tolerating diet well with good intake. Claudio removed POD#3 AM, patient able to void without difficulty post-removal. Patient with Tmax 102.9 overnight POD#0, fevers felt to be due to Thymoglobulin. Thymo held POD#1 (2/26). Thymo resumed POD#2 and patient has been afebrile. Patient is ambulating without difficulty. He is passing gas and reports one small bowel movement on POD#3. He is on bowel regimen. MARLI drain removed POD#3. The patient is now stable for discharge. Discharge instructions and education have been discussed with the patient at bedside. All questions have been answered. PharmD has educated patient. Transplant coordinator has met with patient and discussed discharge planning. The patient will follow-up for outpatient labs tomorrow 3/2/22. He will follow-up for appointment in transplant  clinic tomorrow 3/2/22.

## 2022-02-26 NOTE — ASSESSMENT & PLAN NOTE
BG goal 140-180    Start Low Dose Correction scale  BG monitoring ac/hs    ** Please call Endocrine for any BG related issues **

## 2022-02-26 NOTE — HPI
Reason for Consult: Management of Hyperglycemia     Surgical Procedure and Date: Kidney Transplant 2/26/22    HPI:   Patient is a 21 y.o. male with a diagnosis of ESRD secondary to HTN admitted for kidney transplant. He was started on HD in 12/2015 and switched to PD in 2017, but had multiple peritonitis infections and changed to home HD on 6/24/2019. Patient is now s/p the above procedure(s). Endocrinology consulted for management of hyperglycemia.           Lab Results   Component Value Date    HGBA1C 4.3 12/20/2021

## 2022-02-26 NOTE — TRANSFER OF CARE
"Anesthesia Transfer of Care Note    Patient: Tameka Saenz    Procedure(s) Performed: Procedure(s) (LRB):  TRANSPLANT, KIDNEY (N/A)    Patient location: PACU    Anesthesia Type: general    Transport from OR: Transported from OR on 6-10 L/min O2 by face mask with adequate spontaneous ventilation    Post pain: adequate analgesia    Post assessment: no apparent anesthetic complications and tolerated procedure well    Post vital signs: stable    Level of consciousness: awake and alert    Nausea/Vomiting: no nausea/vomiting    Complications: none    Transfer of care protocol was followed      Last vitals:   Visit Vitals  BP (!) 171/86 (BP Location: Right arm, Patient Position: Lying)   Pulse 62   Temp 36.6 °C (97.9 °F) (Oral)   Resp 18   Ht 5' 9" (1.753 m)   Wt 86.2 kg (190 lb)   SpO2 100%   BMI 28.06 kg/m²     "

## 2022-02-26 NOTE — OP NOTE
Operative Report    Date of Procedure: 2/25/2022  Date of Transplant (UNOS): 2/25/22    Surgeons:  Surgeon(s) and Role:     * Frankie Snider MD - Primary  Alissa Crowley MD - Assisting Resident    First Assistant Attestation:  The indicated resident served as first assistant for this procedure.    Pre-operative Diagnosis: ESRD, requiring chronic dialysis secondary to Hypertensive Nephrosclerosis  Post-operative Diagnosis: Same    Procedure(s) Performed:   1. Back Table Preparation of Right Kidney    2. Donation after Brain Death Kidney transplant    Anesthesia: General endotracheal    Preamble  Indications and Patient Counseling: The patient is a 21 y.o. year-old male with end-stage kidney disease secondary to Hypertensive Nephrosclerosis who has been evaluated for a kidney transplant.  The procedure was thoroughly discussed with the patient, including potential risks, complications, and alternatives.  Specific complications mentioned included death, graft non-function, bleeding, infection, and rejection, as well as the possibility of other complications not specifically mentioned.    Donor Risk Factors: Prior to the operation, the patient was advised of any donor-specific risk factors requiring specific disclosure.  Factors in this case included nothing that required specific disclosure.      Specific PHS donor risk criteria for the organ donor include:  None    All questions were answered, the patient voiced appropriate understanding, and he agreed to proceed with the planned procedure.    ABO Confirmation: Immediately following arrival of the donor organ and prior to implantation, a formal ABO confirmation was done according to hospital and UNOS policies.  I confirmed the UNOS ID number ([id number]) of the donor organ and the donor and recipient ABO types, directly verifying these data by comparison with the UNOS Match Run report ([id number]).  This confirmation was personally done by an attending surgeon and circulating  nurse, and is officially documented elsewhere.    Time-Out: A complete time out was carried out prior to incision, with confirmation of patient identity, correct procedure, correct operative site, appropriate antibiotic prophylaxis, review of any known allergies, and presence of all needed equipment.    Procedure in Detail  Prior to starting the operation, the right kidney  was prepared on the back table. Arterial anatomy was single. Venous anatomy was single. Ureteral anatomy was single. Back table vascular reconstruction was not required .  Unneeded fat was removed from the kidney, the vessels were cleaned of adherent tissue and tested for leaks, and the kidney was maintained at ice temperature in organ preservation solution until it was brought to the operative field.     The patient was brought into the operating room and placed in a supine position on the OR table.  After the induction of general endotracheal anesthesia, lines were placed by the anesthesiologist.  The urinary bladder was catheterized and irrigated with antibiotic solution.  There was no tension on the axillae and all pressure points were padded.  Sequential compression boots were used as were Balbir Huggers.  The abdomen was prepped and draped in the usual sterile fashion.  In the setting of the patient's known thrombocytopenia, platelets were administered intraoperatively.  Skin was incised over the right with a knife and deepened with electrocautery.  The peritoneum and its contents were swept medially, exposing the right external iliac artery and the right external iliac vein.  The Bookwalter retractor was used to provide exposure.  Overlying lymphatics were ligated or cauterized and the vessels were dissected free for a length compatible with anastomosis.  The kidney was brought to the OR table at 2/25/2022  9:12 PM.  Venous control was obtained with a vascular clamp.  A venotomy was made, the vein irrigated, and an lengthened renal to right  external iliac vein anastomosis was created with 5-0 polypropylene.  Arterial control was obtained with a vascular clamp.  Arteriotomy was made, the artery irrigated, and an end renal to right external iliac artery anastomosis was created with 6-0 polypropylene. A small superior pole artery was ligated to allow for shortening of the renal artery.   The kidney was unclamped and reperfused at 2/25/2022  9:42 PM.  Reperfusion quality was fair.  The kidney was initially dusky, which improved with intra-arterial administration of verapamil in the iliac artery proximal to the anastomosis. At this point the kidney appeared very well perfused with good turgor. Doppler signals were assessed and excellent over the entire cortex. There was note of ~2x3cm area of decreased perfusion at the superior pole corresponding to the small superior pole artery that was ligated . Intraoperative urine production was observed, although it was a very small amount.  After hemostasis was obtained, a Lich uretero-neocystostomy was created.  The bladder was filled and identified, opened, and the anastomosis created using 6-0 PDS.  The bladder muscle was closed over the distal ureter to create an antireflux tunnel.  A ureteral stent was used.  With the kidney well perfused and sitting appropriately without tension on the anastomoses, viscera were replaced in their usual position. A prolonged period of time was required to obtain satisfactory hemostasis.   The wound was closed after a final check for hemostasis.  Overall, the graft quality was assessed to be good. At the end of the case the needle, sponge and instrument counts were all correct.  Sterile dressings were applied and the patient was brought to the recovery room/ICU in stable condition.    Estimated Blood Loss: 150 mL  Fluids Administered:    Drains: 19f Remy drains x 1  Specimens: none    Findings:    Organ Transplanted: Right Kidney    Arterial Anatomy: single  Number of Arteries:  1  Configuration of Multiple Arteries: not applicable  Venous Anatomy: single  Number of Veins: 1  Ureteral Anatomy: single  Number of Ureters: 1  Reperfusion Quality: fair  Overall Graft Quality: good  Intraoperative Urine Production: yes  Claudio: not to be removed before 2 days. The bladder was small and contracted with a capacity less than 100ml.   Ureteral Stent: Yes    Ischemic Times:   Anastomosis (warm ischemia) time: 30 minutes   Cold ischemia time: 424 minutes  Total ischemia time: 454 minutes    Donor Data:  UNOS ID:  [id number]  UNOS Match Run:  [id number]  Donor Type:  Donation after Brain Death  Donor CMV Status:  Positive  Donor HBcAB:  Negative  Donor HCV Status:  Negative

## 2022-02-26 NOTE — ASSESSMENT & PLAN NOTE
Can be related to thymo , will monitor   Discussed with transplant surgery   CXR showing congestion   If continues to have fevers - may need imaging and further septic work up

## 2022-02-26 NOTE — PLAN OF CARE
Pt is AAOX4, stand by assist, and VSS- TMax 102.9F oral, currently afebrile. Claudio in place- Clear yellow output. D5 1/2 NS infusing 50 ml/hr with I=Os- NS currently infusing 75 ml/hr. Cefazolin 2g IV administered.  Mg Sulfate 2g IV infused. MARLI carrillo to bulb suction- 65 ml sanguinous/ serosanguinous drainage. VISI and telemetry monitoring in place. Blood glucose monitoring performed and treated as ordered. The RN explained blue card to pt's significant other. Pt up in chair, call bell within reach, nonskid socks on, and RN encouraged pt to call for any assistance needed. RN rounded on pt throughout shift. Will continue to monitor.

## 2022-02-26 NOTE — ASSESSMENT & PLAN NOTE
- 20 y.o .w/ ESRD secondary to HTN admitted for kidney transplant   - At home was on home hemodialysis

## 2022-02-26 NOTE — CARE UPDATE
RAPID RESPONSE NURSE ROUND       Rounding completed with charge RN, Mya. No concerns verbalized at this time. Instructed to call [phone] for further concerns or assistance.

## 2022-02-27 LAB
ALBUMIN SERPL BCP-MCNC: 3.2 G/DL (ref 3.5–5.2)
ANION GAP SERPL CALC-SCNC: 13 MMOL/L (ref 8–16)
BASOPHILS # BLD AUTO: 0 K/UL (ref 0–0.2)
BASOPHILS NFR BLD: 0 % (ref 0–1.9)
BUN SERPL-MCNC: 57 MG/DL (ref 6–20)
CALCIUM SERPL-MCNC: 9 MG/DL (ref 8.7–10.5)
CHLORIDE SERPL-SCNC: 103 MMOL/L (ref 95–110)
CO2 SERPL-SCNC: 21 MMOL/L (ref 23–29)
CREAT SERPL-MCNC: 9.2 MG/DL (ref 0.5–1.4)
DIFFERENTIAL METHOD: ABNORMAL
EOSINOPHIL # BLD AUTO: 0 K/UL (ref 0–0.5)
EOSINOPHIL NFR BLD: 0.3 % (ref 0–8)
ERYTHROCYTE [DISTWIDTH] IN BLOOD BY AUTOMATED COUNT: 15.4 % (ref 11.5–14.5)
EST. GFR  (AFRICAN AMERICAN): 8.3 ML/MIN/1.73 M^2
EST. GFR  (NON AFRICAN AMERICAN): 7.2 ML/MIN/1.73 M^2
GLUCOSE SERPL-MCNC: 99 MG/DL (ref 70–110)
HCT VFR BLD AUTO: 24.8 % (ref 40–54)
HGB BLD-MCNC: 8.1 G/DL (ref 14–18)
IMM GRANULOCYTES # BLD AUTO: 0.02 K/UL (ref 0–0.04)
IMM GRANULOCYTES NFR BLD AUTO: 0.3 % (ref 0–0.5)
LYMPHOCYTES # BLD AUTO: 0 K/UL (ref 1–4.8)
LYMPHOCYTES NFR BLD: 0.4 % (ref 18–48)
MAGNESIUM SERPL-MCNC: 2.2 MG/DL (ref 1.6–2.6)
MCH RBC QN AUTO: 30.3 PG (ref 27–31)
MCHC RBC AUTO-ENTMCNC: 32.7 G/DL (ref 32–36)
MCV RBC AUTO: 93 FL (ref 82–98)
MONOCYTES # BLD AUTO: 0.2 K/UL (ref 0.3–1)
MONOCYTES NFR BLD: 2.7 % (ref 4–15)
NEUTROPHILS # BLD AUTO: 7.6 K/UL (ref 1.8–7.7)
NEUTROPHILS NFR BLD: 96.3 % (ref 38–73)
NRBC BLD-RTO: 0 /100 WBC
PHOSPHATE SERPL-MCNC: 4.5 MG/DL (ref 2.7–4.5)
PLATELET # BLD AUTO: 61 K/UL (ref 150–450)
PMV BLD AUTO: 12 FL (ref 9.2–12.9)
POCT GLUCOSE: 138 MG/DL (ref 70–110)
POCT GLUCOSE: 142 MG/DL (ref 70–110)
POCT GLUCOSE: 87 MG/DL (ref 70–110)
POCT GLUCOSE: 98 MG/DL (ref 70–110)
POTASSIUM SERPL-SCNC: 4.7 MMOL/L (ref 3.5–5.1)
RBC # BLD AUTO: 2.67 M/UL (ref 4.6–6.2)
SODIUM SERPL-SCNC: 137 MMOL/L (ref 136–145)
TACROLIMUS BLD-MCNC: 3.3 NG/ML (ref 5–15)
WBC # BLD AUTO: 7.85 K/UL (ref 3.9–12.7)

## 2022-02-27 PROCEDURE — 25000003 PHARM REV CODE 250: Performed by: NURSE PRACTITIONER

## 2022-02-27 PROCEDURE — 85025 COMPLETE CBC W/AUTO DIFF WBC: CPT | Performed by: TRANSPLANT SURGERY

## 2022-02-27 PROCEDURE — 25000003 PHARM REV CODE 250: Performed by: TRANSPLANT SURGERY

## 2022-02-27 PROCEDURE — 36415 COLL VENOUS BLD VENIPUNCTURE: CPT | Performed by: TRANSPLANT SURGERY

## 2022-02-27 PROCEDURE — 99233 PR SUBSEQUENT HOSPITAL CARE,LEVL III: ICD-10-PCS | Mod: GC,,, | Performed by: INTERNAL MEDICINE

## 2022-02-27 PROCEDURE — 83735 ASSAY OF MAGNESIUM: CPT | Performed by: TRANSPLANT SURGERY

## 2022-02-27 PROCEDURE — 63600175 PHARM REV CODE 636 W HCPCS: Performed by: TRANSPLANT SURGERY

## 2022-02-27 PROCEDURE — 80069 RENAL FUNCTION PANEL: CPT | Performed by: TRANSPLANT SURGERY

## 2022-02-27 PROCEDURE — 25000003 PHARM REV CODE 250: Performed by: STUDENT IN AN ORGANIZED HEALTH CARE EDUCATION/TRAINING PROGRAM

## 2022-02-27 PROCEDURE — 99233 SBSQ HOSP IP/OBS HIGH 50: CPT | Mod: GC,,, | Performed by: INTERNAL MEDICINE

## 2022-02-27 PROCEDURE — 80197 ASSAY OF TACROLIMUS: CPT | Performed by: TRANSPLANT SURGERY

## 2022-02-27 PROCEDURE — 20600001 HC STEP DOWN PRIVATE ROOM

## 2022-02-27 PROCEDURE — 63600175 PHARM REV CODE 636 W HCPCS: Performed by: NURSE PRACTITIONER

## 2022-02-27 PROCEDURE — 94761 N-INVAS EAR/PLS OXIMETRY MLT: CPT

## 2022-02-27 RX ORDER — NYSTATIN 100000 [USP'U]/ML
5 SUSPENSION ORAL 4 TIMES DAILY
Qty: 680 ML | Refills: 0 | Status: SHIPPED | OUTPATIENT
Start: 2022-02-25 | End: 2022-04-01

## 2022-02-27 RX ORDER — GABAPENTIN 300 MG/1
300 CAPSULE ORAL 3 TIMES DAILY
Status: DISCONTINUED | OUTPATIENT
Start: 2022-02-27 | End: 2022-03-01 | Stop reason: HOSPADM

## 2022-02-27 RX ORDER — FAMOTIDINE 20 MG/1
20 TABLET, FILM COATED ORAL NIGHTLY
Qty: 30 TABLET | Refills: 1 | Status: SHIPPED | OUTPATIENT
Start: 2022-02-28

## 2022-02-27 RX ORDER — PREDNISONE 20 MG/1
20 TABLET ORAL DAILY
Status: DISCONTINUED | OUTPATIENT
Start: 2022-03-01 | End: 2022-03-01 | Stop reason: HOSPADM

## 2022-02-27 RX ORDER — POLYETHYLENE GLYCOL 3350 17 G/17G
17 POWDER, FOR SOLUTION ORAL 2 TIMES DAILY PRN
Status: DISCONTINUED | OUTPATIENT
Start: 2022-02-27 | End: 2022-03-01 | Stop reason: HOSPADM

## 2022-02-27 RX ORDER — PREDNISONE 5 MG/1
TABLET ORAL
Qty: 100 TABLET | Refills: 11 | Status: SHIPPED | OUTPATIENT
Start: 2022-02-27 | End: 2022-02-28 | Stop reason: SDUPTHER

## 2022-02-27 RX ORDER — OXYCODONE HYDROCHLORIDE 10 MG/1
10 TABLET ORAL EVERY 6 HOURS PRN
Status: DISCONTINUED | OUTPATIENT
Start: 2022-02-27 | End: 2022-02-27

## 2022-02-27 RX ORDER — TACROLIMUS 1 MG/1
7 CAPSULE ORAL 2 TIMES DAILY
Status: DISCONTINUED | OUTPATIENT
Start: 2022-02-27 | End: 2022-03-01 | Stop reason: HOSPADM

## 2022-02-27 RX ORDER — METHYLPREDNISOLONE SOD SUCC 125 MG
VIAL (EA) INJECTION
Status: DISPENSED
Start: 2022-02-27 | End: 2022-02-27

## 2022-02-27 RX ORDER — HYDROMORPHONE HYDROCHLORIDE 1 MG/ML
0.5 INJECTION, SOLUTION INTRAMUSCULAR; INTRAVENOUS; SUBCUTANEOUS ONCE
Status: COMPLETED | OUTPATIENT
Start: 2022-02-27 | End: 2022-02-27

## 2022-02-27 RX ORDER — MYCOPHENOLATE MOFETIL 250 MG/1
1000 CAPSULE ORAL 2 TIMES DAILY
Qty: 240 CAPSULE | Refills: 11 | Status: SHIPPED | OUTPATIENT
Start: 2022-02-27 | End: 2022-03-29 | Stop reason: SDUPTHER

## 2022-02-27 RX ORDER — VALGANCICLOVIR 450 MG/1
900 TABLET, FILM COATED ORAL DAILY
Qty: 30 TABLET | Refills: 5 | Status: SHIPPED | OUTPATIENT
Start: 2022-02-25 | End: 2022-02-28 | Stop reason: SDUPTHER

## 2022-02-27 RX ORDER — TACROLIMUS 1 MG/1
6 CAPSULE ORAL EVERY 12 HOURS
Qty: 360 CAPSULE | Refills: 11 | Status: SHIPPED | OUTPATIENT
Start: 2022-02-27 | End: 2022-02-28 | Stop reason: SDUPTHER

## 2022-02-27 RX ORDER — SODIUM BICARBONATE 650 MG/1
1300 TABLET ORAL 2 TIMES DAILY
Qty: 120 TABLET | Refills: 11 | Status: SHIPPED | OUTPATIENT
Start: 2022-02-28 | End: 2022-02-28 | Stop reason: HOSPADM

## 2022-02-27 RX ORDER — SULFAMETHOXAZOLE AND TRIMETHOPRIM 400; 80 MG/1; MG/1
1 TABLET ORAL DAILY
Qty: 30 TABLET | Refills: 5 | Status: SHIPPED | OUTPATIENT
Start: 2022-02-25 | End: 2022-03-07

## 2022-02-27 RX ADMIN — MYCOPHENOLATE MOFETIL 1000 MG: 250 CAPSULE ORAL at 09:02

## 2022-02-27 RX ADMIN — ACETAMINOPHEN 1000 MG: 325 TABLET ORAL at 01:02

## 2022-02-27 RX ADMIN — SODIUM BICARBONATE 650 MG TABLET 1300 MG: at 09:02

## 2022-02-27 RX ADMIN — ACETAMINOPHEN 1000 MG: 325 TABLET ORAL at 08:02

## 2022-02-27 RX ADMIN — SULFAMETHOXAZOLE AND TRIMETHOPRIM 1 TABLET: 400; 80 TABLET ORAL at 09:02

## 2022-02-27 RX ADMIN — THERA TABS 1 TABLET: TAB at 09:02

## 2022-02-27 RX ADMIN — TACROLIMUS 5 MG: 1 CAPSULE ORAL at 09:02

## 2022-02-27 RX ADMIN — METHYLPREDNISOLONE SODIUM SUCCINATE 250 MG: 125 INJECTION, POWDER, FOR SOLUTION INTRAMUSCULAR; INTRAVENOUS at 12:02

## 2022-02-27 RX ADMIN — TRAMADOL HYDROCHLORIDE 50 MG: 50 TABLET, COATED ORAL at 11:02

## 2022-02-27 RX ADMIN — BISACODYL 10 MG: 5 TABLET, COATED ORAL at 08:02

## 2022-02-27 RX ADMIN — NYSTATIN 500000 UNITS: 500000 SUSPENSION ORAL at 05:02

## 2022-02-27 RX ADMIN — ANTI-THYMOCYTE GLOBULIN (RABBIT) 125 MG: 5 INJECTION, POWDER, LYOPHILIZED, FOR SOLUTION INTRAVENOUS at 01:02

## 2022-02-27 RX ADMIN — DIPHENHYDRAMINE HYDROCHLORIDE 25 MG: 25 CAPSULE ORAL at 01:02

## 2022-02-27 RX ADMIN — MYCOPHENOLATE MOFETIL 1000 MG: 250 CAPSULE ORAL at 08:02

## 2022-02-27 RX ADMIN — GABAPENTIN 300 MG: 300 CAPSULE ORAL at 08:02

## 2022-02-27 RX ADMIN — NIFEDIPINE 30 MG: 30 TABLET, FILM COATED, EXTENDED RELEASE ORAL at 09:02

## 2022-02-27 RX ADMIN — OXYCODONE HYDROCHLORIDE 10 MG: 10 TABLET ORAL at 07:02

## 2022-02-27 RX ADMIN — GABAPENTIN 300 MG: 300 CAPSULE ORAL at 02:02

## 2022-02-27 RX ADMIN — NYSTATIN 500000 UNITS: 500000 SUSPENSION ORAL at 01:02

## 2022-02-27 RX ADMIN — HEPARIN SODIUM 5000 UNITS: 5000 INJECTION INTRAVENOUS; SUBCUTANEOUS at 08:02

## 2022-02-27 RX ADMIN — NYSTATIN 500000 UNITS: 500000 SUSPENSION ORAL at 09:02

## 2022-02-27 RX ADMIN — DIBASIC SODIUM PHOSPHATE, MONOBASIC POTASSIUM PHOSPHATE AND MONOBASIC SODIUM PHOSPHATE 2 TABLET: 852; 155; 130 TABLET ORAL at 08:02

## 2022-02-27 RX ADMIN — TACROLIMUS 7 MG: 1 CAPSULE ORAL at 05:02

## 2022-02-27 RX ADMIN — FAMOTIDINE 20 MG: 20 TABLET, FILM COATED ORAL at 08:02

## 2022-02-27 RX ADMIN — HYDROMORPHONE HYDROCHLORIDE 0.5 MG: 1 INJECTION, SOLUTION INTRAMUSCULAR; INTRAVENOUS; SUBCUTANEOUS at 02:02

## 2022-02-27 RX ADMIN — OXYCODONE HYDROCHLORIDE 15 MG: 10 TABLET ORAL at 05:02

## 2022-02-27 RX ADMIN — NYSTATIN 500000 UNITS: 500000 SUSPENSION ORAL at 08:02

## 2022-02-27 RX ADMIN — DOCUSATE SODIUM 100 MG: 100 CAPSULE, LIQUID FILLED ORAL at 09:02

## 2022-02-27 RX ADMIN — SODIUM BICARBONATE 650 MG TABLET 1300 MG: at 08:02

## 2022-02-27 RX ADMIN — DIPHENHYDRAMINE HYDROCHLORIDE 50 MG: 50 CAPSULE ORAL at 03:02

## 2022-02-27 RX ADMIN — HEPARIN SODIUM 5000 UNITS: 5000 INJECTION INTRAVENOUS; SUBCUTANEOUS at 01:02

## 2022-02-27 RX ADMIN — NIFEDIPINE 30 MG: 30 TABLET, FILM COATED, EXTENDED RELEASE ORAL at 08:02

## 2022-02-27 RX ADMIN — HEPARIN SODIUM 5000 UNITS: 5000 INJECTION INTRAVENOUS; SUBCUTANEOUS at 05:02

## 2022-02-27 RX ADMIN — DIBASIC SODIUM PHOSPHATE, MONOBASIC POTASSIUM PHOSPHATE AND MONOBASIC SODIUM PHOSPHATE 2 TABLET: 852; 155; 130 TABLET ORAL at 09:02

## 2022-02-27 RX ADMIN — DOCUSATE SODIUM 100 MG: 100 CAPSULE, LIQUID FILLED ORAL at 08:02

## 2022-02-27 RX ADMIN — OXYCODONE 5 MG: 5 TABLET ORAL at 02:02

## 2022-02-27 RX ADMIN — OXYCODONE HYDROCHLORIDE 15 MG: 10 TABLET ORAL at 09:02

## 2022-02-27 RX ADMIN — OXYCODONE HYDROCHLORIDE 15 MG: 10 TABLET ORAL at 01:02

## 2022-02-27 RX ADMIN — ACETAMINOPHEN 1000 MG: 325 TABLET ORAL at 05:02

## 2022-02-27 RX ADMIN — LABETALOL HYDROCHLORIDE 10 MG: 5 INJECTION, SOLUTION INTRAVENOUS at 06:02

## 2022-02-27 RX ADMIN — DOCUSATE SODIUM 100 MG: 100 CAPSULE, LIQUID FILLED ORAL at 02:02

## 2022-02-27 RX ADMIN — MUPIROCIN 1 G: 20 OINTMENT TOPICAL at 08:02

## 2022-02-27 RX ADMIN — SODIUM CHLORIDE: 0.9 INJECTION, SOLUTION INTRAVENOUS at 11:02

## 2022-02-27 RX ADMIN — MUPIROCIN 1 G: 20 OINTMENT TOPICAL at 09:02

## 2022-02-27 NOTE — ASSESSMENT & PLAN NOTE
Likely related to thymo  Discussed with transplant surgery   CXR showing congestion - on room air currently   If continues to have fevers - will order further septic work up

## 2022-02-27 NOTE — ASSESSMENT & PLAN NOTE
S/p DBD kidney transplant 2/25  Cr 14.6 ---> 9.2  mg/dl   Good urine output   CIT - 7 hours 4 minutes, thymo induction  Overall good progress, jeff to be removed in am, has a MARLI drain as well

## 2022-02-27 NOTE — SUBJECTIVE & OBJECTIVE
Subjective:   History of Present Illness:  20 y.o. year old Black male with ESRD secondary to HTN admitted for kidney transplant. He has been on the wait list for a kidney transplant at [facility] since 12/5/2015. Patient initially was started on HD in 12/2015 and switched to PD in 2017, but had multiple Peritonitis infections and changed to home HD on 6/24/2019. Patient is dialyzing 5x/week. Last session 2/23 into 2/24. Patient reports that she is tolerating dialysis well. He has a LUE AV fistula. Patient reports he is anuric. Pt with thrombocytopenia likely secondary to hepatomegaly and/or any underlying hepatic disease. He had appropriate evaluation of thrombocytopenia with Dr. Mathis (heme/onc).      Hospital Course:  S/p kidney transplant last night, thymo induction  Received platelets with surgery as well  Fevers likely related to thymo     Interval History:  Comfortable in bed , denies any issues   Planned for 2nd thymo today   RLQ MARLI with 120 mL   Pain uncontrolled with scheduled tylenol and PRN oxycodone 5 mg  Upto 3 liter urine output   BP steady, saturating well on room air     Past Medical, Surgical, Family, and Social History:   Unchanged from H&P.    Scheduled Meds:   acetaminophen  1,000 mg Oral Q8H    acetaminophen  650 mg Oral Q24H    antithymocyte globulin (rabbit), hydrocortisone 20mg, with optional heparin 1000 units in NS 500ml (FOR PERIPHERAL LINE ADMINISTRATION ONLY)  1.5 mg/kg (Adjusted) Intravenous Daily    bisacodyL  10 mg Oral QHS    diphenhydrAMINE  25 mg Oral Q24H    docusate sodium  100 mg Oral TID    famotidine  20 mg Oral QHS    heparin (porcine)  5,000 Units Subcutaneous Q8H    k phos di & mono-sod phos mono  500 mg Oral BID    methylPREDNISolone sodium succinate        [START ON 2/28/2022] methylPREDNISolone sodium succinate injection  125 mg Intravenous Once    methylPREDNISolone sodium succinate injection  250 mg Intravenous Once    methylPREDNISolone sodium succinate injection  250  mg Intravenous Once    multivitamin  1 tablet Oral Daily    mupirocin  1 g Nasal BID    mycophenolate  1,000 mg Oral BID    NIFEdipine  30 mg Oral BID    nystatin  500,000 Units Mouth/Throat QID (PC + HS)    [START ON 2/28/2022] predniSONE  20 mg Oral Daily    sodium bicarbonate  1,300 mg Oral BID    sulfamethoxazole-trimethoprim 400-80mg  1 tablet Oral Daily AM    tacrolimus  5 mg Oral BID    [START ON 3/7/2022] valGANciclovir  450 mg Oral Daily AM     Continuous Infusions:   sodium chloride 0.9% 100 mL/hr at 02/26/22 1725    glucagon (human recombinant)       PRN Meds:sodium chloride, dextrose 10%, dextrose 10%, dextrose 10%, diphenhydrAMINE, EPINEPHrine, glucagon (human recombinant), glucagon (human recombinant), glucose, glucose, hydrocortisone sodium succinate, insulin aspart U-100, labetalol, ondansetron, oxyCODONE, traMADoL    Intake/Output - Last 3 Shifts         02/25 0700 02/26 0659 02/26 0700 02/27 0659 02/27 0700 02/28 0659    I.V. (mL/kg) 1683.3 (18.3) 2842.9 (30.9)     Blood 300      IV Piggyback 2300      Total Intake(mL/kg) 4283.3 (46.6) 2842.9 (30.9)     Urine (mL/kg/hr) 2400 3075 (1.4)     Drains 90 185     Blood 150      Total Output 2640 3260     Net +1643.3 -417.1                     Review of Systems   Constitutional:  Negative for activity change and appetite change.   HENT:  Negative for facial swelling.    Respiratory:  Negative for shortness of breath and wheezing.    Cardiovascular:  Negative for chest pain and palpitations.   Gastrointestinal:  Negative for abdominal distention, diarrhea and nausea.   Genitourinary:  Negative for decreased urine volume and dysuria.   Allergic/Immunologic: Positive for immunocompromised state.   Psychiatric/Behavioral:  Negative for agitation and confusion.     Objective:     Vital Signs (Most Recent):  Temp: 98.8 °F (37.1 °C) (02/27/22 0810)  Pulse: 88 (02/27/22 0810)  Resp: 19 (02/27/22 0810)  BP: (!) 143/73 (02/27/22 0810)  SpO2: 95 % (02/27/22  "0810)   Vital Signs (24h Range):  Temp:  [98.1 °F (36.7 °C)-99.8 °F (37.7 °C)] 98.8 °F (37.1 °C)  Pulse:  [] 88  Resp:  [15-24] 19  SpO2:  [91 %-99 %] 95 %  BP: (131-158)/(64-83) 143/73     Weight: 91.2 kg (201 lb 1 oz)  Height: 5' 9" (175.3 cm)  Body mass index is 29.69 kg/m².    Physical Exam  Constitutional:       General: He is not in acute distress.     Appearance: He is not toxic-appearing.   Eyes:      Conjunctiva/sclera: Conjunctivae normal.   Cardiovascular:      Rate and Rhythm: Normal rate and regular rhythm.   Pulmonary:      Effort: No respiratory distress.      Breath sounds: Normal breath sounds. No wheezing.   Abdominal:      General: There is no distension.      Palpations: Abdomen is soft.      Comments: Soreness around the surgical site   MARLI drain in place    Musculoskeletal:      Right lower leg: No edema.      Left lower leg: No edema.   Neurological:      Mental Status: He is oriented to person, place, and time. Mental status is at baseline.   Psychiatric:         Judgment: Judgment normal.       Laboratory:  BMP:   Recent Labs   Lab 02/26/22  0637 02/26/22  1325 02/27/22  0701   * 129* 99    137 137   K 4.5 5.0 4.7    100 103   CO2 22* 24 21*   BUN 55* 63* 57*   CREATININE 14.1* 13.1* 9.2*   CALCIUM 7.9* 8.8 9.0     "

## 2022-02-27 NOTE — PLAN OF CARE
Pt AAOx4, VSS on bedside monitor, afebrile (Tmax 99.5F). -160s/70-90s. NSR-NST on tele, HR 90-110s. Sats >92% on RA. Pt received kidney transplant on 2/25/22. Creatinine down to 9.2 this am. IVF decreased to 50cc/hr (NS). Pt receiving thymo today; reporting itching; OSMANI Ayala NP notified. Pt has RLQ incision with surgical dressing and dried drainage. RLQ MARLI drain putting out serosanguineous. Pt reporting 9-10/10 pain at incision; pain regimen adjusted; PRN oxy 15mg given; gabapentin added. Pt has Claudio to gravity, to be discontinued tomorrow (2/28) at 0600. Regular diet, pt reporting poor appetite; accuchecks AC/HS. Pt's significant other at bedside, attentive to pt. Self meds 100%. Pt is standby-assist, spend most of shift up in chair. Remains free from falls/injuries this shift. No BM this shift. Bed locked and in lowest position, nonskid socks on, call bell within reach, recliner wheels locked. Pt verbalized understanding to call for any needs/assistance. Hourly rounding completed throughout shift.

## 2022-02-27 NOTE — PLAN OF CARE
AAO x 4. VSS, afebrile, SpO2>92% on RA-IS encouraged. Telemetry monitoring SR-ST . NS @ 100. 2 day sx jeff with clear yellow  this shift. Hypoactive BS, no appetite. RLQ incision with staples and RLQ MARLI with 120 mL serosanguinous output. Pain uncontrolled with scheduled tylenol and PRN oxycodone 5 mg. Cristina NP notified-ordered one time dose IV dilaudid 0.5 mg and increased oxycodone to 10 mg. Self meds updated. Fall precautions maintained and pt repositions self. POC reviewed with pt and fiance at bedside. See flowsheet for assessment findings.

## 2022-02-27 NOTE — PLAN OF CARE
Recommendations    1. Continue regular diet as tolerated with texture per SLP.    - Add renal restrictions as warranted.     2. Add Boost Plus TID to aid in caloric intake.     3. RD following.     Goals: consume > 50% of meals by RD folow-up  Nutrition Goal Status: new

## 2022-02-27 NOTE — PROGRESS NOTES
Pt is approx. 2 hours in of thymo infusion. No fevers thus far, but pt reporting itching. PRN benadryl 50mg given; OSMANI Ayala NP notified. Infusion slowed down to 50cc/hr. Pt denies SOB, dyspnea, wheezing, or any other s/s of reaction. Pt reporting good pain relief from increase in oxy dose. Will monitor.

## 2022-02-27 NOTE — CARE UPDATE
BG goal 140-180  Diet Adult Regular (IDDSI Level 7)      Remains in TSU. POD 1 s/p kidney transplant. BG at or below goal ranges with no prn SQ insulin requirements. Creatinine 9.2. Receiving steroids per primary.     Plan:  -Continue Low Dose Correction Scale  -BG monitoring ac/hs    ** Please call Endocrine for any BG related issues **    Endocrine to continue to follow.      Lab Results   Component Value Date    HGBA1C 4.3 12/20/2021

## 2022-02-27 NOTE — PROGRESS NOTES
[facility] - [facility]plant Step[facility]  Kidney Transplant  Progress Note      Reason for Follow-up: Reassessment of Kidney Transplant - 2/25/2022  (#1) recipient and management of immunosuppression.      Subjective:   History of Present Illness:  20 y.o. year old Black male with ESRD secondary to HTN admitted for kidney transplant. He has been on the wait list for a kidney transplant at [facility] since 12/5/2015. Patient initially was started on HD in 12/2015 and switched to PD in 2017, but had multiple Peritonitis infections and changed to home HD on 6/24/2019. Patient is dialyzing 5x/week. Last session 2/23 into 2/24. Patient reports that she is tolerating dialysis well. He has a LUE AV fistula. Patient reports he is anuric. Pt with thrombocytopenia likely secondary to hepatomegaly and/or any underlying hepatic disease. He had appropriate evaluation of thrombocytopenia with Dr. Mathis (heme/onc).      Hospital Course:  S/p kidney transplant last night, thymo induction  Received platelets with surgery as well  Fevers likely related to thymo     Interval History:  Comfortable in bed , denies any issues   Planned for 2nd thymo today   RLQ MARLI with 120 mL   Pain uncontrolled with scheduled tylenol and PRN oxycodone 5 mg  Upto 3 liter urine output   BP steady, saturating well on room air  Hasnt had a bowel movement yet     Past Medical, Surgical, Family, and Social History:   Unchanged from H&P.    Scheduled Meds:   acetaminophen  1,000 mg Oral Q8H    acetaminophen  650 mg Oral Q24H    antithymocyte globulin (rabbit), hydrocortisone 20mg, with optional heparin 1000 units in NS 500ml (FOR PERIPHERAL LINE ADMINISTRATION ONLY)  1.5 mg/kg (Adjusted) Intravenous Daily    bisacodyL  10 mg Oral QHS    diphenhydrAMINE  25 mg Oral Q24H    docusate sodium  100 mg Oral TID    famotidine  20 mg Oral QHS    heparin (porcine)  5,000 Units Subcutaneous Q8H    k phos di & mono-sod phos mono  500 mg Oral BID    methylPREDNISolone sodium  succinate        [START ON 2/28/2022] methylPREDNISolone sodium succinate injection  125 mg Intravenous Once    methylPREDNISolone sodium succinate injection  250 mg Intravenous Once    methylPREDNISolone sodium succinate injection  250 mg Intravenous Once    multivitamin  1 tablet Oral Daily    mupirocin  1 g Nasal BID    mycophenolate  1,000 mg Oral BID    NIFEdipine  30 mg Oral BID    nystatin  500,000 Units Mouth/Throat QID (PC + HS)    [START ON 2/28/2022] predniSONE  20 mg Oral Daily    sodium bicarbonate  1,300 mg Oral BID    sulfamethoxazole-trimethoprim 400-80mg  1 tablet Oral Daily AM    tacrolimus  5 mg Oral BID    [START ON 3/7/2022] valGANciclovir  450 mg Oral Daily AM     Continuous Infusions:   sodium chloride 0.9% 100 mL/hr at 02/26/22 1725    glucagon (human recombinant)       PRN Meds:sodium chloride, dextrose 10%, dextrose 10%, dextrose 10%, diphenhydrAMINE, EPINEPHrine, glucagon (human recombinant), glucagon (human recombinant), glucose, glucose, hydrocortisone sodium succinate, insulin aspart U-100, labetalol, ondansetron, oxyCODONE, traMADoL    Intake/Output - Last 3 Shifts         02/25 0700  02/26 0659 02/26 0700 02/27 0659 02/27 0700  02/28 0659    I.V. (mL/kg) 1683.3 (18.3) 2842.9 (30.9)     Blood 300      IV Piggyback 2300      Total Intake(mL/kg) 4283.3 (46.6) 2842.9 (30.9)     Urine (mL/kg/hr) 2400 3075 (1.4)     Drains 90 185     Blood 150      Total Output 2640 3260     Net +1643.3 -417.1                     Review of Systems   Constitutional:  Negative for activity change and appetite change.   HENT:  Negative for facial swelling.    Respiratory:  Negative for shortness of breath and wheezing.    Cardiovascular:  Negative for chest pain and palpitations.   Gastrointestinal:  Negative for abdominal distention, diarrhea and nausea.   Genitourinary:  Negative for decreased urine volume and dysuria.   Allergic/Immunologic: Positive for immunocompromised state.  "  Psychiatric/Behavioral:  Negative for agitation and confusion.     Objective:     Vital Signs (Most Recent):  Temp: 98.8 °F (37.1 °C) (02/27/22 0810)  Pulse: 88 (02/27/22 0810)  Resp: 19 (02/27/22 0810)  BP: (!) 143/73 (02/27/22 0810)  SpO2: 95 % (02/27/22 0810)   Vital Signs (24h Range):  Temp:  [98.1 °F (36.7 °C)-99.8 °F (37.7 °C)] 98.8 °F (37.1 °C)  Pulse:  [] 88  Resp:  [15-24] 19  SpO2:  [91 %-99 %] 95 %  BP: (131-158)/(64-83) 143/73     Weight: 91.2 kg (201 lb 1 oz)  Height: 5' 9" (175.3 cm)  Body mass index is 29.69 kg/m².    Physical Exam  Constitutional:       General: He is not in acute distress.     Appearance: He is not toxic-appearing.   Eyes:      Conjunctiva/sclera: Conjunctivae normal.   Cardiovascular:      Rate and Rhythm: Normal rate and regular rhythm.   Pulmonary:      Effort: No respiratory distress.      Breath sounds: Normal breath sounds. No wheezing.   Abdominal:      General: There is no distension.      Palpations: Abdomen is soft.      Comments: Soreness around the surgical site   MARLI drain in place    Musculoskeletal:      Right lower leg: No edema.      Left lower leg: No edema.   Neurological:      Mental Status: He is oriented to person, place, and time. Mental status is at baseline.   Psychiatric:         Judgment: Judgment normal.       Laboratory:  BMP:   Recent Labs   Lab 02/26/22  0637 02/26/22  1325 02/27/22  0701   * 129* 99    137 137   K 4.5 5.0 4.7    100 103   CO2 22* 24 21*   BUN 55* 63* 57*   CREATININE 14.1* 13.1* 9.2*   CALCIUM 7.9* 8.8 9.0     Assessment/Plan:     * ESRD on dialysis   - 20 y.o .w/ ESRD secondary to HTN admitted for kidney transplant   - At home was on home hemodialysis     Steroid-induced hyperglycemia  Endocrine consulted.     Drug-induced fever  Likely related to thymo  Discussed with transplant surgery   CXR showing congestion - on room air currently   If continues to have fevers - will order further septic work up     S/P " kidney transplant  S/p DBD kidney transplant 2/25  Cr 14.6 ---> 9.2  mg/dl   Good urine output   CIT - 7 hours 4 minutes, thymo induction  Overall good progress, jeff to be removed in am, has a MARLI drain as well       At risk for opportunistic infections  meds to be started per protocol     Immunosuppression due to drug therapy  On prograf , MMF and steroids per protocol   Monitor prograf levels to assess for toxicities     Thrombocytopenia  Acute on chronic, s/p transfusion in OR   Will monitor with thymo now     Benign hypertension with ESRD (end-stage renal disease)  Currently doing well with nifedipine   At home was on amlodipine and bystolic       Krystle Lizarraga MD  Kidney Transplant  [facility] - Transplant Stepdown

## 2022-02-27 NOTE — CONSULTS
"[facility] - Transplant Stepdown  Adult Nutrition  Consult Note    SUMMARY     Recommendations    1. Continue regular diet as tolerated with texture per SLP.    - Add renal restrictions as warranted.     2. Add Boost Plus TID to aid in caloric intake.     3. RD following.     Goals: consume > 50% of meals by RD folow-up  Nutrition Goal Status: new  Communication of RD Recs:  (POC)    Assessment and Plan    Nutrition Problem  Increased Nutrient Needs    Related to (etiology):   Physiological demands, healing     Signs and Symptoms (as evidenced by):   S/p kidney transplant 2/26    Interventions (treatment strategy):  Collaboration of nutrition care with other providers     Nutrition Diagnosis Status:   New     Reason for Assessment    Reason For Assessment: consult  Diagnosis: transplant/postoperative complications (s/p kindey transplant 2/26)  Relevant Medical History: ESRD on dailysis 2/2 HTN  Interdisciplinary Rounds: did not attend  General Information Comments: POD1 kidney transplant. Pt sitting up in chair with RN providing care at bedside. Pt with poor po intake at this time. Family member at bedside reports pt with normal appetite and stable wt PTA. -187 lbs. Pt appears nourished.   Nutrition Discharge Planning: Pt very tired, briefly dicussed post transplant diet education with family member at bedside on 2/27/22. Encouraged to ask questions at follow-up tomorrow. Family member verbalized understanding.     Nutrition Risk Screen    Nutrition Risk Screen: no indicators present    Nutrition/Diet History    Spiritual, Cultural Beliefs, Yarsani Practices, Values that Affect Care: no  Factors Affecting Nutritional Intake: decreased appetite    Anthropometrics    Temp: 98.8 °F (37.1 °C)  Height Method: Stated  Height: 5' 9" (175.3 cm)  Height (inches): 69 in  Weight Method: Standard Scale  Weight: 91.2 kg (201 lb 1 oz)  Weight (lb): 201.06 lb  Ideal Body Weight (IBW), Male: 160 lb  % Ideal Body Weight, " Male (lb): 118.75 %  BMI (Calculated): 29.7  BMI Grade: 25 - 29.9 - overweight     Lab/Procedures/Meds    Pertinent Labs Reviewed: reviewed  Pertinent Labs Comments: BUN 57, Cr 9.2, GFR 8.3   Pertinent Medications Reviewed: reviewed  Pertinent Medications Comments: acetaminophen, docusate, famotidine, heparin, MVI, tacrolimus, valganciclovir, IVF     Estimated/Assessed Needs    Weight Used For Calorie Calculations: 91.2 kg (201 lb 1 oz)  Energy Calorie Requirements (kcal): 2276 kcal/day  Energy Need Method: Bartholomew-St Jeor (x 1.2)  Protein Requirements:  gm/day (1.0-1.2 gm/kg)  Weight Used For Protein Calculations: 91.2 kg (201 lb 1 oz)  Fluid Requirements (mL): 1 mL/kcal or per MD     RDA Method (mL): 2276     Nutrition Prescription Ordered    Current Diet Order: Regular    Evaluation of Received Nutrient/Fluid Intake    I/O: +1.226L since admit   Comments: LBM 2/24  Tolerance:  (will monitor)  % Intake of Estimated Energy Needs: 0 - 25 %  % Meal Intake: 0 - 25 %    Nutrition Risk    Level of Risk/Frequency of Follow-up:  (f/u 1 x wk)       Monitor and Evaluation    Food and Nutrient Intake: energy intake, food and beverage intake  Food and Nutrient Adminstration: diet order  Physical Activity and Function: nutrition-related ADLs and IADLs  Anthropometric Measurements: weight, weight change, body mass index  Biochemical Data, Medical Tests and Procedures: electrolyte and renal panel, gastrointestinal profile, glucose/endocrine profile, inflammatory profile, lipid profile  Nutrition-Focused Physical Findings: overall appearance       Nutrition Follow-Up    RD Follow-up?: Yes

## 2022-02-28 DIAGNOSIS — Z94.0 S/P KIDNEY TRANSPLANT: ICD-10-CM

## 2022-02-28 DIAGNOSIS — Z29.89 PROPHYLACTIC IMMUNOTHERAPY: Primary | ICD-10-CM

## 2022-02-28 PROBLEM — D63.8 ANEMIA OF CHRONIC DISEASE: Status: ACTIVE | Noted: 2022-02-28

## 2022-02-28 PROBLEM — R50.2 DRUG-INDUCED FEVER: Status: RESOLVED | Noted: 2022-02-26 | Resolved: 2022-02-28

## 2022-02-28 PROBLEM — D62 ACUTE BLOOD LOSS ANEMIA: Status: ACTIVE | Noted: 2022-02-28

## 2022-02-28 LAB
ALBUMIN SERPL BCP-MCNC: 3.3 G/DL (ref 3.5–5.2)
ANION GAP SERPL CALC-SCNC: 9 MMOL/L (ref 8–16)
BASOPHILS # BLD AUTO: 0 K/UL (ref 0–0.2)
BASOPHILS NFR BLD: 0 % (ref 0–1.9)
BUN SERPL-MCNC: 50 MG/DL (ref 6–20)
CALCIUM SERPL-MCNC: 9.5 MG/DL (ref 8.7–10.5)
CHLORIDE SERPL-SCNC: 104 MMOL/L (ref 95–110)
CO2 SERPL-SCNC: 24 MMOL/L (ref 23–29)
CREAT SERPL-MCNC: 6.1 MG/DL (ref 0.5–1.4)
DIFFERENTIAL METHOD: ABNORMAL
EOSINOPHIL # BLD AUTO: 0 K/UL (ref 0–0.5)
EOSINOPHIL NFR BLD: 0 % (ref 0–8)
ERYTHROCYTE [DISTWIDTH] IN BLOOD BY AUTOMATED COUNT: 15.6 % (ref 11.5–14.5)
EST. GFR  (AFRICAN AMERICAN): 13.7 ML/MIN/1.73 M^2
EST. GFR  (NON AFRICAN AMERICAN): 11.9 ML/MIN/1.73 M^2
GLUCOSE SERPL-MCNC: 100 MG/DL (ref 70–110)
GLUCOSE SERPL-MCNC: 101 MG/DL (ref 70–110)
GLUCOSE SERPL-MCNC: 117 MG/DL (ref 70–110)
HBV CORE AB SERPL QL IA: NEGATIVE
HBV CORE IGM SERPL QL IA: NEGATIVE
HBV SURFACE AB SER QL IA: POSITIVE
HBV SURFACE AB SERPL IA-ACNC: 20 MIU/ML
HBV SURFACE AG SERPL QL IA: NEGATIVE
HCO3 UR-SCNC: 25.2 MMOL/L (ref 24–28)
HCO3 UR-SCNC: 26.9 MMOL/L (ref 24–28)
HCT VFR BLD AUTO: 25.6 % (ref 40–54)
HCT VFR BLD CALC: 24 %PCV (ref 36–54)
HCT VFR BLD CALC: 25 %PCV (ref 36–54)
HCV AB SERPL QL IA: NEGATIVE
HEPATITIS C VIRUS (HCV) RNA DETECTION/QUANTIFICATION RT-PCR: NORMAL IU/ML
HGB BLD-MCNC: 8.2 G/DL (ref 14–18)
HIV 1+2 AB+HIV1 P24 AG SERPL QL IA: NEGATIVE
IMM GRANULOCYTES # BLD AUTO: 0.02 K/UL (ref 0–0.04)
IMM GRANULOCYTES NFR BLD AUTO: 0.4 % (ref 0–0.5)
LYMPHOCYTES # BLD AUTO: 0 K/UL (ref 1–4.8)
LYMPHOCYTES NFR BLD: 0.6 % (ref 18–48)
MAGNESIUM SERPL-MCNC: 1.8 MG/DL (ref 1.6–2.6)
MCH RBC QN AUTO: 30.1 PG (ref 27–31)
MCHC RBC AUTO-ENTMCNC: 32 G/DL (ref 32–36)
MCV RBC AUTO: 94 FL (ref 82–98)
MONOCYTES # BLD AUTO: 0.1 K/UL (ref 0.3–1)
MONOCYTES NFR BLD: 2.6 % (ref 4–15)
NEUTROPHILS # BLD AUTO: 5.2 K/UL (ref 1.8–7.7)
NEUTROPHILS NFR BLD: 96.4 % (ref 38–73)
NRBC BLD-RTO: 0 /100 WBC
PCO2 BLDA: 43.8 MMHG (ref 35–45)
PCO2 BLDA: 44.5 MMHG (ref 35–45)
PH SMN: 7.36 [PH] (ref 7.35–7.45)
PH SMN: 7.39 [PH] (ref 7.35–7.45)
PHOSPHATE SERPL-MCNC: 3.9 MG/DL (ref 2.7–4.5)
PLATELET # BLD AUTO: 70 K/UL (ref 150–450)
PLATELET BLD QL SMEAR: ABNORMAL
PMV BLD AUTO: 12.3 FL (ref 9.2–12.9)
PO2 BLDA: 134 MMHG (ref 80–100)
PO2 BLDA: 198 MMHG (ref 80–100)
POC BE: 0 MMOL/L
POC BE: 2 MMOL/L
POC IONIZED CALCIUM: 1.03 MMOL/L (ref 1.06–1.42)
POC IONIZED CALCIUM: 1.05 MMOL/L (ref 1.06–1.42)
POC SATURATED O2: 100 % (ref 95–100)
POC SATURATED O2: 99 % (ref 95–100)
POC TCO2: 27 MMOL/L (ref 23–27)
POC TCO2: 28 MMOL/L (ref 23–27)
POCT GLUCOSE: 84 MG/DL (ref 70–110)
POTASSIUM BLD-SCNC: 4.5 MMOL/L (ref 3.5–5.1)
POTASSIUM BLD-SCNC: 4.9 MMOL/L (ref 3.5–5.1)
POTASSIUM SERPL-SCNC: 5 MMOL/L (ref 3.5–5.1)
RBC # BLD AUTO: 2.72 M/UL (ref 4.6–6.2)
SAMPLE: ABNORMAL
SAMPLE: ABNORMAL
SODIUM BLD-SCNC: 138 MMOL/L (ref 136–145)
SODIUM BLD-SCNC: 139 MMOL/L (ref 136–145)
SODIUM SERPL-SCNC: 137 MMOL/L (ref 136–145)
TACROLIMUS BLD-MCNC: 6.4 NG/ML (ref 5–15)
WBC # BLD AUTO: 5.35 K/UL (ref 3.9–12.7)

## 2022-02-28 PROCEDURE — 25000003 PHARM REV CODE 250: Performed by: CLINICAL NURSE SPECIALIST

## 2022-02-28 PROCEDURE — 63600175 PHARM REV CODE 636 W HCPCS: Performed by: NURSE PRACTITIONER

## 2022-02-28 PROCEDURE — 63600175 PHARM REV CODE 636 W HCPCS: Performed by: CLINICAL NURSE SPECIALIST

## 2022-02-28 PROCEDURE — 99233 PR SUBSEQUENT HOSPITAL CARE,LEVL III: ICD-10-PCS | Mod: S$PBB,,, | Performed by: CLINICAL NURSE SPECIALIST

## 2022-02-28 PROCEDURE — 99900035 HC TECH TIME PER 15 MIN (STAT)

## 2022-02-28 PROCEDURE — 83735 ASSAY OF MAGNESIUM: CPT | Performed by: TRANSPLANT SURGERY

## 2022-02-28 PROCEDURE — 25000003 PHARM REV CODE 250: Performed by: NURSE PRACTITIONER

## 2022-02-28 PROCEDURE — 94761 N-INVAS EAR/PLS OXIMETRY MLT: CPT

## 2022-02-28 PROCEDURE — 25000003 PHARM REV CODE 250: Performed by: TRANSPLANT SURGERY

## 2022-02-28 PROCEDURE — 80069 RENAL FUNCTION PANEL: CPT | Performed by: TRANSPLANT SURGERY

## 2022-02-28 PROCEDURE — 63600175 PHARM REV CODE 636 W HCPCS: Performed by: TRANSPLANT SURGERY

## 2022-02-28 PROCEDURE — 85025 COMPLETE CBC W/AUTO DIFF WBC: CPT | Performed by: TRANSPLANT SURGERY

## 2022-02-28 PROCEDURE — 25000003 PHARM REV CODE 250: Performed by: STUDENT IN AN ORGANIZED HEALTH CARE EDUCATION/TRAINING PROGRAM

## 2022-02-28 PROCEDURE — 99233 SBSQ HOSP IP/OBS HIGH 50: CPT | Mod: S$PBB,,, | Performed by: CLINICAL NURSE SPECIALIST

## 2022-02-28 PROCEDURE — 36415 COLL VENOUS BLD VENIPUNCTURE: CPT | Performed by: TRANSPLANT SURGERY

## 2022-02-28 PROCEDURE — 80197 ASSAY OF TACROLIMUS: CPT | Performed by: TRANSPLANT SURGERY

## 2022-02-28 PROCEDURE — 20600001 HC STEP DOWN PRIVATE ROOM

## 2022-02-28 RX ORDER — CINACALCET 30 MG/1
30 TABLET, FILM COATED ORAL NIGHTLY
Qty: 30 TABLET | Refills: 11 | Status: SHIPPED | OUTPATIENT
Start: 2022-02-28 | End: 2022-03-29 | Stop reason: SDUPTHER

## 2022-02-28 RX ORDER — GABAPENTIN 300 MG/1
300 CAPSULE ORAL 2 TIMES DAILY
Qty: 60 CAPSULE | Refills: 2 | Status: SHIPPED | OUTPATIENT
Start: 2022-02-28 | End: 2022-03-29 | Stop reason: SDUPTHER

## 2022-02-28 RX ORDER — ACETAMINOPHEN 325 MG/1
650 TABLET ORAL ONCE
Status: COMPLETED | OUTPATIENT
Start: 2022-02-28 | End: 2022-02-28

## 2022-02-28 RX ORDER — METOPROLOL TARTRATE 25 MG/1
25 TABLET, FILM COATED ORAL 2 TIMES DAILY
Status: DISCONTINUED | OUTPATIENT
Start: 2022-02-28 | End: 2022-03-01 | Stop reason: HOSPADM

## 2022-02-28 RX ORDER — TACROLIMUS 1 MG/1
7 CAPSULE ORAL EVERY 12 HOURS
Qty: 420 CAPSULE | Refills: 11 | Status: SHIPPED | OUTPATIENT
Start: 2022-02-28 | End: 2022-03-02

## 2022-02-28 RX ORDER — DOCUSATE SODIUM 100 MG/1
100 CAPSULE, LIQUID FILLED ORAL 3 TIMES DAILY PRN
Refills: 0 | COMMUNITY
Start: 2022-02-28 | End: 2022-03-16

## 2022-02-28 RX ORDER — CINACALCET 30 MG/1
30 TABLET, FILM COATED ORAL
Status: CANCELLED | OUTPATIENT
Start: 2022-02-28

## 2022-02-28 RX ORDER — OXYCODONE HYDROCHLORIDE 10 MG/1
10 TABLET ORAL EVERY 4 HOURS PRN
Status: DISCONTINUED | OUTPATIENT
Start: 2022-02-28 | End: 2022-03-01 | Stop reason: HOSPADM

## 2022-02-28 RX ORDER — VALGANCICLOVIR 450 MG/1
450 TABLET, FILM COATED ORAL DAILY
Qty: 30 TABLET | Refills: 5 | Status: SHIPPED | OUTPATIENT
Start: 2022-02-28 | End: 2022-03-29 | Stop reason: SDUPTHER

## 2022-02-28 RX ORDER — DIPHENHYDRAMINE HCL 25 MG
25 CAPSULE ORAL ONCE
Status: COMPLETED | OUTPATIENT
Start: 2022-02-28 | End: 2022-02-28

## 2022-02-28 RX ORDER — OXYCODONE HYDROCHLORIDE 10 MG/1
10 TABLET ORAL EVERY 4 HOURS PRN
Qty: 28 TABLET | Refills: 0 | Status: SHIPPED | OUTPATIENT
Start: 2022-02-28 | End: 2022-03-16

## 2022-02-28 RX ORDER — PREDNISONE 5 MG/1
TABLET ORAL
Qty: 120 TABLET | Refills: 11 | Status: SHIPPED | OUTPATIENT
Start: 2022-02-28 | End: 2022-03-29 | Stop reason: SDUPTHER

## 2022-02-28 RX ORDER — CINACALCET 30 MG/1
30 TABLET, FILM COATED ORAL
Status: DISCONTINUED | OUTPATIENT
Start: 2022-02-28 | End: 2022-03-01 | Stop reason: HOSPADM

## 2022-02-28 RX ORDER — NEBIVOLOL 5 MG/1
5 TABLET ORAL DAILY
Qty: 30 TABLET | Refills: 11 | Status: SHIPPED | OUTPATIENT
Start: 2022-02-28 | End: 2022-03-29 | Stop reason: SDUPTHER

## 2022-02-28 RX ORDER — NIFEDIPINE 30 MG/1
30 TABLET, EXTENDED RELEASE ORAL 2 TIMES DAILY
Qty: 60 TABLET | Refills: 11 | Status: SHIPPED | OUTPATIENT
Start: 2022-02-28 | End: 2022-03-29 | Stop reason: SDUPTHER

## 2022-02-28 RX ORDER — SODIUM BICARBONATE 650 MG/1
650 TABLET ORAL 2 TIMES DAILY
Status: DISCONTINUED | OUTPATIENT
Start: 2022-02-28 | End: 2022-03-01 | Stop reason: HOSPADM

## 2022-02-28 RX ADMIN — DIBASIC SODIUM PHOSPHATE, MONOBASIC POTASSIUM PHOSPHATE AND MONOBASIC SODIUM PHOSPHATE 2 TABLET: 852; 155; 130 TABLET ORAL at 08:02

## 2022-02-28 RX ADMIN — DOCUSATE SODIUM 100 MG: 100 CAPSULE, LIQUID FILLED ORAL at 08:02

## 2022-02-28 RX ADMIN — NYSTATIN 500000 UNITS: 500000 SUSPENSION ORAL at 08:02

## 2022-02-28 RX ADMIN — HEPARIN SODIUM 5000 UNITS: 5000 INJECTION INTRAVENOUS; SUBCUTANEOUS at 05:02

## 2022-02-28 RX ADMIN — GABAPENTIN 300 MG: 300 CAPSULE ORAL at 02:02

## 2022-02-28 RX ADMIN — NYSTATIN 500000 UNITS: 500000 SUSPENSION ORAL at 02:02

## 2022-02-28 RX ADMIN — METOPROLOL TARTRATE 25 MG: 25 TABLET, FILM COATED ORAL at 09:02

## 2022-02-28 RX ADMIN — HEPARIN SODIUM 5000 UNITS: 5000 INJECTION INTRAVENOUS; SUBCUTANEOUS at 09:02

## 2022-02-28 RX ADMIN — TACROLIMUS 7 MG: 1 CAPSULE ORAL at 08:02

## 2022-02-28 RX ADMIN — CINACALCET 30 MG: 30 TABLET, FILM COATED ORAL at 10:02

## 2022-02-28 RX ADMIN — TACROLIMUS 7 MG: 1 CAPSULE ORAL at 05:02

## 2022-02-28 RX ADMIN — NYSTATIN 500000 UNITS: 500000 SUSPENSION ORAL at 09:02

## 2022-02-28 RX ADMIN — DOCUSATE SODIUM 100 MG: 100 CAPSULE, LIQUID FILLED ORAL at 02:02

## 2022-02-28 RX ADMIN — ACETAMINOPHEN 650 MG: 325 TABLET ORAL at 10:02

## 2022-02-28 RX ADMIN — MUPIROCIN 1 G: 20 OINTMENT TOPICAL at 08:02

## 2022-02-28 RX ADMIN — MYCOPHENOLATE MOFETIL 1000 MG: 250 CAPSULE ORAL at 08:02

## 2022-02-28 RX ADMIN — GABAPENTIN 300 MG: 300 CAPSULE ORAL at 09:02

## 2022-02-28 RX ADMIN — SULFAMETHOXAZOLE AND TRIMETHOPRIM 1 TABLET: 400; 80 TABLET ORAL at 08:02

## 2022-02-28 RX ADMIN — METOPROLOL TARTRATE 25 MG: 25 TABLET, FILM COATED ORAL at 10:02

## 2022-02-28 RX ADMIN — SODIUM BICARBONATE 650 MG TABLET 650 MG: at 09:02

## 2022-02-28 RX ADMIN — GABAPENTIN 300 MG: 300 CAPSULE ORAL at 08:02

## 2022-02-28 RX ADMIN — METHYLPREDNISOLONE SODIUM SUCCINATE 125 MG: 125 INJECTION, POWDER, FOR SOLUTION INTRAMUSCULAR; INTRAVENOUS at 09:02

## 2022-02-28 RX ADMIN — NYSTATIN 500000 UNITS: 500000 SUSPENSION ORAL at 05:02

## 2022-02-28 RX ADMIN — TRAMADOL HYDROCHLORIDE 50 MG: 50 TABLET, COATED ORAL at 10:02

## 2022-02-28 RX ADMIN — OXYCODONE HYDROCHLORIDE 15 MG: 10 TABLET ORAL at 04:02

## 2022-02-28 RX ADMIN — THERA TABS 1 TABLET: TAB at 08:02

## 2022-02-28 RX ADMIN — FAMOTIDINE 20 MG: 20 TABLET, FILM COATED ORAL at 09:02

## 2022-02-28 RX ADMIN — NIFEDIPINE 30 MG: 30 TABLET, FILM COATED, EXTENDED RELEASE ORAL at 08:02

## 2022-02-28 RX ADMIN — OXYCODONE HYDROCHLORIDE 10 MG: 10 TABLET ORAL at 09:02

## 2022-02-28 RX ADMIN — DIBASIC SODIUM PHOSPHATE, MONOBASIC POTASSIUM PHOSPHATE AND MONOBASIC SODIUM PHOSPHATE 2 TABLET: 852; 155; 130 TABLET ORAL at 09:02

## 2022-02-28 RX ADMIN — MYCOPHENOLATE MOFETIL 1000 MG: 250 CAPSULE ORAL at 09:02

## 2022-02-28 RX ADMIN — DIPHENHYDRAMINE HYDROCHLORIDE 25 MG: 25 CAPSULE ORAL at 10:02

## 2022-02-28 RX ADMIN — ANTI-THYMOCYTE GLOBULIN (RABBIT) 125 MG: 5 INJECTION, POWDER, LYOPHILIZED, FOR SOLUTION INTRAVENOUS at 10:02

## 2022-02-28 RX ADMIN — NIFEDIPINE 30 MG: 30 TABLET, FILM COATED, EXTENDED RELEASE ORAL at 09:02

## 2022-02-28 NOTE — ASSESSMENT & PLAN NOTE
S/p DBD kidney transplant 2/25, out of OR ~ 11:30 PM (Thymo, CIT 7 hr 4 min, CMV D+ R-, KDPI 4%, CPRA 54%)  - Received one pack of platelets intra-op    - Creatinine trending down post-op with good UOP  - Advanced on pathway POD#1  - 2 day Claudio, removed 2/28 AM, patient voiding well  - MARLI drain X 1  - Tolerating diet, + BM  - Ambulating without difficulty

## 2022-02-28 NOTE — PROGRESS NOTES
[facility] - Transplant Stepdown  Kidney Transplant  Progress Note      Reason for Follow-up: Reassessment of Kidney Transplant - 2/25/2022  (#1) recipient and management of immunosuppression.    ORGAN:  RIGHT KIDNEY   Donor Type:  Donation after Brain Death   Donor CMV Status: Positive  Donor HBcAB:Negative  Donor HCV Status:Negative  Donor HBV PORFIRIO: Negative  Donor HCV PORFIRIO: Negative      Subjective:   History of Present Illness:  20 y.o. year old Black male with ESRD secondary to HTN admitted for kidney transplant. He has been on the wait list for a kidney transplant at [facility] since 12/5/2015. Patient initially was started on HD in 12/2015 and switched to PD in 2017, but had multiple Peritonitis infections and changed to home HD on 6/24/2019. Patient is dialyzing 5x/week. Last session 2/23 into 2/24. Patient reports that she is tolerating dialysis well. He has a LUE AV fistula. Patient reports he is anuric. Pt with thrombocytopenia likely secondary to hepatomegaly and/or any underlying hepatic disease. He had appropriate evaluation of thrombocytopenia with Dr. Mathis (heme/onc).      Hospital Course:  Patient is now S/p DBD kidney transplant on 2/25/22, out of OR approx. 11:30 pm (Thymo induction, CIT 7 hr. 4 min., CPRA 54%, KDPI 4%, CMV D+,R-). Pt with history of hepatosplenomegaly with thrombocytopenia, given 1 unit platelets intra-op. Intra-op notable for A small superior pole artery was ligated to allow for shortening of the renal artery. The kidney was initially dusky after reperfusion, which improved with intra-arterial administration of verapamil in the iliac artery proximal to the anastomosis. At this point the kidney appeared very well perfused with good turgor. Doppler signals were assessed and excellent over the entire cortex. There was note of ~2x3cm area of decreased perfusion at the superior pole corresponding to the small superior pole artery that was ligated. Patient with 2 day Claudio and 1 MARLI drain.  Patient kept on IVF with I's=O's POD#0. Advanced on pathway POD#1. Progressing well with good UOP. Creatinine trending down post-op. Maintenance IVF kept at 50 cc/hr POD#2 as patient with decreased intake d/t pain. Oral pain regimen increased with improvement in pain control. IVF stopped POD#3, patient now tolerating diet well with good intake. Claudio removed POD#3 AM, patient able to void without difficulty post-removal. Patient with Tmax 102.9 overnight POD#0, fevers felt to be due to Thymoglobulin. Thymo held POD#1 (2/26). Thymo resumed POD#2 and patient has been afebrile since then. Patient is ambulating without difficulty. He is passing gas and reports one small bowel movement on POD#3.     Interval Note: NAEON. Tolerated Thymo yesterday well without fever. Will give last dose today. Claudio removed this AM, patient voided without difficulty post-removal. Reports one small BM overnight. Reports pain better controlled now with increased dosage of pain meds over weekend. Creatinine trending down, progressing well. VSS, added metoprolol for BP control. MARLI with 100 cc light serosanguineous output past 24 hours. Continue to monitor. Tolerating diet, will stop IVF today. Plan for discharge tomorrow.         Past Medical, Surgical, Family, and Social History:   Unchanged from H&P.    Scheduled Meds:   antithymocyte globulin (rabbit), hydrocortisone 20mg, with optional heparin 1000 units in NS 500ml (FOR PERIPHERAL LINE ADMINISTRATION ONLY)  1.5 mg/kg (Adjusted) Intravenous Daily    bisacodyL  10 mg Oral QHS    cinacalcet  30 mg Oral Daily with breakfast    docusate sodium  100 mg Oral TID    famotidine  20 mg Oral QHS    gabapentin  300 mg Oral TID    heparin (porcine)  5,000 Units Subcutaneous Q8H    k phos di & mono-sod phos mono  500 mg Oral BID    metoprolol tartrate  25 mg Oral BID    multivitamin  1 tablet Oral Daily    mupirocin  1 g Nasal BID    mycophenolate  1,000 mg Oral BID    NIFEdipine  30 mg  Oral BID    nystatin  500,000 Units Mouth/Throat QID (PC + HS)    [START ON 3/1/2022] predniSONE  20 mg Oral Daily    sodium bicarbonate  650 mg Oral BID    sulfamethoxazole-trimethoprim 400-80mg  1 tablet Oral Daily AM    tacrolimus  7 mg Oral BID    [START ON 3/7/2022] valGANciclovir  450 mg Oral Daily AM     Continuous Infusions:   glucagon (human recombinant)       PRN Meds:sodium chloride, dextrose 10%, dextrose 10%, dextrose 10%, EPINEPHrine, glucagon (human recombinant), hydrocortisone sodium succinate, labetalol, ondansetron, oxyCODONE, polyethylene glycol, traMADoL    Intake/Output - Last 3 Shifts         02/26 0700 02/27 0659 02/27 0700 02/28 0659 02/28 0700 03/01 0659    P.O.  816     I.V. (mL/kg) 2842.9 (30.9) 1531.3 (16.7)     Blood       IV Piggyback       Total Intake(mL/kg) 2842.9 (30.9) 2347.3 (25.6)     Urine (mL/kg/hr) 3075 (1.4) 1850 (0.8) 500 (0.8)    Drains 185 100     Stool  0 0    Blood       Total Output 3260 1950 500    Net -417.1 +397.3 -500           Stool Occurrence  0 x 1 x             Review of Systems   Constitutional:  Negative for activity change, appetite change, chills, fatigue and fever.   HENT: Negative.     Eyes: Negative.    Respiratory:  Negative for chest tightness and shortness of breath.    Cardiovascular:  Positive for leg swelling. Negative for chest pain.   Gastrointestinal:  Positive for abdominal pain (incisional). Negative for abdominal distention, constipation, diarrhea, nausea and vomiting.   Endocrine: Negative.    Genitourinary:  Negative for decreased urine volume (anuric).   Musculoskeletal: Negative.    Skin:  Positive for wound.   Allergic/Immunologic: Negative for immunocompromised state.   Neurological:  Negative for dizziness and facial asymmetry.   Hematological: Negative.    Psychiatric/Behavioral:  Negative for agitation and behavioral problems.     Objective:     Vital Signs (Most Recent):  Temp: 98.2 °F (36.8 °C) (02/28/22 1232)  Pulse: 98  "(02/28/22 1232)  Resp: 20 (02/28/22 1232)  BP: (!) 164/84 (02/28/22 1232)  SpO2: 95 % (02/28/22 1232)   Vital Signs (24h Range):  Temp:  [97.9 °F (36.6 °C)-99.4 °F (37.4 °C)] 98.2 °F (36.8 °C)  Pulse:  [] 98  Resp:  [16-30] 20  SpO2:  [91 %-99 %] 95 %  BP: (131-186)/() 164/84     Weight: 91.8 kg (202 lb 6.1 oz)  Height: 5' 9" (175.3 cm)  Body mass index is 29.89 kg/m².    Physical Exam  Vitals and nursing note reviewed.   Constitutional:       General: He is not in acute distress.     Appearance: Normal appearance. He is not ill-appearing.   HENT:      Head: Normocephalic.   Eyes:      Pupils: Pupils are equal, round, and reactive to light.   Cardiovascular:      Rate and Rhythm: Normal rate and regular rhythm.      Pulses: Normal pulses.      Heart sounds: Normal heart sounds.   Pulmonary:      Effort: Pulmonary effort is normal.      Breath sounds: Normal breath sounds.   Abdominal:      General: Abdomen is flat. Bowel sounds are normal.      Palpations: Abdomen is soft.      Tenderness: There is no abdominal tenderness.      Comments: RLQ Ktxp incision with staples, gauze dressing with mild serosanguineous drainage    MARLI drain X 1 RLQ   Musculoskeletal:         General: No swelling or tenderness. Normal range of motion.      Cervical back: Normal range of motion.      Right lower leg: Edema (trace) present.      Left lower leg: Edema (trace) present.   Skin:     General: Skin is warm and dry.   Neurological:      Mental Status: He is alert and oriented to person, place, and time.      Motor: No weakness.   Psychiatric:         Mood and Affect: Mood normal.         Behavior: Behavior normal.         Thought Content: Thought content normal.       Laboratory:  CBC:   Recent Labs   Lab 02/26/22  0637 02/27/22  0701 02/28/22  0642   WBC 7.72 7.85 5.35   RBC 2.66* 2.67* 2.72*   HGB 8.0* 8.1* 8.2*   HCT 24.7* 24.8* 25.6*   PLT 69* 61* 70*   MCV 93 93 94   MCH 30.1 30.3 30.1   MCHC 32.4 32.7 32.0     BMP: " "  Recent Labs   Lab 02/26/22  1325 02/27/22  0701 02/28/22  0642   * 99 101    137 137   K 5.0 4.7 5.0    103 104   CO2 24 21* 24   BUN 63* 57* 50*   CREATININE 13.1* 9.2* 6.1*   CALCIUM 8.8 9.0 9.5     CMP:   Recent Labs   Lab 02/25/22  1257 02/26/22  0003 02/26/22  1325 02/27/22  0701 02/28/22  0642   GLU 85   < > 129* 99 101   CALCIUM 8.7   < > 8.8 9.0 9.5   ALBUMIN 4.0   < > 3.3* 3.2* 3.3*   PROT 7.4  --   --   --   --       < > 137 137 137   K 5.0   < > 5.0 4.7 5.0   CO2 27   < > 24 21* 24      < > 100 103 104   BUN 58*   < > 63* 57* 50*   CREATININE 14.6*   < > 13.1* 9.2* 6.1*   ALKPHOS 58  --   --   --   --    ALT 19  --   --   --   --    AST 11  --   --   --   --     < > = values in this interval not displayed.       Diagnostic Results:  None    Assessment/Plan:     Anemia of chronic disease  - H/H stable  - monitor daily CBC      Acute blood loss anemia  - expected post-op  - H/H stable  - Monitor daily CBC      Prophylactic immunotherapy  - see " immunosuppression due to drug therapy"      Steroid-induced hyperglycemia  Endocrine consulted.       Drug-induced fever  -Tmax 102.9 overnight POD#0. Likely related to Thymo  - Thymo held POD1  - Patient afebrile, no reoccurrence of fever. Tolerating Thymo #2 and #3 well    S/P kidney transplant  S/p DBD kidney transplant 2/25, out of OR ~ 11:30 PM (Thymo, CIT 7 hr 4 min, CMV D+ R-, KDPI 4%, CPRA 54%)  - Received one pack of platelets intra-op    - Creatinine trending down post-op with good UOP  - Advanced on pathway POD#1  - 2 day Claudio, removed 2/28 AM, patient voiding well  - MARLI drain X 1  - Tolerating diet, + BM  - Ambulating without difficulty         At risk for opportunistic infections  - Continue OI prophylaxis per protocol    Immunosuppression due to drug therapy  On prograf , MMF and steroids per protocol   Monitor prograf levels daily to assess for toxicities. Adjust dosage as necessary to achieve therapeutic " level.      Thrombocytopenia  - Acute on chronic, s/p transfusion 1 pack platelets in OR   - monitor daily CBC      Benign hypertension with ESRD (end-stage renal disease)  - Continue nifedipine  - Added metoprolol 2/28 for better control (on bystolic at home, not on hospital formulary)            Discharge Planning:  Plan for discharge tomorrow    Dom Park NP  Kidney Transplant  [facility] - Transplant Stepdown

## 2022-02-28 NOTE — ASSESSMENT & PLAN NOTE
On prograf , MMF and steroids per protocol   Monitor prograf levels daily to assess for toxicities. Adjust dosage as necessary to achieve therapeutic level.

## 2022-02-28 NOTE — PROGRESS NOTES
"[facility] - Transplant Stepdown  Adult Nutrition  Consult Note    SUMMARY     Recommendations    1. Continue current Regular diet, add Boost Plus ONS to aid in caloric intake.   2. RD to monitor & follow-up.    Goals: consume > 50% of meals by RD folow-up  Nutrition Goal Status: progressing towards goal  Communication of RD Recs: reviewed with RN    Assessment and Plan    Nutrition Problem:  Increased Nutrient Needs     Related to (etiology):   Physiological demands, healing      Signs and Symptoms (as evidenced by):   S/p kidney transplant 2/26     Interventions (treatment strategy):  Collaboration of nutrition care with other providers      Nutrition Diagnosis Status:   Continues    Reason for Assessment    Reason For Assessment: RD follow-up  Diagnosis: transplant/postoperative complications (s/p kindey transplant 2/26)  Relevant Medical History: ESRD on dailysis 2/2 HTN  Interdisciplinary Rounds: did not attend    General Information Comments: S/p kidney transplant. Pt tolerating diet, reports fair appetite - willing to try ONS. Family member at bedside reports pt with normal appetite and stable wt PTA. -187 lbs. Pt appears nourished.   Nutrition Discharge Planning: Post transplant nutrition education provided. Food safety/drug interactions emphasized. General healthy diet recommended. RD name/contact information, education material left.  No other needs identified. Caregiver present.    Nutrition/Diet History    Spiritual, Cultural Beliefs, Anabaptism Practices, Values that Affect Care: no  Factors Affecting Nutritional Intake: decreased appetite    Anthropometrics    Temp: 98.2 °F (36.8 °C)  Height Method: Stated  Height: 5' 9" (175.3 cm)  Height (inches): 69 in  Weight Method: Standard Scale  Weight: 91.8 kg (202 lb 6.1 oz)  Weight (lb): 202.38 lb  Ideal Body Weight (IBW), Male: 160 lb  % Ideal Body Weight, Male (lb): 126.49 %  BMI (Calculated): 29.9  BMI Grade: 25 - 29.9 - " overweight    Lab/Procedures/Meds    Pertinent Labs Reviewed: reviewed  Pertinent Labs Comments: BUN 50, Creat 6.1, GFR 13.7  Pertinent Medications Reviewed: reviewed  Pertinent Medications Comments: -    Estimated/Assessed Needs    Weight Used For Calorie Calculations: 91.8 kg (202 lb 6.1 oz)     Energy Calorie Requirements (kcal): 2276 kcal/day  Energy Need Method: Moore-St Jeor (x 1.2)     Protein Requirements:  gm/day (1.0-1.2 gm/kg)  Weight Used For Protein Calculations: 91.2 kg (201 lb 1 oz)     Fluid Requirements (mL): 1 mL/kcal or per MD  RDA Method (mL): 2276    Nutrition Prescription Ordered    Current Diet Order: Regular    Evaluation of Received Nutrient/Fluid Intake    I/O: +1.5L since admit    Comments: LBM: 2/24    Tolerance: tolerating    Nutrition Risk    Level of Risk/Frequency of Follow-up:  (1x/week)     Monitor and Evaluation    Food and Nutrient Intake: energy intake, food and beverage intake  Food and Nutrient Adminstration: diet order  Physical Activity and Function: nutrition-related ADLs and IADLs  Anthropometric Measurements: weight, weight change, body mass index  Biochemical Data, Medical Tests and Procedures: electrolyte and renal panel, gastrointestinal profile, glucose/endocrine profile, inflammatory profile, lipid profile  Nutrition-Focused Physical Findings: overall appearance     Nutrition Follow-Up    RD Follow-up?: Yes

## 2022-02-28 NOTE — NURSING
Pt AAO x 4. VSS. SpO2 > 95%. Tele- 100-110. Pt given prn oxy 15 mg x 1. Pt did state he passed gas, but still no BM. Thymo finished @ 1948. Pt receiving NS @ 50 mL/hr. Pt MARLI drain had 30 mL of serosanguinous drainage. Claudio output 250-300 mL with clear yellow urine. Incision dressing removed and cleansed w/ betadine. All staples intact w/ minimal drainage. H & H stable. Pt resting comfortably w/ fiance at bedside. See flowsheet for further assessment.

## 2022-02-28 NOTE — ASSESSMENT & PLAN NOTE
- Continue nifedipine  - Added metoprolol 2/28 for better control (on bystolic at home, not on hospital formulary)

## 2022-02-28 NOTE — PROCEDURES
Drain removed:  Site cleaned with alcohol. Drain intact at the time of removal. Patient tolerated procedure well. Will continue to monitor for any complications.

## 2022-02-28 NOTE — PROGRESS NOTES
EDUCATION NOTE:    Met with Tameka Saenz and his caregivers to provide teaching re: immunosuppressant medications.  Reviewed medication section of the Kidney Transplant Education book that was provided.  Emphasized the importance of compliance, role of the blue medication card, concerns for drug interactions, and process of obtaining refills.  Counseled regarding Prograf, Cellcept , prednisone, including directions for use, monitoring, how to handle missed doses, and side effects. Patient verbalized understanding and had the opportunity to ask questions.

## 2022-02-28 NOTE — PROGRESS NOTES
TRANSPLANT NOTE:      ORGAN:  RIGHT KIDNEY   Disease Etiology: Hypertensive Nephrosclerosis  Donor CMV Status:  Positive  Donor HCV Status:  Negative  Donor HBcAb:  Negative  Donor HBV PORFIRIO: Negative  Donor HCV PORFIRIO: Negative  Peak cPRA % (within 1 year of transplant): 61      Tameka Saenz is a 21 y.o. male s/p   Donation after Brain Death  kidney transplant on 2/25/2022 (Kidney) for Hypertensive Nephrosclerosis.   This patient will receive 3 doses of Thymoglobulin for induction.  This patients maintenance immunosuppression will include a steroid taper per protocol to 5mg daily, Prograf, and Cellcept maintenance.  Opportunistic infection prophylaxis will include Valcyte for 6 months (CMV D + , R - ), Bactrim for 6 months, and nystatin for 4 weeks.  Patient is to begin self medications upon transfer to the TSU, and I plan to meet with this patient and his/her support person prior to discharge to review the medication section of the Kidney Transplant Education Manual.  I have reviewed the pre-op medications and have restarted those, as appropriate.

## 2022-02-28 NOTE — ASSESSMENT & PLAN NOTE
-Tmax 102.9 overnight POD#0. Likely related to Thymo  - Thymo held POD1  - Patient afebrile, no reoccurrence of fever. Tolerating Thymo #2 and #3 well

## 2022-02-28 NOTE — SUBJECTIVE & OBJECTIVE
Subjective:   History of Present Illness:  20 y.o. year old Black male with ESRD secondary to HTN admitted for kidney transplant. He has been on the wait list for a kidney transplant at [facility] since 12/5/2015. Patient initially was started on HD in 12/2015 and switched to PD in 2017, but had multiple Peritonitis infections and changed to home HD on 6/24/2019. Patient is dialyzing 5x/week. Last session 2/23 into 2/24. Patient reports that she is tolerating dialysis well. He has a LUE AV fistula. Patient reports he is anuric. Pt with thrombocytopenia likely secondary to hepatomegaly and/or any underlying hepatic disease. He had appropriate evaluation of thrombocytopenia with Dr. Mathis (heme/onc).      Hospital Course:  Patient is now S/p DBD kidney transplant on 2/25/22, out of OR approx. 11:30 pm (Thymo induction, CIT 7 hr. 4 min., CPRA 54%, KDPI 4%, CMV D+,R-). Pt with history of hepatosplenomegaly with thrombocytopenia, given 1 unit platelets intra-op. Intra-op notable for A small superior pole artery was ligated to allow for shortening of the renal artery. The kidney was initially dusky after reperfusion, which improved with intra-arterial administration of verapamil in the iliac artery proximal to the anastomosis. At this point the kidney appeared very well perfused with good turgor. Doppler signals were assessed and excellent over the entire cortex. There was note of ~2x3cm area of decreased perfusion at the superior pole corresponding to the small superior pole artery that was ligated. Patient with 2 day Claudio and 1 MARLI drain. Patient kept on IVF with I's=O's POD#0. Advanced on pathway POD#1. Progressing well with good UOP. Creatinine trending down post-op. Maintenance IVF kept at 50 cc/hr POD#2 as patient with decreased intake d/t pain. Oral pain regimen increased with improvement in pain control. IVF stopped POD#3, patient now tolerating diet well with good intake. Claudio removed POD#3 AM, patient able to  void without difficulty post-removal. Patient with Tmax 102.9 overnight POD#0, fevers felt to be due to Thymoglobulin. Thymo held POD#1 (2/26). Thymo resumed POD#2 and patient has been afebrile since then. Patient is ambulating without difficulty. He is passing gas and reports one small bowel movement on POD#3.     Interval Note: NAEON. Tolerated Thymo yesterday well without fever. Will give last dose today. Claudio removed this AM, patient voided without difficulty post-removal. Reports one small BM overnight. Reports pain better controlled now with increased dosage of pain meds over weekend. Creatinine trending down, progressing well. VSS, added metoprolol for BP control. MARLI with 100 cc light serosanguineous output past 24 hours. Continue to monitor. Tolerating diet, will stop IVF today. Plan for discharge tomorrow.         Past Medical, Surgical, Family, and Social History:   Unchanged from H&P.    Scheduled Meds:   antithymocyte globulin (rabbit), hydrocortisone 20mg, with optional heparin 1000 units in NS 500ml (FOR PERIPHERAL LINE ADMINISTRATION ONLY)  1.5 mg/kg (Adjusted) Intravenous Daily    bisacodyL  10 mg Oral QHS    cinacalcet  30 mg Oral Daily with breakfast    docusate sodium  100 mg Oral TID    famotidine  20 mg Oral QHS    gabapentin  300 mg Oral TID    heparin (porcine)  5,000 Units Subcutaneous Q8H    k phos di & mono-sod phos mono  500 mg Oral BID    metoprolol tartrate  25 mg Oral BID    multivitamin  1 tablet Oral Daily    mupirocin  1 g Nasal BID    mycophenolate  1,000 mg Oral BID    NIFEdipine  30 mg Oral BID    nystatin  500,000 Units Mouth/Throat QID (PC + HS)    [START ON 3/1/2022] predniSONE  20 mg Oral Daily    sodium bicarbonate  650 mg Oral BID    sulfamethoxazole-trimethoprim 400-80mg  1 tablet Oral Daily AM    tacrolimus  7 mg Oral BID    [START ON 3/7/2022] valGANciclovir  450 mg Oral Daily AM     Continuous Infusions:   glucagon (human recombinant)       PRN Meds:sodium chloride,  "dextrose 10%, dextrose 10%, dextrose 10%, EPINEPHrine, glucagon (human recombinant), hydrocortisone sodium succinate, labetalol, ondansetron, oxyCODONE, polyethylene glycol, traMADoL    Intake/Output - Last 3 Shifts         02/26 0700 02/27 0659 02/27 0700  02/28 0659 02/28 0700 03/01 0659    P.O.  816     I.V. (mL/kg) 2842.9 (30.9) 1531.3 (16.7)     Blood       IV Piggyback       Total Intake(mL/kg) 2842.9 (30.9) 2347.3 (25.6)     Urine (mL/kg/hr) 3075 (1.4) 1850 (0.8) 500 (0.8)    Drains 185 100     Stool  0 0    Blood       Total Output 3260 1950 500    Net -417.1 +397.3 -500           Stool Occurrence  0 x 1 x             Review of Systems   Constitutional:  Negative for activity change, appetite change, chills, fatigue and fever.   HENT: Negative.     Eyes: Negative.    Respiratory:  Negative for chest tightness and shortness of breath.    Cardiovascular:  Positive for leg swelling. Negative for chest pain.   Gastrointestinal:  Positive for abdominal pain (incisional). Negative for abdominal distention, constipation, diarrhea, nausea and vomiting.   Endocrine: Negative.    Genitourinary:  Negative for decreased urine volume (anuric).   Musculoskeletal: Negative.    Skin:  Positive for wound.   Allergic/Immunologic: Negative for immunocompromised state.   Neurological:  Negative for dizziness and facial asymmetry.   Hematological: Negative.    Psychiatric/Behavioral:  Negative for agitation and behavioral problems.     Objective:     Vital Signs (Most Recent):  Temp: 98.2 °F (36.8 °C) (02/28/22 1232)  Pulse: 98 (02/28/22 1232)  Resp: 20 (02/28/22 1232)  BP: (!) 164/84 (02/28/22 1232)  SpO2: 95 % (02/28/22 1232)   Vital Signs (24h Range):  Temp:  [97.9 °F (36.6 °C)-99.4 °F (37.4 °C)] 98.2 °F (36.8 °C)  Pulse:  [] 98  Resp:  [16-30] 20  SpO2:  [91 %-99 %] 95 %  BP: (131-186)/() 164/84     Weight: 91.8 kg (202 lb 6.1 oz)  Height: 5' 9" (175.3 cm)  Body mass index is 29.89 kg/m².    Physical " Exam  Vitals and nursing note reviewed.   Constitutional:       General: He is not in acute distress.     Appearance: Normal appearance. He is not ill-appearing.   HENT:      Head: Normocephalic.   Eyes:      Pupils: Pupils are equal, round, and reactive to light.   Cardiovascular:      Rate and Rhythm: Normal rate and regular rhythm.      Pulses: Normal pulses.      Heart sounds: Normal heart sounds.   Pulmonary:      Effort: Pulmonary effort is normal.      Breath sounds: Normal breath sounds.   Abdominal:      General: Abdomen is flat. Bowel sounds are normal.      Palpations: Abdomen is soft.      Tenderness: There is no abdominal tenderness.      Comments: RLQ Ktxp incision with staples, gauze dressing with mild serosanguineous drainage    MARLI drain X 1 RLQ   Musculoskeletal:         General: No swelling or tenderness. Normal range of motion.      Cervical back: Normal range of motion.      Right lower leg: Edema (trace) present.      Left lower leg: Edema (trace) present.   Skin:     General: Skin is warm and dry.   Neurological:      Mental Status: He is alert and oriented to person, place, and time.      Motor: No weakness.   Psychiatric:         Mood and Affect: Mood normal.         Behavior: Behavior normal.         Thought Content: Thought content normal.       Laboratory:  CBC:   Recent Labs   Lab 02/26/22  0637 02/27/22  0701 02/28/22  0642   WBC 7.72 7.85 5.35   RBC 2.66* 2.67* 2.72*   HGB 8.0* 8.1* 8.2*   HCT 24.7* 24.8* 25.6*   PLT 69* 61* 70*   MCV 93 93 94   MCH 30.1 30.3 30.1   MCHC 32.4 32.7 32.0     BMP:   Recent Labs   Lab 02/26/22  1325 02/27/22  0701 02/28/22  0642   * 99 101    137 137   K 5.0 4.7 5.0    103 104   CO2 24 21* 24   BUN 63* 57* 50*   CREATININE 13.1* 9.2* 6.1*   CALCIUM 8.8 9.0 9.5     CMP:   Recent Labs   Lab 02/25/22  1257 02/26/22  0003 02/26/22  1325 02/27/22  0701 02/28/22  0642   GLU 85   < > 129* 99 101   CALCIUM 8.7   < > 8.8 9.0 9.5   ALBUMIN 4.0   <  > 3.3* 3.2* 3.3*   PROT 7.4  --   --   --   --       < > 137 137 137   K 5.0   < > 5.0 4.7 5.0   CO2 27   < > 24 21* 24      < > 100 103 104   BUN 58*   < > 63* 57* 50*   CREATININE 14.6*   < > 13.1* 9.2* 6.1*   ALKPHOS 58  --   --   --   --    ALT 19  --   --   --   --    AST 11  --   --   --   --     < > = values in this interval not displayed.       Diagnostic Results:  None

## 2022-02-28 NOTE — PLAN OF CARE
Recommendations     1. Continue current Regular diet, add Boost Plus ONS to aid in caloric intake.   2. RD to monitor & follow-up.     Goals: consume > 50% of meals by RD folow-up  Nutrition Goal Status: progressing towards goal  Communication of RD Recs: reviewed with RN

## 2022-02-28 NOTE — PROGRESS NOTES
Transplant Teaching Book given to patient, Tameka Saenz, on 02/28/2022.  During the course of the hospital stay the patient received information regarding kidney transplant. Teaching and instruction were completed.  Areas that were discussed included: how to contact the Transplant Team, the importance of measuring intake of fluids and urine output, and monitoring vital signs such as blood pressure, temperature, and daily weights.  Parameters for which to report abnormal findings were given.  Appointment were provided along with the rational for the importance of lab work and clinic visits.  A written medication list was provided.  The importance of immunosuppressive medications, their common side effects, and treatment to prevent or minimize side effects has been reviewed.  Signs and symptoms of rejection and infection along with various treatments were reviewed.  The need to avoid infection was discussed.  Wound care and special consideration regarding activities of daily living were explained.      Discussed with the patient and caregiver the importance of maintaining COVID-19 precautions; wearing a mask, good handwashing, and social distancing.  Also, to report any signs or symptoms (fever, difficulty breathing, loss of taste/smell, etc.), suspected exposure, or COVID testing, immediately to the transplant program.     Written and verbal teaching of the above information was given to patient and his fiancee. All questions were answered and understanding verbalized.     Follow up outpatient appointments scheduled for 3/2/22 and reviewed.

## 2022-02-28 NOTE — PLAN OF CARE
Pt is AAOx4, independent, and VSS. 3 of 3 thymo infusing. Pt had small BM. Claudio removed this AM- pt able to void post removal. Incision cleansed with iodine swabs. Pt's significant other pulled self meds 100% correctly. Pt ambulated in room and is up in chair. Pharmacist and transplant coordinator educated pt today. VISI and telemetry monitoring in place. Blood glucose monitoring DC. DC planned for tomorrow. Pt's call bell within reach, nonskid socks on, and RN encouraged pt to call for any assistance needed. RN rounded on pt throughout shift. Will continue to monitor.

## 2022-02-28 NOTE — CARE UPDATE
BG goal 140-180  Diet Adult Regular (IDDSI Level 7)      Remains in TSU. POD 2 s/p kidney transplant. BG at or below goal ranges with no prn SQ insulin requirements. Creatinine 6.1. Receiving steroids per primary.     Plan:  -Discontinue Low Dose Correction Scale and BG monitoring ac/hs  -Recommend continuing to monitor BG with am labs      Endocrine will sign off at this time given no hx of DM and no insulin needs. Please reconsult if needed.       Lab Results   Component Value Date    HGBA1C 4.3 12/20/2021

## 2022-02-28 NOTE — PROGRESS NOTES
Admit Note/Tentaive DC Note (DC planned tomorrow).     Met with patient to assess needs. Patient is a 21 y.o. single male, admitted for for kidney transplant.      Patient admitted from home on 2/25/2022 .  At this time, patient presents as alert and oriented x 4, pleasant, good eye contact, well groomed, recall good, concentration/judgement good, average intelligence, calm, communicative, cooperative and asking and answering questions appropriately.  At this time, patients caregiver presents as alert and oriented x 4, pleasant, good eye contact, well groomed, recall good, concentration/judgement good, average intelligence, calm, communicative, cooperative and asking and answering questions appropriately.    Caregiver present:  Pt's fiance: Carmen Nunez [phone]    Household/Family Systems     Patient resides with patient's parents, at [id number] [place], [place] [id number].  Pt has 2 siblings that live at home with family.  Meek, 25 yr old brother, legally blind; Lucia, 19 yr old sister.     Support system includes fiance, parents, grandmother, aunt.  Patient does not have dependents that are need of being cared for.     Patients primary caregiver is Carmen Nunez, patients significant other of 4 yrs, phone number [phone].  Confirmed patients contact information is [phone] (home);     Other emergency contacts and caregivers:   Pt's mother: Iban Saenz, 46, [phone].  Pt's father: Jose Saenz, 46, [phone].  Pt's grandmother: Cortney Miguel, 77, [phone].  Pt's aunt: Juli George, 36, [phone]    During admission, patient's caregiver plans to stay in patient's room.  Confirmed patient and patients caregivers do have access to reliable transportation.    Cognitive Status/Learning     Patient reports reading ability as 12th grade and states patient does not have difficulty with reading, writing, seeing, hearing, comprehension, learning and memory.  Patient  reports patient learns best by combination of hands on and visual.   Needed: No.   Highest education level: High School (9-12) or GED    Vocation/Disability   .  Working for Income: yes  If yes, working activity level: Working Part Time Due to Disability  Pt reported he has not worked in a couple of weeks.  Patient is employed as part time .at  [employer]. Pt also collecting SSDI due to ESRD.     Adherence     Patient reports a high level of adherence to patients health care regimen.  Adherence counseling and education provided. Patient verbalizes understanding.    Substance Use    Patient reports the following substance usage.    Tobacco: none, patient denies any use.  Alcohol: none, patient denies any use.  Illicit Drugs/Non-prescribed Medications: none, patient denies any use.  Patient states clear understanding of the potential impact of substance use.  Substance abstinence/cessation counseling, education and resources provided and reviewed.     Services Utilizing/ADLS    Infusion Service: Prior to admission, patient utilizing? no  Home Health: Prior to admission, patient utilizing? no  DME: Prior to admission, yes (BP cuff, home hemo supplies/equipment)  Pulmonary/Cardiac Rehab: Prior to admission, no  Dialysis:  Prior to admission, yes Home Hemo through [facility] [phone]; 5 days/week.  Transplant Specialty Pharmacy:  Prior to admission, no; patient provides permission to release necessary information to specialty pharmacy for medications post-tranplant. Resources provided and patient is choosing [facility] pharmacy at this time.    Prior to admission, patient reports patient was independent with ADLS and was driving.  Patient reports patient is able to care for self at this time..  Patient indicates a willingness to care for self once medically cleared to do so.    Insurance/Medications    Insured by   Payer/Plan Subscr  Sex Relation Sub. Ins. ID Effective Group Num  "  1. [place]ICARE - [place]* MARTINA PEREZ 2000 Male Self [id number] 3/1/16                                    [address]   2. [place]* MARTINA PEREZ 2000 Male Self [id number]* 3/1/16                                    [address] [address]      Primary Insurance (for UNOS reporting): Public Insurance - Medicare FFS (Fee For Service)  Secondary Insurance (for UNOS reporting): Public Insurance - Medicaid    Patient reports patient is able to obtain and afford medications at this time and at time of discharge.    Living Will/Healthcare Power of     Patient states patient does not have a LW and/or HCPA.   provided education regarding LW and HCPA and the completion of forms.    Coping/Mental Health    Patient is coping adequately with the aid of  family members.   Patient denies mental health difficulties.     Discharge Planning    At time of discharge, patient plans to return to [facility] under the care of Carmen Nunez (signiciant other).  Patients significant other will transport patient.  Per rounds today, expected discharge date is tomorrow. Patient and patients caregiver  verbalize understanding and are involved in treatment planning and discharge process.  SW arranged [employer] [facility] apt check in for today in anticipation of dc tomorrow.  Pt assigned apt [address] with "0" copay.     Additional Concerns     providing ongoing psychosocial support, education, resources and d/c planning as needed.  SW remains available.  provided resource list, patient choice, psychosocial and supportive counseling, resources, education, assistance and discharge planning with patient and caregiver involvement, ongoing SW availability and services as appropriate. Patient denies additional needs and/or concerns at this time. Patient verbalizes understanding and agreement with information reviewed, social work availability, and how to access available resources as needed.  "

## 2022-02-28 NOTE — PROGRESS NOTES
Patient out of bed, dressed and walking in the room. Girlfriend at bedside. Post kidney transplant education book provided. Informed that I will review information prior to hospital discharge. Patient agreed and verbalized understanding.

## 2022-03-01 VITALS
DIASTOLIC BLOOD PRESSURE: 79 MMHG | SYSTOLIC BLOOD PRESSURE: 142 MMHG | TEMPERATURE: 98 F | HEART RATE: 86 BPM | BODY MASS INDEX: 29.95 KG/M2 | OXYGEN SATURATION: 100 % | RESPIRATION RATE: 18 BRPM | HEIGHT: 69 IN | WEIGHT: 202.19 LBS

## 2022-03-01 LAB
ALBUMIN SERPL BCP-MCNC: 3.2 G/DL (ref 3.5–5.2)
ANION GAP SERPL CALC-SCNC: 8 MMOL/L (ref 8–16)
ANISOCYTOSIS BLD QL SMEAR: SLIGHT
BASOPHILS # BLD AUTO: 0 K/UL (ref 0–0.2)
BASOPHILS NFR BLD: 0 % (ref 0–1.9)
BUN SERPL-MCNC: 48 MG/DL (ref 6–20)
CALCIUM SERPL-MCNC: 9.1 MG/DL (ref 8.7–10.5)
CHLORIDE SERPL-SCNC: 108 MMOL/L (ref 95–110)
CO2 SERPL-SCNC: 24 MMOL/L (ref 23–29)
CREAT SERPL-MCNC: 4.3 MG/DL (ref 0.5–1.4)
DIFFERENTIAL METHOD: ABNORMAL
EOSINOPHIL # BLD AUTO: 0 K/UL (ref 0–0.5)
EOSINOPHIL NFR BLD: 0 % (ref 0–8)
ERYTHROCYTE [DISTWIDTH] IN BLOOD BY AUTOMATED COUNT: 15.4 % (ref 11.5–14.5)
EST. GFR  (AFRICAN AMERICAN): 20.9 ML/MIN/1.73 M^2
EST. GFR  (NON AFRICAN AMERICAN): 18.1 ML/MIN/1.73 M^2
GLUCOSE SERPL-MCNC: 90 MG/DL (ref 70–110)
HCT VFR BLD AUTO: 23.6 % (ref 40–54)
HGB BLD-MCNC: 7.6 G/DL (ref 14–18)
HYPOCHROMIA BLD QL SMEAR: ABNORMAL
IMM GRANULOCYTES # BLD AUTO: 0 K/UL (ref 0–0.04)
IMM GRANULOCYTES NFR BLD AUTO: 0 % (ref 0–0.5)
LYMPHOCYTES # BLD AUTO: 0 K/UL (ref 1–4.8)
LYMPHOCYTES NFR BLD: 2 % (ref 18–48)
MAGNESIUM SERPL-MCNC: 1.6 MG/DL (ref 1.6–2.6)
MCH RBC QN AUTO: 29.9 PG (ref 27–31)
MCHC RBC AUTO-ENTMCNC: 32.2 G/DL (ref 32–36)
MCV RBC AUTO: 93 FL (ref 82–98)
MONOCYTES # BLD AUTO: 0.1 K/UL (ref 0.3–1)
MONOCYTES NFR BLD: 4 % (ref 4–15)
NEUTROPHILS # BLD AUTO: 1.9 K/UL (ref 1.8–7.7)
NEUTROPHILS NFR BLD: 94 % (ref 38–73)
NRBC BLD-RTO: 0 /100 WBC
OVALOCYTES BLD QL SMEAR: ABNORMAL
PHOSPHATE SERPL-MCNC: 2.6 MG/DL (ref 2.7–4.5)
PLATELET # BLD AUTO: 66 K/UL (ref 150–450)
PMV BLD AUTO: 13.7 FL (ref 9.2–12.9)
POIKILOCYTOSIS BLD QL SMEAR: SLIGHT
POLYCHROMASIA BLD QL SMEAR: ABNORMAL
POTASSIUM SERPL-SCNC: 4.8 MMOL/L (ref 3.5–5.1)
RBC # BLD AUTO: 2.54 M/UL (ref 4.6–6.2)
SODIUM SERPL-SCNC: 140 MMOL/L (ref 136–145)
TACROLIMUS BLD-MCNC: 8.2 NG/ML (ref 5–15)
WBC # BLD AUTO: 2.02 K/UL (ref 3.9–12.7)

## 2022-03-01 PROCEDURE — 25000003 PHARM REV CODE 250: Performed by: TRANSPLANT SURGERY

## 2022-03-01 PROCEDURE — 80069 RENAL FUNCTION PANEL: CPT | Performed by: TRANSPLANT SURGERY

## 2022-03-01 PROCEDURE — 36415 COLL VENOUS BLD VENIPUNCTURE: CPT | Performed by: CLINICAL NURSE SPECIALIST

## 2022-03-01 PROCEDURE — 63600175 PHARM REV CODE 636 W HCPCS: Performed by: TRANSPLANT SURGERY

## 2022-03-01 PROCEDURE — 25000003 PHARM REV CODE 250: Performed by: STUDENT IN AN ORGANIZED HEALTH CARE EDUCATION/TRAINING PROGRAM

## 2022-03-01 PROCEDURE — 63600175 PHARM REV CODE 636 W HCPCS: Performed by: CLINICAL NURSE SPECIALIST

## 2022-03-01 PROCEDURE — 99239 PR HOSPITAL DISCHARGE DAY,>30 MIN: ICD-10-PCS | Mod: ,,, | Performed by: NURSE PRACTITIONER

## 2022-03-01 PROCEDURE — 86644 CMV ANTIBODY: CPT | Performed by: CLINICAL NURSE SPECIALIST

## 2022-03-01 PROCEDURE — 25000003 PHARM REV CODE 250: Performed by: NURSE PRACTITIONER

## 2022-03-01 PROCEDURE — 85025 COMPLETE CBC W/AUTO DIFF WBC: CPT | Performed by: TRANSPLANT SURGERY

## 2022-03-01 PROCEDURE — 80197 ASSAY OF TACROLIMUS: CPT | Performed by: TRANSPLANT SURGERY

## 2022-03-01 PROCEDURE — 99239 HOSP IP/OBS DSCHRG MGMT >30: CPT | Mod: ,,, | Performed by: NURSE PRACTITIONER

## 2022-03-01 PROCEDURE — 25000003 PHARM REV CODE 250: Performed by: CLINICAL NURSE SPECIALIST

## 2022-03-01 PROCEDURE — 83735 ASSAY OF MAGNESIUM: CPT | Performed by: TRANSPLANT SURGERY

## 2022-03-01 PROCEDURE — 63600175 PHARM REV CODE 636 W HCPCS: Performed by: SURGERY

## 2022-03-01 PROCEDURE — 63600175 PHARM REV CODE 636 W HCPCS: Performed by: NURSE PRACTITIONER

## 2022-03-01 RX ADMIN — SULFAMETHOXAZOLE AND TRIMETHOPRIM 1 TABLET: 400; 80 TABLET ORAL at 09:03

## 2022-03-01 RX ADMIN — MYCOPHENOLATE MOFETIL 1000 MG: 250 CAPSULE ORAL at 09:03

## 2022-03-01 RX ADMIN — DIBASIC SODIUM PHOSPHATE, MONOBASIC POTASSIUM PHOSPHATE AND MONOBASIC SODIUM PHOSPHATE 2 TABLET: 852; 155; 130 TABLET ORAL at 09:03

## 2022-03-01 RX ADMIN — MUPIROCIN 1 G: 20 OINTMENT TOPICAL at 09:03

## 2022-03-01 RX ADMIN — HEPARIN SODIUM 5000 UNITS: 5000 INJECTION INTRAVENOUS; SUBCUTANEOUS at 06:03

## 2022-03-01 RX ADMIN — PREDNISONE 20 MG: 20 TABLET ORAL at 09:03

## 2022-03-01 RX ADMIN — NYSTATIN 500000 UNITS: 500000 SUSPENSION ORAL at 09:03

## 2022-03-01 RX ADMIN — THERA TABS 1 TABLET: TAB at 09:03

## 2022-03-01 RX ADMIN — CINACALCET 30 MG: 30 TABLET, FILM COATED ORAL at 09:03

## 2022-03-01 RX ADMIN — METOPROLOL TARTRATE 25 MG: 25 TABLET, FILM COATED ORAL at 09:03

## 2022-03-01 RX ADMIN — NIFEDIPINE 30 MG: 30 TABLET, FILM COATED, EXTENDED RELEASE ORAL at 09:03

## 2022-03-01 RX ADMIN — GABAPENTIN 300 MG: 300 CAPSULE ORAL at 09:03

## 2022-03-01 RX ADMIN — SODIUM BICARBONATE 650 MG TABLET 650 MG: at 09:03

## 2022-03-01 RX ADMIN — TACROLIMUS 7 MG: 1 CAPSULE ORAL at 09:03

## 2022-03-01 NOTE — DISCHARGE SUMMARY
[facility] - Transplant Stepdown  Kidney Transplant  Discharge Summary    Patient Name: Tameka Saenz  MRN: [id number]  Admission Date: 2/25/2022  Hospital Length of Stay: 4 days  Discharge Date and Time:  03/01/2022 11:26 AM  Attending Physician: Jeffrey Broussard MD   Discharging Provider: OSMANI Patel NP  Primary Care Provider: Kathy Cruz MD    HPI:   20 y.o. year old Black male with ESRD secondary to HTN admitted for kidney transplant. He has been on the wait list for a kidney transplant at [facility] since 12/5/2015. Patient initially was started on HD in 12/2015 and switched to PD in 2017, but had multiple Peritonitis infections and changed to home HD on 6/24/2019. Patient is dialyzing 5x/week. Last session 2/23 into 2/24. Patient reports that she is tolerating dialysis well. He has a LUE AV fistula. Patient reports he is anuric. Pt with thrombocytopenia likely secondary to hepatomegaly and/or any underlying hepatic disease. He had appropriate evaluation of thrombocytopenia with Dr. Mathis (heme/onc).      Procedure(s) (LRB):  TRANSPLANT, KIDNEY (N/A)     Hospital Course:    Patient is now S/p DBD kidney transplant on 2/25/22, out of OR approx. 11:30 pm (Thymo induction, CIT 7 hr. 4 min., CPRA 54%, KDPI 4%, CMV D+,R-). Pt with history of hepatosplenomegaly with thrombocytopenia, given 1 unit platelets intra-op. Intra-op notable for A small superior pole artery was ligated to allow for shortening of the renal artery. The kidney was initially dusky after reperfusion, which improved with intra-arterial administration of verapamil in the iliac artery proximal to the anastomosis. At this point the kidney appeared very well perfused with good turgor. Doppler signals were assessed and excellent over the entire cortex. There was note of ~2x3cm area of decreased perfusion at the superior pole corresponding to the small superior pole artery that was ligated. Patient with 2 day Claudio and 1 MARLI drain. Patient kept on IVF  with I's=O's POD#0. Advanced on pathway POD#1. Progressing well with good UOP. Creatinine trending down post-op. Maintenance IVF kept at 50 cc/hr POD#2 as patient with decreased intake d/t pain. Oral pain regimen increased with improvement in pain control. IVF stopped POD#3, patient now tolerating diet well with good intake. Claudio removed POD#3 AM, patient able to void without difficulty post-removal. Patient with Tmax 102.9 overnight POD#0, fevers felt to be due to Thymoglobulin. Thymo held POD#1 (2/26). Thymo resumed POD#2 and patient has been afebrile. Patient is ambulating without difficulty. He is passing gas and reports one small bowel movement on POD#3. He is on bowel regimen. MARLI drain removed POD#3. The patient is now stable for discharge. Discharge instructions and education have been discussed with the patient at bedside. All questions have been answered. PharmD has educated patient. Transplant coordinator has met with patient and discussed discharge planning. The patient will follow-up for outpatient labs tomorrow 3/2/22. He will follow-up for appointment in transplant clinic tomorrow 3/2/22.       Goals of Care Treatment Preferences:  Code Status: Full Code      Final Active Diagnoses:    Diagnosis Date Noted POA    PRINCIPAL PROBLEM:  S/P kidney transplant [Z94.0] 02/26/2022 Not Applicable    Acute blood loss anemia [D62] 02/28/2022 No    Anemia of chronic disease [D63.8] 02/28/2022 Yes    Immunosuppression due to drug therapy [D84.821, Z79.899] 02/26/2022 Not Applicable    At risk for opportunistic infections [Z91.89] 02/26/2022 No    Steroid-induced hyperglycemia [R73.9, T38.0X5A] 02/26/2022 No    Prophylactic immunotherapy [Z29.8] 02/26/2022 Not Applicable    Thrombocytopenia [D69.6] 03/26/2021 Yes    Benign hypertension with ESRD (end-stage renal disease) [I12.0, N18.6] 08/03/2017 Yes     Chronic      Problems Resolved During this Admission:    Diagnosis Date Noted Date Resolved POA     Drug-induced fever [R50.2] 02/26/2022 02/28/2022 No    Pre-op evaluation [Z01.818]  02/28/2022 Not Applicable    Hepatosplenomegaly [R16.2] 10/21/2021 02/26/2022 Yes    ESRD on dialysis  [N18.6, Z99.2]  02/28/2022 Not Applicable     Chronic       Consults (From admission, onward)        Status Ordering Provider     Inpatient consult to Endocrinology  Once        Provider:  (Not yet assigned)    Completed CAROLINE QUACH     Inpatient consult to Registered Dietitian/Nutritionist  Once        Provider:  (Not yet assigned)    Completed SHEREEN RONDON     Inpatient consult to Transplant Nephrology (KTM)  Once        Provider:  (Not yet assigned)    Completed SHEREEN RONDON     Inpatient consult to Registered Dietitian/Nutritionist  Once        Provider:  (Not yet assigned)    Completed HAILY HENRY          Pending Diagnostic Studies:     Procedure Component Value Units Date/Time    Cytomegalovirus antibody, IgG [[id number]] Collected: 03/01/22 0701    Order Status: Sent Lab Status: In process Updated: 03/01/22 0731    Specimen: Blood         Significant Diagnostic Studies: Labs:   BMP:   Recent Labs   Lab 02/28/22  0642 03/01/22  0701    90    140   K 5.0 4.8    108   CO2 24 24   BUN 50* 48*   CREATININE 6.1* 4.3*   CALCIUM 9.5 9.1   MG 1.8 1.6   , CMP   Recent Labs   Lab 02/28/22  0642 03/01/22  0701    140   K 5.0 4.8    108   CO2 24 24    90   BUN 50* 48*   CREATININE 6.1* 4.3*   CALCIUM 9.5 9.1   ALBUMIN 3.3* 3.2*   ANIONGAP 9 8   ESTGFRAFRICA 13.7* 20.9*   EGFRNONAA 11.9* 18.1*   , CBC   Recent Labs   Lab 02/28/22  0642 03/01/22  0701   WBC 5.35 2.02*   HGB 8.2* 7.6*   HCT 25.6* 23.6*   PLT 70* 66*    and All labs within the past 24 hours have been reviewed    Discharged Condition: stable    Disposition: Home or Self Care    Follow Up:    Patient Instructions:      Diet Adult Regular     No driving until:     Lifting restrictions     Notify your health care provider if you  experience any of the following:     Notify your health care provider if you experience any of the following:  increased confusion or weakness     Notify your health care provider if you experience any of the following:  persistent dizziness, light-headedness, or visual disturbances     Notify your health care provider if you experience any of the following:  worsening rash     Notify your health care provider if you experience any of the following:  severe persistent headache     Notify your health care provider if you experience any of the following:  difficulty breathing or increased cough     Notify your health care provider if you experience any of the following:  redness, tenderness, or signs of infection (pain, swelling, redness, odor or green/yellow discharge around incision site)     Notify your health care provider if you experience any of the following:  severe uncontrolled pain     Notify your health care provider if you experience any of the following:  persistent nausea and vomiting or diarrhea     Notify your health care provider if you experience any of the following:  temperature >100.4     No dressing needed     Shower on day dressing removed (No bath)     Medications:  Reconciled Home Medications:      Medication List      START taking these medications    docusate sodium 100 MG capsule  Commonly known as: COLACE  Take 1 capsule (100 mg total) by mouth 3 (three) times daily as needed for Constipation.     famotidine 20 MG tablet  Commonly known as: PEPCID  Take 1 tablet (20 mg total) by mouth every evening.     gabapentin 300 MG capsule  Commonly known as: NEURONTIN  Take 1 capsule (300 mg total) by mouth 2 (two) times daily.     k phos di & mono-sod phos mono 250 mg Tab  Commonly known as: K-PHOS-NEUTRAL  Take 2 tablets by mouth 2 (two) times a day.     mycophenolate 250 mg Cap  Commonly known as: CELLCEPT  Take 4 capsules (1,000 mg total) by mouth 2 (two) times daily.     NIFEdipine 30 MG (OSM)  24 hr tablet  Commonly known as: PROCARDIA-XL  Take 1 tablet (30 mg total) by mouth 2 (two) times a day.     nystatin 100,000 unit/mL suspension  Commonly known as: MYCOSTATIN  Take 5 mLs (500,000 Units total) by mouth 4 (four) times daily. Stop 3/31/22     oxyCODONE 10 mg Tab immediate release tablet  Commonly known as: ROXICODONE  Take 1 tablet (10 mg total) by mouth every 4 (four) hours as needed for Pain.     predniSONE 5 MG tablet  Commonly known as: DELTASONE  Take by mouth daily: 20mg 3/1-3/31, 15mg 4/1-4/30, 10mg 5/1-5/31, 5mg 6/1/2022-     sulfamethoxazole-trimethoprim 400-80mg 400-80 mg per tablet  Commonly known as: BACTRIM,SEPTRA  Take 1 tablet by mouth once daily. Stop 8/24/2022     tacrolimus 1 MG Cap  Commonly known as: PROGRAF  Take 7 capsules (7 mg total) by mouth every 12 (twelve) hours.     valGANciclovir 450 mg Tab  Commonly known as: VALCYTE  Take 1 tablet (450 mg total) by mouth once daily. Stop 8/24/22        CHANGE how you take these medications    nebivoloL 5 MG Tab  Commonly known as: BYSTOLIC  Take 1 tablet (5 mg total) by mouth once daily.  What changed: how much to take        CONTINUE taking these medications    cinacalcet 30 MG Tab  Commonly known as: SENSIPAR  Take 1 tablet (30 mg total) by mouth every evening.        STOP taking these medications    acetaminophen 500 MG tablet  Commonly known as: TYLENOL     amLODIPine 10 MG tablet  Commonly known as: NORVASC     AURYXIA 210 mg iron Tab  Generic drug: ferric citrate     calcitRIOL 0.5 MCG Cap  Commonly known as: ROCALTROL     clindamycin 1 % external solution  Commonly known as: CLEOCIN T     diphenhydrAMINE 25 mg capsule  Commonly known as: BENADRYL     doxazosin 8 MG Tab  Commonly known as: CARDURA     MIRCERA 200 mcg/0.3 mL Syrg  Generic drug: epoetin beta, methoxy peg     terbinafine HCL 1 % cream  Commonly known as: LAMISIL     tretinoin 0.1 % cream  Commonly known as: RETIN-A          Time spent caring for patient (Greater than  1/2 spent in direct face-to-face contact): < 30 minutes    CHARISMA GarciaP   Kidney Transplant  [facility] - Transplant Stepdown

## 2022-03-01 NOTE — NURSING
Pt is AAOX4, independent, and VSS. VISI and telemetry monitoring in place. Pharmacy provided pt with meds and blue card. Pt pulled self meds 100% correctly. PIV removed prior to DC. DC instructions explain and handout provided on when to call MD, upcoming appointments, pain medication information, COVID 19 information, pt portal, [facility] on call, and PMH. Pt and pt's fiance verbalized understanding and all questions answered to satisfactions. Transport offered and refused- pt ambulated with fiance to car - mask on pt.

## 2022-03-01 NOTE — PROGRESS NOTES
DISCHARGE NOTE:    Tameka Saenz is a 21 y.o. male s/p  RIGHT KIDNEY   Donation after Brain Death  transplant on 2/25/2022 (Kidney) for ESRD secondary to Hypertensive Nephrosclerosis.      Past Medical History:   Diagnosis Date    Acne     Anemia of renal disease     Dialysis patient     peritoneal daily at night. followed by Dr. Sotelo    ESRD on dialysis since 12/16/15     Hepatosplenomegaly     Hypertension     Kidney disease     Seasonal allergies     Secondary hyperparathyroidism of renal origin     Thrombocytopenia, unspecified        Hospital Course: Patient's hospital course complicated by thrombocytopenia (causing 1 dose of thymo to be delayed), other than that, doing well, and ready for discharge.    Allergies: Review of patient's allergies indicates:  No Known Allergies    Patient Pharmacy: [facility]    Discharge Medications:     Medication List      START taking these medications    docusate sodium 100 MG capsule  Commonly known as: COLACE  Take 1 capsule (100 mg total) by mouth 3 (three) times daily as needed for Constipation.     famotidine 20 MG tablet  Commonly known as: PEPCID  Take 1 tablet (20 mg total) by mouth every evening.     gabapentin 300 MG capsule  Commonly known as: NEURONTIN  Take 1 capsule (300 mg total) by mouth 2 (two) times daily.     k phos di & mono-sod phos mono 250 mg Tab  Commonly known as: K-PHOS-NEUTRAL  Take 2 tablets by mouth 2 (two) times a day.     mycophenolate 250 mg Cap  Commonly known as: CELLCEPT  Take 4 capsules (1,000 mg total) by mouth 2 (two) times daily.     NIFEdipine 30 MG (OSM) 24 hr tablet  Commonly known as: PROCARDIA-XL  Take 1 tablet (30 mg total) by mouth 2 (two) times a day.     nystatin 100,000 unit/mL suspension  Commonly known as: MYCOSTATIN  Take 5 mLs (500,000 Units total) by mouth 4 (four) times daily. Stop 3/31/22     oxyCODONE 10 mg Tab immediate release tablet  Commonly known as: ROXICODONE  Take 1 tablet (10 mg total) by mouth  every 4 (four) hours as needed for Pain.     predniSONE 5 MG tablet  Commonly known as: DELTASONE  Take by mouth daily: 20mg 3/1-3/31, 15mg 4/1-4/30, 10mg 5/1-5/31, 5mg 6/1/2022-     sulfamethoxazole-trimethoprim 400-80mg 400-80 mg per tablet  Commonly known as: BACTRIM,SEPTRA  Take 1 tablet by mouth once daily. Stop 8/24/2022     tacrolimus 1 MG Cap  Commonly known as: PROGRAF  Take 7 capsules (7 mg total) by mouth every 12 (twelve) hours.     valGANciclovir 450 mg Tab  Commonly known as: VALCYTE  Take 1 tablet (450 mg total) by mouth once daily. Stop 8/24/22        CHANGE how you take these medications    nebivoloL 5 MG Tab  Commonly known as: BYSTOLIC  Take 1 tablet (5 mg total) by mouth once daily.  What changed: how much to take        CONTINUE taking these medications    cinacalcet 30 MG Tab  Commonly known as: SENSIPAR  Take 1 tablet (30 mg total) by mouth every evening.        STOP taking these medications    acetaminophen 500 MG tablet  Commonly known as: TYLENOL     amLODIPine 10 MG tablet  Commonly known as: NORVASC     AURYXIA 210 mg iron Tab  Generic drug: ferric citrate     calcitRIOL 0.5 MCG Cap  Commonly known as: ROCALTROL     clindamycin 1 % external solution  Commonly known as: CLEOCIN T     diphenhydrAMINE 25 mg capsule  Commonly known as: BENADRYL     doxazosin 8 MG Tab  Commonly known as: CARDURA     MIRCERA 200 mcg/0.3 mL Syrg  Generic drug: epoetin beta, methoxy peg     terbinafine HCL 1 % cream  Commonly known as: LAMISIL     tretinoin 0.1 % cream  Commonly known as: RETIN-A           Where to Get Your Medications      These medications were sent to [facility] [address] [address]    Hours: Mon-Fri 7a-7p, Sat-Sun 10a-4p Phone: [phone]   · cinacalcet 30 MG Tab  · famotidine 20 MG tablet  · gabapentin 300 MG capsule  · k phos di & mono-sod phos mono 250 mg Tab  · mycophenolate 250 mg Cap  · nebivoloL 5 MG Tab  · NIFEdipine 30 MG (OSM) 24 hr  tablet  · nystatin 100,000 unit/mL suspension  · oxyCODONE 10 mg Tab immediate release tablet  · predniSONE 5 MG tablet  · sulfamethoxazole-trimethoprim 400-80mg 400-80 mg per tablet  · tacrolimus 1 MG Cap  · valGANciclovir 450 mg Tab     You can get these medications from any pharmacy    You don't need a prescription for these medications  · docusate sodium 100 MG capsule          Pharmacy Interventions/Recommendations:  1) Transplant Immunosuppression: Induction Thymo, and maintenance Prograf 7/7, MMF, and pred    2) Opportunistic Infection prophylaxis: Bactrim, Valcyte, and nystatin    3) Osteoporosis Prevention measures (liver txp): N/A    4) Patient Counseling/Education:  Patient counseled and offered the opportunity to ask questions.  Demonstrated the use of the BP cuff, thermometer.    5) Follow-Up/Discharge Needs:  None    6) Patient Assistance Information: None    7) The following medications have been placed on HOLD and should be restarted in the outpatient setting (when appropriate): Sensipar needs JOANN English and his caregiver verbalized their understanding and had the opportunity to ask questions.

## 2022-03-02 ENCOUNTER — LAB VISIT (OUTPATIENT)
Dept: LAB | Facility: HOSPITAL | Age: 22
End: 2022-03-02
Attending: INTERNAL MEDICINE
Payer: MEDICARE

## 2022-03-02 ENCOUNTER — CLINICAL SUPPORT (OUTPATIENT)
Dept: TRANSPLANT | Facility: CLINIC | Age: 22
End: 2022-03-02
Payer: MEDICARE

## 2022-03-02 ENCOUNTER — PATIENT MESSAGE (OUTPATIENT)
Dept: TRANSPLANT | Facility: CLINIC | Age: 22
End: 2022-03-02

## 2022-03-02 ENCOUNTER — PATIENT OUTREACH (OUTPATIENT)
Dept: ADMINISTRATIVE | Facility: CLINIC | Age: 22
End: 2022-03-02
Payer: MEDICARE

## 2022-03-02 VITALS
DIASTOLIC BLOOD PRESSURE: 98 MMHG | SYSTOLIC BLOOD PRESSURE: 182 MMHG | RESPIRATION RATE: 16 BRPM | RESPIRATION RATE: 16 BRPM | TEMPERATURE: 97 F | HEIGHT: 69 IN | HEART RATE: 103 BPM | OXYGEN SATURATION: 100 % | OXYGEN SATURATION: 100 % | WEIGHT: 199.75 LBS | BODY MASS INDEX: 29.59 KG/M2 | DIASTOLIC BLOOD PRESSURE: 98 MMHG | SYSTOLIC BLOOD PRESSURE: 182 MMHG | WEIGHT: 199.75 LBS | TEMPERATURE: 97 F | HEART RATE: 103 BPM | BODY MASS INDEX: 29.59 KG/M2 | HEIGHT: 69 IN

## 2022-03-02 DIAGNOSIS — Z94.0 KIDNEY REPLACED BY TRANSPLANT: Primary | ICD-10-CM

## 2022-03-02 DIAGNOSIS — Z29.89 PROPHYLACTIC IMMUNOTHERAPY: ICD-10-CM

## 2022-03-02 DIAGNOSIS — Z94.0 S/P KIDNEY TRANSPLANT: ICD-10-CM

## 2022-03-02 DIAGNOSIS — Z57.8 OCCUPATIONAL EXPOSURE TO OTHER RISK FACTORS: ICD-10-CM

## 2022-03-02 LAB
ALBUMIN SERPL BCP-MCNC: 3.3 G/DL (ref 3.5–5.2)
ANION GAP SERPL CALC-SCNC: 8 MMOL/L (ref 8–16)
BASOPHILS # BLD AUTO: 0 K/UL (ref 0–0.2)
BASOPHILS NFR BLD: 0 % (ref 0–1.9)
BUN SERPL-MCNC: 46 MG/DL (ref 6–20)
CALCIUM SERPL-MCNC: 9.5 MG/DL (ref 8.7–10.5)
CHLORIDE SERPL-SCNC: 110 MMOL/L (ref 95–110)
CO2 SERPL-SCNC: 25 MMOL/L (ref 23–29)
CREAT SERPL-MCNC: 3.7 MG/DL (ref 0.5–1.4)
DIFFERENTIAL METHOD: ABNORMAL
EOSINOPHIL # BLD AUTO: 0 K/UL (ref 0–0.5)
EOSINOPHIL NFR BLD: 0.4 % (ref 0–8)
ERYTHROCYTE [DISTWIDTH] IN BLOOD BY AUTOMATED COUNT: 15.5 % (ref 11.5–14.5)
EST. GFR  (AFRICAN AMERICAN): 25.1 ML/MIN/1.73 M^2
EST. GFR  (NON AFRICAN AMERICAN): 21.7 ML/MIN/1.73 M^2
FERRITIN SERPL-MCNC: 1564 NG/ML (ref 20–300)
GLUCOSE SERPL-MCNC: 89 MG/DL (ref 70–110)
HCT VFR BLD AUTO: 23.6 % (ref 40–54)
HGB BLD-MCNC: 7.3 G/DL (ref 14–18)
IMM GRANULOCYTES # BLD AUTO: 0.01 K/UL (ref 0–0.04)
IMM GRANULOCYTES NFR BLD AUTO: 0.4 % (ref 0–0.5)
IRON SERPL-MCNC: 136 UG/DL (ref 45–160)
LYMPHOCYTES # BLD AUTO: 0.1 K/UL (ref 1–4.8)
LYMPHOCYTES NFR BLD: 2.2 % (ref 18–48)
MAGNESIUM SERPL-MCNC: 1.6 MG/DL (ref 1.6–2.6)
MCH RBC QN AUTO: 29.4 PG (ref 27–31)
MCHC RBC AUTO-ENTMCNC: 30.9 G/DL (ref 32–36)
MCV RBC AUTO: 95 FL (ref 82–98)
MONOCYTES # BLD AUTO: 0.1 K/UL (ref 0.3–1)
MONOCYTES NFR BLD: 5.3 % (ref 4–15)
NEUTROPHILS # BLD AUTO: 2.1 K/UL (ref 1.8–7.7)
NEUTROPHILS NFR BLD: 91.7 % (ref 38–73)
NRBC BLD-RTO: 0 /100 WBC
PHOSPHATE SERPL-MCNC: 2.7 MG/DL (ref 2.7–4.5)
PLATELET # BLD AUTO: 78 K/UL (ref 150–450)
PMV BLD AUTO: 11.3 FL (ref 9.2–12.9)
POTASSIUM SERPL-SCNC: 4.7 MMOL/L (ref 3.5–5.1)
RBC # BLD AUTO: 2.48 M/UL (ref 4.6–6.2)
SATURATED IRON: 64 % (ref 20–50)
SODIUM SERPL-SCNC: 143 MMOL/L (ref 136–145)
TACROLIMUS BLD-MCNC: 7.2 NG/ML (ref 5–15)
TOTAL IRON BINDING CAPACITY: 213 UG/DL (ref 250–450)
TRANSFERRIN SERPL-MCNC: 144 MG/DL (ref 200–375)
WBC # BLD AUTO: 2.25 K/UL (ref 3.9–12.7)

## 2022-03-02 PROCEDURE — 99999 PR PBB SHADOW E&M-EST. PATIENT-LVL III: ICD-10-PCS | Mod: PBBFAC,,,

## 2022-03-02 PROCEDURE — 83735 ASSAY OF MAGNESIUM: CPT | Performed by: INTERNAL MEDICINE

## 2022-03-02 PROCEDURE — 99213 OFFICE O/P EST LOW 20 MIN: CPT | Mod: PBBFAC,25

## 2022-03-02 PROCEDURE — 99999 PR PBB SHADOW E&M-EST. PATIENT-LVL III: CPT | Mod: PBBFAC,,,

## 2022-03-02 PROCEDURE — 84466 ASSAY OF TRANSFERRIN: CPT | Performed by: INTERNAL MEDICINE

## 2022-03-02 PROCEDURE — 36415 COLL VENOUS BLD VENIPUNCTURE: CPT | Performed by: INTERNAL MEDICINE

## 2022-03-02 PROCEDURE — 80197 ASSAY OF TACROLIMUS: CPT | Performed by: INTERNAL MEDICINE

## 2022-03-02 PROCEDURE — 85025 COMPLETE CBC W/AUTO DIFF WBC: CPT | Performed by: INTERNAL MEDICINE

## 2022-03-02 PROCEDURE — 80069 RENAL FUNCTION PANEL: CPT | Performed by: INTERNAL MEDICINE

## 2022-03-02 PROCEDURE — 99213 OFFICE O/P EST LOW 20 MIN: CPT | Mod: PBBFAC,27

## 2022-03-02 PROCEDURE — 82728 ASSAY OF FERRITIN: CPT | Performed by: INTERNAL MEDICINE

## 2022-03-02 RX ORDER — TACROLIMUS 1 MG/1
8 CAPSULE ORAL EVERY 12 HOURS
Qty: 480 CAPSULE | Refills: 11 | Status: SHIPPED | OUTPATIENT
Start: 2022-03-02 | End: 2022-03-07

## 2022-03-02 SDOH — SOCIAL DETERMINANTS OF HEALTH (SDOH): OCCUPATIONAL EXPOSURE TO OTHER RISK FACTORS: Z57.8

## 2022-03-02 NOTE — TELEPHONE ENCOUNTER
Received written order from Dr. Mi.  Spoke to pt and instructed him to increase Prograf to 8 mg bid and decrease kphos to 250 mg bid.  Patient repeated, noted and verbalized understanding of the medication changes correctly.  Advised to drink more water.      Attempted another call to explain the need for weekly retacrit x 4 weeks but unable to speak to the patient.  Message sent explaining the need for weekly injections x 4.  Asked patient to contact us with any questions.       ----- Message from Mo Mi MD sent at 3/2/2022 12:01 PM CST -----  Increase prograf to 8 mg PO bid   Encourage hydration  Add iron studies ferritin to the labs today   Lower KPN to 1 tab bid

## 2022-03-02 NOTE — PROGRESS NOTES
"1ST NURSING VISIT POST DISCHARGE NOTE    1st RN appointment with Tameka Saenz post discharge 3/2/22 s/p kidney transplant 2/25/22.  Patient's significant other, Lavonne, accompanied him.  Patient reports incisional pain.  Patient says that he that he is sleeping well.  Incision intact with staples. .  Patient that he is able to explain daily incision care and showering instructions.  Reviewed I&O monitoring, measuring, and recording, and the need for hydration (i.e., at least 2 liters of water daily with minimal caffeine and no grapefruit products).  Medication list and rationale were reviewed.  Patient did bring blue medication card and medication bottles for review.  Patient reports that he has not stopped Dulcolax and has not continued Colace.  Patient has had a bowel movement.  Patient expressed understanding of daily care including BID VS, medications, and I&O documentation.  Patient made aware of today's creatinine level: 3.7.  Patient aware that coordinator will review today's labs with a transplant physician and call the patient with any dose changes indicated.  Next lab appointment scheduled for 3/7/22.  First post-operative transplant team appointment with labs scheduled for 3/7/22.    Using the Kidney Transplant Patient Reference Manual, the patient submitted his open book "Self-assessment of Kidney Transplant Patient Knowledge" test, which was completed in the transplant clinic this morning before 1st nursing visit.  This test includes questions regarding critical dose medications commonly used after kidney transplant, medication dosing and side effects, importance of timed lab draws, important signs and symptoms to report 24/7 immediately post-transplant as well as how to contact the transplant team 24/7.    Test Score: 22/25    After completing the test, the patient was given a copy of the Self-assessment Answer Key to reinforce accurate learning of test content.  Patient expressed his understanding " of the value of the information included in the self-assessment test.

## 2022-03-02 NOTE — PROGRESS NOTES
Increase prograf to 8 mg PO bid   Encourage hydration  Add iron studies ferritin to the labs today   Lower KPN to 1 tab bid

## 2022-03-02 NOTE — PROGRESS NOTES
Clinic Note: First Return to Clinic Post-  Kidney Transplant    Tameka Saenz  is a 21 y.o. male  S/p  RIGHT KIDNEY  transplant on 2/25/2022 (Kidney) for Hypertensive Nephrosclerosis.      Discharge Course (Issues/Concerns): No issues or concerns since discharge.     Objective:   Vitals:    03/02/22 0919   BP: (!) 182/98   Pulse: 103   Resp: 16   Temp: 97.3 °F (36.3 °C)       Met with patient and his caregiver in the clinic to review current medication list.     Current Outpatient Medications   Medication Sig Dispense Refill    cinacalcet (SENSIPAR) 30 MG Tab Take 1 tablet (30 mg total) by mouth every evening. 30 tablet 11    docusate sodium (COLACE) 100 MG capsule Take 1 capsule (100 mg total) by mouth 3 (three) times daily as needed for Constipation.  0    famotidine (PEPCID) 20 MG tablet Take 1 tablet (20 mg total) by mouth every evening. 30 tablet 1    gabapentin (NEURONTIN) 300 MG capsule Take 1 capsule (300 mg total) by mouth 2 (two) times daily. 60 capsule 2    k phos di & mono-sod phos mono (K-PHOS-NEUTRAL) 250 mg Tab Take 2 tablets by mouth 2 (two) times a day. 120 tablet 5    mycophenolate (CELLCEPT) 250 mg Cap Take 4 capsules (1,000 mg total) by mouth 2 (two) times daily. 240 capsule 11    nebivoloL (BYSTOLIC) 5 MG Tab Take 1 tablet (5 mg total) by mouth once daily. 30 tablet 11    NIFEdipine (PROCARDIA-XL) 30 MG (OSM) 24 hr tablet Take 1 tablet (30 mg total) by mouth 2 (two) times a day. 60 tablet 11    nystatin (MYCOSTATIN) 100,000 unit/mL suspension Take 5 mLs (500,000 Units total) by mouth 4 (four) times daily. Stop 3/31/22 680 mL 0    oxyCODONE (ROXICODONE) 10 mg Tab immediate release tablet Take 1 tablet (10 mg total) by mouth every 4 (four) hours as needed for Pain. 28 tablet 0    predniSONE (DELTASONE) 5 MG tablet Take by mouth daily: 20mg 3/1-3/31, 15mg 4/1-4/30, 10mg 5/1-5/31, 5mg 6/1/2022- 120 tablet 11    sulfamethoxazole-trimethoprim 400-80mg (BACTRIM,SEPTRA) 400-80 mg per  tablet Take 1 tablet by mouth once daily. Stop 8/24/2022 30 tablet 5    tacrolimus (PROGRAF) 1 MG Cap Take 7 capsules (7 mg total) by mouth every 12 (twelve) hours. 420 capsule 11    valGANciclovir (VALCYTE) 450 mg Tab Take 1 tablet (450 mg total) by mouth once daily. Stop 8/24/22 30 tablet 5     No current facility-administered medications for this visit.       Pharmacy Interventions/Recommendations:     1) Graft Function & Immunosuppression Issues: Patient received thymoglobulin induction. Patient is on tacrolimus 7 mg PO BID, Cellcept 1000 mg BID and Prednisone taper.  Patient's WBC still low at 2.25 (up from 2.0) and platelets improving (post-thymoglobulin).  Patient's SCr down to 3.7 from 4.3 and making good urine.     2) Opportunistic Infection prophylaxis:   PCP ppx: Bactrim until 8/24/22  CMV ppx: Valcyte until 8/24/22 - repeat CMV IgG pending  Fungal ppx: Nystatin until 3/31/22    3) Donor Serologies & Monitoring:     Donor CMV Status: Positive  Donor HCV Status: Negative  Donor HBcAb: Negative  Donor HBV PORFIRIO: Negative  Donor HCV PORFIRIO: Negative    4) Pain Management & Bowel Regimen: Patient discharged on oxycodone 10 mg q4-6 PRN for pain as well as Gabapentin 300 mg BID.  Patient states pain is controlled and having bowel movements.     5) Blood Pressure Management: Patient is on bystolic 5 mg daily and nifedipine 30 mg BID -- patient's BP high but that was about 10 minutes after taking his BP meds.     6) Blood Sugar Management & Follow-up: WNL without medications    7) Electrolyte Management: Patient started on  mg PO BID and Sensipar 30 mg daily.  Patient has sensipar from dialysis which he will use until PA approved.  Patient encouraged to start phos diet.     8) Osteoporosis Prevention Strategy (Liver Transplant): N/A    8) OTHER medication follow-up (patient assistance, held medications, etc): Sensipar requires a PA. Patient has a repeat CMV IgG pending.     9) Reinforced medication education  conducted in the hospital, including medication indications, dosing, administration, side effects, monitoring-- including timing of immunosuppressant levels.     Patient received their FIRST fill of medications from [facility].  Discussed the process for obtaining refills of medications, including verifying the dose and mailing address to have medications delivered.     Tameka and his caregivers verbalized understanding and had the opportunity to ask questions.

## 2022-03-03 ENCOUNTER — CLINICAL SUPPORT (OUTPATIENT)
Dept: TRANSPLANT | Facility: CLINIC | Age: 22
End: 2022-03-03
Payer: MEDICARE

## 2022-03-03 VITALS
BODY MASS INDEX: 28.73 KG/M2 | HEART RATE: 80 BPM | SYSTOLIC BLOOD PRESSURE: 138 MMHG | HEIGHT: 69 IN | OXYGEN SATURATION: 100 % | DIASTOLIC BLOOD PRESSURE: 80 MMHG | WEIGHT: 194 LBS | TEMPERATURE: 97 F | RESPIRATION RATE: 16 BRPM

## 2022-03-03 DIAGNOSIS — D63.8 ANEMIA OF CHRONIC DISEASE: Primary | ICD-10-CM

## 2022-03-03 PROCEDURE — 96372 THER/PROPH/DIAG INJ SC/IM: CPT | Mod: PBBFAC

## 2022-03-03 PROCEDURE — 99999 PR PBB SHADOW E&M-EST. PATIENT-LVL III: CPT | Mod: PBBFAC,,,

## 2022-03-03 PROCEDURE — 99999 PR PBB SHADOW E&M-EST. PATIENT-LVL III: ICD-10-PCS | Mod: PBBFAC,,,

## 2022-03-03 RX ADMIN — EPOETIN ALFA-EPBX 10000 UNITS: 10000 INJECTION, SOLUTION INTRAVENOUS; SUBCUTANEOUS at 09:03

## 2022-03-04 PROBLEM — Z76.82 PATIENT ON WAITING LIST FOR KIDNEY TRANSPLANT: Chronic | Status: RESOLVED | Noted: 2019-08-30 | Resolved: 2022-03-04

## 2022-03-04 LAB — CMV IGG SERPL QL IA: NORMAL

## 2022-03-07 ENCOUNTER — PATIENT MESSAGE (OUTPATIENT)
Dept: TRANSPLANT | Facility: CLINIC | Age: 22
End: 2022-03-07
Payer: MEDICARE

## 2022-03-07 ENCOUNTER — TELEPHONE (OUTPATIENT)
Dept: TRANSPLANT | Facility: CLINIC | Age: 22
End: 2022-03-07
Payer: MEDICARE

## 2022-03-07 ENCOUNTER — HOSPITAL ENCOUNTER (OUTPATIENT)
Facility: HOSPITAL | Age: 22
Discharge: HOME | End: 2022-03-08
Attending: EMERGENCY MEDICINE | Admitting: TRANSPLANT SURGERY
Payer: MEDICARE

## 2022-03-07 DIAGNOSIS — D69.6 THROMBOCYTOPENIA: ICD-10-CM

## 2022-03-07 DIAGNOSIS — Z91.89 AT RISK FOR OPPORTUNISTIC INFECTIONS: ICD-10-CM

## 2022-03-07 DIAGNOSIS — D63.8 ANEMIA OF CHRONIC DISEASE: ICD-10-CM

## 2022-03-07 DIAGNOSIS — Z29.89 PROPHYLACTIC IMMUNOTHERAPY: ICD-10-CM

## 2022-03-07 DIAGNOSIS — Z94.0 KIDNEY REPLACED BY TRANSPLANT: ICD-10-CM

## 2022-03-07 DIAGNOSIS — Z79.899 IMMUNOSUPPRESSION DUE TO DRUG THERAPY: ICD-10-CM

## 2022-03-07 DIAGNOSIS — I15.1 HYPERTENSION SECONDARY TO OTHER RENAL DISORDERS: ICD-10-CM

## 2022-03-07 DIAGNOSIS — Z94.0 S/P KIDNEY TRANSPLANT: ICD-10-CM

## 2022-03-07 DIAGNOSIS — D84.821 IMMUNOSUPPRESSION DUE TO DRUG THERAPY: ICD-10-CM

## 2022-03-07 DIAGNOSIS — B35.3 TINEA PEDIS OF BOTH FEET: ICD-10-CM

## 2022-03-07 DIAGNOSIS — E87.5 HYPERKALEMIA: Primary | ICD-10-CM

## 2022-03-07 PROBLEM — R19.7 DIARRHEA: Status: ACTIVE | Noted: 2022-03-07

## 2022-03-07 LAB
ALBUMIN SERPL BCP-MCNC: 3.9 G/DL (ref 3.5–5.2)
ALP SERPL-CCNC: 96 U/L (ref 55–135)
ALT SERPL W/O P-5'-P-CCNC: 45 U/L (ref 10–44)
ANION GAP SERPL CALC-SCNC: 9 MMOL/L (ref 8–16)
ANION GAP SERPL CALC-SCNC: 9 MMOL/L (ref 8–16)
AST SERPL-CCNC: 15 U/L (ref 10–40)
BACTERIA #/AREA URNS AUTO: ABNORMAL /HPF
BILIRUB SERPL-MCNC: 0.4 MG/DL (ref 0.1–1)
BILIRUB UR QL STRIP: NEGATIVE
BUN SERPL-MCNC: 41 MG/DL (ref 6–20)
BUN SERPL-MCNC: 42 MG/DL (ref 6–30)
BUN SERPL-MCNC: 43 MG/DL (ref 6–20)
CALCIUM SERPL-MCNC: 9.5 MG/DL (ref 8.7–10.5)
CALCIUM SERPL-MCNC: 9.5 MG/DL (ref 8.7–10.5)
CHLORIDE SERPL-SCNC: 106 MMOL/L (ref 95–110)
CHLORIDE SERPL-SCNC: 107 MMOL/L (ref 95–110)
CHLORIDE SERPL-SCNC: 107 MMOL/L (ref 95–110)
CLARITY UR REFRACT.AUTO: CLEAR
CO2 SERPL-SCNC: 19 MMOL/L (ref 23–29)
CO2 SERPL-SCNC: 21 MMOL/L (ref 23–29)
COLOR UR AUTO: YELLOW
CREAT SERPL-MCNC: 2.7 MG/DL (ref 0.5–1.4)
CREAT SERPL-MCNC: 2.9 MG/DL (ref 0.5–1.4)
CREAT SERPL-MCNC: 3.1 MG/DL (ref 0.5–1.4)
CTP QC/QA: YES
EST. GFR  (AFRICAN AMERICAN): 33.7 ML/MIN/1.73 M^2
EST. GFR  (AFRICAN AMERICAN): 36.7 ML/MIN/1.73 M^2
EST. GFR  (NON AFRICAN AMERICAN): 29.2 ML/MIN/1.73 M^2
EST. GFR  (NON AFRICAN AMERICAN): 31.8 ML/MIN/1.73 M^2
GLUCOSE SERPL-MCNC: 135 MG/DL (ref 70–110)
GLUCOSE SERPL-MCNC: 89 MG/DL (ref 70–110)
GLUCOSE SERPL-MCNC: 92 MG/DL (ref 70–110)
GLUCOSE UR QL STRIP: NEGATIVE
HCT VFR BLD CALC: 26 %PCV (ref 36–54)
HGB UR QL STRIP: ABNORMAL
KETONES UR QL STRIP: NEGATIVE
LEUKOCYTE ESTERASE UR QL STRIP: NEGATIVE
MAGNESIUM SERPL-MCNC: 1.6 MG/DL (ref 1.6–2.6)
MICROSCOPIC COMMENT: ABNORMAL
NITRITE UR QL STRIP: NEGATIVE
PH UR STRIP: 5 [PH] (ref 5–8)
POC IONIZED CALCIUM: 1.26 MMOL/L (ref 1.06–1.42)
POC TCO2 (MEASURED): 23 MMOL/L (ref 23–29)
POCT GLUCOSE: 167 MG/DL (ref 70–110)
POCT GLUCOSE: 168 MG/DL (ref 70–110)
POCT GLUCOSE: 329 MG/DL (ref 70–110)
POCT GLUCOSE: 85 MG/DL (ref 70–110)
POTASSIUM BLD-SCNC: 5.8 MMOL/L (ref 3.5–5.1)
POTASSIUM SERPL-SCNC: 5.8 MMOL/L (ref 3.5–5.1)
POTASSIUM SERPL-SCNC: 6.4 MMOL/L (ref 3.5–5.1)
PROT SERPL-MCNC: 7.6 G/DL (ref 6–8.4)
PROT UR QL STRIP: NEGATIVE
RBC #/AREA URNS AUTO: 64 /HPF (ref 0–4)
SAMPLE: ABNORMAL
SARS-COV-2 RDRP RESP QL NAA+PROBE: NEGATIVE
SODIUM BLD-SCNC: 137 MMOL/L (ref 136–145)
SODIUM SERPL-SCNC: 135 MMOL/L (ref 136–145)
SODIUM SERPL-SCNC: 136 MMOL/L (ref 136–145)
SP GR UR STRIP: 1.01 (ref 1–1.03)
SQUAMOUS #/AREA URNS AUTO: 0 /HPF
URN SPEC COLLECT METH UR: ABNORMAL
WBC #/AREA URNS AUTO: 4 /HPF (ref 0–5)

## 2022-03-07 PROCEDURE — 80100014 HC HEMODIALYSIS 1:1

## 2022-03-07 PROCEDURE — G0378 HOSPITAL OBSERVATION PER HR: HCPCS

## 2022-03-07 PROCEDURE — 96361 HYDRATE IV INFUSION ADD-ON: CPT

## 2022-03-07 PROCEDURE — 93010 ELECTROCARDIOGRAM REPORT: CPT | Mod: ,,, | Performed by: INTERNAL MEDICINE

## 2022-03-07 PROCEDURE — 81001 URINALYSIS AUTO W/SCOPE: CPT | Performed by: PHYSICIAN ASSISTANT

## 2022-03-07 PROCEDURE — 83735 ASSAY OF MAGNESIUM: CPT | Performed by: PHYSICIAN ASSISTANT

## 2022-03-07 PROCEDURE — 96372 THER/PROPH/DIAG INJ SC/IM: CPT | Performed by: PHYSICIAN ASSISTANT

## 2022-03-07 PROCEDURE — G0257 UNSCHED DIALYSIS ESRD PT HOS: HCPCS

## 2022-03-07 PROCEDURE — 80047 BASIC METABLC PNL IONIZED CA: CPT

## 2022-03-07 PROCEDURE — 99285 EMERGENCY DEPT VISIT HI MDM: CPT | Mod: CS,,, | Performed by: PHYSICIAN ASSISTANT

## 2022-03-07 PROCEDURE — 63600175 PHARM REV CODE 636 W HCPCS: Performed by: PHYSICIAN ASSISTANT

## 2022-03-07 PROCEDURE — 99285 PR EMERGENCY DEPT VISIT,LEVEL V: ICD-10-PCS | Mod: CS,,, | Performed by: PHYSICIAN ASSISTANT

## 2022-03-07 PROCEDURE — 96375 TX/PRO/DX INJ NEW DRUG ADDON: CPT | Mod: 59

## 2022-03-07 PROCEDURE — 25000003 PHARM REV CODE 250: Performed by: EMERGENCY MEDICINE

## 2022-03-07 PROCEDURE — 82955 ASSAY OF G6PD ENZYME: CPT | Performed by: PHYSICIAN ASSISTANT

## 2022-03-07 PROCEDURE — 96375 TX/PRO/DX INJ NEW DRUG ADDON: CPT

## 2022-03-07 PROCEDURE — 93010 EKG 12-LEAD: ICD-10-PCS | Mod: ,,, | Performed by: INTERNAL MEDICINE

## 2022-03-07 PROCEDURE — 25000003 PHARM REV CODE 250: Performed by: PHYSICIAN ASSISTANT

## 2022-03-07 PROCEDURE — U0002 COVID-19 LAB TEST NON-CDC: HCPCS | Performed by: PHYSICIAN ASSISTANT

## 2022-03-07 PROCEDURE — 25000003 PHARM REV CODE 250: Performed by: INTERNAL MEDICINE

## 2022-03-07 PROCEDURE — 94640 AIRWAY INHALATION TREATMENT: CPT

## 2022-03-07 PROCEDURE — 96367 TX/PROPH/DG ADDL SEQ IV INF: CPT

## 2022-03-07 PROCEDURE — 25000242 PHARM REV CODE 250 ALT 637 W/ HCPCS: Performed by: PHYSICIAN ASSISTANT

## 2022-03-07 PROCEDURE — 96376 TX/PRO/DX INJ SAME DRUG ADON: CPT

## 2022-03-07 PROCEDURE — 82962 GLUCOSE BLOOD TEST: CPT | Mod: 91

## 2022-03-07 PROCEDURE — 82330 ASSAY OF CALCIUM: CPT

## 2022-03-07 PROCEDURE — 99285 EMERGENCY DEPT VISIT HI MDM: CPT | Mod: 25,CS

## 2022-03-07 PROCEDURE — 94761 N-INVAS EAR/PLS OXIMETRY MLT: CPT

## 2022-03-07 PROCEDURE — 80048 BASIC METABOLIC PNL TOTAL CA: CPT | Performed by: PHYSICIAN ASSISTANT

## 2022-03-07 PROCEDURE — 80053 COMPREHEN METABOLIC PANEL: CPT | Performed by: PHYSICIAN ASSISTANT

## 2022-03-07 PROCEDURE — 93005 ELECTROCARDIOGRAM TRACING: CPT

## 2022-03-07 PROCEDURE — 96365 THER/PROPH/DIAG IV INF INIT: CPT | Mod: 59

## 2022-03-07 RX ORDER — ALBUTEROL SULFATE 2.5 MG/.5ML
15 SOLUTION RESPIRATORY (INHALATION)
Status: COMPLETED | OUTPATIENT
Start: 2022-03-07 | End: 2022-03-07

## 2022-03-07 RX ORDER — ACETAMINOPHEN 325 MG/1
650 TABLET ORAL EVERY 8 HOURS PRN
Status: DISCONTINUED | OUTPATIENT
Start: 2022-03-07 | End: 2022-03-08 | Stop reason: HOSPADM

## 2022-03-07 RX ORDER — MYCOPHENOLATE MOFETIL 250 MG/1
1000 CAPSULE ORAL 2 TIMES DAILY
Status: DISCONTINUED | OUTPATIENT
Start: 2022-03-07 | End: 2022-03-08 | Stop reason: HOSPADM

## 2022-03-07 RX ORDER — TACROLIMUS 1 MG/1
7 CAPSULE ORAL EVERY 12 HOURS
Qty: 420 CAPSULE | Refills: 11 | Status: SHIPPED | OUTPATIENT
Start: 2022-03-07 | End: 2022-03-10

## 2022-03-07 RX ORDER — TACROLIMUS 1 MG/1
7 CAPSULE ORAL 2 TIMES DAILY
Status: DISCONTINUED | OUTPATIENT
Start: 2022-03-07 | End: 2022-03-08 | Stop reason: HOSPADM

## 2022-03-07 RX ORDER — SODIUM CHLORIDE 9 MG/ML
INJECTION, SOLUTION INTRAVENOUS ONCE
Status: CANCELLED | OUTPATIENT
Start: 2022-03-07 | End: 2022-03-07

## 2022-03-07 RX ORDER — MUPIROCIN 20 MG/G
OINTMENT TOPICAL 2 TIMES DAILY
Status: DISCONTINUED | OUTPATIENT
Start: 2022-03-07 | End: 2022-03-08 | Stop reason: HOSPADM

## 2022-03-07 RX ORDER — SODIUM CHLORIDE 9 MG/ML
INJECTION, SOLUTION INTRAVENOUS
Status: CANCELLED | OUTPATIENT
Start: 2022-03-07

## 2022-03-07 RX ORDER — FUROSEMIDE 10 MG/ML
100 INJECTION INTRAMUSCULAR; INTRAVENOUS ONCE
Status: DISCONTINUED | OUTPATIENT
Start: 2022-03-07 | End: 2022-03-07

## 2022-03-07 RX ORDER — METOPROLOL TARTRATE 25 MG/1
25 TABLET, FILM COATED ORAL 2 TIMES DAILY
Refills: 11 | Status: DISCONTINUED | OUTPATIENT
Start: 2022-03-07 | End: 2022-03-08 | Stop reason: HOSPADM

## 2022-03-07 RX ORDER — PREDNISONE 20 MG/1
20 TABLET ORAL DAILY
Status: DISCONTINUED | OUTPATIENT
Start: 2022-03-08 | End: 2022-03-08 | Stop reason: HOSPADM

## 2022-03-07 RX ORDER — NYSTATIN 100000 [USP'U]/ML
5 SUSPENSION ORAL 4 TIMES DAILY
Status: DISCONTINUED | OUTPATIENT
Start: 2022-03-07 | End: 2022-03-08 | Stop reason: HOSPADM

## 2022-03-07 RX ORDER — FUROSEMIDE 10 MG/ML
100 INJECTION INTRAMUSCULAR; INTRAVENOUS ONCE
Status: COMPLETED | OUTPATIENT
Start: 2022-03-07 | End: 2022-03-07

## 2022-03-07 RX ORDER — FAMOTIDINE 20 MG/1
20 TABLET, FILM COATED ORAL NIGHTLY
Status: DISCONTINUED | OUTPATIENT
Start: 2022-03-07 | End: 2022-03-08 | Stop reason: HOSPADM

## 2022-03-07 RX ORDER — SODIUM CHLORIDE 9 MG/ML
INJECTION, SOLUTION INTRAVENOUS CONTINUOUS
Status: DISCONTINUED | OUTPATIENT
Start: 2022-03-07 | End: 2022-03-07

## 2022-03-07 RX ORDER — TALC
6 POWDER (GRAM) TOPICAL NIGHTLY PRN
Status: DISCONTINUED | OUTPATIENT
Start: 2022-03-07 | End: 2022-03-08 | Stop reason: HOSPADM

## 2022-03-07 RX ORDER — OXYCODONE HYDROCHLORIDE 5 MG/1
5 TABLET ORAL EVERY 6 HOURS PRN
Status: DISCONTINUED | OUTPATIENT
Start: 2022-03-07 | End: 2022-03-08 | Stop reason: HOSPADM

## 2022-03-07 RX ORDER — HEPARIN SODIUM 5000 [USP'U]/ML
5000 INJECTION, SOLUTION INTRAVENOUS; SUBCUTANEOUS EVERY 8 HOURS
Status: DISCONTINUED | OUTPATIENT
Start: 2022-03-07 | End: 2022-03-08 | Stop reason: HOSPADM

## 2022-03-07 RX ORDER — SODIUM CHLORIDE 0.9 % (FLUSH) 0.9 %
10 SYRINGE (ML) INJECTION
Status: DISCONTINUED | OUTPATIENT
Start: 2022-03-07 | End: 2022-03-08 | Stop reason: HOSPADM

## 2022-03-07 RX ORDER — GABAPENTIN 300 MG/1
300 CAPSULE ORAL 2 TIMES DAILY
Status: DISCONTINUED | OUTPATIENT
Start: 2022-03-07 | End: 2022-03-08 | Stop reason: HOSPADM

## 2022-03-07 RX ORDER — CINACALCET 30 MG/1
30 TABLET, FILM COATED ORAL NIGHTLY
Status: DISCONTINUED | OUTPATIENT
Start: 2022-03-07 | End: 2022-03-08 | Stop reason: HOSPADM

## 2022-03-07 RX ORDER — VALGANCICLOVIR 450 MG/1
450 TABLET, FILM COATED ORAL DAILY
Status: DISCONTINUED | OUTPATIENT
Start: 2022-03-08 | End: 2022-03-08 | Stop reason: HOSPADM

## 2022-03-07 RX ORDER — ONDANSETRON 8 MG/1
8 TABLET, ORALLY DISINTEGRATING ORAL EVERY 8 HOURS PRN
Status: DISCONTINUED | OUTPATIENT
Start: 2022-03-07 | End: 2022-03-08 | Stop reason: HOSPADM

## 2022-03-07 RX ORDER — SODIUM BICARBONATE 650 MG/1
650 TABLET ORAL 2 TIMES DAILY
Status: DISCONTINUED | OUTPATIENT
Start: 2022-03-07 | End: 2022-03-08 | Stop reason: HOSPADM

## 2022-03-07 RX ORDER — NIFEDIPINE 30 MG/1
30 TABLET, EXTENDED RELEASE ORAL 2 TIMES DAILY
Status: DISCONTINUED | OUTPATIENT
Start: 2022-03-07 | End: 2022-03-08 | Stop reason: HOSPADM

## 2022-03-07 RX ADMIN — ALBUTEROL SULFATE 15 MG: 2.5 SOLUTION RESPIRATORY (INHALATION) at 12:03

## 2022-03-07 RX ADMIN — MUPIROCIN: 20 OINTMENT TOPICAL at 09:03

## 2022-03-07 RX ADMIN — NYSTATIN 500000 UNITS: 500000 SUSPENSION ORAL at 04:03

## 2022-03-07 RX ADMIN — DEXTROSE MONOHYDRATE 25 G: 25 INJECTION, SOLUTION INTRAVENOUS at 08:03

## 2022-03-07 RX ADMIN — CINACALCET 30 MG: 30 TABLET, FILM COATED ORAL at 09:03

## 2022-03-07 RX ADMIN — HEPARIN SODIUM 5000 UNITS: 5000 INJECTION INTRAVENOUS; SUBCUTANEOUS at 09:03

## 2022-03-07 RX ADMIN — FUROSEMIDE 100 MG: 10 INJECTION INTRAMUSCULAR; INTRAVENOUS at 09:03

## 2022-03-07 RX ADMIN — MYCOPHENOLATE MOFETIL 1000 MG: 250 CAPSULE ORAL at 09:03

## 2022-03-07 RX ADMIN — CALCIUM GLUCONATE 1 G: 98 INJECTION, SOLUTION INTRAVENOUS at 09:03

## 2022-03-07 RX ADMIN — SODIUM BICARBONATE 650 MG TABLET 650 MG: at 09:03

## 2022-03-07 RX ADMIN — NYSTATIN 500000 UNITS: 500000 SUSPENSION ORAL at 09:03

## 2022-03-07 RX ADMIN — GABAPENTIN 300 MG: 300 CAPSULE ORAL at 09:03

## 2022-03-07 RX ADMIN — SODIUM CHLORIDE: 0.9 INJECTION, SOLUTION INTRAVENOUS at 04:03

## 2022-03-07 RX ADMIN — FAMOTIDINE 20 MG: 20 TABLET, FILM COATED ORAL at 09:03

## 2022-03-07 RX ADMIN — METOPROLOL TARTRATE 25 MG: 25 TABLET, FILM COATED ORAL at 09:03

## 2022-03-07 RX ADMIN — INSULIN HUMAN 8.53 UNITS: 100 INJECTION, SOLUTION PARENTERAL at 08:03

## 2022-03-07 RX ADMIN — DEXTROSE 250 ML: 10 SOLUTION INTRAVENOUS at 02:03

## 2022-03-07 RX ADMIN — TACROLIMUS 7 MG: 5 CAPSULE ORAL at 07:03

## 2022-03-07 RX ADMIN — INSULIN HUMAN 5 UNITS: 100 INJECTION, SOLUTION PARENTERAL at 02:03

## 2022-03-07 NOTE — TELEPHONE ENCOUNTER
Spoke to patient and explained the need to have him go to the ER for hyperkalemia.  Patient states he feels fine and does not have any complaints.  Instructed patient to discontinue KPN and bactrim until further notice per Dr. Mi' written order.  Pt verbalized understanding.       ----- Message from Nicole KEEN Do, RN sent at 3/7/2022 11:27 AM CST -----    ----- Message -----  From: Mo Mi MD  Sent: 3/7/2022  10:43 AM CST  To: Abida Cazares RN, #    Please bring the patient to the ER STAT for treatment of hyperkalemia and observation for 24 hrs   Why he went 5 days w/o any labs??? Did he miss the appointments? Or he went to other lab?  D/c KPN and d/c Bactrim

## 2022-03-07 NOTE — ED PROVIDER NOTES
Encounter Date: 3/7/2022       History     Chief Complaint   Patient presents with    Hyperkalemia     Kidney transplant 2 weeks ago     This is a 21 year old male with a PMH of HTN, ESRD s/p kidney transplant presenting to the ED as referral from transplant nephrology with hyperkalemia on outpatient labs. Patient reports he is compliant with his medications including cellcept, tacrolimus and prednisone for immunosuppression. Has been on k-phos supplementation and bactrim, which were discontinued upon recognition of hyperkalemia. He reports frequent urination and a few episodes of diarrhea, which he attributes to the colace. He reports normal appetite and oral intake. He has mild incisional pain. Denies fever, chills, URI symptoms, chest pain, SOB, nausea/vomiting, abdominal pain, dysuria, testicle pain, scrotal swelling, edema or additional complaints.        Review of patient's allergies indicates:  No Known Allergies  Past Medical History:   Diagnosis Date    Acne     Anemia of renal disease     Dialysis patient     peritoneal daily at night. followed by Dr. Sotelo    ESRD on dialysis since 12/16/15     Hepatosplenomegaly     Hypertension     Kidney disease     Seasonal allergies     Secondary hyperparathyroidism of renal origin     Thrombocytopenia, unspecified      Past Surgical History:   Procedure Laterality Date    BRONCHOSCOPY N/A 10/7/2019    Procedure: Bronchoscopy;  Surgeon: Anjum Cali MD;  Location: [facility] ENDO;  Service: Pulmonary;  Laterality: N/A;    BRONCHOSCOPY N/A 3/8/2020    Procedure: Bronchoscopy- in ice on drip;  Surgeon: Vania Larkin MD;  Location: [facility];  Service: Pulmonary;  Laterality: N/A;    KIDNEY TRANSPLANT N/A 2/25/2022    Procedure: TRANSPLANT, KIDNEY;  Surgeon: Frankie Snider MD;  Location: [facility] OR [address] [place] [address];  Service: Transplant;  Laterality: N/A;    LAPAROSCOPY N/A 9/27/2018    Procedure: LAPAROSCOPY  - Atempted Laparoscopic Peritoneal Dialysis  Catheter Placement;  Surgeon: Drew Soliz MD;  Location: [facility] OR;  Service: General;  Laterality: N/A;    PERITONEAL CATHETER INSERTION      PERITONEAL CATHETER REMOVAL N/A 8/21/2018    Procedure: REMOVAL, CATHETER, DIALYSIS, PERITONEAL;  Surgeon: Glendy Romero MD;  Location: [facility] OR;  Service: General;  Laterality: N/A;    PERITONEAL CATHETER REMOVAL  08/2018    [facility]  12[facility]2015    [facility]    WISDOM TOOTH EXTRACTION  07/2018     Family History   Problem Relation Age of Onset    Fibromyalgia Mother     No Known Problems Father     No Known Problems Sister     Diabetes Maternal Aunt         diagnosed in her 50s    Hypertension Maternal Aunt     Hypertension Maternal Grandmother     Heart disease Maternal Grandmother     Diabetes Maternal Grandmother         diagnosed in her 70s    Hyperlipidemia Maternal Grandmother     No Known Problems Brother     Sickle cell anemia Paternal Aunt     Kidney disease Neg Hx     Cancer Neg Hx     Stroke Neg Hx      Social History     Tobacco Use    Smoking status: Never Smoker    Smokeless tobacco: Never Used   Substance Use Topics    Alcohol use: No    Drug use: Never     Review of Systems   Constitutional: Negative for chills and fever.   HENT: Negative for sore throat.    Respiratory: Negative for shortness of breath.    Cardiovascular: Negative for chest pain.   Gastrointestinal: Positive for diarrhea. Negative for nausea and vomiting.   Genitourinary: Positive for frequency. Negative for dysuria.   Musculoskeletal: Negative for back pain.   Skin: Positive for wound (incision).   Allergic/Immunologic: Positive for immunocompromised state.   Neurological: Negative for weakness.       Physical Exam     Initial Vitals [03/07/22 1128]   BP Pulse Resp Temp SpO2   (!) 144/65 89 18 97.4 °F (36.3 °C) 100 %      MAP       --         Physical Exam    Constitutional: He appears well-developed and well-nourished. No distress.   HENT:    Head: Atraumatic.   Eyes: Conjunctivae and EOM are normal. Pupils are equal, round, and reactive to light.   Cardiovascular: Normal rate, regular rhythm and normal heart sounds.   Pulmonary/Chest: Breath sounds normal. No respiratory distress. He has no wheezes. He has no rhonchi. He has no rales.   Abdominal: Abdomen is soft. Bowel sounds are normal. There is no abdominal tenderness.     Neurological: He is alert and oriented to person, place, and time.   Skin: Skin is warm and dry. No rash noted.         ED Course   Procedures  Labs Reviewed   COMPREHENSIVE METABOLIC PANEL - Abnormal; Notable for the following components:       Result Value    Potassium 5.8 (*)     CO2 21 (*)     BUN 43 (*)     Creatinine 2.9 (*)     ALT 45 (*)     eGFR if  33.7 (*)     eGFR if non  29.2 (*)     All other components within normal limits   URINALYSIS, REFLEX TO URINE CULTURE - Abnormal; Notable for the following components:    Occult Blood UA 3+ (*)     All other components within normal limits    Narrative:     Specimen Source->Urine   URINALYSIS MICROSCOPIC - Abnormal; Notable for the following components:    RBC, UA 64 (*)     All other components within normal limits    Narrative:     Specimen Source->Urine   ISTAT PROCEDURE - Abnormal; Notable for the following components:    POC BUN 42 (*)     POC Creatinine 3.1 (*)     POC Potassium 5.8 (*)     POC Hematocrit 26 (*)     All other components within normal limits   MAGNESIUM   G6PD,QUANTITATIVE   BASIC METABOLIC PANEL   SARS-COV-2 RDRP GENE   ISTAT CHEM8   POCT GLUCOSE MONITORING CONTINUOUS        ECG Results          EKG 12-lead (In process)  Result time 03/07/22 12:37:51    In process by Interface, Lab In [facility] (03/07/22 12:37:51)                 Narrative:    Test Reason : E87.5,    Vent. Rate : 085 BPM     Atrial Rate : 085 BPM     P-R Int : 152 ms          QRS Dur : 090 ms      QT Int : 338 ms       P-R-T Axes : 053 023 -10 degrees      QTc Int : 402 ms    Normal sinus rhythm  T wave abnormality, consider inferior ischemia  Abnormal ECG  When compared with ECG of 25-FEB-2022 13:08,  Nonspecific T wave abnormality now evident in Lateral leads    Referred By: [email or web address]   SELF           Confirmed By:                             Imaging Results    None          Medications   sodium chloride 0.9% flush 10 mL (has no administration in time range)   ondansetron disintegrating tablet 8 mg (has no administration in time range)   melatonin tablet 6 mg (has no administration in time range)   acetaminophen tablet 650 mg (has no administration in time range)   heparin (porcine) injection 5,000 Units (has no administration in time range)   sodium zirconium cyclosilicate packet 10 g (has no administration in time range)   0.9%  NaCl infusion (has no administration in time range)   insulin regular injection 5 Units (5 Units Intravenous Given 3/7/22 1430)   albuterol sulfate nebulizer solution 15 mg (15 mg Nebulization Given 3/7/22 1257)   dextrose 10% bolus 250 mL (0 mLs Intravenous Stopped 3/7/22 1429)           APC / Resident Notes:   21 year old kidney transplant patient sent for hyperkalemia on labs.  Discussed with transplant surgery, will place in observation.    Potassium shifted in ED with albuterol, insulin, and dextrose.  I discussed the care of this patient with my supervising MD.                  Clinical Impression:   Final diagnoses:  [E87.5] Hyperkalemia          ED Disposition Condition    Observation               Janet Gómez PA-C  03/07/22 1432

## 2022-03-07 NOTE — SUBJECTIVE & OBJECTIVE
Subjective:     Chief Complaint/Reason for Admission: Hyperkalemia    History of Present Illness:  Mr. Saenz is a 22 yo black male with a history of ESRD d/t HTN who is now s/p DBD kidney transplant on 2/25/22, out of OR approx. 11:30 pm (Thymo induction, CIT 7 hr. 4 min., CPRA 54%, KDPI 4%, CMV D+,R-). Pt with history of hepatosplenomegaly with thrombocytopenia, given 1 unit platelets intra-op. Intra-op notable for A small superior pole artery was ligated to allow for shortening of the renal artery. The kidney was initially dusky after reperfusion, which improved with intra-arterial administration of verapamil in the iliac artery proximal to the anastomosis. At this point the kidney appeared very well perfused with good turgor. Doppler signals were assessed and excellent over the entire cortex. There was note of ~2x3cm area of decreased perfusion at the superior pole corresponding to the small superior pole artery that was ligated. Patient progressed well post-op and Creatinine trended down. The patient was discharged on 3/1 with Cr down to 4.3, K at the time was 4.8. The patient now presents to the ER after routine outpatient labs this AM with hyperkalemia, K 6.2. Repeat labs in ER with K 5.8. Patient given albuterol and shifted with dextrose/insulin. Creatinine continues to trend down and is 2.9 in ER which is best since transplant. Discussed plan of care with transplant nephrologist, plan to admit to observation to monitor electrolytes. Will hold Bactrim, K phos    (Not in a hospital admission)      Review of patient's allergies indicates:  No Known Allergies    Past Medical History:   Diagnosis Date    Acne     Anemia of renal disease     Dialysis patient     peritoneal daily at night. followed by Dr. Sotelo    ESRD on dialysis since 12/16/15     Hepatosplenomegaly     Hypertension     Kidney disease     Seasonal allergies     Secondary hyperparathyroidism of renal origin     Thrombocytopenia, unspecified       Past Surgical History:   Procedure Laterality Date    BRONCHOSCOPY N/A 10/7/2019    Procedure: Bronchoscopy;  Surgeon: Anjum Cali MD;  Location: [facility] ENDO;  Service: Pulmonary;  Laterality: N/A;    BRONCHOSCOPY N/A 3/8/2020    Procedure: Bronchoscopy- in ice on drip;  Surgeon: Vania Larkin MD;  Location: [facility] ENDO;  Service: Pulmonary;  Laterality: N/A;    KIDNEY TRANSPLANT N/A 2/25/2022    Procedure: TRANSPLANT, KIDNEY;  Surgeon: Frankie Snider MD;  Location: [facility] OR [facility] FLR;  Service: Transplant;  Laterality: N/A;    LAPAROSCOPY N/A 9/27/2018    Procedure: LAPAROSCOPY  - Atempted Laparoscopic Peritoneal Dialysis Catheter Placement;  Surgeon: Drew Soliz MD;  Location: [facility] OR;  Service: General;  Laterality: N/A;    PERITONEAL CATHETER INSERTION      PERITONEAL CATHETER REMOVAL N/A 8/21/2018    Procedure: REMOVAL, CATHETER, DIALYSIS, PERITONEAL;  Surgeon: Glendy Romero MD;  Location: [facility] OR;  Service: General;  Laterality: N/A;    PERITONEAL CATHETER REMOVAL  08/2018    RE[facility]SY  12/2015    [facility]    WISDOM TOOTH EXTRACTION  07/2018     Family History       Problem Relation (Age of Onset)    Diabetes Maternal Aunt, Maternal Grandmother    Fibromyalgia Mother    Heart disease Maternal Grandmother    Hyperlipidemia Maternal Grandmother    Hypertension Maternal Aunt, Maternal Grandmother    No Known Problems Father, Sister, Brother    Sickle cell anemia Paternal Aunt          Tobacco Use    Smoking status: Never Smoker    Smokeless tobacco: Never Used   Substance and Sexual Activity    Alcohol use: No    Drug use: Never    Sexual activity: Yes     Partners: Female     Birth control/protection: Condom        Review of Systems   Constitutional:  Negative for appetite change, fatigue and fever.   HENT:  Negative for rhinorrhea and sore throat.    Respiratory:  Negative for cough, shortness of breath and wheezing.    Cardiovascular:  Positive for palpitations. Negative  "for chest pain and leg swelling.   Gastrointestinal:  Positive for abdominal pain (incisional). Negative for abdominal distention, diarrhea, nausea and vomiting.   Genitourinary:  Negative for decreased urine volume, difficulty urinating, dysuria and frequency.   Musculoskeletal:  Negative for arthralgias and back pain.   Skin:  Positive for wound.   Allergic/Immunologic: Positive for immunocompromised state.   Neurological:  Negative for dizziness and weakness.   Psychiatric/Behavioral:  Negative for confusion.    Objective:     Vital Signs (Most Recent):  Temp: 98.6 °F (37 °C) (03/07/22 1255)  Pulse: (!) 114 (03/07/22 1430)  Resp: 16 (03/07/22 1257)  BP: 134/62 (03/07/22 1257)  SpO2: 100 % (03/07/22 1430)    Height: 5' 9" (175.3 cm)  Weight: 85.3 kg (188 lb)  Body mass index is 27.76 kg/m².     Physical Exam  Constitutional:       General: He is not in acute distress.     Appearance: Normal appearance.   HENT:      Head: Normocephalic and atraumatic.      Mouth/Throat:      Mouth: Mucous membranes are moist.   Eyes:      Extraocular Movements: Extraocular movements intact.      Conjunctiva/sclera: Conjunctivae normal.      Pupils: Pupils are equal, round, and reactive to light.   Cardiovascular:      Rate and Rhythm: Regular rhythm. Tachycardia present.      Pulses: Normal pulses.      Heart sounds: Normal heart sounds. No murmur heard.    No friction rub. No gallop.   Pulmonary:      Effort: Pulmonary effort is normal.      Breath sounds: Normal breath sounds.   Abdominal:      General: Bowel sounds are normal. There is no distension.      Palpations: Abdomen is soft.      Tenderness: There is abdominal tenderness.      Comments: Right ktx incisional scar with small amount of dehiscence on right end.    Musculoskeletal:         General: Normal range of motion.      Cervical back: Normal range of motion.      Right lower leg: No edema.      Left lower leg: No edema.   Skin:     General: Skin is warm and dry. "   Neurological:      Mental Status: He is alert and oriented to person, place, and time.      Motor: No weakness.   Psychiatric:         Mood and Affect: Mood normal.         Behavior: Behavior normal.         Thought Content: Thought content normal.         Judgment: Judgment normal.       Laboratory  CBC:   Recent Labs   Lab 03/01/22  0701 03/02/22  0844 03/07/22  0829 03/07/22  1240   WBC 2.02* 2.25* 8.65  --    RBC 2.54* 2.48* 3.07*  --    HGB 7.6* 7.3* 9.1*  --    HCT 23.6* 23.6* 28.7* 26*   PLT 66* 78* 216  --    MCV 93 95 94  --    MCH 29.9 29.4 29.6  --    MCHC 32.2 30.9* 31.7*  --      CMP:   Recent Labs   Lab 03/02/22  0844 03/07/22  0829 03/07/22  1240   GLU 89 97 89   CALCIUM 9.5 10.4 9.5   ALBUMIN 3.3* 4.0 3.9   PROT  --   --  7.6    136 136   K 4.7 6.2* 5.8*   CO2 25 21* 21*    105 106   BUN 46* 43* 43*   CREATININE 3.7* 3.0* 2.9*   ALKPHOS  --   --  96   ALT  --   --  45*   AST  --   --  15     Labs within the past 24 hours have been reviewed.    Diagnostic Results:  None    Patient was SARS-CoV-2 /COVID-19 tested with negative results.

## 2022-03-07 NOTE — ED TRIAGE NOTES
Pt states he had blood drawn for labs this morning, received a call stating his potassium level was elevated and to come to the ED

## 2022-03-07 NOTE — TELEPHONE ENCOUNTER
Patient is currently in the ER.  Message sent to patient informing him that prograf will be decreased to 7 mg twice a day.  Asked patient to contact us with any questions.       ----- Message from Mo Mi MD sent at 3/7/2022 12:36 PM CST -----  Lower prograf to 7 mg PO bid

## 2022-03-07 NOTE — HPI
Mr. Saenz is a 22 yo black male with a history of ESRD d/t HTN who is now s/p DBD kidney transplant on 2/25/22, out of OR approx. 11:30 pm (Thymo induction, CIT 7 hr. 4 min., CPRA 54%, KDPI 4%, CMV D+,R-). Pt with history of hepatosplenomegaly with thrombocytopenia, given 1 unit platelets intra-op. Intra-op notable for A small superior pole artery was ligated to allow for shortening of the renal artery. The kidney was initially dusky after reperfusion, which improved with intra-arterial administration of verapamil in the iliac artery proximal to the anastomosis. At this point the kidney appeared very well perfused with good turgor. Doppler signals were assessed and excellent over the entire cortex. There was note of ~2x3cm area of decreased perfusion at the superior pole corresponding to the small superior pole artery that was ligated. Patient progressed well post-op and Creatinine trended down. The patient was discharged on 3/1 with Cr down to 4.3, K at the time was 4.8. The patient now presents to the ER after routine outpatient labs this AM with hyperkalemia, K 6.2. Repeat labs in ER with K 5.8. Patient reports taking lokelma at home prior to presentation to hospital. Patient given albuterol and shifted with dextrose/insulin in ER. Creatinine continues to trend down and is 2.9 in ER which is best since transplant. Discussed plan of care with transplant nephrologist, plan to admit to observation to monitor electrolytes. Will hold Bactrim and K phos. Patient's prograf level 11.2 this AM, will decrease dosage to 7 mg BID (previously 8 mg BID). Patient reports diarrhea at home, on colace post-transplant. Hold colace on admit. Gentle IVF hydration.

## 2022-03-07 NOTE — ED NOTES
Pt identifiers Tameka Saenz were checked and are correct  LOC: The patient is awake, alert, aware of environment with an appropriate affect. Oriented x4, speaking appropriately  APPEARANCE: Pt rates right lower abd incisional pain a 7/10, in no acute distress, pt is clean and well groomed, clothing properly fastened  SKIN: Skin warm, dry and intact, normal skin turgor, moist mucus membranes  Right side abd incision with skin staples intact   RESPIRATORY: Airway is open and patent, respirations are spontaneous, even and unlabored, normal effort and rate Breath sounds clear charlee to all lung fields on auscultation  CARDIAC: Normal rate and rhythm, no peripheral edema noted, capillary refill < 3 seconds, bilateral radial pulses 2+ AV fistula to left forearm noted with a palpable thrill  ABDOMEN: Soft, nontender, nondistended. Bowel sounds present to all four quad of abd on auscultation  NEUROLOGIC: PERRL, facial expression is symmetrical, patient moving all extremities spontaneously, normal sensation in all extremities when touched with a finger.  Follows all commands appropriately  MUSCULOSKELETAL: No obvious deformities.

## 2022-03-07 NOTE — ASSESSMENT & PLAN NOTE
- ESRD d/t HTN now S/p DBD kidney transplant on 2/25/22, out of OR approx. 11:30 pm (Thymo induction, CIT 7 hr. 4 min., CPRA 54%, KDPI 4%, CMV D+,R-).   - Intra-op notable for A small superior pole artery was ligated to allow for shortening of the renal artery. The kidney was initially dusky after reperfusion, which improved with intra-arterial administration of verapamil in the iliac artery proximal to the anastomosis. At this point the kidney appeared very well perfused with good turgor. Doppler signals were assessed and excellent over the entire cortex. There was note of ~2x3cm area of decreased perfusion at the superior pole corresponding to the small superior pole artery that was ligated.  - Creatinine trending down post-transplant

## 2022-03-07 NOTE — H&P
[facility] - Emergency Dept  Kidney Transplant  H&P      Subjective:     Chief Complaint/Reason for Admission: Hyperkalemia    History of Present Illness:  Mr. Saenz is a 22 yo black male with a history of ESRD d/t HTN who is now s/p DBD kidney transplant on 2/25/22, out of OR approx. 11:30 pm (Thymo induction, CIT 7 hr. 4 min., CPRA 54%, KDPI 4%, CMV D+,R-). Pt with history of hepatosplenomegaly with thrombocytopenia, given 1 unit platelets intra-op. Intra-op notable for A small superior pole artery was ligated to allow for shortening of the renal artery. The kidney was initially dusky after reperfusion, which improved with intra-arterial administration of verapamil in the iliac artery proximal to the anastomosis. At this point the kidney appeared very well perfused with good turgor. Doppler signals were assessed and excellent over the entire cortex. There was note of ~2x3cm area of decreased perfusion at the superior pole corresponding to the small superior pole artery that was ligated. Patient progressed well post-op and Creatinine trended down. The patient was discharged on 3/1 with Cr down to 4.3, K at the time was 4.8. The patient now presents to the ER after routine outpatient labs this AM with hyperkalemia, K 6.2. Repeat labs in ER with K 5.8. Patient reports taking lokelma at home prior to presentation to hospital. Patient given albuterol and shifted with dextrose/insulin in ER. Creatinine continues to trend down and is 2.9 in ER which is best since transplant. Discussed plan of care with transplant nephrologist, plan to admit to observation to monitor electrolytes. Will hold Bactrim and K phos. Patient's prograf level 11.2 this AM, will decrease dosage to 7 mg BID (previously 8 mg BID). Patient reports diarrhea at home, on colace post-transplant. Hold colace on admit. Gentle IVF hydration.       Review of patient's allergies indicates:  No Known Allergies    Past Medical History:   Diagnosis Date    Acne      Anemia of renal disease     Dialysis patient     peritoneal daily at night. followed by Dr. Sotelo    ESRD on dialysis since 12/16/15     Hepatosplenomegaly     Hypertension     Kidney disease     Seasonal allergies     Secondary hyperparathyroidism of renal origin     Thrombocytopenia, unspecified      Past Surgical History:   Procedure Laterality Date    BRONCHOSCOPY N/A 10/7/2019    Procedure: Bronchoscopy;  Surgeon: Anjum Cali MD;  [facility] ENDO;  Service: Pulmonary;  Laterality: N/A;    BRONCHOSCOPY N/A 3/8/2020    Procedure: Bronchoscopy- in ice on drip;  Surgeon: Vania Larkin MD;  Location[facility] ENDO;  Service: Pulmonary;  Laterality: N/A;    KIDNEY TRANSPLANT N/A 2/25/2022    Procedure: TRANSPLANT, KIDNEY;  Surgeon: Frankie Snider MD;  Location: [facility] OR [address] [place] [address];  Service: Transplant;  Laterality: N/A;    LAPAROSCOPY N/A 9/27/2018    Procedure: LAPAROSCOPY  - Atempted Laparoscopic Peritoneal Dialysis Catheter Placement;  Surgeon: Drew Soliz MD;  Location[facility] OR;  Service: General;  Laterality: N/A;    PERITONEAL CATHETER INSERTION      PERITONEAL CATHETER REMOVAL N/A 8/21/2018    Procedure: REMOVAL, CATHETER, DIALYSIS, PERITONEAL;  Surgeon: Glendy Romero MD;  Location: [facility] OR;  Service: General;  Laterality: N/A;    PERITONEAL CATHETER REMOVAL  08/2018    [facility]  12/2015    at [facility]    WISDOM TOOTH EXTRACTION  07/2018     Family History       Problem Relation (Age of Onset)    Diabetes Maternal Aunt, Maternal Grandmother    Fibromyalgia Mother    Heart disease Maternal Grandmother    Hyperlipidemia Maternal Grandmother    Hypertension Maternal Aunt, Maternal Grandmother    No Known Problems Father, Sister, Brother    Sickle cell anemia Paternal Aunt          Tobacco Use    Smoking status: Never Smoker    Smokeless tobacco: Never Used   Substance and Sexual Activity    Alcohol use: No    Drug use: Never    Sexual activity:  "Yes     Partners: Female     Birth control/protection: Condom        Review of Systems   Constitutional:  Negative for appetite change, fatigue and fever.   HENT:  Negative for rhinorrhea and sore throat.    Respiratory:  Negative for cough, shortness of breath and wheezing.    Cardiovascular:  Positive for palpitations. Negative for chest pain and leg swelling.   Gastrointestinal:  Positive for abdominal pain (incisional). Negative for abdominal distention, diarrhea, nausea and vomiting.   Genitourinary:  Negative for decreased urine volume, difficulty urinating, dysuria and frequency.   Musculoskeletal:  Negative for arthralgias and back pain.   Skin:  Positive for wound.   Allergic/Immunologic: Positive for immunocompromised state.   Neurological:  Negative for dizziness and weakness.   Psychiatric/Behavioral:  Negative for confusion.    Objective:     Vital Signs (Most Recent):  Temp: 98.6 °F (37 °C) (03/07/22 1255)  Pulse: (!) 114 (03/07/22 1430)  Resp: 16 (03/07/22 1257)  BP: 134/62 (03/07/22 1257)  SpO2: 100 % (03/07/22 1430)    Height: 5' 9" (175.3 cm)  Weight: 85.3 kg (188 lb)  Body mass index is 27.76 kg/m².     Physical Exam  Constitutional:       General: He is not in acute distress.     Appearance: Normal appearance.   HENT:      Head: Normocephalic and atraumatic.      Mouth/Throat:      Mouth: Mucous membranes are moist.   Eyes:      Extraocular Movements: Extraocular movements intact.      Conjunctiva/sclera: Conjunctivae normal.      Pupils: Pupils are equal, round, and reactive to light.   Cardiovascular:      Rate and Rhythm: Regular rhythm. Tachycardia present.      Pulses: Normal pulses.      Heart sounds: Normal heart sounds. No murmur heard.    No friction rub. No gallop.   Pulmonary:      Effort: Pulmonary effort is normal.      Breath sounds: Normal breath sounds.   Abdominal:      General: Bowel sounds are normal. There is no distension.      Palpations: Abdomen is soft.      Tenderness: " There is abdominal tenderness.      Comments: Right ktx incisional scar with small amount of dehiscence on right end.    Musculoskeletal:         General: Normal range of motion.      Cervical back: Normal range of motion.      Right lower leg: No edema.      Left lower leg: No edema.   Skin:     General: Skin is warm and dry.   Neurological:      Mental Status: He is alert and oriented to person, place, and time.      Motor: No weakness.   Psychiatric:         Mood and Affect: Mood normal.         Behavior: Behavior normal.         Thought Content: Thought content normal.         Judgment: Judgment normal.       Laboratory  CBC:   Recent Labs   Lab 03/01/22  0701 03/02/22  0844 03/07/22  0829 03/07/22  1240   WBC 2.02* 2.25* 8.65  --    RBC 2.54* 2.48* 3.07*  --    HGB 7.6* 7.3* 9.1*  --    HCT 23.6* 23.6* 28.7* 26*   PLT 66* 78* 216  --    MCV 93 95 94  --    MCH 29.9 29.4 29.6  --    MCHC 32.2 30.9* 31.7*  --      CMP:   Recent Labs   Lab 03/02/22  0844 03/07/22  0829 03/07/22  1240   GLU 89 97 89   CALCIUM 9.5 10.4 9.5   ALBUMIN 3.3* 4.0 3.9   PROT  --   --  7.6    136 136   K 4.7 6.2* 5.8*   CO2 25 21* 21*    105 106   BUN 46* 43* 43*   CREATININE 3.7* 3.0* 2.9*   ALKPHOS  --   --  96   ALT  --   --  45*   AST  --   --  15     Labs within the past 24 hours have been reviewed.    Diagnostic Results:  None    Patient was SARS-CoV-2 /COVID-19 tested with negative results.     Assessment/Plan:     * Hyperkalemia  - K 6.2 on outpatient labs prior to admission  - Repeat K 5.8 in ED. Given albuterol, dextrose/insulin, IVF. Patient reports taking lokelma at home prior to admission  - Hold Bactrim and K phos on admit  - Cardiac monitoring  - Decrease prograf to 7 mg BID (level 11.2 prior to admit on 3/7)  - Repeat labs at 18:00      Diarrhea  - on bowel regimen at home due to recent transplant surgery  - Hold colace on admit. If not improvement seen, consider holding MMF and sending stool studies  - Gentle  "IVF hydration on admit      Anemia of chronic disease  - H/H stable  - monitor daily and prn CBC      Prophylactic immunotherapy  - see "Immunosuppression due to drug therapy"      S/P kidney transplant  - ESRD d/t HTN now S/p DBD kidney transplant on 2/25/22, out of OR approx. 11:30 pm (Thymo induction, CIT 7 hr. 4 min., CPRA 54%, KDPI 4%, CMV D+,R-).   - Intra-op notable for A small superior pole artery was ligated to allow for shortening of the renal artery. The kidney was initially dusky after reperfusion, which improved with intra-arterial administration of verapamil in the iliac artery proximal to the anastomosis. At this point the kidney appeared very well perfused with good turgor. Doppler signals were assessed and excellent over the entire cortex. There was note of ~2x3cm area of decreased perfusion at the superior pole corresponding to the small superior pole artery that was ligated.  - Creatinine trending down post-transplant        At risk for opportunistic infections  - Continue OI prophylaxis per protocol  - Hold Bactrim d/t hyperkalemia      Immunosuppression due to drug therapy  - Continue prograf. Monitor daily trough level and adjust for therapeutic dosage. Monitor for toxic side effects  - Continue MMF and pred taper      Thrombocytopenia  - chronic history of thrombocytopenia and hepatosplenomegaly  - Monitor CBC      Hypertension secondary to other renal disorders  - on bystolic at home (not on hospital formulary)  - Start lopressor 25 mg BID        Discharge Planning:  Not suitable for discharge at this time    Dom Park NP  Kidney Transplant  [facility] - Emergency Dept  "

## 2022-03-07 NOTE — ED NOTES
Telemetry Verification   Patient placed on Telemetry Box  Verified on ED monitor  Box [id number]   Monitor Tech Brunilda    Rate 97   Rhythm NSR

## 2022-03-07 NOTE — ASSESSMENT & PLAN NOTE
- Continue prograf. Monitor daily trough level and adjust for therapeutic dosage. Monitor for toxic side effects  - Continue MMF and pred taper

## 2022-03-07 NOTE — ASSESSMENT & PLAN NOTE
- on bowel regimen at home due to recent transplant surgery  - Hold colace on admit. If not improvement seen, consider holding MMF and sending stool studies  - Gentle IVF hydration on admit

## 2022-03-07 NOTE — PROVIDER PROGRESS NOTES - EMERGENCY DEPT.
Encounter Date: 3/7/2022    ED Physician Progress Notes         EKG - STEMI Decision  Initial Reading: No STEMI present.  Response: patient moved to monitored bed (hyperK ).

## 2022-03-07 NOTE — ASSESSMENT & PLAN NOTE
- K 6.2 on outpatient labs prior to admission  - Repeat K 5.8 in ED. Given albuterol, dextrose/insulin, IVF. Patient reports taking lokelma at home prior to admission  - Hold Bactrim and K phos on admit  - Cardiac monitoring  - Decrease prograf to 7 mg BID (level 11.2 prior to admit on 3/7)  - Repeat labs at 18:00

## 2022-03-07 NOTE — ED NOTES
I-STAT Chem-8+ Results:   Value Reference Range   Sodium 137 136-145 mmol/L   Potassium  5.8 3.5-5.1 mmol/L   Chloride 107  mmol/L   Ionized Calcium 1.26 1.06-1.42 mmol/L   CO2 (measured) 23 23-29 mmol/L   Glucose 92  mg/dL   BUN 42 6-30 mg/dL   Creatinine 3.1 0.5-1.4 mg/dL   Hematocrit 26 36-54%

## 2022-03-08 VITALS
SYSTOLIC BLOOD PRESSURE: 155 MMHG | HEIGHT: 69 IN | HEART RATE: 75 BPM | RESPIRATION RATE: 18 BRPM | OXYGEN SATURATION: 98 % | WEIGHT: 187.75 LBS | DIASTOLIC BLOOD PRESSURE: 80 MMHG | BODY MASS INDEX: 27.81 KG/M2 | TEMPERATURE: 98 F

## 2022-03-08 PROBLEM — E87.5 HYPERKALEMIA: Status: RESOLVED | Noted: 2021-05-22 | Resolved: 2022-03-08

## 2022-03-08 PROBLEM — R19.7 DIARRHEA: Status: RESOLVED | Noted: 2022-03-07 | Resolved: 2022-03-08

## 2022-03-08 LAB
ALBUMIN SERPL BCP-MCNC: 3.8 G/DL (ref 3.5–5.2)
ANION GAP SERPL CALC-SCNC: 10 MMOL/L (ref 8–16)
BASOPHILS # BLD AUTO: 0.01 K/UL (ref 0–0.2)
BASOPHILS NFR BLD: 0.1 % (ref 0–1.9)
BUN SERPL-MCNC: 31 MG/DL (ref 6–20)
CALCIUM SERPL-MCNC: 9.5 MG/DL (ref 8.7–10.5)
CHLORIDE SERPL-SCNC: 103 MMOL/L (ref 95–110)
CO2 SERPL-SCNC: 24 MMOL/L (ref 23–29)
CREAT SERPL-MCNC: 2.3 MG/DL (ref 0.5–1.4)
CREAT UR-MCNC: 156 MG/DL (ref 23–375)
DIFFERENTIAL METHOD: ABNORMAL
EOSINOPHIL # BLD AUTO: 0 K/UL (ref 0–0.5)
EOSINOPHIL NFR BLD: 0.2 % (ref 0–8)
ERYTHROCYTE [DISTWIDTH] IN BLOOD BY AUTOMATED COUNT: 16.1 % (ref 11.5–14.5)
EST. GFR  (AFRICAN AMERICAN): 44.6 ML/MIN/1.73 M^2
EST. GFR  (NON AFRICAN AMERICAN): 38.6 ML/MIN/1.73 M^2
GLUCOSE SERPL-MCNC: 99 MG/DL (ref 70–110)
HCT VFR BLD AUTO: 25.7 % (ref 40–54)
HGB BLD-MCNC: 8.4 G/DL (ref 14–18)
IMM GRANULOCYTES # BLD AUTO: 0.05 K/UL (ref 0–0.04)
IMM GRANULOCYTES NFR BLD AUTO: 0.5 % (ref 0–0.5)
LYMPHOCYTES # BLD AUTO: 0.3 K/UL (ref 1–4.8)
LYMPHOCYTES NFR BLD: 2.9 % (ref 18–48)
MAGNESIUM SERPL-MCNC: 1.6 MG/DL (ref 1.6–2.6)
MCH RBC QN AUTO: 29.5 PG (ref 27–31)
MCHC RBC AUTO-ENTMCNC: 32.7 G/DL (ref 32–36)
MCV RBC AUTO: 90 FL (ref 82–98)
MONOCYTES # BLD AUTO: 0.6 K/UL (ref 0.3–1)
MONOCYTES NFR BLD: 6 % (ref 4–15)
NEUTROPHILS # BLD AUTO: 8.8 K/UL (ref 1.8–7.7)
NEUTROPHILS NFR BLD: 90.3 % (ref 38–73)
NRBC BLD-RTO: 0 /100 WBC
PHOSPHATE SERPL-MCNC: 3.7 MG/DL (ref 2.7–4.5)
PLATELET # BLD AUTO: 230 K/UL (ref 150–450)
PMV BLD AUTO: 11 FL (ref 9.2–12.9)
POCT GLUCOSE: 129 MG/DL (ref 70–110)
POCT GLUCOSE: 432 MG/DL (ref 70–110)
POTASSIUM SERPL-SCNC: 4.8 MMOL/L (ref 3.5–5.1)
POTASSIUM SERPL-SCNC: 5.2 MMOL/L (ref 3.5–5.1)
PROT UR-MCNC: 42 MG/DL (ref 0–15)
PROT/CREAT UR: 0.27 MG/G{CREAT} (ref 0–0.2)
RBC # BLD AUTO: 2.85 M/UL (ref 4.6–6.2)
SODIUM SERPL-SCNC: 137 MMOL/L (ref 136–145)
TACROLIMUS BLD-MCNC: 10.5 NG/ML (ref 5–15)
WBC # BLD AUTO: 9.71 K/UL (ref 3.9–12.7)

## 2022-03-08 PROCEDURE — 85025 COMPLETE CBC W/AUTO DIFF WBC: CPT | Performed by: PHYSICIAN ASSISTANT

## 2022-03-08 PROCEDURE — 99217 PR OBSERVATION CARE DISCHARGE: CPT | Mod: 24,,, | Performed by: PHYSICIAN ASSISTANT

## 2022-03-08 PROCEDURE — 25000003 PHARM REV CODE 250: Performed by: INTERNAL MEDICINE

## 2022-03-08 PROCEDURE — 36415 COLL VENOUS BLD VENIPUNCTURE: CPT | Performed by: PHYSICIAN ASSISTANT

## 2022-03-08 PROCEDURE — 99217 PR OBSERVATION CARE DISCHARGE: ICD-10-PCS | Mod: 24,,, | Performed by: PHYSICIAN ASSISTANT

## 2022-03-08 PROCEDURE — 82570 ASSAY OF URINE CREATININE: CPT | Performed by: PHYSICIAN ASSISTANT

## 2022-03-08 PROCEDURE — 25000003 PHARM REV CODE 250: Performed by: PHYSICIAN ASSISTANT

## 2022-03-08 PROCEDURE — 84132 ASSAY OF SERUM POTASSIUM: CPT | Performed by: PHYSICIAN ASSISTANT

## 2022-03-08 PROCEDURE — 80069 RENAL FUNCTION PANEL: CPT | Performed by: PHYSICIAN ASSISTANT

## 2022-03-08 PROCEDURE — 96372 THER/PROPH/DIAG INJ SC/IM: CPT | Performed by: PHYSICIAN ASSISTANT

## 2022-03-08 PROCEDURE — G0378 HOSPITAL OBSERVATION PER HR: HCPCS

## 2022-03-08 PROCEDURE — 63600175 PHARM REV CODE 636 W HCPCS: Performed by: PHYSICIAN ASSISTANT

## 2022-03-08 PROCEDURE — 83735 ASSAY OF MAGNESIUM: CPT | Performed by: PHYSICIAN ASSISTANT

## 2022-03-08 PROCEDURE — 80197 ASSAY OF TACROLIMUS: CPT | Performed by: PHYSICIAN ASSISTANT

## 2022-03-08 RX ORDER — ATOVAQUONE 750 MG/5ML
1500 SUSPENSION ORAL DAILY
Status: DISCONTINUED | OUTPATIENT
Start: 2022-03-09 | End: 2022-03-08 | Stop reason: HOSPADM

## 2022-03-08 RX ORDER — ATOVAQUONE 750 MG/5ML
1500 SUSPENSION ORAL DAILY
Qty: 300 ML | Refills: 5 | Status: SHIPPED | OUTPATIENT
Start: 2022-03-09 | End: 2022-03-29 | Stop reason: SDUPTHER

## 2022-03-08 RX ADMIN — HEPARIN SODIUM 5000 UNITS: 5000 INJECTION INTRAVENOUS; SUBCUTANEOUS at 05:03

## 2022-03-08 RX ADMIN — PREDNISONE 20 MG: 20 TABLET ORAL at 09:03

## 2022-03-08 RX ADMIN — OXYCODONE 5 MG: 5 TABLET ORAL at 06:03

## 2022-03-08 RX ADMIN — GABAPENTIN 300 MG: 300 CAPSULE ORAL at 09:03

## 2022-03-08 RX ADMIN — MYCOPHENOLATE MOFETIL 1000 MG: 250 CAPSULE ORAL at 09:03

## 2022-03-08 RX ADMIN — NYSTATIN 500000 UNITS: 500000 SUSPENSION ORAL at 09:03

## 2022-03-08 RX ADMIN — TACROLIMUS 7 MG: 5 CAPSULE ORAL at 09:03

## 2022-03-08 RX ADMIN — MUPIROCIN: 20 OINTMENT TOPICAL at 09:03

## 2022-03-08 RX ADMIN — NYSTATIN 500000 UNITS: 500000 SUSPENSION ORAL at 03:03

## 2022-03-08 RX ADMIN — SODIUM ZIRCONIUM CYCLOSILICATE 10 G: 5 POWDER, FOR SUSPENSION ORAL at 03:03

## 2022-03-08 RX ADMIN — VALGANCICLOVIR 450 MG: 450 TABLET, FILM COATED ORAL at 09:03

## 2022-03-08 RX ADMIN — METOPROLOL TARTRATE 25 MG: 25 TABLET, FILM COATED ORAL at 09:03

## 2022-03-08 RX ADMIN — NIFEDIPINE 30 MG: 30 TABLET, FILM COATED, EXTENDED RELEASE ORAL at 09:03

## 2022-03-08 RX ADMIN — SODIUM BICARBONATE 650 MG TABLET 650 MG: at 09:03

## 2022-03-08 RX ADMIN — SODIUM ZIRCONIUM CYCLOSILICATE 10 G: 5 POWDER, FOR SUSPENSION ORAL at 10:03

## 2022-03-08 RX ADMIN — OXYCODONE 5 MG: 5 TABLET ORAL at 02:03

## 2022-03-08 NOTE — NURSING
Patient ready for dc once bedside delivery Rx arrives.Printed AVS reviewed along with followup appts.Bl;francisco med card has been updated / reviewed by PharmD and reviewed by this RN also. Patient given basic printout of foods high in potassium to avoid. Patient verbalizes understanding of dc instructions.

## 2022-03-08 NOTE — PROGRESS NOTES
Discharge Medication Note:    Hospital Course: 22 yo male with a history of ESRD d/t HTN who is now s/p DBD kidney transplant on 2/25/22 (Thymo induction, CIT 7 hr. 4 min., CPRA 54%, KDPI 4%, CMV D+,R-).The patient was discharged on 3/1 with Cr down to 4.3, K at the time was 4.8.     The patient now presents to the ER after routine outpatient labs 3/7 with hyperkalemia, K 6.2. Repeat labs in ER with K 5.8. Potassium was elevated on repeat labs at 6.4 and he was treated with 180mg IV lasix and 2 hours HD. Potassium post dialysis 5.2.    Bactrim changed to atovaquone at discharge and given Lokelma ( lokelma bid x 2 days and then once daily) for outpatient use. Patient's prograf level 11.2, decreased dosage to 7 mg BID (previously 8 mg BID).    Met with Tameka Saenz at discharge to review discharge medications and to update the blue medication card.           Medication List      START taking these medications    atovaquone 750 mg/5 mL Susp  Commonly known as: MEPRON  Take 10 mLs (1,500 mg total) by mouth once daily. STOP 8/24/22  Start taking on: March 9, 2022     sodium zirconium cyclosilicate 10 gram packet  Commonly known as: Lokelma  Take 1 packet (10g) by mouth twice a day for 2 days, then once daily.  Mix entire contents of packet(s) into drinking glass containing 3 tablespoons of water; stir well and drink immediately. Add water and repeat until no powder remains to receive entire dose.        CHANGE how you take these medications    tacrolimus 1 MG Cap  Commonly known as: PROGRAF  Take 7 capsules (7 mg total) by mouth every 12 (twelve) hours.  What changed: how much to take        CONTINUE taking these medications    cinacalcet 30 MG Tab  Commonly known as: SENSIPAR  Take 1 tablet (30 mg total) by mouth every evening.     docusate sodium 100 MG capsule  Commonly known as: COLACE  Take 1 capsule (100 mg total) by mouth 3 (three) times daily as needed for Constipation.     famotidine 20 MG tablet  Commonly  known as: PEPCID  Take 1 tablet (20 mg total) by mouth every evening.     gabapentin 300 MG capsule  Commonly known as: NEURONTIN  Take 1 capsule (300 mg total) by mouth 2 (two) times daily.     mycophenolate 250 mg Cap  Commonly known as: CELLCEPT  Take 4 capsules (1,000 mg total) by mouth 2 (two) times daily.     nebivoloL 5 MG Tab  Commonly known as: BYSTOLIC  Take 1 tablet (5 mg total) by mouth once daily.     NIFEdipine 30 MG (OSM) 24 hr tablet  Commonly known as: PROCARDIA-XL  Take 1 tablet (30 mg total) by mouth 2 (two) times a day.     nystatin 100,000 unit/mL suspension  Commonly known as: MYCOSTATIN  Take 5 mLs (500,000 Units total) by mouth 4 (four) times daily. Stop 3/31/22     oxyCODONE 10 mg Tab immediate release tablet  Commonly known as: ROXICODONE  Take 1 tablet (10 mg total) by mouth every 4 (four) hours as needed for Pain.     predniSONE 5 MG tablet  Commonly known as: DELTASONE  Take by mouth daily: 20mg 3/1-3/31, 15mg 4/1-4/30, 10mg 5/1-5/31, 5mg 6/1/2022-     valGANciclovir 450 mg Tab  Commonly known as: VALCYTE  Take 1 tablet (450 mg total) by mouth once daily. Stop 8/24/22        STOP taking these medications    doxazosin 8 MG Tab  Commonly known as: CARDURA     k phos di & mono-sod phos mono 250 mg Tab  Commonly known as: K-PHOS-NEUTRAL     sodium bicarbonate 650 MG tablet     sulfamethoxazole-trimethoprim 400-80mg 400-80 mg per tablet  Commonly known as: BACTRIM,SEPTRA     terbinafine HCL 1 % cream  Commonly known as: LAMISIL           Where to Get Your Medications      These medications were sent to [facility] [place] [address] [address] [facility] [address]    Hours: Mon-Fri 7a-7p, Sat-Sun 10a-4p Phone: [phone]   · atovaquone 750 mg/5 mL Susp  · sodium zirconium cyclosilicate 10 gram packet  · tacrolimus 1 MG Cap            The following medications have been placed on HOLD and should be restarted in the outpatient setting (when appropriate): ladonna Saenz  verbalized understanding and had the opportunity to ask questions.

## 2022-03-08 NOTE — PROGRESS NOTES
Admit Note/Tentative DC Note (DC planned today).   SW confirmed the below information with pt's fiance/so of 4yrs.    Met with significant other Carmen to assess needs. Pt presented in ultrasound. Patient is a 21 y.o. single male, admitted for Hyperkalemia [E87.5].      Patient admitted from home on 3/7/2022 .  At this time, patient presents as in ultrasound.  At this time, patients caregiver presents as alert and oriented x 4, pleasant, good eye contact, well groomed, recall good, concentration/judgement good, average intelligence, calm, communicative, cooperative and asking and answering questions appropriately.    Caregiver present:  Pt's fiance: Carmen Nunez [phone]    Household/Family Systems     Patient resides with patient's parents, at [address] [facility] [address].  Pt has 2 siblings that live at home with family.  Demontray, 25 yr old brother, legally blind; Demearia, 19 yr old sister.     Support system includes fiance, parents, grandmother, aunt.  Patient does not have dependents that are need of being cared for.     Patients primary caregiver is Carmen Nunez, patients significant other of 4 yrs, phone number [phone].  Confirmed patients contact information is [phone] (home);     Other emergency contacts and caregivers:   Pt's mother: Iban Saenz, 46, [phone].  Pt's father: Jose Saenz, 46, [phone].  Pt's grandmother: Cortney Rivera, 77, [phone].  Pt's aunt: Juli George, 36, [phone]    During admission, patient's caregiver plans to stay in patient's room.  Confirmed patient and patients caregivers do have access to reliable transportation.    Cognitive Status/Learning     Patient reports reading ability as 12th grade and states patient does not have difficulty with reading, writing, seeing, hearing, comprehension, learning and memory.  Patient reports patient learns best by combination of hands on and visual.   Needed: No.    Highest education level: High School (9-12) or GED    Vocation/Disability   .  Working for Income: no  Pt also collecting SSDI due to ESRD.     Adherence     Patient reports a high level of adherence to patients health care regimen.  Adherence counseling and education provided. Patient verbalizes understanding.    Substance Use    Patient reports the following substance usage.    Tobacco: none, patient denies any use.  Alcohol: none, patient denies any use.  Illicit Drugs/Non-prescribed Medications: none, patient denies any use.  Patient states clear understanding of the potential impact of substance use.  Substance abstinence/cessation counseling, education and resources provided and reviewed.     Services Utilizing/ADLS    Infusion Service: Prior to admission, patient utilizing? no  Home Health: Prior to admission, patient utilizing? no  DME: Prior to admission, yes (BP cuff, home hemo supplies/equipment)  Pulmonary/Cardiac Rehab: Prior to admission, no  Dialysis:  Prior to admission, no Prior to transplant -Home Hemo [facility] [phone]; 5 days/week.  Transplant Specialty Pharmacy:  Prior to admission, yes; [facility] .    Prior to admission, patient reports patient was independent with ADLS and was driving.  Patient reports patient is able to care for self at this time. Patient indicates a willingness to care for self once medically cleared to do so.    Insurance/Medications    Insured by   Payer/Plan Subscr  Sex Relation Sub. Ins. ID Effective Group Num   1. [place] - [place]* MARTINA PEREZ 2000 Male Self [id number] 3/1/16                                    [address]   2. [place] MARTINA PEREZ 2000 Male Self [id number]* 3/1/16                                    [address] [address]      Primary Insurance (for UNOS reporting): Public Insurance - Medicare FFS (Fee For Service)  Secondary Insurance (for UNOS reporting): Public Insurance - Medicaid    Patient reports  patient is able to obtain and afford medications at this time and at time of discharge.    Living Will/Healthcare Power of     Patient states patient does not have a LW and/or HCPA.   provided education regarding LW and HCPA and the completion of forms.    Coping/Mental Health    Patient is coping adequately with the aid of  family members.   Patient denies mental health difficulties.     Discharge Planning    At time of discharge, patient plans to return to patient's home under the care of Carmen Nunez (signiciant other) as needed.  Patients significant other will transport patient.  Per rounds today, expected discharge date today. Patients caregiver verbalized understanding and are involved in treatment planning and discharge process.    Additional Concerns    Patient's caretaker denies additional needs and/or concerns at this time.  providing ongoing psychosocial support, education, resources and d/c planning as needed.  SW remains available.  provided resource list, patient choice, psychosocial and supportive counseling, resources, education, assistance and discharge planning with patient and caregiver involvement, ongoing SW availability and services as appropriate. Patient's caregiver verbalizes understanding and agreement with information reviewed,  availability and how to access available resources as needed.

## 2022-03-08 NOTE — DISCHARGE SUMMARY
[facility] - [facility] Stepdown  Kidney Transplant  Discharge Summary    Patient Name: Tameka Saenz  MRN: [id number]  Admission Date: 3/7/2022  Hospital Length of Stay: 0 days  Discharge Date and Time:  03/08/2022 11:14 AM  Attending Physician: Robyn Piper MD   Discharging Provider: Elvie Bean PA-C  Primary Care Provider: Kathy Cruz MD    HPI:   Mr. Saenz is a 20 yo black male with a history of ESRD d/t HTN who is now s/p DBD kidney transplant on 2/25/22, out of OR approx. 11:30 pm (Thymo induction, CIT 7 hr. 4 min., CPRA 54%, KDPI 4%, CMV D+,R-). Pt with history of hepatosplenomegaly with thrombocytopenia, given 1 unit platelets intra-op. Intra-op notable for A small superior pole artery was ligated to allow for shortening of the renal artery. The kidney was initially dusky after reperfusion, which improved with intra-arterial administration of verapamil in the iliac artery proximal to the anastomosis. At this point the kidney appeared very well perfused with good turgor. Doppler signals were assessed and excellent over the entire cortex. There was note of ~2x3cm area of decreased perfusion at the superior pole corresponding to the small superior pole artery that was ligated. Patient progressed well post-op and Creatinine trended down. The patient was discharged on 3/1 with Cr down to 4.3, K at the time was 4.8. The patient now presents to the ER after routine outpatient labs this AM with hyperkalemia, K 6.2. Repeat labs in ER with K 5.8. Patient reports taking lokelma at home prior to presentation to hospital. Patient given albuterol and shifted with dextrose/insulin in ER. Creatinine continues to trend down and is 2.9 in ER which is best since transplant. Discussed plan of care with transplant nephrologist, plan to admit to observation to monitor electrolytes. Will hold Bactrim and K phos. Patient's prograf level 11.2 this AM, will decrease dosage to 7 mg BID (previously 8 mg BID). Patient reports  diarrhea at home, on colace post-transplant. Hold colace on admit. Gentle IVF hydration.       * No surgery found *     Hospital Course:    Patient admitted from the ED for hyperkalemia seen on outpatient labs. Labs in ED notable for K 5.8 and patient was shifted with insulin. EKG, NS with mildly peaked T waves - calcium gluconate given. Potassium was elevated on repeat labs at 6.4 and he was treated with 180mg IV lasix and 2 hours HD. Potassium post dialysis 5.2. Starting lokelma bid x 2 days and then once daily. Pt transitioned from Bactrim to Mepron. Cr improving but remains elevated. Kidney biopsy, small sharon-incisional and sharon transplant collections but otherwise satisfactory Doppler evaluation of the renal allograft. Plan for kidney biopsy outpatient next week.     Pt is now stable and ready for discharge. He has no complaints. He has met with PharmD and received education. He will follow up with labs 3/10 and clinic appointment 3/9. Pt expressed understanding of discharge instructions and importance of follow up.      Goals of Care Treatment Preferences:  Code Status: Full Code      Final Active Diagnoses:    Diagnosis Date Noted POA    PRINCIPAL PROBLEM:  Hyperkalemia [E87.5] 05/22/2021 Yes    Diarrhea [R19.7] 03/07/2022 Yes    Anemia of chronic disease [D63.8] 02/28/2022 Yes    At risk for opportunistic infections [Z91.89] 02/26/2022 Yes    Prophylactic immunotherapy [Z29.8] 02/26/2022 Not Applicable    Immunosuppression due to drug therapy [D84.821, Z79.899] 02/26/2022 Not Applicable    S/P kidney transplant [Z94.0] 02/26/2022 Not Applicable    Thrombocytopenia [D69.6] 03/26/2021 Yes    Hypertension secondary to other renal disorders [I15.1, N28.89] 09/28/2016 Yes      Problems Resolved During this Admission:       Consults (From admission, onward)        Status Ordering Provider     Inpatient consult to Kidney/Pancreas Transplant Medicine  Once        Provider:  (Not yet assigned)     Acknowledged LOYD ELLIOTT          Pending Diagnostic Studies:     Procedure Component Value Units Date/Time    G6PD,Quantitative [[id number]] Collected: 03/07/22 1655    Order Status: Sent Lab Status: In process Updated: 03/07/22 1659    Specimen: Blood     US Transplant Kidney With Doppler [[id number]] Resulted: 03/08/22 1046    Order Status: Sent Lab Status: In process Updated: 03/08/22 1046        Significant Diagnostic Studies: Labs:   BMP:   Recent Labs   Lab 03/07/22  0829 03/07/22  1240 03/07/22  1853 03/08/22  0337 03/08/22  0809   GLU 97 89 135* 99  --     136 135* 137  --    K 6.2* 5.8* 6.4* 4.8 5.2*    106 107 103  --    CO2 21* 21* 19* 24  --    BUN 43* 43* 41* 31*  --    CREATININE 3.0* 2.9* 2.7* 2.3*  --    CALCIUM 10.4 9.5 9.5 9.5  --    MG 1.6 1.6  --  1.6  --    , CBC   Recent Labs   Lab 03/07/22  0829 03/07/22  1240 03/08/22  0337   WBC 8.65  --  9.71   HGB 9.1*  --  8.4*   HCT 28.7*   < > 25.7*     --  230    < > = values in this interval not displayed.    and All labs within the past 24 hours have been reviewed    Discharged Condition: stable    Patient Instructions:   No discharge procedures on file.  Medications:  Reconciled Home Medications:      Medication List      START taking these medications    atovaquone 750 mg/5 mL Susp  Commonly known as: MEPRON  Take 10 mLs (1,500 mg total) by mouth once daily. STOP 8/24/22  Start taking on: March 9, 2022     sodium zirconium cyclosilicate 10 gram packet  Commonly known as: Lokelma  Take 1 packet (10g) by mouth twice a day for 2 days, then once daily.  Mix entire contents of packet(s) into drinking glass containing 3 tablespoons of water; stir well and drink immediately. Add water and repeat until no powder remains to receive entire dose.        CHANGE how you take these medications    tacrolimus 1 MG Cap  Commonly known as: PROGRAF  Take 7 capsules (7 mg total) by mouth every 12 (twelve) hours.  What changed: how much to  take        CONTINUE taking these medications    cinacalcet 30 MG Tab  Commonly known as: SENSIPAR  Take 1 tablet (30 mg total) by mouth every evening.     docusate sodium 100 MG capsule  Commonly known as: COLACE  Take 1 capsule (100 mg total) by mouth 3 (three) times daily as needed for Constipation.     famotidine 20 MG tablet  Commonly known as: PEPCID  Take 1 tablet (20 mg total) by mouth every evening.     gabapentin 300 MG capsule  Commonly known as: NEURONTIN  Take 1 capsule (300 mg total) by mouth 2 (two) times daily.     mycophenolate 250 mg Cap  Commonly known as: CELLCEPT  Take 4 capsules (1,000 mg total) by mouth 2 (two) times daily.     nebivoloL 5 MG Tab  Commonly known as: BYSTOLIC  Take 1 tablet (5 mg total) by mouth once daily.     NIFEdipine 30 MG (OSM) 24 hr tablet  Commonly known as: PROCARDIA-XL  Take 1 tablet (30 mg total) by mouth 2 (two) times a day.     nystatin 100,000 unit/mL suspension  Commonly known as: MYCOSTATIN  Take 5 mLs (500,000 Units total) by mouth 4 (four) times daily. Stop 3/31/22     oxyCODONE 10 mg Tab immediate release tablet  Commonly known as: ROXICODONE  Take 1 tablet (10 mg total) by mouth every 4 (four) hours as needed for Pain.     predniSONE 5 MG tablet  Commonly known as: DELTASONE  Take by mouth daily: 20mg 3/1-3/31, 15mg 4/1-4/30, 10mg 5/1-5/31, 5mg 6/1/2022-     valGANciclovir 450 mg Tab  Commonly known as: VALCYTE  Take 1 tablet (450 mg total) by mouth once daily. Stop 8/24/22        STOP taking these medications    doxazosin 8 MG Tab  Commonly known as: CARDURA     k phos di & mono-sod phos mono 250 mg Tab  Commonly known as: K-PHOS-NEUTRAL     sodium bicarbonate 650 MG tablet     sulfamethoxazole-trimethoprim 400-80mg 400-80 mg per tablet  Commonly known as: BACTRIM,SEPTRA     terbinafine HCL 1 % cream  Commonly known as: LAMISIL          Time spent caring for patient (Greater than 1/2 spent in direct face-to-face contact): > 30 minutes    Elvie Bean  PAMELA  Kidney Transplant  [facility] - Transplant Stepdown

## 2022-03-08 NOTE — PROGRESS NOTES
03/08/22 0000   During Hemodialysis Assessment   Blood Flow Rate (mL/min) 300 mL/min   Dialysate Flow Rate (mL/min) 600 ml/min   Ultrafiltration Rate (mL/Hr) 200 mL/Hr   Arteriovenous Lines Secure Yes   Arterial Pressure (mmHg) -150 mmHg   Venous Pressure (mmHg) 130   UF Removed (mL) 400 mL   TMP 30   Venous Line in Air Detector Yes   Intake (mL) 200 mL   Transducer Dry Yes   Access Visible Yes   Intra-Hemodialysis Comments HD completed   HD completed and needles removed and hemostasis achieved.

## 2022-03-08 NOTE — PLAN OF CARE
Pt arrived from ED after outpt labs showed hyperkalemia (K+ of 6.4 in ED)  PT AAOx4, VSS on Visi monitor, afebrile  O2>97% on RA  No c/o pain/N/V  Pt was shifted with 25g D10 amp and 8.53U regular insulin  Accuchecks ACHS  Calcium gluconate hung by this RN  20G in R hand- CDI  Pt received HD at bedside through access in L forearm (+/+) -- tolerated well.   100mg lasix IVP given clear yellow UOP collected and documented per flowsheets.   Pt up to toilet independently  Fall and infection precautions maintained throughout shift  Pt currently sleeping in chair  Repeat renal panel to be redrawn with AM labs.  Pt in lowest settings, call light w/in reach, nonskid socks when ambulating, remains free from falls. NAD. WCTM.

## 2022-03-08 NOTE — PROGRESS NOTES
03/07/22 2200   During Hemodialysis Assessment   Blood Flow Rate (mL/min) 300 mL/min   Dialysate Flow Rate (mL/min) 600 ml/min   Ultrafiltration Rate (mL/Hr) 200 mL/Hr   Arteriovenous Lines Secure Yes   Arterial Pressure (mmHg) -150 mmHg   Venous Pressure (mmHg) 130   Blood Volume Processed (Liters) 0 L   UF Removed (mL) 0 mL   TMP 30   Venous Line in Air Detector Yes   Intake (mL) 200 mL   Transducer Dry Yes   Access Visible Yes   Intra-Hemodialysis Comments HD started/stable   HD initiated via LFA fistula.

## 2022-03-09 ENCOUNTER — OFFICE VISIT (OUTPATIENT)
Dept: TRANSPLANT | Facility: CLINIC | Age: 22
End: 2022-03-09
Payer: MEDICARE

## 2022-03-09 VITALS
WEIGHT: 182.56 LBS | DIASTOLIC BLOOD PRESSURE: 71 MMHG | TEMPERATURE: 97 F | OXYGEN SATURATION: 100 % | HEIGHT: 69 IN | SYSTOLIC BLOOD PRESSURE: 164 MMHG | RESPIRATION RATE: 16 BRPM | HEART RATE: 78 BPM | BODY MASS INDEX: 27.04 KG/M2

## 2022-03-09 DIAGNOSIS — T38.0X5A STEROID-INDUCED HYPERGLYCEMIA: ICD-10-CM

## 2022-03-09 DIAGNOSIS — Z91.89 AT RISK FOR OPPORTUNISTIC INFECTIONS: ICD-10-CM

## 2022-03-09 DIAGNOSIS — Z94.0 S/P KIDNEY TRANSPLANT: Primary | ICD-10-CM

## 2022-03-09 DIAGNOSIS — I15.1 HYPERTENSION SECONDARY TO OTHER RENAL DISORDERS: ICD-10-CM

## 2022-03-09 DIAGNOSIS — Z29.89 PROPHYLACTIC IMMUNOTHERAPY: ICD-10-CM

## 2022-03-09 DIAGNOSIS — R73.9 STEROID-INDUCED HYPERGLYCEMIA: ICD-10-CM

## 2022-03-09 LAB — G6PD RBC-CCNT: 12.1 U/G HB (ref 8–11.9)

## 2022-03-09 PROCEDURE — 99215 PR OFFICE/OUTPT VISIT, EST, LEVL V, 40-54 MIN: ICD-10-PCS | Mod: S$PBB,,, | Performed by: NURSE PRACTITIONER

## 2022-03-09 PROCEDURE — 99215 OFFICE O/P EST HI 40 MIN: CPT | Mod: S$PBB,,, | Performed by: NURSE PRACTITIONER

## 2022-03-09 PROCEDURE — 99999 PR PBB SHADOW E&M-EST. PATIENT-LVL V: ICD-10-PCS | Mod: PBBFAC,,, | Performed by: NURSE PRACTITIONER

## 2022-03-09 PROCEDURE — 99999 PR PBB SHADOW E&M-EST. PATIENT-LVL V: CPT | Mod: PBBFAC,,, | Performed by: NURSE PRACTITIONER

## 2022-03-09 PROCEDURE — 86977 RBC SERUM PRETX INCUBJ/INHIB: CPT | Mod: 59,PO | Performed by: NURSE PRACTITIONER

## 2022-03-09 PROCEDURE — 99215 OFFICE O/P EST HI 40 MIN: CPT | Mod: PBBFAC | Performed by: NURSE PRACTITIONER

## 2022-03-09 PROCEDURE — 86832 HLA CLASS I HIGH DEFIN QUAL: CPT | Mod: PO | Performed by: NURSE PRACTITIONER

## 2022-03-09 PROCEDURE — 86833 HLA CLASS II HIGH DEFIN QUAL: CPT | Mod: PO | Performed by: NURSE PRACTITIONER

## 2022-03-09 NOTE — PROGRESS NOTES
Kidney Post-Transplant Assessment    Referring Physician: Yury Sotelo  Current Nephrologist: Kathy Cruz    ORGAN: RIGHT KIDNEY  Donor Type: donation after brain death  PHS Increased Risk: no  Cold Ischemia: 424 mins  Induction Medications: thymoglobulin    Subjective:     CC:  Reassessment of renal allograft function and management of chronic immunosuppression.    HPI:  Mr. Saenz is a 21 y.o. 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 His most recent creatinine is 2.3. He takes mycophenolate mofetil, prednisone and tacrolimus for maintenance immunosuppression. His post transplant course has been uncomplicated to date.    Transplant History:  -ESRD secondary to HTN  -on dialysis 12/2015 until DBD kidney transplant on 2/25/22 (Thymo induction, CIT 7 hr. 4 min., CPRA 54%, KDPI 4%, CMV D+,R-).  -history of hepatosplenomegaly with thrombocytopenia, given 1 unit platelets intra-op.       Interval History:    Hospital Course 3/7:  Patient admitted from the ED for hyperkalemia seen on outpatient labs. Labs in ED notable for K 5.8 and patient was shifted with insulin. EKG, NS with mildly peaked T waves - calcium gluconate given. Potassium was elevated on repeat labs at 6.4 and he was treated with 180mg IV lasix and 2 hours HD. Potassium post dialysis 5.2. Starting lokelma bid x 2 days and then once daily. Pt transitioned from Bactrim to Mepron. Cr improving but remains elevated. Kidney biopsy, small sharon-incisional and sharon transplant collections but otherwise satisfactory Doppler evaluation of the renal allograft. Plan for kidney biopsy outpatient next week.     Intake->2 L water daily  UOP-no issues  BP-HOME BPs 140/70s  BP Readings from Last 3 Encounters:   03/09/22 (!) 164/71   03/08/22 (!) 155/80   03/03/22 138/80     Peripheral edema-none. Had episode of mild swelling in one testicle, but has resolved.  Weight-182 lb,  stable  Appetite--good  Wound-healing nicely, no drainage or pain    Fx assessment-has been increasing activity level, no CP or SOB with exertion, looks great    Current Outpatient Medications   Medication Sig Dispense Refill    atovaquone (MEPRON) 750 mg/5 mL Susp Take 10 mLs (1,500 mg total) by mouth once daily. STOP 8/24/22 300 mL 5    cinacalcet (SENSIPAR) 30 MG Tab Take 1 tablet (30 mg total) by mouth every evening. 30 tablet 11    docusate sodium (COLACE) 100 MG capsule Take 1 capsule (100 mg total) by mouth 3 (three) times daily as needed for Constipation.  0    famotidine (PEPCID) 20 MG tablet Take 1 tablet (20 mg total) by mouth every evening. 30 tablet 1    gabapentin (NEURONTIN) 300 MG capsule Take 1 capsule (300 mg total) by mouth 2 (two) times daily. 60 capsule 2    mycophenolate (CELLCEPT) 250 mg Cap Take 4 capsules (1,000 mg total) by mouth 2 (two) times daily. 240 capsule 11    nebivoloL (BYSTOLIC) 5 MG Tab Take 1 tablet (5 mg total) by mouth once daily. 30 tablet 11    NIFEdipine (PROCARDIA-XL) 30 MG (OSM) 24 hr tablet Take 1 tablet (30 mg total) by mouth 2 (two) times a day. 60 tablet 11    nystatin (MYCOSTATIN) 100,000 unit/mL suspension Take 5 mLs (500,000 Units total) by mouth 4 (four) times daily. Stop 3/31/22 680 mL 0    oxyCODONE (ROXICODONE) 10 mg Tab immediate release tablet Take 1 tablet (10 mg total) by mouth every 4 (four) hours as needed for Pain. 28 tablet 0    predniSONE (DELTASONE) 5 MG tablet Take by mouth daily: 20mg 3/1-3/31, 15mg 4/1-4/30, 10mg 5/1-5/31, 5mg 6/1/2022- (Patient taking differently: Take by mouth daily: 20mg 3/1-3/31, 15mg 4/1-4/30, 10mg 5/1-5/31, 5mg 6/1/2022-) 120 tablet 11    sodium zirconium cyclosilicate (LOKELMA) 10 gram packet Take 1 packet (10g) by mouth twice a day for 2 days, then once daily.  Mix entire contents of packet(s) into drinking glass containing 3 tablespoons of water; stir well and drink immediately. Add water and repeat until no  "powder remains to receive entire dose. 30 packet 11    tacrolimus (PROGRAF) 1 MG Cap Take 7 capsules (7 mg total) by mouth every 12 (twelve) hours. 420 capsule 11    valGANciclovir (VALCYTE) 450 mg Tab Take 1 tablet (450 mg total) by mouth once daily. Stop 8/24/22 30 tablet 5     No current facility-administered medications for this visit.       Past Medical History:   Diagnosis Date    Acne     Anemia of renal disease     Dialysis patient     peritoneal daily at night. followed by Dr. Sotelo    ESRD on dialysis since 12/16/15     Hepatosplenomegaly     Hypertension     Kidney disease     Seasonal allergies     Secondary hyperparathyroidism of renal origin     Thrombocytopenia, unspecified        Review of Systems   Constitutional: Negative for activity change, appetite change and fever.   HENT: Negative for congestion, mouth sores and sore throat.    Eyes: Negative for visual disturbance.   Respiratory: Negative for cough, chest tightness and shortness of breath.    Cardiovascular: Negative for chest pain, palpitations and leg swelling.   Gastrointestinal: Negative for abdominal distention, abdominal pain, constipation, diarrhea and nausea.   Genitourinary: Negative for difficulty urinating, frequency and hematuria.   Musculoskeletal: Negative for arthralgias and gait problem.   Skin: Positive for wound.   Allergic/Immunologic: Positive for immunocompromised state. Negative for environmental allergies and food allergies.   Neurological: Negative for dizziness, weakness and numbness.   Psychiatric/Behavioral: Negative for sleep disturbance. The patient is not nervous/anxious.        Objective:   Blood pressure (!) 164/71, pulse 78, temperature 97.3 °F (36.3 °C), temperature source Temporal, resp. rate 16, height 5' 9" (1.753 m), weight 82.8 kg (182 lb 8.7 oz), SpO2 100 %.body mass index is 26.96 kg/m².    Physical Exam  Vitals and nursing note reviewed.   Constitutional:       Appearance: Normal " appearance.   HENT:      Head: Normocephalic.   Cardiovascular:      Rate and Rhythm: Normal rate and regular rhythm.      Heart sounds: Normal heart sounds.   Pulmonary:      Effort: Pulmonary effort is normal.      Breath sounds: Normal breath sounds.   Abdominal:      General: Bowel sounds are normal. There is no distension.      Palpations: Abdomen is soft.      Tenderness: There is no abdominal tenderness.      Comments: Surgical incision well approximated with staples, no drainage    Musculoskeletal:         General: Normal range of motion.   Skin:     General: Skin is warm and dry.   Neurological:      General: No focal deficit present.      Mental Status: He is alert.   Psychiatric:         Behavior: Behavior normal.         Labs:  Lab Results   Component Value Date    WBC 9.71 03/08/2022    HGB 8.4 (L) 03/08/2022    HCT 25.7 (L) 03/08/2022     03/08/2022    K 5.2 (H) 03/08/2022     03/08/2022    CO2 24 03/08/2022    BUN 31 (H) 03/08/2022    CREATININE 2.3 (H) 03/08/2022    EGFRNONAA 38.6 (A) 03/08/2022    CALCIUM 9.5 03/08/2022    PHOS 3.7 03/08/2022    MG 1.6 03/08/2022    ALBUMIN 3.8 03/08/2022    AST 15 03/07/2022    ALT 45 (H) 03/07/2022    UTPCR 0.27 (H) 03/08/2022    .2 (H) 02/25/2022    TACROLIMUS 10.5 03/08/2022       Labs were reviewed with the patient    Assessment:     1. S/P kidney transplant    2. Prophylactic immunotherapy    3. Hypertension secondary to other renal disorders    4. Steroid-induced hyperglycemia    5. At risk for opportunistic infections        Plan:   Has labs, surgery visit, and epo injection tomorrow  Ureteral stent removal 3/14/2022  Hyperkalemia: lokelma, low K diet    Follow-up:   1. CKD stage: 3 stable      2. Immunosuppression: Prograf trough 10.5, which is therapeutic (target 8-10). Continue Prograf 7/7, MMF 1000 mg BID, and Prednisone taper. Will continue to monitor for drug toxicities    3. Allograft Function: Cr continues to trend down. Continue  good po hydration.   Lab Results   Component Value Date    CREATININE 2.3 (H) 03/08/2022      3/8/2022  POD 11   eGFR if African American >60 mL/min/1.73 m^2 44.6 (A)       4. Hypertension management: advise low salt diet and home BP monitoring    bystolic 5 mg QD, Nifedipine 30 mg BID     5. Metabolic Bone Disease/Secondary Hyperparathyroidism:stable  Sensipar 30 mg QD,     Lab Results   Component Value Date    .2 (H) 02/25/2022    CALCIUM 9.5 03/08/2022    PHOS 3.7 03/08/2022      3/8/2022  POD 11   Magnesium 1.6 - 2.6 mg/dL 1.6       6. Electrolytes:  Will monitor /guidelines  Hyperkalemia: lokelma, low K diet  Lab Results   Component Value Date     03/08/2022    K 5.2 (H) 03/08/2022     03/08/2022    CO2 24 03/08/2022         7. Anemia: Epo scheduled for tomorrow  Lab Results   Component Value Date    WBC 9.71 03/08/2022    HGB 8.4 (L) 03/08/2022    HCT 25.7 (L) 03/08/2022    MCV 90 03/08/2022     03/08/2022         8.  Cytopenias: no significant cytopenias will monitor as per our guidelines. Medicine list reviewed including potential causes of drug-induced cytopenias    9.Proteinuria: continue p/c ratio as per guidelines    3/8/2022  POD 11   Prot/Creat Ratio, Urine 0.00 - 0.20 0.27 (A)       10. BK virus infection screening:  will continue to monitor/ guidelines      11. Weight education: provided during the clinic visit   Body mass index is 26.96 kg/m².     12.Patient safety education regarding immunosuppression including prophylaxis posttransplant for CMV, PCP : Education provided about vaccination and prevention of infections     PCP ppx: Mepron until 8/24/22  CMV ppx: Valcyte until 8/24/22   Fungal ppx: Nystatin until 3/31/22       Follow-up:   Clinic: return to transplant clinic weekly for the first month after transplant; every 2 weeks during months 2-3; then at 6-, 9-, 12-, 18-, 24-, and 36- months post-transplant to reassess for complications from immunosuppression toxicity  and monitor for rejection.  Annually thereafter.    Labs: since patient remains at high risk for rejection and drug-related complications that warrant close monitoring, labs will be ordered as follows: continue twice weekly CBC, renal panel, and drug level for first month; then same labs once weekly through 3rd month post-transplant.  Urine for UA and protein/creatinine ratio monthly.  Serum BK - PCR at 1-, 3-, 6-, 9-, 12-, 18-, 24-, 36-, 48-, and 60 months post-transplant.  Hepatic panel at 1-, 2-, 3-, 6-, 9-, 12-, 18-, 24-, and 36- months post-transplant.    Alcira Pierce NP       Education:   Material provided to the patient.  Patient reminded to call with any health changes since these can be early signs of significant complications.  Also, I advised the patient to be sure any new medications or changes of old medications are discussed with either a pharmacist or physician knowledgeable with transplant to avoid rejection/drug toxicity related to significant drug interactions.    Patient advised that it is recommended that all transplanted patients, and their close contacts and household members receive Covid vaccination.

## 2022-03-09 NOTE — LETTER
March 9, 2022        Kathy Cruz  [address] [place] [address]   [address]  [facility] [address]  Phone: [phone]  Fax: [phone]             [facility]  [address]  [facility] [address]  Phone: [phone]   Patient: Tameka Saenz   MR Number: [id number]   YOB: 2000   Date of Visit: 3/9/2022       Dear Dr. Kathy Cruz    Thank you for referring Tameka Saenz to me for evaluation. Attached you will find relevant portions of my assessment and plan of care.    If you have questions, please do not hesitate to call me. I look forward to following Tameka Saenz along with you.    Sincerely,    Alcira Pierce, PRISCILA    Enclosure    If you would like to receive this communication electronically, please contact [email or web address] or [phone] to request [facility] access.    [facility] is a tool which provides read-only access to select patient information with whom you have a relationship. Its easy to use and provides real time access to review your patients record including encounter summaries, notes, results, and demographic information.    If you feel you have received this communication in error or would no longer like to receive these types of communications, please e-mail [email or web address]

## 2022-03-10 ENCOUNTER — PATIENT MESSAGE (OUTPATIENT)
Dept: TRANSPLANT | Facility: CLINIC | Age: 22
End: 2022-03-10

## 2022-03-10 ENCOUNTER — CLINICAL SUPPORT (OUTPATIENT)
Dept: TRANSPLANT | Facility: CLINIC | Age: 22
End: 2022-03-10
Payer: MEDICARE

## 2022-03-10 ENCOUNTER — LAB VISIT (OUTPATIENT)
Dept: LAB | Facility: HOSPITAL | Age: 22
End: 2022-03-10
Payer: MEDICARE

## 2022-03-10 VITALS
HEART RATE: 86 BPM | WEIGHT: 181.44 LBS | OXYGEN SATURATION: 98 % | TEMPERATURE: 97 F | SYSTOLIC BLOOD PRESSURE: 135 MMHG | BODY MASS INDEX: 26.87 KG/M2 | DIASTOLIC BLOOD PRESSURE: 62 MMHG | HEIGHT: 69 IN | RESPIRATION RATE: 16 BRPM

## 2022-03-10 DIAGNOSIS — D63.8 ANEMIA OF CHRONIC DISEASE: Primary | ICD-10-CM

## 2022-03-10 DIAGNOSIS — Z94.0 KIDNEY REPLACED BY TRANSPLANT: ICD-10-CM

## 2022-03-10 LAB
ALBUMIN SERPL BCP-MCNC: 3.8 G/DL (ref 3.5–5.2)
ANION GAP SERPL CALC-SCNC: 10 MMOL/L (ref 8–16)
BASOPHILS # BLD AUTO: 0.02 K/UL (ref 0–0.2)
BASOPHILS NFR BLD: 0.2 % (ref 0–1.9)
BUN SERPL-MCNC: 42 MG/DL (ref 6–20)
CALCIUM SERPL-MCNC: 9.8 MG/DL (ref 8.7–10.5)
CHLORIDE SERPL-SCNC: 105 MMOL/L (ref 95–110)
CO2 SERPL-SCNC: 22 MMOL/L (ref 23–29)
CREAT SERPL-MCNC: 2.5 MG/DL (ref 0.5–1.4)
DIFFERENTIAL METHOD: ABNORMAL
EOSINOPHIL # BLD AUTO: 0.1 K/UL (ref 0–0.5)
EOSINOPHIL NFR BLD: 0.6 % (ref 0–8)
ERYTHROCYTE [DISTWIDTH] IN BLOOD BY AUTOMATED COUNT: 16.3 % (ref 11.5–14.5)
EST. GFR  (AFRICAN AMERICAN): 40.3 ML/MIN/1.73 M^2
EST. GFR  (NON AFRICAN AMERICAN): 34.9 ML/MIN/1.73 M^2
GLUCOSE SERPL-MCNC: 99 MG/DL (ref 70–110)
HCT VFR BLD AUTO: 27.1 % (ref 40–54)
HGB BLD-MCNC: 8.7 G/DL (ref 14–18)
IMM GRANULOCYTES # BLD AUTO: 0.06 K/UL (ref 0–0.04)
IMM GRANULOCYTES NFR BLD AUTO: 0.6 % (ref 0–0.5)
LYMPHOCYTES # BLD AUTO: 0.4 K/UL (ref 1–4.8)
LYMPHOCYTES NFR BLD: 3.5 % (ref 18–48)
MAGNESIUM SERPL-MCNC: 1.7 MG/DL (ref 1.6–2.6)
MCH RBC QN AUTO: 29.9 PG (ref 27–31)
MCHC RBC AUTO-ENTMCNC: 32.1 G/DL (ref 32–36)
MCV RBC AUTO: 93 FL (ref 82–98)
MONOCYTES # BLD AUTO: 0.6 K/UL (ref 0.3–1)
MONOCYTES NFR BLD: 5.2 % (ref 4–15)
NEUTROPHILS # BLD AUTO: 9.8 K/UL (ref 1.8–7.7)
NEUTROPHILS NFR BLD: 89.9 % (ref 38–73)
NRBC BLD-RTO: 0 /100 WBC
PHOSPHATE SERPL-MCNC: 2.7 MG/DL (ref 2.7–4.5)
PLATELET # BLD AUTO: 232 K/UL (ref 150–450)
PMV BLD AUTO: 11.2 FL (ref 9.2–12.9)
POTASSIUM SERPL-SCNC: 5 MMOL/L (ref 3.5–5.1)
RBC # BLD AUTO: 2.91 M/UL (ref 4.6–6.2)
SODIUM SERPL-SCNC: 137 MMOL/L (ref 136–145)
TACROLIMUS BLD-MCNC: 11 NG/ML (ref 5–15)
WBC # BLD AUTO: 10.85 K/UL (ref 3.9–12.7)

## 2022-03-10 PROCEDURE — 85025 COMPLETE CBC W/AUTO DIFF WBC: CPT | Performed by: INTERNAL MEDICINE

## 2022-03-10 PROCEDURE — 96372 THER/PROPH/DIAG INJ SC/IM: CPT | Mod: PBBFAC

## 2022-03-10 PROCEDURE — 80069 RENAL FUNCTION PANEL: CPT | Performed by: INTERNAL MEDICINE

## 2022-03-10 PROCEDURE — 83735 ASSAY OF MAGNESIUM: CPT | Performed by: INTERNAL MEDICINE

## 2022-03-10 PROCEDURE — 36415 COLL VENOUS BLD VENIPUNCTURE: CPT | Performed by: INTERNAL MEDICINE

## 2022-03-10 PROCEDURE — 80197 ASSAY OF TACROLIMUS: CPT | Performed by: INTERNAL MEDICINE

## 2022-03-10 PROCEDURE — 99999 PR PBB SHADOW E&M-EST. PATIENT-LVL III: CPT | Mod: PBBFAC,,,

## 2022-03-10 PROCEDURE — 99999 PR PBB SHADOW E&M-EST. PATIENT-LVL III: ICD-10-PCS | Mod: PBBFAC,,,

## 2022-03-10 RX ORDER — TACROLIMUS 1 MG/1
6 CAPSULE ORAL EVERY 12 HOURS
Qty: 360 CAPSULE | Refills: 11 | Status: SHIPPED | OUTPATIENT
Start: 2022-03-10 | End: 2022-03-21

## 2022-03-10 RX ADMIN — EPOETIN ALFA-EPBX 10000 UNITS: 10000 INJECTION, SOLUTION INTRAVENOUS; SUBCUTANEOUS at 09:03

## 2022-03-10 NOTE — TELEPHONE ENCOUNTER
Written order received from Dr. Mi.  Message sent to patient instructing him to decrease Prograf to 6 mg twice a day starting tonight.  Asked pt to contact us with any questions.    ----- Message from Mo Mi MD sent at 3/10/2022 11:33 AM CST -----  Lower prograf to 6 mg PO bid

## 2022-03-10 NOTE — PROGRESS NOTES
Is he taking his lokelma? Continue with jennifer K diet  Encourage hydration  Prograf level is pending   Does he report moderate to severe diarrhea?

## 2022-03-14 ENCOUNTER — PROCEDURE VISIT (OUTPATIENT)
Dept: UROLOGY | Facility: CLINIC | Age: 22
End: 2022-03-14
Payer: MEDICARE

## 2022-03-14 ENCOUNTER — LAB VISIT (OUTPATIENT)
Dept: LAB | Facility: HOSPITAL | Age: 22
End: 2022-03-14
Payer: MEDICARE

## 2022-03-14 VITALS
HEART RATE: 98 BPM | BODY MASS INDEX: 26.91 KG/M2 | SYSTOLIC BLOOD PRESSURE: 164 MMHG | RESPIRATION RATE: 16 BRPM | WEIGHT: 181.69 LBS | DIASTOLIC BLOOD PRESSURE: 78 MMHG | HEIGHT: 69 IN

## 2022-03-14 DIAGNOSIS — Z94.0 KIDNEY REPLACED BY TRANSPLANT: Primary | ICD-10-CM

## 2022-03-14 DIAGNOSIS — Z94.0 KIDNEY REPLACED BY TRANSPLANT: ICD-10-CM

## 2022-03-14 DIAGNOSIS — Z94.0 S/P KIDNEY TRANSPLANT: ICD-10-CM

## 2022-03-14 LAB
ALBUMIN SERPL BCP-MCNC: 3.7 G/DL (ref 3.5–5.2)
ANION GAP SERPL CALC-SCNC: 8 MMOL/L (ref 8–16)
BASOPHILS # BLD AUTO: 0.05 K/UL (ref 0–0.2)
BASOPHILS NFR BLD: 0.8 % (ref 0–1.9)
BUN SERPL-MCNC: 37 MG/DL (ref 6–20)
CALCIUM SERPL-MCNC: 9.8 MG/DL (ref 8.7–10.5)
CHLORIDE SERPL-SCNC: 109 MMOL/L (ref 95–110)
CO2 SERPL-SCNC: 23 MMOL/L (ref 23–29)
CREAT SERPL-MCNC: 2.2 MG/DL (ref 0.5–1.4)
DIFFERENTIAL METHOD: ABNORMAL
EOSINOPHIL # BLD AUTO: 0 K/UL (ref 0–0.5)
EOSINOPHIL NFR BLD: 0.6 % (ref 0–8)
ERYTHROCYTE [DISTWIDTH] IN BLOOD BY AUTOMATED COUNT: 17.3 % (ref 11.5–14.5)
EST. GFR  (AFRICAN AMERICAN): 47.1 ML/MIN/1.73 M^2
EST. GFR  (NON AFRICAN AMERICAN): 40.7 ML/MIN/1.73 M^2
FERRITIN SERPL-MCNC: 1020 NG/ML (ref 20–300)
GLUCOSE SERPL-MCNC: 94 MG/DL (ref 70–110)
HCT VFR BLD AUTO: 28.3 % (ref 40–54)
HGB BLD-MCNC: 8.8 G/DL (ref 14–18)
IMM GRANULOCYTES # BLD AUTO: 0.02 K/UL (ref 0–0.04)
IMM GRANULOCYTES NFR BLD AUTO: 0.3 % (ref 0–0.5)
IRON SERPL-MCNC: 99 UG/DL (ref 45–160)
LYMPHOCYTES # BLD AUTO: 0.5 K/UL (ref 1–4.8)
LYMPHOCYTES NFR BLD: 7.6 % (ref 18–48)
MAGNESIUM SERPL-MCNC: 1.7 MG/DL (ref 1.6–2.6)
MCH RBC QN AUTO: 29.6 PG (ref 27–31)
MCHC RBC AUTO-ENTMCNC: 31.1 G/DL (ref 32–36)
MCV RBC AUTO: 95 FL (ref 82–98)
MONOCYTES # BLD AUTO: 0.3 K/UL (ref 0.3–1)
MONOCYTES NFR BLD: 5 % (ref 4–15)
NEUTROPHILS # BLD AUTO: 5.5 K/UL (ref 1.8–7.7)
NEUTROPHILS NFR BLD: 85.7 % (ref 38–73)
NRBC BLD-RTO: 0 /100 WBC
PHOSPHATE SERPL-MCNC: 2.6 MG/DL (ref 2.7–4.5)
PLATELET # BLD AUTO: 235 K/UL (ref 150–450)
PMV BLD AUTO: 10.7 FL (ref 9.2–12.9)
POTASSIUM SERPL-SCNC: 5.3 MMOL/L (ref 3.5–5.1)
RBC # BLD AUTO: 2.97 M/UL (ref 4.6–6.2)
SATURATED IRON: 41 % (ref 20–50)
SODIUM SERPL-SCNC: 140 MMOL/L (ref 136–145)
TACROLIMUS BLD-MCNC: 9.7 NG/ML (ref 5–15)
TOTAL IRON BINDING CAPACITY: 243 UG/DL (ref 250–450)
TRANSFERRIN SERPL-MCNC: 164 MG/DL (ref 200–375)
WBC # BLD AUTO: 6.42 K/UL (ref 3.9–12.7)

## 2022-03-14 PROCEDURE — 82728 ASSAY OF FERRITIN: CPT | Performed by: INTERNAL MEDICINE

## 2022-03-14 PROCEDURE — 84466 ASSAY OF TRANSFERRIN: CPT | Performed by: INTERNAL MEDICINE

## 2022-03-14 PROCEDURE — 36415 COLL VENOUS BLD VENIPUNCTURE: CPT | Performed by: INTERNAL MEDICINE

## 2022-03-14 PROCEDURE — 52310 CYSTOSCOPY AND TREATMENT: CPT | Mod: S$PBB,,, | Performed by: UROLOGY

## 2022-03-14 PROCEDURE — 83735 ASSAY OF MAGNESIUM: CPT | Performed by: INTERNAL MEDICINE

## 2022-03-14 PROCEDURE — 80069 RENAL FUNCTION PANEL: CPT | Performed by: INTERNAL MEDICINE

## 2022-03-14 PROCEDURE — 80197 ASSAY OF TACROLIMUS: CPT | Performed by: INTERNAL MEDICINE

## 2022-03-14 PROCEDURE — 52310 CYSTOSCOPY AND TREATMENT: CPT | Mod: PBBFAC | Performed by: UROLOGY

## 2022-03-14 PROCEDURE — 85025 COMPLETE CBC W/AUTO DIFF WBC: CPT | Performed by: INTERNAL MEDICINE

## 2022-03-14 PROCEDURE — 52310 PR CYSTOSCOPY,REMV CALCULUS,SIMPLE: ICD-10-PCS | Mod: S$PBB,,, | Performed by: UROLOGY

## 2022-03-14 RX ORDER — LIDOCAINE HYDROCHLORIDE 20 MG/ML
JELLY TOPICAL ONCE
Status: CANCELLED | OUTPATIENT
Start: 2022-03-14 | End: 2022-03-14

## 2022-03-14 RX ORDER — DOXYCYCLINE HYCLATE 100 MG
100 TABLET ORAL ONCE
Status: COMPLETED | OUTPATIENT
Start: 2022-03-14 | End: 2022-03-14

## 2022-03-14 RX ORDER — LIDOCAINE HYDROCHLORIDE 20 MG/ML
JELLY TOPICAL
Status: COMPLETED | OUTPATIENT
Start: 2022-03-14 | End: 2022-03-14

## 2022-03-14 RX ADMIN — LIDOCAINE HYDROCHLORIDE: 20 JELLY TOPICAL at 09:03

## 2022-03-14 RX ADMIN — Medication 100 MG: at 09:03

## 2022-03-14 NOTE — PROGRESS NOTES
Kidney Post-Transplant Assessment    Referring Physician: Yury Sotelo  Current Nephrologist: Kathy Cruz    ORGAN: RIGHT KIDNEY  Donor Type: donation after brain death  PHS Increased Risk: no  Cold Ischemia: 424 mins  Induction Medications: thymoglobulin    Subjective:     CC:  Reassessment of renal allograft function and management of chronic immunosuppression.    HPI:  Mr. Saenz is a 21 y.o. 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 His most recent creatinine is 2.2. He takes mycophenolate mofetil, prednisone and tacrolimus for maintenance immunosuppression. His post transplant course has been uncomplicated to date.    Transplant History:  -ESRD secondary to HTN  -on dialysis 12/2015 until DBD kidney transplant on 2/25/22 (Thymo induction, CIT 7 hr. 4 min., CPRA 54%, KDPI 4%, CMV D+,R-).  -history of hepatosplenomegaly with thrombocytopenia, given 1 unit platelets intra-op.   -re-admit 3/7 for hyperkalemia      Interval History:  Had ureteral stent removed 3/14.     Intake->2 L water daily  UOP-no issues  BP-HOME BPs 120/70  BP Readings from Last 3 Encounters:   03/16/22 (!) 143/67   03/14/22 (!) 164/78   03/10/22 135/62     Peripheral edema-none.   Weight-180 lb, stable  Appetite--good  Wound-healing nicely, no drainage or pain    Fx assessment-has been increasing activity level, no CP or SOB with exertion, looks great    Current Outpatient Medications   Medication Sig Dispense Refill    atovaquone (MEPRON) 750 mg/5 mL Susp Take 10 mLs (1,500 mg total) by mouth once daily. STOP 8/24/22 300 mL 5    cinacalcet (SENSIPAR) 30 MG Tab Take 1 tablet (30 mg total) by mouth every evening. 30 tablet 11    famotidine (PEPCID) 20 MG tablet Take 1 tablet (20 mg total) by mouth every evening. 30 tablet 1    gabapentin (NEURONTIN) 300 MG capsule Take 1 capsule (300 mg total) by mouth 2 (two) times daily. 60 capsule 2    mycophenolate  (CELLCEPT) 250 mg Cap Take 4 capsules (1,000 mg total) by mouth 2 (two) times daily. 240 capsule 11    nebivoloL (BYSTOLIC) 5 MG Tab Take 1 tablet (5 mg total) by mouth once daily. 30 tablet 11    NIFEdipine (PROCARDIA-XL) 30 MG (OSM) 24 hr tablet Take 1 tablet (30 mg total) by mouth 2 (two) times a day. 60 tablet 11    nystatin (MYCOSTATIN) 100,000 unit/mL suspension Take 5 mLs (500,000 Units total) by mouth 4 (four) times daily. Stop 3/31/22 (Patient taking differently: Take 5 mLs by mouth 3 (three) times daily. Stop 3/31/22) 680 mL 0    predniSONE (DELTASONE) 5 MG tablet Take by mouth daily: 20mg 3/1-3/31, 15mg 4/1-4/30, 10mg 5/1-5/31, 5mg 6/1/2022- (Patient taking differently: Take by mouth daily: 20mg 3/1-3/31, 15mg 4/1-4/30, 10mg 5/1-5/31, 5mg 6/1/2022-) 120 tablet 11    sodium zirconium cyclosilicate (LOKELMA) 10 gram packet Take 1 packet (10g) by mouth twice a day for 2 days, then once daily.  Mix entire contents of packet(s) into drinking glass containing 3 tablespoons of water; stir well and drink immediately. Add water and repeat until no powder remains to receive entire dose. 30 packet 11    tacrolimus (PROGRAF) 1 MG Cap Take 6 capsules (6 mg total) by mouth every 12 (twelve) hours. 360 capsule 11    valGANciclovir (VALCYTE) 450 mg Tab Take 1 tablet (450 mg total) by mouth once daily. Stop 8/24/22 30 tablet 5    docusate sodium (COLACE) 100 MG capsule Take 1 capsule (100 mg total) by mouth 3 (three) times daily as needed for Constipation. (Patient not taking: Reported on 3/16/2022)  0    oxyCODONE (ROXICODONE) 10 mg Tab immediate release tablet Take 1 tablet (10 mg total) by mouth every 4 (four) hours as needed for Pain. (Patient not taking: Reported on 3/16/2022) 28 tablet 0     No current facility-administered medications for this visit.       Past Medical History:   Diagnosis Date    Acne     Anemia of renal disease     Dialysis patient     peritoneal daily at night. followed by   "[facility]    ESRD on dialysis since 12/16/15     Hepatosplenomegaly     Hypertension     Kidney disease     Seasonal allergies     Secondary hyperparathyroidism of renal origin     Thrombocytopenia, unspecified        Review of Systems   Constitutional: Negative for activity change, appetite change and fever.   HENT: Negative for congestion, mouth sores and sore throat.    Eyes: Negative for visual disturbance.   Respiratory: Negative for cough, chest tightness and shortness of breath.    Cardiovascular: Negative for chest pain, palpitations and leg swelling.   Gastrointestinal: Negative for abdominal distention, abdominal pain, constipation, diarrhea and nausea.   Genitourinary: Negative for difficulty urinating, frequency and hematuria.   Musculoskeletal: Negative for arthralgias and gait problem.   Skin: Positive for wound.   Allergic/Immunologic: Positive for immunocompromised state. Negative for environmental allergies and food allergies.   Neurological: Negative for dizziness, weakness and numbness.   Psychiatric/Behavioral: Negative for sleep disturbance. The patient is not nervous/anxious.        Objective:   Blood pressure (!) 143/67, pulse 86, temperature 97.7 °F (36.5 °C), temperature source Temporal, resp. rate 18, height 5' 9" (1.753 m), weight 81.7 kg (180 lb 1.9 oz), SpO2 99 %.body mass index is 26.6 kg/m².    Physical Exam  Vitals and nursing note reviewed.   Constitutional:       Appearance: Normal appearance.   HENT:      Head: Normocephalic.   Cardiovascular:      Rate and Rhythm: Normal rate and regular rhythm.      Heart sounds: Normal heart sounds.   Pulmonary:      Effort: Pulmonary effort is normal.      Breath sounds: Normal breath sounds.   Abdominal:      General: Bowel sounds are normal. There is no distension.      Palpations: Abdomen is soft.      Tenderness: There is no abdominal tenderness.      Comments: Surgical incision well approximated with staples, no drainage  "   Musculoskeletal:         General: Normal range of motion.   Skin:     General: Skin is warm and dry.   Neurological:      General: No focal deficit present.      Mental Status: He is alert.   Psychiatric:         Behavior: Behavior normal.         Labs:  Lab Results   Component Value Date    WBC 6.42 03/14/2022    HGB 8.8 (L) 03/14/2022    HCT 28.3 (L) 03/14/2022     03/14/2022    K 5.3 (H) 03/14/2022     03/14/2022    CO2 23 03/14/2022    BUN 37 (H) 03/14/2022    CREATININE 2.2 (H) 03/14/2022    EGFRNONAA 40.7 (A) 03/14/2022    CALCIUM 9.8 03/14/2022    PHOS 2.6 (L) 03/14/2022    MG 1.7 03/14/2022    ALBUMIN 3.7 03/14/2022    AST 15 03/07/2022    ALT 45 (H) 03/07/2022    UTPCR 0.27 (H) 03/08/2022    .2 (H) 02/25/2022    TACROLIMUS 9.7 03/14/2022       Labs were reviewed with the patient    Assessment:     1. S/P kidney transplant    2. Hypertension secondary to other renal disorders    3. Prophylactic immunotherapy    4. Stage 3a chronic kidney disease    5. Steroid-induced hyperglycemia    6. At risk for opportunistic infections        Plan:     Hyperkalemia: lokelma, low K diet  Has labs tomorrow, surgery visit next Wednesday-still has staples    Follow-up:   1. CKD stage: 3 stable      2. Immunosuppression: Prograf trough 9.7, which is therapeutic (target 8-10). Continue Prograf 6/6, MMF 1000 mg BID, and Prednisone taper. Will continue to monitor for drug toxicities    3. Allograft Function: Cr continues to trend down. Continue good po hydration.   Lab Results   Component Value Date    CREATININE 2.2 (H) 03/14/2022      3/14/2022  POD 17   eGFR if African American >60 mL/min/1.73 m^2 47.1 (A)     4. Hypertension management: advise low salt diet and home BP monitoring    bystolic 5 mg QD, Nifedipine 30 mg BID     5. Metabolic Bone Disease/Secondary Hyperparathyroidism:stable  Sensipar 30 mg QD  Lab Results   Component Value Date    .2 (H) 02/25/2022    CALCIUM 9.8 03/14/2022    PHOS  2.6 (L) 03/14/2022      3/8/2022  POD 11   Magnesium 1.6 - 2.6 mg/dL 1.6       6. Electrolytes:  Will monitor /guidelines  Hyperkalemia: lokelma, low K diet  Lab Results   Component Value Date     03/14/2022    K 5.3 (H) 03/14/2022     03/14/2022    CO2 23 03/14/2022         7. Anemia: BETH weekly x 4  Lab Results   Component Value Date    WBC 6.42 03/14/2022    HGB 8.8 (L) 03/14/2022    HCT 28.3 (L) 03/14/2022    MCV 95 03/14/2022     03/14/2022         8.  Cytopenias: no significant cytopenias will monitor as per our guidelines. Medicine list reviewed including potential causes of drug-induced cytopenias    9.Proteinuria: continue p/c ratio as per guidelines    3/8/2022  POD 11   Prot/Creat Ratio, Urine 0.00 - 0.20 0.27 (A)       10. BK virus infection screening:  will continue to monitor/ guidelines      11. Weight education: provided during the clinic visit   Body mass index is 26.6 kg/m².     12.Patient safety education regarding immunosuppression including prophylaxis posttransplant for CMV, PCP : Education provided about vaccination and prevention of infections     PCP ppx: Mepron until 8/24/22  CMV ppx: Valcyte until 8/24/22   Fungal ppx: Nystatin until 3/31/22       Follow-up:   Clinic: return to transplant clinic weekly for the first month after transplant; every 2 weeks during months 2-3; then at 6-, 9-, 12-, 18-, 24-, and 36- months post-transplant to reassess for complications from immunosuppression toxicity and monitor for rejection.  Annually thereafter.    Labs: since patient remains at high risk for rejection and drug-related complications that warrant close monitoring, labs will be ordered as follows: continue twice weekly CBC, renal panel, and drug level for first month; then same labs once weekly through 3rd month post-transplant.  Urine for UA and protein/creatinine ratio monthly.  Serum BK - PCR at 1-, 3-, 6-, 9-, 12-, 18-, 24-, 36-, 48-, and 60 months post-transplant.   Hepatic panel at 1-, 2-, 3-, 6-, 9-, 12-, 18-, 24-, and 36- months post-transplant.    Alcira Pierce NP       Education:   Material provided to the patient.  Patient reminded to call with any health changes since these can be early signs of significant complications.  Also, I advised the patient to be sure any new medications or changes of old medications are discussed with either a pharmacist or physician knowledgeable with transplant to avoid rejection/drug toxicity related to significant drug interactions.    Patient advised that it is recommended that all transplanted patients, and their close contacts and household members receive Covid vaccination.

## 2022-03-14 NOTE — PROGRESS NOTES
Please make sure he is taking Lokelma as ordered  Encourage hydration   Low K diet   Add iron stores ferritin to blood work

## 2022-03-14 NOTE — PATIENT INSTRUCTIONS
What to Expect After a Cystoscopy with Stent Removal  For the next 24-48 hours, you may feel a mild burning when you urinate. This burning is normal and expected. Drink plenty of water to dilute the urine to help relieve the burning sensation. You may also see a small amount of blood in your urine after the procedure.    Unless you are already taking antibiotics, you may be given an antibiotic after the test to prevent infection.    Signs and Symptoms to Report  Call the [facility] at [phone] if you develop any of the following:  Fever of 101 degrees or higher  Chills or persistent bleeding  Inability to urinate    After hours or on weekends, you may reach a urology resident on call at this number: [phone].

## 2022-03-14 NOTE — PROCEDURES
Procedures     CYSTOSCOPY REPORT    Pre Procedure Diagnosis:  Status post renal transplant on 2/25/2022, single ureter    Post Procedure Diagnosis: same    Procedure:  Cystoscopy, transplant stent removal    Anesthesia: 10 cc 2% lidocaine jelly applied per urethra.    14 FR Flexible Olympus cystoscope used.    FINDINGS:  Stent was removed in its entirety.    The patient was taken to the cystoscopy suite and placed in supine position.  The genitalia was prepped and draped  in the usual sterile fashion.  Two percent lidocaine jelly was inserted in the urethra and held in place with a penile clamp.  After sufficent time had passed to allow good local anesthesia, the cystoscope was inserted in the urethra and passed into the bladder visualizing the urethra along its entire course.  The cystoscope was then advanced into the bladder where the stent was visualized, grasped and removed along with the cystoscope.  The patient was instructed to urinate prior to leaving the office.     Post procedure medication:  Doxycycline 100 mg x 1     ASSESSMENT/PLAN:  21 year old man status post flexible cystoscopy, transplant stent removal.  1. Push fluids for 24 hours.  2. May see blood in the urine, this should gradually improve over the next 2-3 days.  3. The patient was instructed to return to the office or go to the emergency should fever, chills, cloudy urine, or inability to urinate develop.  4. Follow up with the transplant service as previously scheduled.

## 2022-03-15 ENCOUNTER — TELEPHONE (OUTPATIENT)
Dept: TRANSPLANT | Facility: CLINIC | Age: 22
End: 2022-03-15
Payer: MEDICARE

## 2022-03-15 NOTE — TELEPHONE ENCOUNTER
Returned call to patient's mother and explained the potassium results and what the doctor recommended.  Explained that coordinator also spoke to the patient and explained the need for a low potassium diet and to continue with Lokelma as prescribed.  Patient's mother verbalized understanding.       ----- Message from Cosmo Price sent at 3/15/2022  9:46 AM CDT -----  Regarding: Medication  Contact: Darcie  Patient's mother is calling to speak with nurse about son's Potassium.    Contact: Darcie Saenz (Mother)      [phone]

## 2022-03-16 ENCOUNTER — OFFICE VISIT (OUTPATIENT)
Dept: TRANSPLANT | Facility: CLINIC | Age: 22
End: 2022-03-16
Payer: MEDICARE

## 2022-03-16 VITALS
SYSTOLIC BLOOD PRESSURE: 143 MMHG | DIASTOLIC BLOOD PRESSURE: 67 MMHG | WEIGHT: 180.13 LBS | TEMPERATURE: 98 F | OXYGEN SATURATION: 99 % | HEART RATE: 86 BPM | HEIGHT: 69 IN | RESPIRATION RATE: 18 BRPM | BODY MASS INDEX: 26.68 KG/M2

## 2022-03-16 DIAGNOSIS — Z29.89 PROPHYLACTIC IMMUNOTHERAPY: ICD-10-CM

## 2022-03-16 DIAGNOSIS — T38.0X5A STEROID-INDUCED HYPERGLYCEMIA: ICD-10-CM

## 2022-03-16 DIAGNOSIS — I15.1 HYPERTENSION SECONDARY TO OTHER RENAL DISORDERS: ICD-10-CM

## 2022-03-16 DIAGNOSIS — N18.31 STAGE 3A CHRONIC KIDNEY DISEASE: ICD-10-CM

## 2022-03-16 DIAGNOSIS — R73.9 STEROID-INDUCED HYPERGLYCEMIA: ICD-10-CM

## 2022-03-16 DIAGNOSIS — Z91.89 AT RISK FOR OPPORTUNISTIC INFECTIONS: ICD-10-CM

## 2022-03-16 DIAGNOSIS — Z94.0 S/P KIDNEY TRANSPLANT: Primary | ICD-10-CM

## 2022-03-16 PROCEDURE — 99215 OFFICE O/P EST HI 40 MIN: CPT | Mod: PBBFAC | Performed by: NURSE PRACTITIONER

## 2022-03-16 PROCEDURE — 99215 OFFICE O/P EST HI 40 MIN: CPT | Mod: S$PBB,,, | Performed by: NURSE PRACTITIONER

## 2022-03-16 PROCEDURE — 99999 PR PBB SHADOW E&M-EST. PATIENT-LVL V: CPT | Mod: PBBFAC,,, | Performed by: NURSE PRACTITIONER

## 2022-03-16 PROCEDURE — 99999 PR PBB SHADOW E&M-EST. PATIENT-LVL V: ICD-10-PCS | Mod: PBBFAC,,, | Performed by: NURSE PRACTITIONER

## 2022-03-16 PROCEDURE — 99215 PR OFFICE/OUTPT VISIT, EST, LEVL V, 40-54 MIN: ICD-10-PCS | Mod: S$PBB,,, | Performed by: NURSE PRACTITIONER

## 2022-03-16 NOTE — PATIENT INSTRUCTIONS
It is my privilege to participate in your transplant care! Please be sure to let us know if you have any questions or concerns about your health care - we cannot help you if we do not know. Don't forget we are on call 24/7 for any emergencies.      Best Wishes,  Alcira Pierce NP-C

## 2022-03-16 NOTE — LETTER
March 16, 2022        Kathy Cruz  [address] [place] [address]   [address]  [facility] [address]  Phone: [phone]  Fax: [phone]             Ambrosio Gamez- Transplant [facility]  [address]  [facility] [address]  Phone: [phone]   Patient: Tameka Saenz   MR Number: [id number]   YOB: 2000   Date of Visit: 3/16/2022       Dear Dr. Kathy Cruz    Thank you for referring Tameka Saenz to me for evaluation. Attached you will find relevant portions of my assessment and plan of care.    If you have questions, please do not hesitate to call me. I look forward to following Tameka Saenz along with you.    Sincerely,    Alcira Pierce, PRISCILA    Enclosure    If you would like to receive this communication electronically, please contact [email or web address] or [phone] to request [employer] access.    [employer] is a tool which provides read-only access to select patient information with whom you have a relationship. Its easy to use and provides real time access to review your patients record including encounter summaries, notes, results, and demographic information.    If you feel you have received this communication in error or would no longer like to receive these types of communications, please e-mail [email or web address]

## 2022-03-17 ENCOUNTER — LAB VISIT (OUTPATIENT)
Dept: LAB | Facility: HOSPITAL | Age: 22
End: 2022-03-17
Payer: MEDICARE

## 2022-03-17 ENCOUNTER — CLINICAL SUPPORT (OUTPATIENT)
Dept: TRANSPLANT | Facility: CLINIC | Age: 22
End: 2022-03-17
Payer: MEDICARE

## 2022-03-17 VITALS
SYSTOLIC BLOOD PRESSURE: 148 MMHG | WEIGHT: 178.13 LBS | DIASTOLIC BLOOD PRESSURE: 71 MMHG | HEIGHT: 69 IN | BODY MASS INDEX: 26.38 KG/M2

## 2022-03-17 DIAGNOSIS — Z94.0 S/P KIDNEY TRANSPLANT: Primary | ICD-10-CM

## 2022-03-17 DIAGNOSIS — D63.8 ANEMIA OF CHRONIC DISEASE: Primary | ICD-10-CM

## 2022-03-17 DIAGNOSIS — Z94.0 KIDNEY REPLACED BY TRANSPLANT: ICD-10-CM

## 2022-03-17 LAB
ALBUMIN SERPL BCP-MCNC: 4 G/DL (ref 3.5–5.2)
ANION GAP SERPL CALC-SCNC: 8 MMOL/L (ref 8–16)
BASOPHILS # BLD AUTO: 0.02 K/UL (ref 0–0.2)
BASOPHILS NFR BLD: 0.4 % (ref 0–1.9)
BUN SERPL-MCNC: 49 MG/DL (ref 6–20)
CALCIUM SERPL-MCNC: 10.1 MG/DL (ref 8.7–10.5)
CHLORIDE SERPL-SCNC: 109 MMOL/L (ref 95–110)
CO2 SERPL-SCNC: 21 MMOL/L (ref 23–29)
CREAT SERPL-MCNC: 2.3 MG/DL (ref 0.5–1.4)
DIFFERENTIAL METHOD: ABNORMAL
EOSINOPHIL # BLD AUTO: 0 K/UL (ref 0–0.5)
EOSINOPHIL NFR BLD: 0.8 % (ref 0–8)
ERYTHROCYTE [DISTWIDTH] IN BLOOD BY AUTOMATED COUNT: 17.4 % (ref 11.5–14.5)
EST. GFR  (AFRICAN AMERICAN): 44.6 ML/MIN/1.73 M^2
EST. GFR  (NON AFRICAN AMERICAN): 38.6 ML/MIN/1.73 M^2
GLUCOSE SERPL-MCNC: 96 MG/DL (ref 70–110)
HCT VFR BLD AUTO: 28.4 % (ref 40–54)
HGB BLD-MCNC: 8.9 G/DL (ref 14–18)
IMM GRANULOCYTES # BLD AUTO: 0.02 K/UL (ref 0–0.04)
IMM GRANULOCYTES NFR BLD AUTO: 0.4 % (ref 0–0.5)
LYMPHOCYTES # BLD AUTO: 0.4 K/UL (ref 1–4.8)
LYMPHOCYTES NFR BLD: 7.8 % (ref 18–48)
MAGNESIUM SERPL-MCNC: 1.6 MG/DL (ref 1.6–2.6)
MCH RBC QN AUTO: 29.2 PG (ref 27–31)
MCHC RBC AUTO-ENTMCNC: 31.3 G/DL (ref 32–36)
MCV RBC AUTO: 93 FL (ref 82–98)
MONOCYTES # BLD AUTO: 0.3 K/UL (ref 0.3–1)
MONOCYTES NFR BLD: 5.5 % (ref 4–15)
NEUTROPHILS # BLD AUTO: 4.5 K/UL (ref 1.8–7.7)
NEUTROPHILS NFR BLD: 85.1 % (ref 38–73)
NRBC BLD-RTO: 0 /100 WBC
PHOSPHATE SERPL-MCNC: 2.7 MG/DL (ref 2.7–4.5)
PLATELET # BLD AUTO: 212 K/UL (ref 150–450)
PMV BLD AUTO: 10.3 FL (ref 9.2–12.9)
POTASSIUM SERPL-SCNC: 4.7 MMOL/L (ref 3.5–5.1)
RBC # BLD AUTO: 3.05 M/UL (ref 4.6–6.2)
SODIUM SERPL-SCNC: 138 MMOL/L (ref 136–145)
TACROLIMUS BLD-MCNC: 11.5 NG/ML (ref 5–15)
WBC # BLD AUTO: 5.25 K/UL (ref 3.9–12.7)

## 2022-03-17 PROCEDURE — 83735 ASSAY OF MAGNESIUM: CPT | Performed by: INTERNAL MEDICINE

## 2022-03-17 PROCEDURE — 96372 THER/PROPH/DIAG INJ SC/IM: CPT | Mod: PBBFAC

## 2022-03-17 PROCEDURE — 99999 PR PBB SHADOW E&M-EST. PATIENT-LVL I: ICD-10-PCS | Mod: PBBFAC,,,

## 2022-03-17 PROCEDURE — 80069 RENAL FUNCTION PANEL: CPT | Performed by: INTERNAL MEDICINE

## 2022-03-17 PROCEDURE — 36415 COLL VENOUS BLD VENIPUNCTURE: CPT | Performed by: INTERNAL MEDICINE

## 2022-03-17 PROCEDURE — 80197 ASSAY OF TACROLIMUS: CPT | Performed by: INTERNAL MEDICINE

## 2022-03-17 PROCEDURE — 85025 COMPLETE CBC W/AUTO DIFF WBC: CPT | Performed by: INTERNAL MEDICINE

## 2022-03-17 PROCEDURE — 99999 PR PBB SHADOW E&M-EST. PATIENT-LVL I: CPT | Mod: PBBFAC,,,

## 2022-03-17 RX ORDER — MIDAZOLAM HYDROCHLORIDE 1 MG/ML
1 INJECTION INTRAMUSCULAR; INTRAVENOUS
Status: CANCELLED | OUTPATIENT
Start: 2022-03-17

## 2022-03-17 RX ORDER — FENTANYL CITRATE 50 UG/ML
50 INJECTION, SOLUTION INTRAMUSCULAR; INTRAVENOUS
Status: CANCELLED | OUTPATIENT
Start: 2022-03-17

## 2022-03-17 RX ADMIN — EPOETIN ALFA-EPBX 10000 UNITS: 10000 INJECTION, SOLUTION INTRAVENOUS; SUBCUTANEOUS at 09:03

## 2022-03-18 ENCOUNTER — PATIENT MESSAGE (OUTPATIENT)
Dept: TRANSPLANT | Facility: CLINIC | Age: 22
End: 2022-03-18
Payer: MEDICARE

## 2022-03-18 LAB
CLASS I ANTIBODIES - LUMINEX: NORMAL
CLASS I ANTIBODY COMMENTS - LUMINEX: NORMAL
CLASS II ANTIBODIES - LUMINEX: NEGATIVE
CPRA %: 58
SERUM COLLECTION DT - LUMINEX CLASS I: NORMAL
SERUM COLLECTION DT - LUMINEX CLASS II: NORMAL
SPCL1 TESTING DATE: NORMAL
SPCL2 TESTING DATE: NORMAL
SPLUA TESTING DATE: NORMAL

## 2022-03-21 ENCOUNTER — PATIENT MESSAGE (OUTPATIENT)
Dept: TRANSPLANT | Facility: CLINIC | Age: 22
End: 2022-03-21
Payer: MEDICARE

## 2022-03-21 ENCOUNTER — LAB VISIT (OUTPATIENT)
Dept: LAB | Facility: HOSPITAL | Age: 22
End: 2022-03-21
Payer: MEDICARE

## 2022-03-21 DIAGNOSIS — Z94.0 KIDNEY REPLACED BY TRANSPLANT: ICD-10-CM

## 2022-03-21 LAB
ALBUMIN SERPL BCP-MCNC: 4 G/DL (ref 3.5–5.2)
ANION GAP SERPL CALC-SCNC: 9 MMOL/L (ref 8–16)
BASOPHILS # BLD AUTO: 0.01 K/UL (ref 0–0.2)
BASOPHILS NFR BLD: 0.2 % (ref 0–1.9)
BUN SERPL-MCNC: 48 MG/DL (ref 6–20)
CALCIUM SERPL-MCNC: 9.9 MG/DL (ref 8.7–10.5)
CHLORIDE SERPL-SCNC: 111 MMOL/L (ref 95–110)
CO2 SERPL-SCNC: 20 MMOL/L (ref 23–29)
CREAT SERPL-MCNC: 2.8 MG/DL (ref 0.5–1.4)
DIFFERENTIAL METHOD: ABNORMAL
EOSINOPHIL # BLD AUTO: 0 K/UL (ref 0–0.5)
EOSINOPHIL NFR BLD: 0.3 % (ref 0–8)
ERYTHROCYTE [DISTWIDTH] IN BLOOD BY AUTOMATED COUNT: 18.2 % (ref 11.5–14.5)
EST. GFR  (AFRICAN AMERICAN): 35.2 ML/MIN/1.73 M^2
EST. GFR  (NON AFRICAN AMERICAN): 30.4 ML/MIN/1.73 M^2
GLUCOSE SERPL-MCNC: 95 MG/DL (ref 70–110)
HCT VFR BLD AUTO: 30.2 % (ref 40–54)
HGB BLD-MCNC: 9.5 G/DL (ref 14–18)
IMM GRANULOCYTES # BLD AUTO: 0.05 K/UL (ref 0–0.04)
IMM GRANULOCYTES NFR BLD AUTO: 0.8 % (ref 0–0.5)
LYMPHOCYTES # BLD AUTO: 0.5 K/UL (ref 1–4.8)
LYMPHOCYTES NFR BLD: 7.8 % (ref 18–48)
MAGNESIUM SERPL-MCNC: 1.6 MG/DL (ref 1.6–2.6)
MCH RBC QN AUTO: 29.4 PG (ref 27–31)
MCHC RBC AUTO-ENTMCNC: 31.5 G/DL (ref 32–36)
MCV RBC AUTO: 94 FL (ref 82–98)
MONOCYTES # BLD AUTO: 0.4 K/UL (ref 0.3–1)
MONOCYTES NFR BLD: 5.8 % (ref 4–15)
NEUTROPHILS # BLD AUTO: 5.1 K/UL (ref 1.8–7.7)
NEUTROPHILS NFR BLD: 85.1 % (ref 38–73)
NRBC BLD-RTO: 0 /100 WBC
PHOSPHATE SERPL-MCNC: 2.3 MG/DL (ref 2.7–4.5)
PLATELET # BLD AUTO: 205 K/UL (ref 150–450)
PMV BLD AUTO: 11.1 FL (ref 9.2–12.9)
POTASSIUM SERPL-SCNC: 5 MMOL/L (ref 3.5–5.1)
RBC # BLD AUTO: 3.23 M/UL (ref 4.6–6.2)
SODIUM SERPL-SCNC: 140 MMOL/L (ref 136–145)
TACROLIMUS BLD-MCNC: 13.4 NG/ML (ref 5–15)
WBC # BLD AUTO: 6.01 K/UL (ref 3.9–12.7)

## 2022-03-21 PROCEDURE — 80197 ASSAY OF TACROLIMUS: CPT | Performed by: INTERNAL MEDICINE

## 2022-03-21 PROCEDURE — 83735 ASSAY OF MAGNESIUM: CPT | Performed by: INTERNAL MEDICINE

## 2022-03-21 PROCEDURE — 80069 RENAL FUNCTION PANEL: CPT | Performed by: INTERNAL MEDICINE

## 2022-03-21 PROCEDURE — 85025 COMPLETE CBC W/AUTO DIFF WBC: CPT | Performed by: INTERNAL MEDICINE

## 2022-03-21 PROCEDURE — 36415 COLL VENOUS BLD VENIPUNCTURE: CPT | Performed by: INTERNAL MEDICINE

## 2022-03-21 RX ORDER — TACROLIMUS 1 MG/1
4 CAPSULE ORAL EVERY 12 HOURS
Qty: 240 CAPSULE | Refills: 11 | Status: SHIPPED | OUTPATIENT
Start: 2022-03-22 | End: 2022-03-29 | Stop reason: SDUPTHER

## 2022-03-21 NOTE — TELEPHONE ENCOUNTER
Received written order from Dr. Mi.  Message sent to patient instructing him to hold the next 2 doses of Prograf and restart tomorrow night at 4 mg twice a day.  Labs to be repeated on Thursday as scheduled.  Patient verbalized understanding.         ----- Message from Mo Mi MD sent at 3/21/2022 12:15 PM CDT -----  Please hold prograf for 24 hrs (x 2 doses) and start prograf 4 mg bid tomorrow night , repeat a level /labs Thursday this week, encourage hydration

## 2022-03-21 NOTE — PROGRESS NOTES
Please hold prograf for 24 hrs (x 2 doses) and start prograf 4 mg bid tomorrow night , repeat a level /labs Thursday this week, encourage hydration

## 2022-03-23 ENCOUNTER — OFFICE VISIT (OUTPATIENT)
Dept: TRANSPLANT | Facility: CLINIC | Age: 22
End: 2022-03-23
Payer: MEDICARE

## 2022-03-23 ENCOUNTER — TELEPHONE (OUTPATIENT)
Dept: INTERVENTIONAL RADIOLOGY/VASCULAR | Facility: HOSPITAL | Age: 22
End: 2022-03-23
Payer: MEDICARE

## 2022-03-23 VITALS
DIASTOLIC BLOOD PRESSURE: 78 MMHG | RESPIRATION RATE: 16 BRPM | WEIGHT: 178.56 LBS | BODY MASS INDEX: 26.45 KG/M2 | TEMPERATURE: 97 F | SYSTOLIC BLOOD PRESSURE: 149 MMHG | HEART RATE: 82 BPM | OXYGEN SATURATION: 100 % | HEIGHT: 69 IN

## 2022-03-23 DIAGNOSIS — Z94.0 KIDNEY REPLACED BY TRANSPLANT: Primary | ICD-10-CM

## 2022-03-23 DIAGNOSIS — Z79.60 LONG-TERM USE OF IMMUNOSUPPRESSANT MEDICATION: ICD-10-CM

## 2022-03-23 DIAGNOSIS — Z51.81 MEDICATION MONITORING ENCOUNTER: ICD-10-CM

## 2022-03-23 PROCEDURE — 99214 OFFICE O/P EST MOD 30 MIN: CPT | Mod: S$PBB,,, | Performed by: TRANSPLANT SURGERY

## 2022-03-23 PROCEDURE — 99214 PR OFFICE/OUTPT VISIT, EST, LEVL IV, 30-39 MIN: ICD-10-PCS | Mod: S$PBB,,, | Performed by: TRANSPLANT SURGERY

## 2022-03-23 PROCEDURE — 99999 PR PBB SHADOW E&M-EST. PATIENT-LVL III: ICD-10-PCS | Mod: PBBFAC,,,

## 2022-03-23 PROCEDURE — 99213 OFFICE O/P EST LOW 20 MIN: CPT | Mod: PBBFAC

## 2022-03-23 PROCEDURE — 99999 PR PBB SHADOW E&M-EST. PATIENT-LVL III: CPT | Mod: PBBFAC,,,

## 2022-03-23 NOTE — PROGRESS NOTES
Post Clinic Note:    SW spoke with patient and significant other to follow up regarding any needs post transplant and assess coping. Pt is a kidney transplant recipient from 2/25/2022. Patient and Caregiver reported currently staying in [employer] [address] apartments. Patient and Caregiver reported that they are doing well and have no concerns at this time. Patient and Caregiver expressed no psychosocial needs or concerns at this time, reported no issues with obtaining medications, and reports receiving medications from [facility]. Patient and caregiver reported knowing how to contact SW team if any additional needs arise and SW remains available at [phone].

## 2022-03-23 NOTE — PROGRESS NOTES
Transplant Surgery  Kidney Transplant Recipient Follow-up    Referring Physician: Yury Sotelo  Current Nephrologist: Kathy Cruz    Subjective:     Chief Complaint: Tameka Saenz is a 21 y.o. year old Black or  male who is status post Kidney transplant performed on 2/25/2022.    ORGAN:  RIGHT KIDNEY  Disease Etiology: Hypertensive Nephrosclerosis  Donor Type:  Donation after Brain Death  Donor CMV Status:  Positive  Donor HBcAB:  Negative  Donor HCV Status:  Negative    History of Present Illness: He reports no concerns.  From a transplant perspective, he reports normal urination, no incisional pain and no dysuria.  Tameka is here for management of his immunosuppression medication.  Tameka states that his immunosuppression is being well tolerated.  Hypertension is is reportedly well controlled.    External provider notes reviewed: Yes    Review of Systems   Gastrointestinal: Negative.    Genitourinary: Negative.        Objective:     Physical Exam  Lab Results   Component Value Date    CREATININE 2.8 (H) 03/21/2022    BUN 48 (H) 03/21/2022     Lab Results   Component Value Date    WBC 6.01 03/21/2022    HGB 9.5 (L) 03/21/2022    HCT 30.2 (L) 03/21/2022    HCT 26 (L) 03/07/2022     03/21/2022     Lab Results   Component Value Date    TACROLIMUS 13.4 03/21/2022       Diagnostics:  The following labs have been reviewed: CBC  BMP  TACROLIMUS LEVEL  The following radiology images have been independently reviewed and interpreted: Renal US    Assessment and Plan:        · S/P Kidney transplant.  · Chronic immunosuppressive medications for rejection prophylaxis at target.  Plan: no adjustment needed.  · Continue monitoring symptoms, labs and drug levels for drug-related toxicity and side effects.  · Staples removed today.  · Biopsy pending.     Additional testing to be completed according to the Kidney: Written Order Guideline for Kidney Transplant Follow-Up  (KI-09)    Interpretation of tests and discussion of patient management involves all members of the multidisciplinary transplant team.  Patient advised that it is recommended that all transplant candidates, and their close contacts and household members receive Covid vaccination.  Follow-up: Patient reminded to call with any health changes, since these can be early signs of significant complications.  Also, I advised the patient to be sure any new medications or changes of old medications are discussed with either a pharmacist, or physician knowledgeable with transplant to avoid rejection/drug toxicity related to significant drug interactions.    Frankie Snider MD       [facility] Patient Status  Functional Status: 100% - Normal, no complaints, no evidence of disease  Physical Capacity: No Limitations

## 2022-03-24 ENCOUNTER — HOSPITAL ENCOUNTER (OUTPATIENT)
Dept: INTERVENTIONAL RADIOLOGY/VASCULAR | Facility: HOSPITAL | Age: 22
Discharge: HOME | End: 2022-03-24
Attending: INTERNAL MEDICINE
Payer: MEDICARE

## 2022-03-24 ENCOUNTER — CLINICAL SUPPORT (OUTPATIENT)
Dept: TRANSPLANT | Facility: CLINIC | Age: 22
End: 2022-03-24
Payer: MEDICARE

## 2022-03-24 VITALS
BODY MASS INDEX: 26.66 KG/M2 | DIASTOLIC BLOOD PRESSURE: 44 MMHG | OXYGEN SATURATION: 100 % | WEIGHT: 180 LBS | RESPIRATION RATE: 21 BRPM | SYSTOLIC BLOOD PRESSURE: 138 MMHG | TEMPERATURE: 98 F | HEART RATE: 84 BPM | HEIGHT: 69 IN

## 2022-03-24 VITALS
BODY MASS INDEX: 26.71 KG/M2 | DIASTOLIC BLOOD PRESSURE: 70 MMHG | SYSTOLIC BLOOD PRESSURE: 138 MMHG | WEIGHT: 180.31 LBS | HEIGHT: 69 IN

## 2022-03-24 DIAGNOSIS — Z94.0 KIDNEY REPLACED BY TRANSPLANT: ICD-10-CM

## 2022-03-24 DIAGNOSIS — D63.8 ANEMIA OF CHRONIC DISEASE: Primary | ICD-10-CM

## 2022-03-24 PROCEDURE — 50200 IR BIOPSY KIDNEY: ICD-10-PCS | Mod: RT,,, | Performed by: RADIOLOGY

## 2022-03-24 PROCEDURE — 25000003 PHARM REV CODE 250: Performed by: RADIOLOGY

## 2022-03-24 PROCEDURE — 76942 PR U/S GUIDANCE FOR NEEDLE GUIDANCE: ICD-10-PCS | Mod: 26,,, | Performed by: RADIOLOGY

## 2022-03-24 PROCEDURE — 76942 ECHO GUIDE FOR BIOPSY: CPT | Mod: TC | Performed by: RADIOLOGY

## 2022-03-24 PROCEDURE — 99999 PR PBB SHADOW E&M-EST. PATIENT-LVL I: ICD-10-PCS | Mod: PBBFAC,,,

## 2022-03-24 PROCEDURE — 76942 ECHO GUIDE FOR BIOPSY: CPT | Mod: 26,,, | Performed by: RADIOLOGY

## 2022-03-24 PROCEDURE — 63600175 PHARM REV CODE 636 W HCPCS: Performed by: RADIOLOGY

## 2022-03-24 PROCEDURE — 27201068 IR BIOPSY KIDNEY

## 2022-03-24 PROCEDURE — 50200 RENAL BIOPSY PERQ: CPT | Mod: RT | Performed by: RADIOLOGY

## 2022-03-24 PROCEDURE — 96372 THER/PROPH/DIAG INJ SC/IM: CPT | Mod: PBBFAC

## 2022-03-24 PROCEDURE — 99999 PR PBB SHADOW E&M-EST. PATIENT-LVL I: CPT | Mod: PBBFAC,,,

## 2022-03-24 RX ORDER — MIDAZOLAM HYDROCHLORIDE 1 MG/ML
INJECTION INTRAMUSCULAR; INTRAVENOUS CODE/TRAUMA/SEDATION MEDICATION
Status: COMPLETED | OUTPATIENT
Start: 2022-03-24 | End: 2022-03-24

## 2022-03-24 RX ORDER — SODIUM CHLORIDE 9 MG/ML
INJECTION, SOLUTION INTRAVENOUS CONTINUOUS
Status: DISCONTINUED | OUTPATIENT
Start: 2022-03-24 | End: 2022-03-25 | Stop reason: HOSPADM

## 2022-03-24 RX ORDER — FENTANYL CITRATE 50 UG/ML
INJECTION, SOLUTION INTRAMUSCULAR; INTRAVENOUS CODE/TRAUMA/SEDATION MEDICATION
Status: COMPLETED | OUTPATIENT
Start: 2022-03-24 | End: 2022-03-24

## 2022-03-24 RX ORDER — LIDOCAINE HYDROCHLORIDE 10 MG/ML
INJECTION INFILTRATION; PERINEURAL CODE/TRAUMA/SEDATION MEDICATION
Status: COMPLETED | OUTPATIENT
Start: 2022-03-24 | End: 2022-03-24

## 2022-03-24 RX ORDER — ONDANSETRON 2 MG/ML
4 INJECTION INTRAMUSCULAR; INTRAVENOUS EVERY 6 HOURS PRN
Status: DISCONTINUED | OUTPATIENT
Start: 2022-03-24 | End: 2022-03-25 | Stop reason: HOSPADM

## 2022-03-24 RX ADMIN — FENTANYL CITRATE 50 MCG: 50 INJECTION, SOLUTION INTRAMUSCULAR; INTRAVENOUS at 01:03

## 2022-03-24 RX ADMIN — MIDAZOLAM HYDROCHLORIDE 2 MG: 1 INJECTION INTRAMUSCULAR; INTRAVENOUS at 01:03

## 2022-03-24 RX ADMIN — LIDOCAINE HYDROCHLORIDE 10 ML: 10 INJECTION, SOLUTION INFILTRATION; PERINEURAL at 01:03

## 2022-03-24 RX ADMIN — EPOETIN ALFA-EPBX 20000 UNITS: 20000 INJECTION, SOLUTION INTRAVENOUS; SUBCUTANEOUS at 08:03

## 2022-03-24 NOTE — DISCHARGE INSTRUCTIONS
For scheduling: Call at [phone]    For questions or concerns call: PATTI MON-FRI 8 AM- 5PM [phone]. Radiology resident on call [phone].    For immediate concerns that are not emergent, you may call our radiology clinic at: [phone]

## 2022-03-24 NOTE — CARE UPDATE
Patient fully recovered from sedation and states full understanding of discharge instructions. Patient to leave via wheelchair and escort for garage. Girl friend at patient's side.

## 2022-03-24 NOTE — PROGRESS NOTES
Retacrit 20,000 units administered sq to right posterior arm per MD order. Patient tolerated with no complaints. Patient informed of side effects including bone pain, muscle aches and tiredness. Verbalized acknowledgment.

## 2022-03-24 NOTE — CARE UPDATE
Patient arrived to MPU 6 in NAD and voided 350cc of clear yellow urine upon arrival. VS stable. See assessment in computer.

## 2022-03-24 NOTE — PLAN OF CARE
Pt arrived to  for transplant kidney biopsy. Pt oriented to unit and staff. Plan of care reviewed with patient. Comfort measures utilized. Blankets applied. Patient placed on continuous monitoring. RN to remain at bedside. Education accepted. Consents reviewed. See flow sheets for monitoring, medication administration, and updates.

## 2022-03-29 DIAGNOSIS — N25.81 SECONDARY HYPERPARATHYROIDISM OF RENAL ORIGIN: Primary | ICD-10-CM

## 2022-03-29 DIAGNOSIS — E87.5 HYPERKALEMIA: ICD-10-CM

## 2022-03-29 DIAGNOSIS — I15.1 HYPERTENSION SECONDARY TO OTHER RENAL DISORDERS: ICD-10-CM

## 2022-03-29 DIAGNOSIS — Z91.89 AT RISK FOR OPPORTUNISTIC INFECTIONS: ICD-10-CM

## 2022-03-29 DIAGNOSIS — Z94.0 KIDNEY REPLACED BY TRANSPLANT: ICD-10-CM

## 2022-03-29 RX ORDER — TACROLIMUS 1 MG/1
4 CAPSULE ORAL EVERY 12 HOURS
Qty: 240 CAPSULE | Refills: 11 | Status: SHIPPED | OUTPATIENT
Start: 2022-03-29 | End: 2022-04-22

## 2022-03-29 RX ORDER — NEBIVOLOL 5 MG/1
5 TABLET ORAL DAILY
Qty: 30 TABLET | Refills: 11 | Status: SHIPPED | OUTPATIENT
Start: 2022-03-29 | End: 2023-03-13 | Stop reason: SDUPTHER

## 2022-03-29 RX ORDER — CINACALCET 30 MG/1
30 TABLET, FILM COATED ORAL NIGHTLY
Qty: 30 TABLET | Refills: 11 | Status: SHIPPED | OUTPATIENT
Start: 2022-03-29 | End: 2023-04-17

## 2022-03-29 RX ORDER — ATOVAQUONE 750 MG/5ML
1500 SUSPENSION ORAL DAILY
Qty: 300 ML | Refills: 5 | Status: SHIPPED | OUTPATIENT
Start: 2022-03-29 | End: 2022-09-02

## 2022-03-29 RX ORDER — NIFEDIPINE 30 MG/1
30 TABLET, EXTENDED RELEASE ORAL 2 TIMES DAILY
Qty: 60 TABLET | Refills: 11 | Status: SHIPPED | OUTPATIENT
Start: 2022-03-29 | End: 2023-03-13 | Stop reason: SDUPTHER

## 2022-03-29 RX ORDER — VALGANCICLOVIR 450 MG/1
450 TABLET, FILM COATED ORAL DAILY
Qty: 30 TABLET | Refills: 5 | Status: SHIPPED | OUTPATIENT
Start: 2022-03-29 | End: 2022-05-17

## 2022-03-29 RX ORDER — PREDNISONE 5 MG/1
TABLET ORAL
Qty: 120 TABLET | Refills: 11 | Status: SHIPPED | OUTPATIENT
Start: 2022-03-29 | End: 2023-04-17

## 2022-03-29 RX ORDER — MYCOPHENOLATE MOFETIL 250 MG/1
1000 CAPSULE ORAL 2 TIMES DAILY
Qty: 240 CAPSULE | Refills: 11 | Status: SHIPPED | OUTPATIENT
Start: 2022-03-29 | End: 2022-05-17

## 2022-03-29 RX ORDER — GABAPENTIN 300 MG/1
300 CAPSULE ORAL 2 TIMES DAILY
Qty: 60 CAPSULE | Refills: 2 | Status: SHIPPED | OUTPATIENT
Start: 2022-03-29 | End: 2022-09-02

## 2022-03-30 ENCOUNTER — HOSPITAL ENCOUNTER (OUTPATIENT)
Dept: RADIOLOGY | Facility: HOSPITAL | Age: 22
Discharge: HOME | End: 2022-03-30
Attending: INTERNAL MEDICINE
Payer: MEDICARE

## 2022-03-30 DIAGNOSIS — Z94.0 KIDNEY REPLACED BY TRANSPLANT: Primary | ICD-10-CM

## 2022-03-30 DIAGNOSIS — Z94.0 KIDNEY REPLACED BY TRANSPLANT: ICD-10-CM

## 2022-03-30 PROCEDURE — 76776 US EXAM K TRANSPL W/DOPPLER: CPT | Mod: 26,,, | Performed by: INTERNAL MEDICINE

## 2022-03-30 PROCEDURE — 76776 US TRANSPLANT KIDNEY WITH DOPPLER: ICD-10-PCS | Mod: 26,,, | Performed by: INTERNAL MEDICINE

## 2022-03-30 PROCEDURE — 76776 US EXAM K TRANSPL W/DOPPLER: CPT | Mod: TC

## 2022-03-31 LAB
FINAL PATHOLOGIC DIAGNOSIS: NORMAL
GROSS: NORMAL
Lab: NORMAL

## 2022-03-31 NOTE — COMMITTEE REVIEW
Native Organ Dx: Hypertensive Nephrosclerosis      SELECTION COMMITTEE NOTE    Tameka Saenz was presented at selection committee on 9/28/2018.  Patient met selection criteria for kidney transplant related to ESRD due to  Hypertensive Nephrosclerosis.  No absolute contraindications to transplant at this time.  Patient will be placed on the cadaveric wait list pending improvement of albumin to 2.5 or greater and  final financial approval from insurance company.  Patient will return to clinic for routine appointment in 1 year. Patient does not meet criteria for High KDPI kidney offer due to age less than 50 years.  Dr Simpson to speak with patient's nephrologist regarding reoccurrent peritoneal infection. Also review Kt/V from dialysis unit.     Patient mother was informed. Pt's mother stated that pt has completed all of his antibiotics for PD catheter infection a week ago. No longer have a PD catheter. They attempted to place another PD catheter on yesterday but couldn't due to a lot of scarring. Patient will remain on HD per mother. All questions were answered and pt and mother verbalized understanding.     Received up to date Albumin from dialysis unit. 9/12/18 Albumin 3.2. Requested Kt/V            Note written by Montserrat Diehl RN    ===============================================    I was present at the meeting and attest to the decision of the committee.    Mo Mi  09/28/2018   pale

## 2022-03-31 NOTE — PROGRESS NOTES
Kidney Post-Transplant Assessment    Referring Physician: Yury Sotelo  Current Nephrologist: Kathy Cruz    ORGAN: RIGHT KIDNEY  Donor Type: donation after brain death  PHS Increased Risk: no  Cold Ischemia: 424 mins  Induction Medications: thymoglobulin    Subjective:     CC:  Reassessment of renal allograft function and management of chronic immunosuppression.    HPI:  Mr. Saenz is a 21 y.o. 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 His most recent creatinine is 2.2. He takes mycophenolate mofetil, prednisone and tacrolimus for maintenance immunosuppression. His post transplant course has been uncomplicated to date.    Transplant History:  -ESRD secondary to HTN  -on dialysis 12/2015 until DBD kidney transplant on 2/25/22 (Thymo induction, CIT 7 hr. 4 min., CPRA 54%, KDPI 4%, CMV D+,R-).  -history of hepatosplenomegaly with thrombocytopenia, given 1 unit platelets intra-op.   -re-admit 3/7 for hyperkalemia    -ureteral stent removed 3/14.     Interval History:  Kidney biopsy 3/24/2022 (for sub optimal creatinine): diffuse acute tubular injury, no rejection.     Had allograft US 3/30/2022 with interval development of large collection lateral to the kidney.  Improved collection along the medial aspect of the kidney.    Drinking 5 bottles of water daily, no problems with urination. Home BPs 120/60, no peripheral edema. Doing light work outs, minimal fatigue.     Current Outpatient Medications   Medication Sig Dispense Refill    atovaquone (MEPRON) 750 mg/5 mL Susp Take 10 mLs (1,500 mg total) by mouth once daily. STOP 8/24/22 300 mL 5    cinacalcet (SENSIPAR) 30 MG Tab Take 1 tablet (30 mg total) by mouth every evening. 30 tablet 11    famotidine (PEPCID) 20 MG tablet Take 1 tablet (20 mg total) by mouth every evening. 30 tablet 1    gabapentin (NEURONTIN) 300 MG capsule Take 1 capsule (300 mg total) by mouth 2 (two) times  daily. 60 capsule 2    mycophenolate (CELLCEPT) 250 mg Cap Take 4 capsules (1,000 mg total) by mouth 2 (two) times daily. 240 capsule 11    nebivoloL (BYSTOLIC) 5 MG Tab Take 1 tablet (5 mg total) by mouth once daily. 30 tablet 11    NIFEdipine (PROCARDIA-XL) 30 MG (OSM) 24 hr tablet Take 1 tablet (30 mg total) by mouth 2 (two) times a day. 60 tablet 11    predniSONE (DELTASONE) 5 MG tablet Take by mouth daily: 20mg 3/1-3/31, 15mg 4/1-4/30, 10mg 5/1-5/31, 5mg 6/1/2022- 120 tablet 11    sodium zirconium cyclosilicate (LOKELMA) 10 gram packet Take 1 packet (10g) by mouth twice a day for 2 days, then once daily.  Mix entire contents of packet(s) into drinking glass containing 3 tablespoons of water; stir well and drink immediately. Add water and repeat until no powder remains to receive entire dose. 30 packet 11    tacrolimus (PROGRAF) 1 MG Cap Take 4 capsules (4 mg total) by mouth every 12 (twelve) hours. 240 capsule 11    valGANciclovir (VALCYTE) 450 mg Tab Take 1 tablet (450 mg total) by mouth once daily. Stop 8/24/22 30 tablet 5     No current facility-administered medications for this visit.       Past Medical History:   Diagnosis Date    Acne     Anemia of renal disease     Dialysis patient     peritoneal daily at night. followed by Dr. Sotelo    ESRD on dialysis since 12/16/15     Hepatosplenomegaly     Hypertension     Kidney disease     Seasonal allergies     Secondary hyperparathyroidism of renal origin     Thrombocytopenia, unspecified        Review of Systems   Constitutional: Negative for activity change, appetite change and fever.   HENT: Negative for congestion, mouth sores and sore throat.    Eyes: Negative for visual disturbance.   Respiratory: Negative for cough, chest tightness and shortness of breath.    Cardiovascular: Negative for chest pain, palpitations and leg swelling.   Gastrointestinal: Negative for abdominal distention, abdominal pain, constipation, diarrhea and nausea.  "  Genitourinary: Negative for difficulty urinating, frequency and hematuria.   Musculoskeletal: Negative for arthralgias and gait problem.   Skin: Negative for wound.   Allergic/Immunologic: Positive for immunocompromised state. Negative for environmental allergies and food allergies.   Neurological: Negative for dizziness, weakness and numbness.   Psychiatric/Behavioral: Negative for sleep disturbance. The patient is not nervous/anxious.        Objective:   Blood pressure (!) 144/72, pulse 91, temperature 97.3 °F (36.3 °C), temperature source Tympanic, resp. rate 16, height 5' 9" (1.753 m), weight 83.7 kg (184 lb 8.4 oz), SpO2 98 %.body mass index is 27.25 kg/m².    Physical Exam  Vitals and nursing note reviewed.   Constitutional:       Appearance: Normal appearance.   HENT:      Head: Normocephalic.   Cardiovascular:      Rate and Rhythm: Normal rate and regular rhythm.      Heart sounds: Normal heart sounds.   Pulmonary:      Effort: Pulmonary effort is normal.      Breath sounds: Normal breath sounds.   Abdominal:      General: Bowel sounds are normal. There is no distension.      Palpations: Abdomen is soft.      Tenderness: There is no abdominal tenderness.      Comments: Surgical incision healed nicely   Musculoskeletal:         General: Normal range of motion.   Skin:     General: Skin is warm and dry.   Neurological:      General: No focal deficit present.      Mental Status: He is alert.   Psychiatric:         Behavior: Behavior normal.         Labs:  Lab Results   Component Value Date    WBC 4.38 03/30/2022    HGB 9.6 (L) 03/30/2022    HCT 30.4 (L) 03/30/2022     03/30/2022    K 4.5 03/30/2022     (H) 03/30/2022    CO2 19 (L) 03/30/2022    BUN 43 (H) 03/30/2022    CREATININE 2.2 (H) 03/30/2022    EGFRNONAA 40.7 (A) 03/30/2022    CALCIUM 9.6 03/30/2022    PHOS 2.3 (L) 03/30/2022    MG 1.7 03/30/2022    ALBUMIN 3.5 03/30/2022    AST 10 03/24/2022    ALT 9 (L) 03/24/2022    UTPCR 0.25 (H) " 03/24/2022    .2 (H) 02/25/2022    TACROLIMUS 8.2 03/30/2022       Labs were reviewed with the patient    Assessment:     1. S/P kidney transplant    2. Hypertension secondary to other renal disorders    3. Prophylactic immunotherapy    4. Steroid-induced hyperglycemia    5. Stage 3a chronic kidney disease    6. At risk for opportunistic infections        Plan:       Follow-up:   1. CKD stage: 3 stable      2. Immunosuppression: Prograf trough 8.2, which is therapeutic (target 8-10). Continue Prograf 4/4, MMF 1000 mg BID, and Prednisone taper. Will continue to monitor for drug toxicities    3. Allograft Function: stable. Continue good po hydration.   Lab Results   Component Value Date    CREATININE 2.2 (H) 03/30/2022       3/30/2022  POD 33   eGFR if African American >60 mL/min/1.73 m^2 47.1 (A)     4. Hypertension management: advise low salt diet and home BP monitoring    bystolic 5 mg QD, Nifedipine 30 mg BID     5. Metabolic Bone Disease/Secondary Hyperparathyroidism:stable  Sensipar 30 mg QD  Lab Results   Component Value Date    .2 (H) 02/25/2022    CALCIUM 9.6 03/30/2022    PHOS 2.3 (L) 03/30/2022      3/30/2022  POD 33   Magnesium 1.6 - 2.6 mg/dL 1.7       6. Electrolytes:  Will monitor /guidelines    Lab Results   Component Value Date     03/30/2022    K 4.5 03/30/2022     (H) 03/30/2022    CO2 19 (L) 03/30/2022         7. Anemia: BETH weekly x 4  Lab Results   Component Value Date    WBC 4.38 03/30/2022    HGB 9.6 (L) 03/30/2022    HCT 30.4 (L) 03/30/2022    MCV 95 03/30/2022     03/30/2022         8.  Cytopenias: no significant cytopenias will monitor as per our guidelines. Medicine list reviewed including potential causes of drug-induced cytopenias    9.Proteinuria: continue p/c ratio as per guidelines    3/24/2022  POD 27   Prot/Creat Ratio, Urine 0.00 - 0.20 0.25 (A)     10. BK virus infection screening:  will continue to monitor/ guidelines      3/24/2022  POD 27   BK  Virus DNA, Blood Not detected Not detected   BK Virus DNA PCR, Quant, Blood <125 Copies/mL <125       11. Weight education: provided during the clinic visit   Body mass index is 27.25 kg/m².     12.Patient safety education regarding immunosuppression including prophylaxis posttransplant for CMV, PCP : Education provided about vaccination and prevention of infections     PCP ppx: Mepron until 8/24/22  CMV ppx: Valcyte until 8/24/22   Fungal ppx: Nystatin until 3/31/22       Follow-up:   Clinic: return to transplant clinic weekly for the first month after transplant; every 2 weeks during months 2-3; then at 6-, 9-, 12-, 18-, 24-, and 36- months post-transplant to reassess for complications from immunosuppression toxicity and monitor for rejection.  Annually thereafter.    Labs: since patient remains at high risk for rejection and drug-related complications that warrant close monitoring, labs will be ordered as follows: continue twice weekly CBC, renal panel, and drug level for first month; then same labs once weekly through 3rd month post-transplant.  Urine for UA and protein/creatinine ratio monthly.  Serum BK - PCR at 1-, 3-, 6-, 9-, 12-, 18-, 24-, 36-, 48-, and 60 months post-transplant.  Hepatic panel at 1-, 2-, 3-, 6-, 9-, 12-, 18-, 24-, and 36- months post-transplant.    Alcira Pierce NP       Education:   Material provided to the patient.  Patient reminded to call with any health changes since these can be early signs of significant complications.  Also, I advised the patient to be sure any new medications or changes of old medications are discussed with either a pharmacist or physician knowledgeable with transplant to avoid rejection/drug toxicity related to significant drug interactions.    Patient advised that it is recommended that all transplanted patients, and their close contacts and household members receive Covid vaccination.

## 2022-03-31 NOTE — PROGRESS NOTES
This is the patient that called to report leaking of serous fluid at the kidney biopsy site (after the procedure). Please review this with transplant surgery. Creatinine is 2.2 unchanged h/h stable. The kidney US showed this 8.6 x 7.3 x 4.3 new collection. No surer if it is worth to aspirate this ?

## 2022-04-01 ENCOUNTER — TELEPHONE (OUTPATIENT)
Dept: TRANSPLANT | Facility: CLINIC | Age: 22
End: 2022-04-01
Payer: MEDICARE

## 2022-04-01 NOTE — TELEPHONE ENCOUNTER
Spoke with pt mom regarding pt schedule for labs and clinic appts.----- Message from Cristi Cyr sent at 4/1/2022 11:32 AM CDT -----  Regarding: Speak with office  Contact: Darcie Saenz (Mother)  Pt's mom is calling to get pt's f/u care schedule she was supposed to have to send over to outside facility by 12:00 today. She is aware Su Lozoya is out of office but was told someone else could still assist.      Darcie Saenz (Mother)   [phone]

## 2022-04-04 ENCOUNTER — OFFICE VISIT (OUTPATIENT)
Dept: TRANSPLANT | Facility: CLINIC | Age: 22
End: 2022-04-04
Payer: MEDICARE

## 2022-04-04 VITALS
RESPIRATION RATE: 16 BRPM | HEIGHT: 69 IN | OXYGEN SATURATION: 98 % | BODY MASS INDEX: 27.33 KG/M2 | HEART RATE: 91 BPM | SYSTOLIC BLOOD PRESSURE: 144 MMHG | DIASTOLIC BLOOD PRESSURE: 72 MMHG | TEMPERATURE: 97 F | WEIGHT: 184.5 LBS

## 2022-04-04 DIAGNOSIS — N18.31 STAGE 3A CHRONIC KIDNEY DISEASE: ICD-10-CM

## 2022-04-04 DIAGNOSIS — R73.9 STEROID-INDUCED HYPERGLYCEMIA: ICD-10-CM

## 2022-04-04 DIAGNOSIS — Z29.89 PROPHYLACTIC IMMUNOTHERAPY: ICD-10-CM

## 2022-04-04 DIAGNOSIS — I15.1 HYPERTENSION SECONDARY TO OTHER RENAL DISORDERS: ICD-10-CM

## 2022-04-04 DIAGNOSIS — T38.0X5A STEROID-INDUCED HYPERGLYCEMIA: ICD-10-CM

## 2022-04-04 DIAGNOSIS — Z91.89 AT RISK FOR OPPORTUNISTIC INFECTIONS: ICD-10-CM

## 2022-04-04 DIAGNOSIS — Z94.0 S/P KIDNEY TRANSPLANT: Primary | ICD-10-CM

## 2022-04-04 PROCEDURE — 99999 PR PBB SHADOW E&M-EST. PATIENT-LVL V: ICD-10-PCS | Mod: PBBFAC,,, | Performed by: NURSE PRACTITIONER

## 2022-04-04 PROCEDURE — 99215 OFFICE O/P EST HI 40 MIN: CPT | Mod: PBBFAC | Performed by: NURSE PRACTITIONER

## 2022-04-04 PROCEDURE — 99215 PR OFFICE/OUTPT VISIT, EST, LEVL V, 40-54 MIN: ICD-10-PCS | Mod: S$PBB,,, | Performed by: NURSE PRACTITIONER

## 2022-04-04 PROCEDURE — 99215 OFFICE O/P EST HI 40 MIN: CPT | Mod: S$PBB,,, | Performed by: NURSE PRACTITIONER

## 2022-04-04 PROCEDURE — 99999 PR PBB SHADOW E&M-EST. PATIENT-LVL V: CPT | Mod: PBBFAC,,, | Performed by: NURSE PRACTITIONER

## 2022-04-04 NOTE — LETTER
April 4, 2022        Kathy Cruz  [address] [place] [address]   [address]  [facility] [address]  Phone: [phone]  Fax: [phone]             [facility]st [facility]  [address]  [facility] [address]  Phone: [phone]   Patient: Tameka Saenz   MR Number: [id number]   YOB: 2000   Date of Visit: 4/4/2022       Dear Dr. Kathy Cruz    Thank you for referring Tameka Saenz to me for evaluation. Attached you will find relevant portions of my assessment and plan of care.    If you have questions, please do not hesitate to call me. I look forward to following Tameka Saenz along with you.    Sincerely,    Alcira Pierce, PRISCILA    Enclosure    If you would like to receive this communication electronically, please contact [email or web address] or [phone] to request [employer] access.    [employer] is a tool which provides read-only access to select patient information with whom you have a relationship. Its easy to use and provides real time access to review your patients record including encounter summaries, notes, results, and demographic information.    If you feel you have received this communication in error or would no longer like to receive these types of communications, please e-mail [email or web address]

## 2022-04-05 ENCOUNTER — TELEPHONE (OUTPATIENT)
Dept: TRANSPLANT | Facility: CLINIC | Age: 22
End: 2022-04-05
Payer: MEDICARE

## 2022-04-05 ENCOUNTER — PATIENT MESSAGE (OUTPATIENT)
Dept: TRANSPLANT | Facility: CLINIC | Age: 22
End: 2022-04-05
Payer: MEDICARE

## 2022-04-05 NOTE — TELEPHONE ENCOUNTER
----- Message from Mo Mi MD sent at 3/28/2022  4:25 PM CDT -----  This is my answer:    Acceptable to return to work if blood work is stable or improved.  He must maintain adequate hydration, appropriate follow up for labs and clinic visits with transplant nephrology, general nephrology PCP and other providers. Work environment should allow the patient follow up of CDC guidelines for prevention of COVID19 and other transmissible diseases in immunocompromised hosts.     I am not an occupational medicine specialist so I can't  in aspects different from my field of expertise.      Regards      Mo Mi MD   Transplant Nephrologist   ----- Message -----  From: Nicole KEEN Do, RN  Sent: 3/28/2022   2:56 PM CDT  To: MD Dr. Hiwot Curry, your response for the patient to return to work is in this thread.

## 2022-04-06 ENCOUNTER — LAB VISIT (OUTPATIENT)
Dept: LAB | Facility: HOSPITAL | Age: 22
End: 2022-04-06
Attending: INTERNAL MEDICINE
Payer: MEDICARE

## 2022-04-06 DIAGNOSIS — Z94.0 KIDNEY REPLACED BY TRANSPLANT: ICD-10-CM

## 2022-04-06 LAB
ALBUMIN SERPL BCP-MCNC: 3.4 G/DL (ref 3.5–5.2)
ANION GAP SERPL CALC-SCNC: 7 MMOL/L (ref 8–16)
BASOPHILS # BLD AUTO: 0.01 K/UL (ref 0–0.2)
BASOPHILS NFR BLD: 0.2 % (ref 0–1.9)
BUN SERPL-MCNC: 20 MG/DL (ref 6–20)
CALCIUM SERPL-MCNC: 9.1 MG/DL (ref 8.7–10.5)
CHLORIDE SERPL-SCNC: 108 MMOL/L (ref 95–110)
CO2 SERPL-SCNC: 25 MMOL/L (ref 23–29)
CREAT SERPL-MCNC: 1.5 MG/DL (ref 0.5–1.4)
DIFFERENTIAL METHOD: ABNORMAL
EOSINOPHIL # BLD AUTO: 0 K/UL (ref 0–0.5)
EOSINOPHIL NFR BLD: 0.4 % (ref 0–8)
ERYTHROCYTE [DISTWIDTH] IN BLOOD BY AUTOMATED COUNT: 18.3 % (ref 11.5–14.5)
EST. GFR  (AFRICAN AMERICAN): >60 ML/MIN/1.73 M^2
EST. GFR  (NON AFRICAN AMERICAN): >60 ML/MIN/1.73 M^2
GLUCOSE SERPL-MCNC: 94 MG/DL (ref 70–110)
HCT VFR BLD AUTO: 31.3 % (ref 40–54)
HGB BLD-MCNC: 9.6 G/DL (ref 14–18)
IMM GRANULOCYTES # BLD AUTO: 0.01 K/UL (ref 0–0.04)
IMM GRANULOCYTES NFR BLD AUTO: 0.2 % (ref 0–0.5)
LYMPHOCYTES # BLD AUTO: 0.5 K/UL (ref 1–4.8)
LYMPHOCYTES NFR BLD: 9.8 % (ref 18–48)
MAGNESIUM SERPL-MCNC: 1.6 MG/DL (ref 1.6–2.6)
MCH RBC QN AUTO: 30.2 PG (ref 27–31)
MCHC RBC AUTO-ENTMCNC: 30.7 G/DL (ref 32–36)
MCV RBC AUTO: 98 FL (ref 82–98)
MONOCYTES # BLD AUTO: 0.3 K/UL (ref 0.3–1)
MONOCYTES NFR BLD: 6.4 % (ref 4–15)
NEUTROPHILS # BLD AUTO: 4 K/UL (ref 1.8–7.7)
NEUTROPHILS NFR BLD: 83 % (ref 38–73)
NRBC BLD-RTO: 0 /100 WBC
PHOSPHATE SERPL-MCNC: 1.9 MG/DL (ref 2.7–4.5)
PLATELET # BLD AUTO: 153 K/UL (ref 150–450)
PMV BLD AUTO: 12.4 FL (ref 9.2–12.9)
POTASSIUM SERPL-SCNC: 4.6 MMOL/L (ref 3.5–5.1)
RBC # BLD AUTO: 3.18 M/UL (ref 4.6–6.2)
SODIUM SERPL-SCNC: 140 MMOL/L (ref 136–145)
WBC # BLD AUTO: 4.82 K/UL (ref 3.9–12.7)

## 2022-04-06 PROCEDURE — 36415 COLL VENOUS BLD VENIPUNCTURE: CPT | Mod: PO | Performed by: INTERNAL MEDICINE

## 2022-04-06 PROCEDURE — 80197 ASSAY OF TACROLIMUS: CPT | Performed by: INTERNAL MEDICINE

## 2022-04-06 PROCEDURE — 85025 COMPLETE CBC W/AUTO DIFF WBC: CPT | Performed by: INTERNAL MEDICINE

## 2022-04-06 PROCEDURE — 83735 ASSAY OF MAGNESIUM: CPT | Performed by: INTERNAL MEDICINE

## 2022-04-06 PROCEDURE — 80069 RENAL FUNCTION PANEL: CPT | Performed by: INTERNAL MEDICINE

## 2022-04-07 LAB — TACROLIMUS BLD-MCNC: 7.3 NG/ML (ref 5–15)

## 2022-04-08 ENCOUNTER — PATIENT MESSAGE (OUTPATIENT)
Dept: TRANSPLANT | Facility: CLINIC | Age: 22
End: 2022-04-08
Payer: MEDICARE

## 2022-04-08 DIAGNOSIS — T86.19 OTHER COMPLICATION OF KIDNEY TRANSPLANT: Primary | ICD-10-CM

## 2022-04-11 ENCOUNTER — PATIENT MESSAGE (OUTPATIENT)
Dept: TRANSPLANT | Facility: CLINIC | Age: 22
End: 2022-04-11
Payer: MEDICARE

## 2022-04-12 ENCOUNTER — HOSPITAL ENCOUNTER (OUTPATIENT)
Dept: RADIOLOGY | Facility: HOSPITAL | Age: 22
Discharge: HOME | End: 2022-04-12
Attending: TRANSPLANT SURGERY
Payer: MEDICARE

## 2022-04-12 DIAGNOSIS — T86.19 OTHER COMPLICATION OF KIDNEY TRANSPLANT: ICD-10-CM

## 2022-04-12 PROCEDURE — 76776 US EXAM K TRANSPL W/DOPPLER: CPT | Mod: TC

## 2022-04-12 PROCEDURE — 76776 US EXAM K TRANSPL W/DOPPLER: CPT | Mod: 26,,, | Performed by: RADIOLOGY

## 2022-04-12 PROCEDURE — 76776 US TRANSPLANT KIDNEY WITH DOPPLER: ICD-10-PCS | Mod: 26,,, | Performed by: RADIOLOGY

## 2022-04-14 ENCOUNTER — LAB VISIT (OUTPATIENT)
Dept: LAB | Facility: HOSPITAL | Age: 22
End: 2022-04-14
Attending: INTERNAL MEDICINE
Payer: MEDICARE

## 2022-04-14 DIAGNOSIS — E83.39 HYPOPHOSPHATEMIA: Primary | ICD-10-CM

## 2022-04-14 DIAGNOSIS — Z94.0 KIDNEY REPLACED BY TRANSPLANT: ICD-10-CM

## 2022-04-14 LAB
ALBUMIN SERPL BCP-MCNC: 3.8 G/DL (ref 3.5–5.2)
ANION GAP SERPL CALC-SCNC: 9 MMOL/L (ref 8–16)
BASOPHILS # BLD AUTO: 0.01 K/UL (ref 0–0.2)
BASOPHILS NFR BLD: 0.2 % (ref 0–1.9)
BUN SERPL-MCNC: 24 MG/DL (ref 6–20)
CALCIUM SERPL-MCNC: 10 MG/DL (ref 8.7–10.5)
CHLORIDE SERPL-SCNC: 106 MMOL/L (ref 95–110)
CO2 SERPL-SCNC: 25 MMOL/L (ref 23–29)
CREAT SERPL-MCNC: 1.5 MG/DL (ref 0.5–1.4)
DIFFERENTIAL METHOD: ABNORMAL
EOSINOPHIL # BLD AUTO: 0 K/UL (ref 0–0.5)
EOSINOPHIL NFR BLD: 0.2 % (ref 0–8)
ERYTHROCYTE [DISTWIDTH] IN BLOOD BY AUTOMATED COUNT: 16.2 % (ref 11.5–14.5)
EST. GFR  (AFRICAN AMERICAN): >60 ML/MIN/1.73 M^2
EST. GFR  (NON AFRICAN AMERICAN): >60 ML/MIN/1.73 M^2
GLUCOSE SERPL-MCNC: 90 MG/DL (ref 70–110)
HCT VFR BLD AUTO: 34 % (ref 40–54)
HGB BLD-MCNC: 10.8 G/DL (ref 14–18)
IMM GRANULOCYTES # BLD AUTO: 0.03 K/UL (ref 0–0.04)
IMM GRANULOCYTES NFR BLD AUTO: 0.6 % (ref 0–0.5)
LYMPHOCYTES # BLD AUTO: 0.4 K/UL (ref 1–4.8)
LYMPHOCYTES NFR BLD: 6.5 % (ref 18–48)
MAGNESIUM SERPL-MCNC: 1.5 MG/DL (ref 1.6–2.6)
MCH RBC QN AUTO: 29.8 PG (ref 27–31)
MCHC RBC AUTO-ENTMCNC: 31.8 G/DL (ref 32–36)
MCV RBC AUTO: 94 FL (ref 82–98)
MONOCYTES # BLD AUTO: 0.4 K/UL (ref 0.3–1)
MONOCYTES NFR BLD: 8 % (ref 4–15)
NEUTROPHILS # BLD AUTO: 4.5 K/UL (ref 1.8–7.7)
NEUTROPHILS NFR BLD: 84.5 % (ref 38–73)
NRBC BLD-RTO: 0 /100 WBC
PHOSPHATE SERPL-MCNC: 1.2 MG/DL (ref 2.7–4.5)
PLATELET # BLD AUTO: 180 K/UL (ref 150–450)
PMV BLD AUTO: 12.1 FL (ref 9.2–12.9)
POTASSIUM SERPL-SCNC: 4.2 MMOL/L (ref 3.5–5.1)
RBC # BLD AUTO: 3.63 M/UL (ref 4.6–6.2)
SODIUM SERPL-SCNC: 140 MMOL/L (ref 136–145)
WBC # BLD AUTO: 5.37 K/UL (ref 3.9–12.7)

## 2022-04-14 PROCEDURE — 80069 RENAL FUNCTION PANEL: CPT | Performed by: INTERNAL MEDICINE

## 2022-04-14 PROCEDURE — 36415 COLL VENOUS BLD VENIPUNCTURE: CPT | Mod: PO | Performed by: INTERNAL MEDICINE

## 2022-04-14 PROCEDURE — 80197 ASSAY OF TACROLIMUS: CPT | Performed by: INTERNAL MEDICINE

## 2022-04-14 PROCEDURE — 83735 ASSAY OF MAGNESIUM: CPT | Performed by: INTERNAL MEDICINE

## 2022-04-14 PROCEDURE — 85025 COMPLETE CBC W/AUTO DIFF WBC: CPT | Performed by: INTERNAL MEDICINE

## 2022-04-14 NOTE — TELEPHONE ENCOUNTER
Received written order from Dr. Carrington.  Spoke to patient and explained the need for IV phosphorus.  Instructed to take  mg TID starting tomorrow and advised on a high phos diet.  Patient states that he will go to his local ER and asked to have the prescription sent to his local [facility].  Patient did not have any questions or concerns.     ----- Message from Salina Joe MD sent at 4/14/2022  3:18 PM CDT -----  Go to ED to get IV phos. Post d/c Start  mg TID + follow high ph diet

## 2022-04-15 LAB — TACROLIMUS BLD-MCNC: 7.1 NG/ML (ref 5–15)

## 2022-04-19 ENCOUNTER — LAB VISIT (OUTPATIENT)
Dept: LAB | Facility: HOSPITAL | Age: 22
End: 2022-04-19
Attending: INTERNAL MEDICINE
Payer: MEDICARE

## 2022-04-19 ENCOUNTER — TELEPHONE (OUTPATIENT)
Dept: TRANSPLANT | Facility: CLINIC | Age: 22
End: 2022-04-19
Payer: MEDICARE

## 2022-04-19 DIAGNOSIS — Z94.0 KIDNEY REPLACED BY TRANSPLANT: ICD-10-CM

## 2022-04-19 DIAGNOSIS — Z94.0 KIDNEY REPLACED BY TRANSPLANT: Primary | ICD-10-CM

## 2022-04-19 LAB
ALBUMIN SERPL BCP-MCNC: 3.5 G/DL (ref 3.5–5.2)
ANION GAP SERPL CALC-SCNC: 6 MMOL/L (ref 8–16)
BASOPHILS # BLD AUTO: 0.02 K/UL (ref 0–0.2)
BASOPHILS NFR BLD: 0.5 % (ref 0–1.9)
BUN SERPL-MCNC: 28 MG/DL (ref 6–20)
CALCIUM SERPL-MCNC: 9.5 MG/DL (ref 8.7–10.5)
CHLORIDE SERPL-SCNC: 110 MMOL/L (ref 95–110)
CO2 SERPL-SCNC: 25 MMOL/L (ref 23–29)
CREAT SERPL-MCNC: 1.7 MG/DL (ref 0.5–1.4)
DIFFERENTIAL METHOD: ABNORMAL
EOSINOPHIL # BLD AUTO: 0 K/UL (ref 0–0.5)
EOSINOPHIL NFR BLD: 0.2 % (ref 0–8)
ERYTHROCYTE [DISTWIDTH] IN BLOOD BY AUTOMATED COUNT: 15.3 % (ref 11.5–14.5)
EST. GFR  (AFRICAN AMERICAN): >60 ML/MIN/1.73 M^2
EST. GFR  (NON AFRICAN AMERICAN): 55.6 ML/MIN/1.73 M^2
GLUCOSE SERPL-MCNC: 104 MG/DL (ref 70–110)
HCT VFR BLD AUTO: 33 % (ref 40–54)
HGB BLD-MCNC: 10.4 G/DL (ref 14–18)
IMM GRANULOCYTES # BLD AUTO: 0.02 K/UL (ref 0–0.04)
IMM GRANULOCYTES NFR BLD AUTO: 0.5 % (ref 0–0.5)
LYMPHOCYTES # BLD AUTO: 0.4 K/UL (ref 1–4.8)
LYMPHOCYTES NFR BLD: 9.5 % (ref 18–48)
MAGNESIUM SERPL-MCNC: 1.7 MG/DL (ref 1.6–2.6)
MCH RBC QN AUTO: 29.8 PG (ref 27–31)
MCHC RBC AUTO-ENTMCNC: 31.5 G/DL (ref 32–36)
MCV RBC AUTO: 95 FL (ref 82–98)
MONOCYTES # BLD AUTO: 0.6 K/UL (ref 0.3–1)
MONOCYTES NFR BLD: 12.7 % (ref 4–15)
NEUTROPHILS # BLD AUTO: 3.4 K/UL (ref 1.8–7.7)
NEUTROPHILS NFR BLD: 76.6 % (ref 38–73)
NRBC BLD-RTO: 0 /100 WBC
PHOSPHATE SERPL-MCNC: 1.4 MG/DL (ref 2.7–4.5)
PLATELET # BLD AUTO: 179 K/UL (ref 150–450)
PMV BLD AUTO: 12.4 FL (ref 9.2–12.9)
POTASSIUM SERPL-SCNC: 4.8 MMOL/L (ref 3.5–5.1)
RBC # BLD AUTO: 3.49 M/UL (ref 4.6–6.2)
SODIUM SERPL-SCNC: 141 MMOL/L (ref 136–145)
WBC # BLD AUTO: 4.4 K/UL (ref 3.9–12.7)

## 2022-04-19 PROCEDURE — 83735 ASSAY OF MAGNESIUM: CPT | Performed by: INTERNAL MEDICINE

## 2022-04-19 PROCEDURE — 85025 COMPLETE CBC W/AUTO DIFF WBC: CPT | Performed by: INTERNAL MEDICINE

## 2022-04-19 PROCEDURE — 36415 COLL VENOUS BLD VENIPUNCTURE: CPT | Mod: PO | Performed by: INTERNAL MEDICINE

## 2022-04-19 PROCEDURE — 80197 ASSAY OF TACROLIMUS: CPT | Performed by: INTERNAL MEDICINE

## 2022-04-19 PROCEDURE — 80069 RENAL FUNCTION PANEL: CPT | Performed by: INTERNAL MEDICINE

## 2022-04-19 NOTE — TELEPHONE ENCOUNTER
Returned call to Mrs. Saenz.  The prescription went through and is no longer an issue.       ----- Message from Hernan Crowley sent at 4/19/2022  8:52 AM CDT -----  Regarding: rx  Pt 's mother Tyra Saenz states pt has a problem getting k phos di & mono-sod phos mono (K-PHOS-NEUTRAL) 250 mg Tab and hasn't been able to  rx since last Thursday    Call : [phone]

## 2022-04-20 LAB — TACROLIMUS BLD-MCNC: 8.1 NG/ML (ref 5–15)

## 2022-04-22 ENCOUNTER — PATIENT MESSAGE (OUTPATIENT)
Dept: TRANSPLANT | Facility: CLINIC | Age: 22
End: 2022-04-22
Payer: MEDICARE

## 2022-04-27 ENCOUNTER — LAB VISIT (OUTPATIENT)
Dept: FAMILY MEDICINE | Facility: CLINIC | Age: 22
End: 2022-04-27
Payer: MEDICARE

## 2022-04-27 DIAGNOSIS — Z94.0 KIDNEY REPLACED BY TRANSPLANT: ICD-10-CM

## 2022-04-27 LAB
ALBUMIN SERPL BCP-MCNC: 4.1 G/DL (ref 3.5–5.2)
ALBUMIN SERPL BCP-MCNC: 4.1 G/DL (ref 3.5–5.2)
ALP SERPL-CCNC: 77 U/L (ref 55–135)
ALT SERPL W/O P-5'-P-CCNC: 10 U/L (ref 10–44)
ANION GAP SERPL CALC-SCNC: 10 MMOL/L (ref 8–16)
AST SERPL-CCNC: 13 U/L (ref 10–40)
BASOPHILS # BLD AUTO: 0.02 K/UL (ref 0–0.2)
BASOPHILS NFR BLD: 0.3 % (ref 0–1.9)
BILIRUB DIRECT SERPL-MCNC: 0.1 MG/DL (ref 0.1–0.3)
BILIRUB SERPL-MCNC: 0.3 MG/DL (ref 0.1–1)
BUN SERPL-MCNC: 23 MG/DL (ref 6–20)
CALCIUM SERPL-MCNC: 9.7 MG/DL (ref 8.7–10.5)
CHLORIDE SERPL-SCNC: 108 MMOL/L (ref 95–110)
CO2 SERPL-SCNC: 23 MMOL/L (ref 23–29)
CREAT SERPL-MCNC: 1.9 MG/DL (ref 0.5–1.4)
DIFFERENTIAL METHOD: ABNORMAL
EOSINOPHIL # BLD AUTO: 0 K/UL (ref 0–0.5)
EOSINOPHIL NFR BLD: 0.5 % (ref 0–8)
ERYTHROCYTE [DISTWIDTH] IN BLOOD BY AUTOMATED COUNT: 15.1 % (ref 11.5–14.5)
EST. GFR  (AFRICAN AMERICAN): 56.2 ML/MIN/1.73 M^2
EST. GFR  (NON AFRICAN AMERICAN): 48.6 ML/MIN/1.73 M^2
GLUCOSE SERPL-MCNC: 84 MG/DL (ref 70–110)
HCT VFR BLD AUTO: 32.7 % (ref 40–54)
HGB BLD-MCNC: 10.3 G/DL (ref 14–18)
IMM GRANULOCYTES # BLD AUTO: 0.09 K/UL (ref 0–0.04)
IMM GRANULOCYTES NFR BLD AUTO: 1.4 % (ref 0–0.5)
LYMPHOCYTES # BLD AUTO: 0.5 K/UL (ref 1–4.8)
LYMPHOCYTES NFR BLD: 8 % (ref 18–48)
MAGNESIUM SERPL-MCNC: 1.5 MG/DL (ref 1.6–2.6)
MCH RBC QN AUTO: 29.6 PG (ref 27–31)
MCHC RBC AUTO-ENTMCNC: 31.5 G/DL (ref 32–36)
MCV RBC AUTO: 94 FL (ref 82–98)
MONOCYTES # BLD AUTO: 0.5 K/UL (ref 0.3–1)
MONOCYTES NFR BLD: 7.7 % (ref 4–15)
NEUTROPHILS # BLD AUTO: 5.1 K/UL (ref 1.8–7.7)
NEUTROPHILS NFR BLD: 82.1 % (ref 38–73)
NRBC BLD-RTO: 0 /100 WBC
PHOSPHATE SERPL-MCNC: 2.2 MG/DL (ref 2.7–4.5)
PLATELET # BLD AUTO: 230 K/UL (ref 150–450)
PMV BLD AUTO: 11.8 FL (ref 9.2–12.9)
POTASSIUM SERPL-SCNC: 4.8 MMOL/L (ref 3.5–5.1)
PROT SERPL-MCNC: 6.9 G/DL (ref 6–8.4)
RBC # BLD AUTO: 3.48 M/UL (ref 4.6–6.2)
SODIUM SERPL-SCNC: 141 MMOL/L (ref 136–145)
WBC # BLD AUTO: 6.24 K/UL (ref 3.9–12.7)

## 2022-04-27 PROCEDURE — 36415 COLL VENOUS BLD VENIPUNCTURE: CPT | Performed by: INTERNAL MEDICINE

## 2022-04-27 PROCEDURE — 84075 ASSAY ALKALINE PHOSPHATASE: CPT | Performed by: INTERNAL MEDICINE

## 2022-04-27 PROCEDURE — 87799 DETECT AGENT NOS DNA QUANT: CPT | Performed by: INTERNAL MEDICINE

## 2022-04-27 PROCEDURE — 83735 ASSAY OF MAGNESIUM: CPT | Performed by: INTERNAL MEDICINE

## 2022-04-27 PROCEDURE — 85025 COMPLETE CBC W/AUTO DIFF WBC: CPT | Performed by: INTERNAL MEDICINE

## 2022-04-27 PROCEDURE — 36415 COLL VENOUS BLD VENIPUNCTURE: CPT | Mod: S$GLB,,, | Performed by: INTERNAL MEDICINE

## 2022-04-27 PROCEDURE — 36415 PR COLLECTION VENOUS BLOOD,VENIPUNCTURE: ICD-10-PCS | Mod: S$GLB,,, | Performed by: INTERNAL MEDICINE

## 2022-04-27 PROCEDURE — 80069 RENAL FUNCTION PANEL: CPT | Performed by: INTERNAL MEDICINE

## 2022-04-27 PROCEDURE — 80197 ASSAY OF TACROLIMUS: CPT | Performed by: INTERNAL MEDICINE

## 2022-04-28 ENCOUNTER — PATIENT MESSAGE (OUTPATIENT)
Dept: TRANSPLANT | Facility: CLINIC | Age: 22
End: 2022-04-28
Payer: MEDICARE

## 2022-04-28 DIAGNOSIS — Z94.0 KIDNEY REPLACED BY TRANSPLANT: ICD-10-CM

## 2022-04-28 LAB — TACROLIMUS BLD-MCNC: 10.5 NG/ML (ref 5–15)

## 2022-04-28 RX ORDER — TACROLIMUS 1 MG/1
3 CAPSULE ORAL EVERY 12 HOURS
Qty: 540 CAPSULE | Refills: 3 | Status: SHIPPED | OUTPATIENT
Start: 2022-04-28 | End: 2022-07-21

## 2022-04-28 NOTE — TELEPHONE ENCOUNTER
Received written order from Dr. Mi.  Message sent to patient instructing him to decrease Prograf to 3 mg twice a day.  Repeat labs as scheduled.  Patient responded stating understanding.       ----- Message from Mo Mi MD sent at 4/28/2022  7:47 AM CDT -----  Please lower prograf to 3 mg PO bid labs in 1 week

## 2022-04-29 NOTE — PROGRESS NOTES
Kidney Post-Transplant Assessment    Referring Physician: Yury Sotelo  Current Nephrologist: Kathy Cruz    ORGAN: RIGHT KIDNEY  Donor Type: donation after brain death  PHS Increased Risk: no  Cold Ischemia: 424 mins  Induction Medications: thymoglobulin    Subjective:   The patient location is: home  The chief complaint leading to consultation is: Kidney Transplant    Visit type: audiovisual    Face to Face time with patient: 15 minutes of total time spent on the encounter, which includes face to face time and non-face to face time preparing to see the patient (eg, review of tests), Obtaining and/or reviewing separately obtained history, Documenting clinical information in the electronic or other health record, Independently interpreting results (not separately reported) and communicating results to the patient/family/caregiver, or Care coordination (not separately reported).     Each patient to whom he or she provides medical services by telemedicine is:  (1) informed of the relationship between the physician and patient and the respective role of any other health care provider with respect to management of the patient; and (2) notified that he or she may decline to receive medical services by telemedicine and may withdraw from such care at any time.    CC:  Reassessment of renal allograft function and management of chronic immunosuppression.    HPI:  Mr. Saenz is a 21 y.o. 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 His most recent creatinine is 2.2. He takes mycophenolate mofetil, prednisone and tacrolimus for maintenance immunosuppression. His post transplant course has been uncomplicated to date.    Transplant History:  -ESRD secondary to HTN  -on dialysis 12/2015 until DBD kidney transplant on 2/25/22 (Thymo induction, CIT 7 hr. 4 min., CPRA 54%, KDPI 4%, CMV D+,R-).  -history of hepatosplenomegaly with thrombocytopenia, given  1 unit platelets intra-op.   -re-admit 3/7 for hyperkalemia    -ureteral stent removed 3/14.   -Kidney biopsy 3/24/2022 (for sub optimal creatinine): diffuse acute tubular injury, no rejection.     Interval History  Drinking 3.5L of water daily, no problems with urination. Home BPs 130/60, no peripheral edema. Doing light work outs, minimal fatigue. He was at work during this video visit. 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 Discussed needing to increase water intake by 1L if he continues to work outside.     Current Outpatient Medications   Medication Sig Dispense Refill    atovaquone (MEPRON) 750 mg/5 mL Susp Take 10 mLs (1,500 mg total) by mouth once daily. STOP 8/24/22 300 mL 5    cinacalcet (SENSIPAR) 30 MG Tab Take 1 tablet (30 mg total) by mouth every evening. 30 tablet 11    famotidine (PEPCID) 20 MG tablet Take 1 tablet (20 mg total) by mouth every evening. 30 tablet 1    gabapentin (NEURONTIN) 300 MG capsule Take 1 capsule (300 mg total) by mouth 2 (two) times daily. 60 capsule 2    k phos di & mono-sod phos mono (K-PHOS-NEUTRAL) 250 mg Tab Take 2 tablets by mouth 3 (three) times daily. 180 tablet 3    mycophenolate (CELLCEPT) 250 mg Cap Take 4 capsules (1,000 mg total) by mouth 2 (two) times daily. 240 capsule 11    nebivoloL (BYSTOLIC) 5 MG Tab Take 1 tablet (5 mg total) by mouth once daily. 30 tablet 11    NIFEdipine (PROCARDIA-XL) 30 MG (OSM) 24 hr tablet Take 1 tablet (30 mg total) by mouth 2 (two) times a day. 60 tablet 11    predniSONE (DELTASONE) 5 MG tablet Take by mouth daily: 20mg 3/1-3/31, 15mg 4/1-4/30, 10mg 5/1-5/31, 5mg 6/1/2022- 120 tablet 11    sodium zirconium cyclosilicate (LOKELMA) 10 gram packet Take 1 packet (10g) by mouth twice a day for 2 days, then once daily.  Mix entire contents of packet(s) into drinking glass containing 3 tablespoons of water; stir well and drink immediately. Add water and repeat until no powder  remains to receive entire dose. 30 packet 11    tacrolimus (PROGRAF) 1 MG Cap Take 3 capsules (3 mg total) by mouth every 12 (twelve) hours. 540 capsule 3    valGANciclovir (VALCYTE) 450 mg Tab Take 1 tablet (450 mg total) by mouth once daily. Stop 8/24/22 30 tablet 5     No current facility-administered medications for this visit.       Past Medical History:   Diagnosis Date    Acne     Anemia of renal disease     Dialysis patient     peritoneal daily at night. followed by Dr. Sotelo    ESRD on dialysis since 12/16/15     Hepatosplenomegaly     Hypertension     Kidney disease     Seasonal allergies     Secondary hyperparathyroidism of renal origin     Thrombocytopenia, unspecified        Review of Systems   Constitutional: Negative for activity change, appetite change and fever.   HENT: Negative for congestion, mouth sores and sore throat.    Eyes: Negative for visual disturbance.   Respiratory: Negative for cough, chest tightness and shortness of breath.    Cardiovascular: Negative for chest pain, palpitations and leg swelling.   Gastrointestinal: Negative for abdominal distention, abdominal pain, constipation, diarrhea and nausea.   Genitourinary: Negative for difficulty urinating, frequency and hematuria.   Musculoskeletal: Negative for arthralgias and gait problem.   Skin: Negative for wound.   Allergic/Immunologic: Positive for immunocompromised state. Negative for environmental allergies and food allergies.   Neurological: Negative for dizziness, weakness and numbness.   Psychiatric/Behavioral: Negative for sleep disturbance. The patient is not nervous/anxious.        Objective:   There were no vitals taken for this visit.body mass index is unknown because there is no height or weight on file.  Wt Readings from Last 3 Encounters:   04/15/22 82.8 kg (182 lb 8.7 oz)   04/14/22 87.5 kg (193 lb)   04/04/22 83.7 kg (184 lb 8.4 oz)     Temp Readings from Last 3 Encounters:   04/16/22 99.6 °F (37.6 °C)  (Oral)   04/14/22 98 °F (36.7 °C) (Oral)   04/04/22 97.3 °F (36.3 °C) (Tympanic)     BP Readings from Last 3 Encounters:   04/16/22 (!) 172/84   04/14/22 130/70   04/04/22 (!) 144/72     Pulse Readings from Last 3 Encounters:   04/16/22 107   04/14/22 80   04/04/22 91       Physical Exam  Deferred due to AV visit  Labs:  Lab Results   Component Value Date    WBC 7.47 05/02/2022    HGB 10.5 (L) 05/02/2022    HCT 33.8 (L) 05/02/2022     05/02/2022    K 5.6 (H) 05/02/2022     05/02/2022    CO2 24 05/02/2022    BUN 25 (H) 05/02/2022    CREATININE 1.9 (H) 05/02/2022    EGFRNONAA 48.6 (A) 05/02/2022    CALCIUM 10.0 05/02/2022    PHOS 2.1 (L) 05/02/2022    MG 1.5 (L) 05/02/2022    ALBUMIN 4.1 05/02/2022    AST 13 04/27/2022    ALT 10 04/27/2022    UTPCR 0.25 (H) 03/24/2022    .2 (H) 02/25/2022    TACROLIMUS 7.6 05/02/2022       Labs were reviewed with the patient    Assessment:     1. S/P kidney transplant    2. Immunosuppression due to drug therapy    3. Benign hypertension with ESRD (end-stage renal disease)        Plan:   encourage hydration  Low K diet  Lokelma 10 gm bid for 2 days and repeat Potassium level on Thursday  His kidney biopsy showed ATI but over 1 month ago. Get an allosure now and monthly for three months  Please order DSA's      Follow-up:   1. CKD stage: 3 stable      2. Immunosuppression: Prograf trough 10.5, which is therapeutic (target 8-10). Continue Prograf 3/3, MMF 1000 mg BID, and Prednisone taper. Will continue to monitor for drug toxicities    3. Allograft Function: stable. Continue good po hydration.      5/2/2022  POD 66   Creatinine 0.5 - 1.4 mg/dL 1.9 (A)   eGFR if African American >60 mL/min/1.73 m^2 56.2 (A)   Glucose 70 - 110 mg/dL 101     Lab Results   Component Value Date    CREATININE 1.9 (H) 05/02/2022        4. Hypertension management: advise low salt diet and home BP monitoring    bystolic 5 mg QD, Nifedipine 30 mg BID     5. Metabolic Bone Disease/Secondary  Hyperparathyroidism:stable  Sensipar 30 mg QD  Lab Results   Component Value Date    .2 (H) 02/25/2022    CALCIUM 10.0 05/02/2022    PHOS 2.1 (L) 05/02/2022 5/2/2022  POD 66   Magnesium 1.6 - 2.6 mg/dL 1.5 (A)     6. Electrolytes:  Will monitor /guidelines  Lab Results   Component Value Date     05/02/2022    K 5.6 (H) 05/02/2022     05/02/2022    CO2 24 05/02/2022        7. Anemia:  Lab Results   Component Value Date    WBC 7.47 05/02/2022    HGB 10.5 (L) 05/02/2022    HCT 33.8 (L) 05/02/2022    MCV 95 05/02/2022     05/02/2022       8.  Cytopenias: no significant cytopenias will monitor as per our guidelines. Medicine list reviewed including potential causes of drug-induced cytopenias    9.Proteinuria: continue p/c ratio as per guidelines    3/24/2022  POD 27   Prot/Creat Ratio, Urine 0.00 - 0.20 0.25 (A)     10. BK virus infection screening:  will continue to monitor/ guidelines      3/24/2022  POD 27   BK Virus DNA, Blood Not detected Not detected   BK Virus DNA PCR, Quant, Blood <125 Copies/mL <125       11. Weight education: provided during the clinic visit   There is no height or weight on file to calculate BMI.     12.Patient safety education regarding immunosuppression including prophylaxis posttransplant for CMV, PCP : Education provided about vaccination and prevention of infections     PCP ppx: Mepron until 8/24/22  CMV ppx: Valcyte until 8/24/22   Fungal ppx: Nystatin until 3/31/22       Follow-up:   Clinic: return to transplant clinic weekly for the first month after transplant; every 2 weeks during months 2-3; then at 6-, 9-, 12-, 18-, 24-, and 36- months post-transplant to reassess for complications from immunosuppression toxicity and monitor for rejection.  Annually thereafter.    Labs: since patient remains at high risk for rejection and drug-related complications that warrant close monitoring, labs will be ordered as follows: continue twice weekly CBC, renal panel,  and drug level for first month; then same labs once weekly through 3rd month post-transplant.  Urine for UA and protein/creatinine ratio monthly.  Serum BK - PCR at 1-, 3-, 6-, 9-, 12-, 18-, 24-, 36-, 48-, and 60 months post-transplant.  Hepatic panel at 1-, 2-, 3-, 6-, 9-, 12-, 18-, 24-, and 36- months post-transplant.    Marisela Bucio NP       Education:   Material provided to the patient.  Patient reminded to call with any health changes since these can be early signs of significant complications.  Also, I advised the patient to be sure any new medications or changes of old medications are discussed with either a pharmacist or physician knowledgeable with transplant to avoid rejection/drug toxicity related to significant drug interactions.    Patient advised that it is recommended that all transplanted patients, and their close contacts and household members receive Covid vaccination.

## 2022-05-02 ENCOUNTER — PATIENT MESSAGE (OUTPATIENT)
Dept: TRANSPLANT | Facility: CLINIC | Age: 22
End: 2022-05-02
Payer: MEDICARE

## 2022-05-02 ENCOUNTER — LAB VISIT (OUTPATIENT)
Dept: FAMILY MEDICINE | Facility: CLINIC | Age: 22
End: 2022-05-02
Payer: MEDICARE

## 2022-05-02 DIAGNOSIS — Z94.0 KIDNEY REPLACED BY TRANSPLANT: ICD-10-CM

## 2022-05-02 LAB
ALBUMIN SERPL BCP-MCNC: 4.1 G/DL (ref 3.5–5.2)
ANION GAP SERPL CALC-SCNC: 8 MMOL/L (ref 8–16)
BKV DNA SERPL NAA+PROBE-ACNC: <125 COPIES/ML
BKV DNA SERPL NAA+PROBE-LOG#: <2.1 LOG (10) COPIES/ML
BKV DNA SERPL QL NAA+PROBE: NOT DETECTED
BUN SERPL-MCNC: 25 MG/DL (ref 6–20)
CALCIUM SERPL-MCNC: 10 MG/DL (ref 8.7–10.5)
CHLORIDE SERPL-SCNC: 106 MMOL/L (ref 95–110)
CO2 SERPL-SCNC: 24 MMOL/L (ref 23–29)
CREAT SERPL-MCNC: 1.9 MG/DL (ref 0.5–1.4)
EST. GFR  (AFRICAN AMERICAN): 56.2 ML/MIN/1.73 M^2
EST. GFR  (NON AFRICAN AMERICAN): 48.6 ML/MIN/1.73 M^2
GLUCOSE SERPL-MCNC: 101 MG/DL (ref 70–110)
MAGNESIUM SERPL-MCNC: 1.5 MG/DL (ref 1.6–2.6)
PHOSPHATE SERPL-MCNC: 2.1 MG/DL (ref 2.7–4.5)
POTASSIUM SERPL-SCNC: 5.6 MMOL/L (ref 3.5–5.1)
SODIUM SERPL-SCNC: 138 MMOL/L (ref 136–145)

## 2022-05-02 PROCEDURE — 85027 COMPLETE CBC AUTOMATED: CPT | Performed by: INTERNAL MEDICINE

## 2022-05-02 PROCEDURE — 36415 COLL VENOUS BLD VENIPUNCTURE: CPT | Mod: S$GLB,,, | Performed by: INTERNAL MEDICINE

## 2022-05-02 PROCEDURE — 83735 ASSAY OF MAGNESIUM: CPT | Performed by: INTERNAL MEDICINE

## 2022-05-02 PROCEDURE — 80197 ASSAY OF TACROLIMUS: CPT | Performed by: INTERNAL MEDICINE

## 2022-05-02 PROCEDURE — 36415 PR COLLECTION VENOUS BLOOD,VENIPUNCTURE: ICD-10-PCS | Mod: S$GLB,,, | Performed by: INTERNAL MEDICINE

## 2022-05-02 PROCEDURE — 85007 BL SMEAR W/DIFF WBC COUNT: CPT | Performed by: INTERNAL MEDICINE

## 2022-05-02 PROCEDURE — 80069 RENAL FUNCTION PANEL: CPT | Performed by: INTERNAL MEDICINE

## 2022-05-02 NOTE — PROGRESS NOTES
Venipuncture performed with 21 gauge butterfly, x's 1 attempt.  Successful venipuncture to R Antecubital vein.  Specimens collected per orders.      Pressure dressing applied to site, instructed patient to remove dressing in 10-15 minutes, OK to re-adjust dressing if pressure causing any discomfort, to observe closely for numbness and/or discoloration to hand or fingers, and to notify provider if bleeding persists after applying constant pressure lasting 30 minutes.

## 2022-05-03 ENCOUNTER — TELEPHONE (OUTPATIENT)
Dept: TRANSPLANT | Facility: CLINIC | Age: 22
End: 2022-05-03
Payer: MEDICARE

## 2022-05-03 ENCOUNTER — PATIENT MESSAGE (OUTPATIENT)
Dept: TRANSPLANT | Facility: CLINIC | Age: 22
End: 2022-05-03

## 2022-05-03 ENCOUNTER — OFFICE VISIT (OUTPATIENT)
Dept: TRANSPLANT | Facility: CLINIC | Age: 22
End: 2022-05-03
Payer: MEDICARE

## 2022-05-03 DIAGNOSIS — Z94.0 S/P KIDNEY TRANSPLANT: Primary | ICD-10-CM

## 2022-05-03 DIAGNOSIS — D84.821 IMMUNOSUPPRESSION DUE TO DRUG THERAPY: ICD-10-CM

## 2022-05-03 DIAGNOSIS — I12.0 BENIGN HYPERTENSION WITH ESRD (END-STAGE RENAL DISEASE): Chronic | ICD-10-CM

## 2022-05-03 DIAGNOSIS — N18.6 BENIGN HYPERTENSION WITH ESRD (END-STAGE RENAL DISEASE): Chronic | ICD-10-CM

## 2022-05-03 DIAGNOSIS — Z79.899 IMMUNOSUPPRESSION DUE TO DRUG THERAPY: ICD-10-CM

## 2022-05-03 DIAGNOSIS — E87.5 HYPERKALEMIA: ICD-10-CM

## 2022-05-03 DIAGNOSIS — Z94.0 KIDNEY REPLACED BY TRANSPLANT: Primary | ICD-10-CM

## 2022-05-03 LAB
ANISOCYTOSIS BLD QL SMEAR: SLIGHT
BASOPHILS # BLD AUTO: ABNORMAL K/UL (ref 0–0.2)
BASOPHILS NFR BLD: 0 % (ref 0–1.9)
BURR CELLS BLD QL SMEAR: ABNORMAL
DIFFERENTIAL METHOD: ABNORMAL
EOSINOPHIL # BLD AUTO: ABNORMAL K/UL (ref 0–0.5)
EOSINOPHIL NFR BLD: 0 % (ref 0–8)
ERYTHROCYTE [DISTWIDTH] IN BLOOD BY AUTOMATED COUNT: 14.7 % (ref 11.5–14.5)
HCT VFR BLD AUTO: 33.8 % (ref 40–54)
HGB BLD-MCNC: 10.5 G/DL (ref 14–18)
IMM GRANULOCYTES # BLD AUTO: ABNORMAL K/UL (ref 0–0.04)
IMM GRANULOCYTES NFR BLD AUTO: ABNORMAL % (ref 0–0.5)
LYMPHOCYTES # BLD AUTO: ABNORMAL K/UL (ref 1–4.8)
LYMPHOCYTES NFR BLD: 7 % (ref 18–48)
MCH RBC QN AUTO: 29.5 PG (ref 27–31)
MCHC RBC AUTO-ENTMCNC: 31.1 G/DL (ref 32–36)
MCV RBC AUTO: 95 FL (ref 82–98)
MONOCYTES # BLD AUTO: ABNORMAL K/UL (ref 0.3–1)
MONOCYTES NFR BLD: 5 % (ref 4–15)
NEUTROPHILS NFR BLD: 87 % (ref 38–73)
NEUTS BAND NFR BLD MANUAL: 1 %
NRBC BLD-RTO: 0 /100 WBC
OVALOCYTES BLD QL SMEAR: ABNORMAL
PLATELET # BLD AUTO: 151 K/UL (ref 150–450)
PLATELET BLD QL SMEAR: ABNORMAL
PMV BLD AUTO: 11.9 FL (ref 9.2–12.9)
POIKILOCYTOSIS BLD QL SMEAR: SLIGHT
RBC # BLD AUTO: 3.56 M/UL (ref 4.6–6.2)
TACROLIMUS BLD-MCNC: 7.6 NG/ML (ref 5–15)
WBC # BLD AUTO: 7.47 K/UL (ref 3.9–12.7)

## 2022-05-03 PROCEDURE — 99214 PR OFFICE/OUTPT VISIT, EST, LEVL IV, 30-39 MIN: ICD-10-PCS | Mod: 95,,, | Performed by: NURSE PRACTITIONER

## 2022-05-03 PROCEDURE — 99214 OFFICE O/P EST MOD 30 MIN: CPT | Mod: 95,,, | Performed by: NURSE PRACTITIONER

## 2022-05-03 NOTE — PROGRESS NOTES
I do not think we addressed mild hyperkalemia  Let's encourage hydration  Low K diet  Lokelma 10 gm bid for 2 days and repeat Potassium level on Thursday  His kidney biopsy showed ATI but over 1 month ago. Lest get an allosure now and monthly for three months  Please order DSA's

## 2022-05-03 NOTE — LETTER
May 3, 2022        Kathy Cruz  [address] [place] [address]   [address]  [facility] [address]  Phone: [phone]  Fax: [phone]             Ambrosio Gamez- Transplant [facility]  [address]  [facility] [address]  Phone: [phone]   Patient: Tameka Saenz   MR Number: [id number]   YOB: 2000   Date of Visit: 5/3/2022       Dear Dr. Kathy Cruz    Thank you for referring Tameka Saenz to me for evaluation. Attached you will find relevant portions of my assessment and plan of care.    If you have questions, please do not hesitate to call me. I look forward to following Tameka Saenz along with you.    Sincerely,    Marisela Bucio, NP    Enclosure    If you would like to receive this communication electronically, please contact [email or web address] or [phone] to request [employer] access.    [employer] is a tool which provides read-only access to select patient information with whom you have a relationship. Its easy to use and provides real time access to review your patients record including encounter summaries, notes, results, and demographic information.    If you feel you have received this communication in error or would no longer like to receive these types of communications, please e-mail [email or web address]

## 2022-05-03 NOTE — TELEPHONE ENCOUNTER
Received the following written orders from Dr. Mi.  Message sent to patient instructing him to increase Lokelma to twice a day today and tomorrow and repeat a K level on Thursday and to adhere to a low potassium diet.   Explained the need for additional Allosure test monthly for 3 months.  Asked patient to contact us with any questions.       ----- Message from Nicole KEEN Do, RN sent at 5/3/2022  7:37 AM CDT -----    ----- Message -----  From: Mo Mi MD  Sent: 5/3/2022   7:32 AM CDT  To: [facility]ney Transplant Clinical    I do not think we addressed mild hyperkalemia  Let's encourage hydration  Low K diet  Lokelma 10 gm bid for 2 days and repeat Potassium level on Thursday  His kidney biopsy showed ATI but over 1 month ago. Lest get an allosure now and monthly for three months  Please order DSA's

## 2022-05-05 ENCOUNTER — LAB VISIT (OUTPATIENT)
Dept: FAMILY MEDICINE | Facility: CLINIC | Age: 22
End: 2022-05-05
Payer: MEDICARE

## 2022-05-05 DIAGNOSIS — E87.5 HYPERKALEMIA: ICD-10-CM

## 2022-05-05 LAB — POTASSIUM SERPL-SCNC: 4.6 MMOL/L (ref 3.5–5.1)

## 2022-05-05 PROCEDURE — 84132 ASSAY OF SERUM POTASSIUM: CPT | Performed by: INTERNAL MEDICINE

## 2022-05-05 PROCEDURE — 36415 COLL VENOUS BLD VENIPUNCTURE: CPT | Mod: S$GLB,,, | Performed by: INTERNAL MEDICINE

## 2022-05-05 PROCEDURE — 36415 PR COLLECTION VENOUS BLOOD,VENIPUNCTURE: ICD-10-PCS | Mod: S$GLB,,, | Performed by: INTERNAL MEDICINE

## 2022-05-09 ENCOUNTER — PATIENT MESSAGE (OUTPATIENT)
Dept: TRANSPLANT | Facility: CLINIC | Age: 22
End: 2022-05-09
Payer: MEDICARE

## 2022-05-09 ENCOUNTER — LAB VISIT (OUTPATIENT)
Dept: FAMILY MEDICINE | Facility: CLINIC | Age: 22
End: 2022-05-09
Payer: MEDICARE

## 2022-05-09 DIAGNOSIS — Z94.0 KIDNEY REPLACED BY TRANSPLANT: ICD-10-CM

## 2022-05-09 LAB
ALBUMIN SERPL BCP-MCNC: 3.9 G/DL (ref 3.5–5.2)
ANION GAP SERPL CALC-SCNC: 10 MMOL/L (ref 8–16)
BUN SERPL-MCNC: 25 MG/DL (ref 6–20)
CALCIUM SERPL-MCNC: 9.5 MG/DL (ref 8.7–10.5)
CHLORIDE SERPL-SCNC: 109 MMOL/L (ref 95–110)
CO2 SERPL-SCNC: 21 MMOL/L (ref 23–29)
CREAT SERPL-MCNC: 1.5 MG/DL (ref 0.5–1.4)
EST. GFR  (AFRICAN AMERICAN): >60 ML/MIN/1.73 M^2
EST. GFR  (NON AFRICAN AMERICAN): >60 ML/MIN/1.73 M^2
GLUCOSE SERPL-MCNC: 96 MG/DL (ref 70–110)
MAGNESIUM SERPL-MCNC: 1.7 MG/DL (ref 1.6–2.6)
PHOSPHATE SERPL-MCNC: 2.1 MG/DL (ref 2.7–4.5)
POTASSIUM SERPL-SCNC: 4.1 MMOL/L (ref 3.5–5.1)
SODIUM SERPL-SCNC: 140 MMOL/L (ref 136–145)

## 2022-05-09 PROCEDURE — 36415 PR COLLECTION VENOUS BLOOD,VENIPUNCTURE: ICD-10-PCS | Mod: S$GLB,,, | Performed by: INTERNAL MEDICINE

## 2022-05-09 PROCEDURE — 83735 ASSAY OF MAGNESIUM: CPT | Performed by: INTERNAL MEDICINE

## 2022-05-09 PROCEDURE — 85027 COMPLETE CBC AUTOMATED: CPT | Performed by: INTERNAL MEDICINE

## 2022-05-09 PROCEDURE — 80197 ASSAY OF TACROLIMUS: CPT | Performed by: INTERNAL MEDICINE

## 2022-05-09 PROCEDURE — 85007 BL SMEAR W/DIFF WBC COUNT: CPT | Performed by: INTERNAL MEDICINE

## 2022-05-09 PROCEDURE — 86833 HLA CLASS II HIGH DEFIN QUAL: CPT | Mod: PO | Performed by: INTERNAL MEDICINE

## 2022-05-09 PROCEDURE — 86832 HLA CLASS I HIGH DEFIN QUAL: CPT | Mod: PO | Performed by: INTERNAL MEDICINE

## 2022-05-09 PROCEDURE — 80069 RENAL FUNCTION PANEL: CPT | Performed by: INTERNAL MEDICINE

## 2022-05-09 PROCEDURE — 86977 RBC SERUM PRETX INCUBJ/INHIB: CPT | Mod: 59,PO | Performed by: INTERNAL MEDICINE

## 2022-05-09 PROCEDURE — 36415 COLL VENOUS BLD VENIPUNCTURE: CPT | Mod: S$GLB,,, | Performed by: INTERNAL MEDICINE

## 2022-05-10 LAB
ANISOCYTOSIS BLD QL SMEAR: SLIGHT
BASOPHILS # BLD AUTO: ABNORMAL K/UL (ref 0–0.2)
BASOPHILS NFR BLD: 0.7 % (ref 0–1.9)
BURR CELLS BLD QL SMEAR: ABNORMAL
DIFFERENTIAL METHOD: ABNORMAL
EOSINOPHIL # BLD AUTO: ABNORMAL K/UL (ref 0–0.5)
EOSINOPHIL NFR BLD: 1.3 % (ref 0–8)
ERYTHROCYTE [DISTWIDTH] IN BLOOD BY AUTOMATED COUNT: 14.6 % (ref 11.5–14.5)
HCT VFR BLD AUTO: 33 % (ref 40–54)
HGB BLD-MCNC: 10.4 G/DL (ref 14–18)
IMM GRANULOCYTES # BLD AUTO: ABNORMAL K/UL (ref 0–0.04)
IMM GRANULOCYTES NFR BLD AUTO: ABNORMAL % (ref 0–0.5)
LYMPHOCYTES # BLD AUTO: ABNORMAL K/UL (ref 1–4.8)
LYMPHOCYTES NFR BLD: 12 % (ref 18–48)
MCH RBC QN AUTO: 29.4 PG (ref 27–31)
MCHC RBC AUTO-ENTMCNC: 31.5 G/DL (ref 32–36)
MCV RBC AUTO: 93 FL (ref 82–98)
MONOCYTES # BLD AUTO: ABNORMAL K/UL (ref 0.3–1)
MONOCYTES NFR BLD: 2.7 % (ref 4–15)
NEUTROPHILS NFR BLD: 76.6 % (ref 38–73)
NEUTS BAND NFR BLD MANUAL: 6.7 %
NRBC BLD-RTO: 0 /100 WBC
PLATELET # BLD AUTO: 168 K/UL (ref 150–450)
PLATELET BLD QL SMEAR: ABNORMAL
PMV BLD AUTO: 12.2 FL (ref 9.2–12.9)
POIKILOCYTOSIS BLD QL SMEAR: SLIGHT
RBC # BLD AUTO: 3.54 M/UL (ref 4.6–6.2)
TACROLIMUS BLD-MCNC: 8.4 NG/ML (ref 5–15)
WBC # BLD AUTO: 3.54 K/UL (ref 3.9–12.7)

## 2022-05-11 ENCOUNTER — PATIENT MESSAGE (OUTPATIENT)
Dept: RESEARCH | Facility: CLINIC | Age: 22
End: 2022-05-11
Payer: MEDICARE

## 2022-05-16 ENCOUNTER — LAB VISIT (OUTPATIENT)
Dept: FAMILY MEDICINE | Facility: CLINIC | Age: 22
End: 2022-05-16
Payer: MEDICARE

## 2022-05-16 DIAGNOSIS — Z94.0 KIDNEY REPLACED BY TRANSPLANT: ICD-10-CM

## 2022-05-16 PROCEDURE — 85027 COMPLETE CBC AUTOMATED: CPT | Performed by: INTERNAL MEDICINE

## 2022-05-16 PROCEDURE — 36415 COLL VENOUS BLD VENIPUNCTURE: CPT | Mod: S$GLB,,, | Performed by: INTERNAL MEDICINE

## 2022-05-16 PROCEDURE — 36415 PR COLLECTION VENOUS BLOOD,VENIPUNCTURE: ICD-10-PCS | Mod: S$GLB,,, | Performed by: INTERNAL MEDICINE

## 2022-05-16 PROCEDURE — 83735 ASSAY OF MAGNESIUM: CPT | Performed by: INTERNAL MEDICINE

## 2022-05-16 PROCEDURE — 80069 RENAL FUNCTION PANEL: CPT | Performed by: INTERNAL MEDICINE

## 2022-05-16 PROCEDURE — 85007 BL SMEAR W/DIFF WBC COUNT: CPT | Performed by: INTERNAL MEDICINE

## 2022-05-16 PROCEDURE — 80197 ASSAY OF TACROLIMUS: CPT | Performed by: INTERNAL MEDICINE

## 2022-05-16 NOTE — PROGRESS NOTES
Venipuncture performed with 21 gauge butterfly, x's 1 attempt,  to R Antecubital vein.  Specimens collected per orders.      Pressure dressing applied to site, instructed patient to remove dressing in 10-15 minutes, OK to re-adjust dressing if pressure causing any discomfort, to observe closely for numbness and/or discoloration to hand or fingers, and to notify provider if bleeding persists after applying constant pressure lasting 30 minutes.      Patient arrived with Vudusure test kit. Specimens collected and packaged per instructions in the kit. Sent out with .

## 2022-05-17 ENCOUNTER — TELEPHONE (OUTPATIENT)
Dept: TRANSPLANT | Facility: CLINIC | Age: 22
End: 2022-05-17
Payer: MEDICARE

## 2022-05-17 ENCOUNTER — SPECIALTY PHARMACY (OUTPATIENT)
Dept: PHARMACY | Facility: CLINIC | Age: 22
End: 2022-05-17
Payer: MEDICARE

## 2022-05-17 ENCOUNTER — PATIENT MESSAGE (OUTPATIENT)
Dept: TRANSPLANT | Facility: CLINIC | Age: 22
End: 2022-05-17
Payer: MEDICARE

## 2022-05-17 ENCOUNTER — LAB VISIT (OUTPATIENT)
Dept: FAMILY MEDICINE | Facility: CLINIC | Age: 22
End: 2022-05-17
Payer: MEDICARE

## 2022-05-17 DIAGNOSIS — D70.2 OTHER DRUG-INDUCED NEUTROPENIA: Primary | ICD-10-CM

## 2022-05-17 DIAGNOSIS — Z94.0 KIDNEY REPLACED BY TRANSPLANT: Primary | ICD-10-CM

## 2022-05-17 DIAGNOSIS — Z94.0 KIDNEY REPLACED BY TRANSPLANT: ICD-10-CM

## 2022-05-17 DIAGNOSIS — D70.2 DRUG-INDUCED LEUKOPENIA: Primary | ICD-10-CM

## 2022-05-17 LAB
ALBUMIN SERPL BCP-MCNC: 3.8 G/DL (ref 3.5–5.2)
ANION GAP SERPL CALC-SCNC: 8 MMOL/L (ref 8–16)
ANISOCYTOSIS BLD QL SMEAR: SLIGHT
BASOPHILS # BLD AUTO: ABNORMAL K/UL (ref 0–0.2)
BASOPHILS NFR BLD: 10 % (ref 0–1.9)
BUN SERPL-MCNC: 16 MG/DL (ref 6–20)
BURR CELLS BLD QL SMEAR: ABNORMAL
CALCIUM SERPL-MCNC: 9.2 MG/DL (ref 8.7–10.5)
CHLORIDE SERPL-SCNC: 110 MMOL/L (ref 95–110)
CLASS I ANTIBODY COMMENTS - LUMINEX: NORMAL
CLASS II ANTIBODY COMMENTS - LUMINEX: NORMAL
CO2 SERPL-SCNC: 22 MMOL/L (ref 23–29)
CREAT SERPL-MCNC: 1.5 MG/DL (ref 0.5–1.4)
DACRYOCYTES BLD QL SMEAR: ABNORMAL
DIFFERENTIAL METHOD: ABNORMAL
DSA1 TESTING DATE: NORMAL
DSA12 TESTING DATE: NORMAL
DSA2 TESTING DATE: NORMAL
EOSINOPHIL # BLD AUTO: ABNORMAL K/UL (ref 0–0.5)
EOSINOPHIL NFR BLD: 5 % (ref 0–8)
ERYTHROCYTE [DISTWIDTH] IN BLOOD BY AUTOMATED COUNT: 13.6 % (ref 11.5–14.5)
EST. GFR  (AFRICAN AMERICAN): >60 ML/MIN/1.73 M^2
EST. GFR  (NON AFRICAN AMERICAN): >60 ML/MIN/1.73 M^2
GLUCOSE SERPL-MCNC: 85 MG/DL (ref 70–110)
HCT VFR BLD AUTO: 34.3 % (ref 40–54)
HGB BLD-MCNC: 10.6 G/DL (ref 14–18)
HYPOCHROMIA BLD QL SMEAR: ABNORMAL
IMM GRANULOCYTES # BLD AUTO: ABNORMAL K/UL (ref 0–0.04)
IMM GRANULOCYTES NFR BLD AUTO: ABNORMAL % (ref 0–0.5)
LYMPHOCYTES # BLD AUTO: ABNORMAL K/UL (ref 1–4.8)
LYMPHOCYTES NFR BLD: 65 % (ref 18–48)
MAGNESIUM SERPL-MCNC: 1.5 MG/DL (ref 1.6–2.6)
MCH RBC QN AUTO: 29.4 PG (ref 27–31)
MCHC RBC AUTO-ENTMCNC: 30.9 G/DL (ref 32–36)
MCV RBC AUTO: 95 FL (ref 82–98)
MONOCYTES # BLD AUTO: ABNORMAL K/UL (ref 0.3–1)
MONOCYTES NFR BLD: 0 % (ref 4–15)
NEUTROPHILS NFR BLD: 5 % (ref 38–73)
NEUTS BAND NFR BLD MANUAL: 15 %
NRBC BLD-RTO: 0 /100 WBC
OVALOCYTES BLD QL SMEAR: ABNORMAL
PHOSPHATE SERPL-MCNC: 2.2 MG/DL (ref 2.7–4.5)
PLATELET # BLD AUTO: 172 K/UL (ref 150–450)
PMV BLD AUTO: 11.5 FL (ref 9.2–12.9)
POIKILOCYTOSIS BLD QL SMEAR: SLIGHT
POLYCHROMASIA BLD QL SMEAR: ABNORMAL
POTASSIUM SERPL-SCNC: 4.4 MMOL/L (ref 3.5–5.1)
RBC # BLD AUTO: 3.61 M/UL (ref 4.6–6.2)
SERUM COLLECTION DT - LUMINEX CLASS I: NORMAL
SERUM COLLECTION DT - LUMINEX CLASS II: NORMAL
SODIUM SERPL-SCNC: 140 MMOL/L (ref 136–145)
TACROLIMUS BLD-MCNC: 9 NG/ML (ref 5–15)
WBC # BLD AUTO: 0.59 K/UL (ref 3.9–12.7)

## 2022-05-17 PROCEDURE — 36415 COLL VENOUS BLD VENIPUNCTURE: CPT | Mod: S$GLB,,, | Performed by: INTERNAL MEDICINE

## 2022-05-17 PROCEDURE — 36415 PR COLLECTION VENOUS BLOOD,VENIPUNCTURE: ICD-10-PCS | Mod: S$GLB,,, | Performed by: INTERNAL MEDICINE

## 2022-05-17 NOTE — TELEPHONE ENCOUNTER
Received written order from Dr. Mi.  Spoke to patient and patient's mother.  Explained lab results and low WBC.  Patient denies any symptoms.  Instructed patient to hold MMF and Valcyte until further instructions.  Reviewed lab schedule for monitoring CBC and CMV.  Lab today for CMV and Friday for CBC and again next Monday and Thursday to monitor CBC.  Will start filgrastim now.  Recommendation received from Ana Lilia Piedra PharmD regarding filgrastim dose.  Ana Lilia sent in a prescription to [facility] for insurance approval and hopefully shipment today.  Explained to patient the dose and frequency of the filgrastim.  Asked patient to contact us if he develops any symptoms.  Educated patient on proper hand hygiene and avoiding crowds as much as possible.  Pt's mother noted all the changes, and lab dates.  My chart message also sent to patient with instructions as a reference.  Patient and mother verbalized all understanding and did not have any further questions at this time.       ----- Message from Mo Mi MD sent at 5/17/2022  9:03 AM CDT -----  Will repeat DSA in 1 month  Plan for severe leukopenia:   Call the patient to assess symptoms (fever sore throat cough or any infectious signs). If positive needs admission.   D/c MMF  D/c valcyte  Get a serum CMV pcr now and weekly until end of prophylaxis per protocol. (Surveillance)  Start neupogen 5 mcg/kg daily x 3 immediately  Repeat cbc with diff Friday and twice next week

## 2022-05-17 NOTE — PROGRESS NOTES
Call the patient to assess symptoms (fever sore throat cough or any infectious signs). If positive needs admission.   D/c MMF  D/c valcyte  Get a serum CMV pcr now and weekly until end of prophylaxis per protocol. (Surveillance)  Start neupogen 5 mcg/kg daily x 3 immediately  Repeat cbc with diff Friday and twice next week

## 2022-05-17 NOTE — TELEPHONE ENCOUNTER
Incoming call from pt's mother inquiring about Nivestym. Was told by nurse that Nivestym would be sent to them tomorrow for pt to take for low WBC count    Notified her that unfortunately it cannot be sent out tomorrow and unsure why she was told this misinformation.     [facility] received rx today, 5/17. It has not been worked up yet and requires a PA for both pt's medicaid and medicare part D    Notified her that TAT for urgent PA is 48-72 hours. Also, notified her of the process and that [facility] will work on urgently for pt    Pt will be on Nivestym for drug induced neutropenia d/t transplant medications.     Will work up pt and submit urgent PA    Jhon, this is Lelo Graham with [facility].  We are working on your prescription that your doctor has sent us. We will be working with your insurance to get this approved for you. We will be calling you along the way with updates on your medication.  If you have any questions, you can reach us at [phone].    Welcome call outcome: Patient/caregiver reached

## 2022-05-17 NOTE — PROGRESS NOTES
Will repeat DSA in 1 month  Plan for severe leukopenia:   Call the patient to assess symptoms (fever sore throat cough or any infectious signs). If positive needs admission.   D/c MMF  D/c valcyte  Get a serum CMV pcr now and weekly until end of prophylaxis per protocol. (Surveillance)  Start neupogen 5 mcg/kg daily x 3 immediately  Repeat cbc with diff Friday and twice next week

## 2022-05-18 ENCOUNTER — TELEPHONE (OUTPATIENT)
Dept: TRANSPLANT | Facility: CLINIC | Age: 22
End: 2022-05-18
Payer: MEDICARE

## 2022-05-18 ENCOUNTER — DOCUMENTATION ONLY (OUTPATIENT)
Dept: TRANSPLANT | Facility: CLINIC | Age: 22
End: 2022-05-18
Payer: MEDICARE

## 2022-05-18 LAB
CMV DNA SPEC QL NAA+PROBE: NOT DETECTED
CYTOMEGALOVIRUS LOG (IU/ML): NOT DETECTED LOGIU/ML
CYTOMEGALOVIRUS PCR, QUANT: NOT DETECTED IU/ML

## 2022-05-18 NOTE — TELEPHONE ENCOUNTER
Spoke with patients mom regarding PA needed for Nivestym. Pa was sent by Felicia Guan. Waiting for response from insurance. Patient also needing letter for work. ----- Message from Shon Briones sent at 5/18/2022  9:44 AM CDT -----  Regarding: speak to coordinator  Patient's mom, Darcie, calling to speak to coordinator regarding pt's white blood cell count being low and him needing an injection. Requesting a call back ASA.      Call: [phone] (Mobile

## 2022-05-18 NOTE — TELEPHONE ENCOUNTER
Submitted urgent request to Medicaid via CM on 5/18. Key: [id number]. Will check status 5/20    Attempted to submit PA to Medicare part D but unable with message that a authorization was already in process for member

## 2022-05-19 ENCOUNTER — TELEPHONE (OUTPATIENT)
Dept: TRANSPLANT | Facility: CLINIC | Age: 22
End: 2022-05-19
Payer: MEDICARE

## 2022-05-19 ENCOUNTER — SPECIALTY PHARMACY (OUTPATIENT)
Dept: PHARMACY | Facility: CLINIC | Age: 22
End: 2022-05-19
Payer: MEDICARE

## 2022-05-19 DIAGNOSIS — D70.2 DRUG-INDUCED LEUKOPENIA: Primary | ICD-10-CM

## 2022-05-19 LAB
ALLOSURE COMMENT: NORMAL
ALLOSURE SCORE KIDNEY: <0.12 %
INFORMATION: NORMAL

## 2022-05-19 NOTE — TELEPHONE ENCOUNTER
Specialty Pharmacy - Initial Clinical Assessment    Specialty Medication Orders Linked to Encounter    Flowsheet Row Most Recent Value   Medication #1 filgrastim-aafi 480 mcg/0.8 mL Syrg (Order#[id number], Rx#[id number])        Patient Diagnosis   D70.2 - Drug-induced leukopenia    Africa Saenz is a 21 y.o. male, who is followed by the specialty pharmacy service for management and education.    Recent Encounters     Date Type Provider Description    05/19/2022 Specialty Pharmacy Lelo Graham, Fatmata Initial Clinical Assessment    05/17/2022 Specialty Pharmacy Lelo Graham, PharmD Referral Authorization        Clinical call attempts since last clinical assessment   No call attempts found.     Current Outpatient Medications   Medication Sig    atovaquone (MEPRON) 750 mg/5 mL Susp Take 10 mLs (1,500 mg total) by mouth once daily. STOP 8/24/22    cinacalcet (SENSIPAR) 30 MG Tab Take 1 tablet (30 mg total) by mouth every evening.    famotidine (PEPCID) 20 MG tablet Take 1 tablet (20 mg total) by mouth every evening.    gabapentin (NEURONTIN) 300 MG capsule Take 1 capsule (300 mg total) by mouth 2 (two) times daily.    k phos di & mono-sod phos mono (K-PHOS-NEUTRAL) 250 mg Tab Take 2 tablets by mouth 3 (three) times daily.    nebivoloL (BYSTOLIC) 5 MG Tab Take 1 tablet (5 mg total) by mouth once daily.    NIFEdipine (PROCARDIA-XL) 30 MG (OSM) 24 hr tablet Take 1 tablet (30 mg total) by mouth 2 (two) times a day.    predniSONE (DELTASONE) 5 MG tablet Take by mouth daily: 20mg 3/1-3/31, 15mg 4/1-4/30, 10mg 5/1-5/31, 5mg 6/1/2022-    sodium zirconium cyclosilicate (LOKELMA) 10 gram packet Take 1 packet (10g) by mouth twice a day for 2 days, then once daily.  Mix entire contents of packet(s) into drinking glass containing 3 tablespoons of water; stir well and drink immediately. Add water and repeat until no powder remains to receive entire dose.    tacrolimus (PROGRAF) 1 MG Cap Take 3  capsules (3 mg total) by mouth every 12 (twelve) hours.    filgrastim-aafi 480 mcg/0.8 mL Syrg Inject 0.8 mLs (480 mcg total) into the skin every 24 hours as needed (as directed by the transplant clinic).   Last reviewed on 5/19/2022 11:02 AM by Lelo Graham, PharmD    Review of patient's allergies indicates:  No Known AllergiesLast reviewed on  5/19/2022 11:00 AM by Lelo Graham    Drug Interactions    Drug interactions evaluated: yes  Clinically relevant drug interactions identified: no  Provided the patient with educational material regarding drug interactions: not applicable         Adverse Effects    *All other systems reviewed and are negative       Assessment Questions - Documented Responses    Flowsheet Row Most Recent Value   Assessment    Medication Reconciliation completed for patient Yes   During the past 4 weeks, has patient missed any activities due to condition or medication? No   During the past 4 weeks, did patient have any of the following urgent care visits? None   Goals of Therapy Status Discussed (new start)   Status of the patients ability to self-administer: Is Able   All education points have been covered with patient? Yes, supplemental printed education provided   Welcome packet contents reviewed and discussed with patient? Yes   Assesment completed? Yes   Plan Therapy being initiated   Do you need to open a clinical intervention (i-vent)? No   Do you want to schedule first shipment? Yes   Medication #1 Assessment Info    Patient status New medication, New to [facility]   Is this medication appropriate for the patient? Yes   Is this medication effective? Not yet started        Refill Questions - Documented Responses    Flowsheet Row Most Recent Value   Patient Availability and HIPAA Verification    Does patient want to proceed with activity? Yes   HIPAA/medical authority confirmed? Yes   Relationship to patient of person spoken to? Mother   Refill Screening Questions    When does  "the patient need to receive the medication? 05/20/22   Refill Delivery Questions    How will the patient receive the medication? Delivery Elizabeth   When does the patient need to receive the medication? 05/20/22   Shipping Address Home   Address in [facility] confirmed and updated if neccessary? Yes   Expected Copay ($) 0   Is the patient able to afford the medication copay? Yes   Payment Method zero copay   Days supply of Refill 5   Supplies needed? No supplies needed   Refill activity completed? Yes   Refill activity plan Refill scheduled   Shipment/Pickup Date: 05/20/22          Objective    He has a past medical history of Acne, Anemia of renal disease, Dialysis patient, ESRD on dialysis since 12/16/15, Hepatosplenomegaly, Hypertension, Kidney disease, Seasonal allergies, Secondary hyperparathyroidism of renal origin, and Thrombocytopenia, unspecified.    Tried/failed medications: None    BP Readings from Last 4 Encounters:   04/16/22 (!) 172/84   04/14/22 130/70   04/04/22 (!) 144/72   03/24/22 (!) 138/44     Ht Readings from Last 4 Encounters:   04/14/22 5' 9" (1.753 m)   04/04/22 5' 9" (1.753 m)   03/24/22 5' 9" (1.753 m)   03/24/22 5' 9" (1.753 m)     Wt Readings from Last 4 Encounters:   04/15/22 82.8 kg (182 lb 8.7 oz)   04/14/22 87.5 kg (193 lb)   04/04/22 83.7 kg (184 lb 8.4 oz)   03/24/22 81.6 kg (180 lb)     Recent Labs   Lab Result Units 05/16/22  0755 05/09/22  0913 05/02/22  0830 04/27/22  0815 04/19/22  0836 04/15/22  2358 04/14/22  1712 03/30/22  0847 03/24/22  0828   Creatinine mg/dL 1.5 H 1.5 H 1.9 H 1.9 H   < > 1.74 H 1.60 H   < > 2.2 H   ALT U/L  --   --   --  10  --  15 17  --  9 L   AST U/L  --   --   --  13  --  26 26  --  10   Hemoglobin g/dL 10.6 L 10.4 L 10.5 L 10.3 L   < > 11.9 L 11.3 L   < > 9.5 L    < > = values in this interval not displayed.     The goals of prescribed drug therapy management include:  · Supporting patient to meet the prescriber's medical treatment " objectives  · Improving or maintaining quality of life  · Maintaining optimal therapy adherence  · Minimizing and managing side effects      Goals of Therapy Status: Discussed (new start)    Assessment/Plan  Patient plans to start therapy on 05/20/22      Indication, dosage, appropriateness, effectiveness, safety and convenience of his specialty medication(s) were reviewed today.     Patient Education   Patient received education on the following:    Expectations and possible outcomes of therapy   Proper use, timely administration, and missed dose management   Duration of therapy   Side effects, including prevention, minimization, and management   Contraindications and safety precautions   New or changed medications, including prescribe and over the counter medications and supplements   Reviews recommended vaccinations, as appropriate   Storage, safe handling, and disposal    Reviewed how to administer medication and complete initial consult. Pt will receive medication 5/20.    Tasks added this encounter   5/22/2022 - Refill Call (Auto Added)  2/19/2023 - Clinical - Follow Up Assesement (Annual)   Tasks due within next 3 months   No tasks due.     Lelo Graham, PharmD  [facility]  [address]  [facility] [address]  Phone: [phone]  Fax: [phone]

## 2022-05-19 NOTE — TELEPHONE ENCOUNTER
PA approved for Nivestym. PA Case ID: [id number] and [id number] (clin review)  Approved 5/18/22 to 5/18/23    Copay $0    Will push to initial and call pt to complete

## 2022-05-20 ENCOUNTER — TELEPHONE (OUTPATIENT)
Dept: TRANSPLANT | Facility: CLINIC | Age: 22
End: 2022-05-20
Payer: MEDICARE

## 2022-05-20 ENCOUNTER — LAB VISIT (OUTPATIENT)
Dept: FAMILY MEDICINE | Facility: CLINIC | Age: 22
End: 2022-05-20
Payer: MEDICARE

## 2022-05-20 DIAGNOSIS — Z94.0 KIDNEY REPLACED BY TRANSPLANT: ICD-10-CM

## 2022-05-20 LAB
BASOPHILS # BLD AUTO: ABNORMAL K/UL (ref 0–0.2)
BASOPHILS NFR BLD: 6 % (ref 0–1.9)
DIFFERENTIAL METHOD: ABNORMAL
EOSINOPHIL # BLD AUTO: ABNORMAL K/UL (ref 0–0.5)
EOSINOPHIL NFR BLD: 9 % (ref 0–8)
ERYTHROCYTE [DISTWIDTH] IN BLOOD BY AUTOMATED COUNT: 13.1 % (ref 11.5–14.5)
GIANT PLATELETS BLD QL SMEAR: PRESENT
HCT VFR BLD AUTO: 37.4 % (ref 40–54)
HGB BLD-MCNC: 11.6 G/DL (ref 14–18)
IMM GRANULOCYTES # BLD AUTO: ABNORMAL K/UL (ref 0–0.04)
IMM GRANULOCYTES NFR BLD AUTO: ABNORMAL % (ref 0–0.5)
LYMPHOCYTES # BLD AUTO: ABNORMAL K/UL (ref 1–4.8)
LYMPHOCYTES NFR BLD: 54 % (ref 18–48)
MCH RBC QN AUTO: 28.7 PG (ref 27–31)
MCHC RBC AUTO-ENTMCNC: 31 G/DL (ref 32–36)
MCV RBC AUTO: 93 FL (ref 82–98)
METAMYELOCYTES NFR BLD MANUAL: 4 %
MONOCYTES # BLD AUTO: ABNORMAL K/UL (ref 0.3–1)
MONOCYTES NFR BLD: 16 % (ref 4–15)
MYELOCYTES NFR BLD MANUAL: 1 %
NEUTROPHILS NFR BLD: 7 % (ref 38–73)
NEUTS BAND NFR BLD MANUAL: 2 %
NRBC BLD-RTO: 0 /100 WBC
PLATELET # BLD AUTO: 161 K/UL (ref 150–450)
PLATELET BLD QL SMEAR: ABNORMAL
PMV BLD AUTO: 12.6 FL (ref 9.2–12.9)
PROMYELOCYTES NFR BLD MANUAL: 1 %
RBC # BLD AUTO: 4.04 M/UL (ref 4.6–6.2)
WBC # BLD AUTO: 0.49 K/UL (ref 3.9–12.7)

## 2022-05-20 PROCEDURE — 85027 COMPLETE CBC AUTOMATED: CPT | Performed by: INTERNAL MEDICINE

## 2022-05-20 PROCEDURE — 85007 BL SMEAR W/DIFF WBC COUNT: CPT | Performed by: INTERNAL MEDICINE

## 2022-05-20 NOTE — TELEPHONE ENCOUNTER
Contacted patient's mother Darcie Saenz.  Advised pt to go to the closest ER to be evaluated for symptoms with significant leukopenia.  Patient's mother states that patient will go to [facility].  Provided Mrs. Saenz with a number to have the attending doctor call to speak to our transplant on call provider.  Mrs. Saenz verbalized understanding.       ----- Message from Robyn Piper MD sent at 5/20/2022 11:56 AM CDT -----    Dr Mi is off today. Pt needs to be evaluated for his symptoms in the setting of significant leukopenia. Please ER visit?    ----- Message -----  From: Nicole KEEN Do, RN  Sent: 5/20/2022  10:12 AM CDT  To: Mo Mi MD    Patient had severe leukopenia on Monday (WBC 0.59)  He is receiving first dose of filgrastim today (had to wait for insurance approval).  Cbc repeated this morning waiting on result.  CMV was negative. Patient's mom called, pt started with a sore throat this morning and fatigue but does not report any other symptoms.  Your initial instruction was to admit with any symptoms.  Do you still want to admit him?

## 2022-05-20 NOTE — TELEPHONE ENCOUNTER
Returned call to Darcie Saenz.  Mrs. Saenz reported that patient developed a sore throat this morning and was worried so the patient was heading to the ER.  Advised Ms. Saenz to have patient wait until he hears back from us since the initial instructions was admission and not the ER.  Patient will receive the first dose of filgrastim today and repeat cbc was done this morning.  Results pending.  Will notify Dr. Mi of this development and will call patient with further instructions.  Mrs. Saenz verbalized understanding and did not have any further questions at this time.       ----- Message from Shon Briones sent at 5/20/2022  9:41 AM CDT -----  Regarding: speak to coordinator  Patient's momDarcie calling to speak to coordinator regarding patient going to ER. Patient has sore throat with low white blood cells.      Call: [phone] (Mobile)

## 2022-05-20 NOTE — TELEPHONE ENCOUNTER
Spoke to patient and instructed him to monitor his temperature and symptoms.  Patient states that he is feeling fine.  Advised pt to go to the ED if he develops a temperature of 100.4 or develops additional symptoms.  Advised to avoid others and wash fruits/vegetables thoroughly and performing good hand hygiene. Pt administered his first dose of filgrastim today and will complete the other 2 doses over the weekend.  Cbc today pending.  Asked pt to call the after hour line over the weekend if he has any questions.  Patient verbalized understanding.         ----- Message from Domitila Parada DO sent at 5/20/2022  2:20 PM CDT -----  Regarding: RE: follow up  Let him know to have a low threshold to come in to ED over the weekend. Fever of 100.4. Have him do the neutropenic precautions at home as well (cautious of being around any one with fever, wash fruits/veg well, etc). Repeat labs on Monday and he is set up for the remaining two doses right?  ----- Message -----  From: Nicole KEEN Do RN  Sent: 5/20/2022   2:03 PM CDT  To: Robyn Piper MD, Domitila Parada DO, #  Subject: RE: follow up                                    Yes,  lab scheduled for Monday.  CMV on 5/17 was negative with weekly monitoring for now.   ----- Message -----  From: Robyn Piper MD  Sent: 5/20/2022   1:54 PM CDT  To: Domitila Parada DO, Nicole KEEN Do RN, #  Subject: RE: follow up                                      He might have other finding as well. Pt is known to me. So he needs assessment to r/o abscess/pneumonia/ oral ulcer/...... in the setting of leukopenia. Checking CMV PCR.....    Any way he has lab set up on Monday regardless. Right Su?     ----- Message -----  From: Krystle Lizarraga MD  Sent: 5/20/2022   1:35 PM CDT  To: Robyn Piper MD, Domitila Parada DO, #  Subject: follow up                                        So if he goes to ER.   Doesnot look septic. They give him filgrastim ( assuming they even have)  and may be  antibiotics for sore throat ???  Do we want him followed up for labs and future filgrastim in clinic.     Not sure what else they ll do if he looks fine and I think we wouldn't have anything much to offer over the weekend . Right ?  ----- Message -----  From: Robyn Piper MD  Sent: 5/20/2022  11:57 AM CDT  To: Domitila Parada DO, Nicole KEEN Do, RN, #      Dr Mi is off today. Pt needs to be evaluated for his symptoms in the setting of significant leukopenia. Please ER visit?    ----- Message -----  From: Nicole KEEN Do, RN  Sent: 5/20/2022  10:12 AM CDT  To: Mo Mi MD    Patient had severe leukopenia on Monday (WBC 0.59)  He is receiving first dose of filgrastim today (had to wait for insurance approval).  Cbc repeated this morning waiting on result.  CMV was negative. Patient's mom called, pt started with a sore throat this morning and fatigue but does not report any other symptoms.  Your initial instruction was to admit with any symptoms.  Do you still want to admit him?

## 2022-05-23 ENCOUNTER — LAB VISIT (OUTPATIENT)
Dept: FAMILY MEDICINE | Facility: CLINIC | Age: 22
End: 2022-05-23
Payer: MEDICARE

## 2022-05-23 ENCOUNTER — CLINICAL SUPPORT (OUTPATIENT)
Dept: FAMILY MEDICINE | Facility: CLINIC | Age: 22
End: 2022-05-23
Payer: MEDICARE

## 2022-05-23 DIAGNOSIS — Z94.0 KIDNEY REPLACED BY TRANSPLANT: ICD-10-CM

## 2022-05-23 PROCEDURE — 80069 RENAL FUNCTION PANEL: CPT | Performed by: INTERNAL MEDICINE

## 2022-05-23 PROCEDURE — 80197 ASSAY OF TACROLIMUS: CPT | Performed by: INTERNAL MEDICINE

## 2022-05-23 PROCEDURE — 85060 BLOOD SMEAR INTERPRETATION: CPT | Mod: ,,, | Performed by: PATHOLOGY

## 2022-05-23 PROCEDURE — 36415 COLL VENOUS BLD VENIPUNCTURE: CPT | Performed by: INTERNAL MEDICINE

## 2022-05-23 PROCEDURE — 84075 ASSAY ALKALINE PHOSPHATASE: CPT | Performed by: INTERNAL MEDICINE

## 2022-05-23 PROCEDURE — 85027 COMPLETE CBC AUTOMATED: CPT | Performed by: INTERNAL MEDICINE

## 2022-05-23 PROCEDURE — 83735 ASSAY OF MAGNESIUM: CPT | Performed by: INTERNAL MEDICINE

## 2022-05-23 PROCEDURE — 85007 BL SMEAR W/DIFF WBC COUNT: CPT | Performed by: INTERNAL MEDICINE

## 2022-05-23 PROCEDURE — 36415 COLL VENOUS BLD VENIPUNCTURE: CPT | Mod: S$GLB,,, | Performed by: INTERNAL MEDICINE

## 2022-05-23 PROCEDURE — 81001 URINALYSIS AUTO W/SCOPE: CPT | Performed by: INTERNAL MEDICINE

## 2022-05-23 PROCEDURE — 85060 PATHOLOGIST REVIEW: ICD-10-PCS | Mod: ,,, | Performed by: PATHOLOGY

## 2022-05-23 PROCEDURE — 82570 ASSAY OF URINE CREATININE: CPT | Performed by: INTERNAL MEDICINE

## 2022-05-23 PROCEDURE — 36415 PR COLLECTION VENOUS BLOOD,VENIPUNCTURE: ICD-10-PCS | Mod: S$GLB,,, | Performed by: INTERNAL MEDICINE

## 2022-05-23 NOTE — PROGRESS NOTES
Plan to repeat cbc with diff twice a week, when did he get last dose of neupogen?  When  is he having labs again  Will likely need at least other three doses per protocol  Any signs of infection . Educate patient about signs of infections. Of he does needs to come in  Neutropenic precautions

## 2022-05-23 NOTE — PROGRESS NOTES
Venipuncture performed with 21 gauge butterfly, x's 1 attempt,  to R Cephalic vein.  Specimens collected per orders.      Pressure dressing applied to site, instructed patient to remove dressing in 10-15 minutes, OK to re-adjust dressing if pressure causing any discomfort, to observe closely for numbness and/or discoloration to hand or fingers, and to notify provider if bleeding persists after applying constant pressure lasting 30 minutes.

## 2022-05-24 ENCOUNTER — TELEPHONE (OUTPATIENT)
Dept: FAMILY MEDICINE | Facility: CLINIC | Age: 22
End: 2022-05-24

## 2022-05-24 ENCOUNTER — TELEPHONE (OUTPATIENT)
Dept: TRANSPLANT | Facility: CLINIC | Age: 22
End: 2022-05-24
Payer: MEDICARE

## 2022-05-24 ENCOUNTER — CLINICAL SUPPORT (OUTPATIENT)
Dept: FAMILY MEDICINE | Facility: CLINIC | Age: 22
End: 2022-05-24
Payer: MEDICARE

## 2022-05-24 ENCOUNTER — SPECIALTY PHARMACY (OUTPATIENT)
Dept: PHARMACY | Facility: CLINIC | Age: 22
End: 2022-05-24
Payer: MEDICARE

## 2022-05-24 DIAGNOSIS — Z94.0 KIDNEY REPLACED BY TRANSPLANT: Primary | ICD-10-CM

## 2022-05-24 DIAGNOSIS — R82.998 OTHER ABNORMAL FINDINGS IN URINE: ICD-10-CM

## 2022-05-24 DIAGNOSIS — Z94.0 KIDNEY REPLACED BY TRANSPLANT: ICD-10-CM

## 2022-05-24 LAB
ALBUMIN SERPL BCP-MCNC: 3.5 G/DL (ref 3.5–5.2)
ALBUMIN SERPL BCP-MCNC: 3.5 G/DL (ref 3.5–5.2)
ALP SERPL-CCNC: 59 U/L (ref 55–135)
ALT SERPL W/O P-5'-P-CCNC: 9 U/L (ref 10–44)
ANION GAP SERPL CALC-SCNC: 12 MMOL/L (ref 8–16)
ANISOCYTOSIS BLD QL SMEAR: SLIGHT
AST SERPL-CCNC: 13 U/L (ref 10–40)
BACTERIA #/AREA URNS AUTO: ABNORMAL /HPF
BASOPHILS # BLD AUTO: ABNORMAL K/UL (ref 0–0.2)
BASOPHILS NFR BLD: 1 % (ref 0–1.9)
BILIRUB DIRECT SERPL-MCNC: 0.1 MG/DL (ref 0.1–0.3)
BILIRUB SERPL-MCNC: 0.3 MG/DL (ref 0.1–1)
BILIRUB UR QL STRIP: NEGATIVE
BUN SERPL-MCNC: 15 MG/DL (ref 6–20)
BURR CELLS BLD QL SMEAR: ABNORMAL
CALCIUM SERPL-MCNC: 9.3 MG/DL (ref 8.7–10.5)
CAOX CRY UR QL COMP ASSIST: ABNORMAL
CHLORIDE SERPL-SCNC: 109 MMOL/L (ref 95–110)
CLARITY UR REFRACT.AUTO: ABNORMAL
CO2 SERPL-SCNC: 22 MMOL/L (ref 23–29)
COLOR UR AUTO: YELLOW
CREAT SERPL-MCNC: 1.6 MG/DL (ref 0.5–1.4)
CREAT UR-MCNC: 440 MG/DL (ref 23–375)
DACRYOCYTES BLD QL SMEAR: ABNORMAL
DIFFERENTIAL METHOD: ABNORMAL
EOSINOPHIL # BLD AUTO: ABNORMAL K/UL (ref 0–0.5)
EOSINOPHIL NFR BLD: 3 % (ref 0–8)
ERYTHROCYTE [DISTWIDTH] IN BLOOD BY AUTOMATED COUNT: 13.2 % (ref 11.5–14.5)
EST. GFR  (AFRICAN AMERICAN): >60 ML/MIN/1.73 M^2
EST. GFR  (NON AFRICAN AMERICAN): 59.8 ML/MIN/1.73 M^2
GIANT PLATELETS BLD QL SMEAR: PRESENT
GLUCOSE SERPL-MCNC: 90 MG/DL (ref 70–110)
GLUCOSE UR QL STRIP: NEGATIVE
HCT VFR BLD AUTO: 37.6 % (ref 40–54)
HGB BLD-MCNC: 11.2 G/DL (ref 14–18)
HGB UR QL STRIP: NEGATIVE
HYALINE CASTS UR QL AUTO: 8 /LPF
HYPOCHROMIA BLD QL SMEAR: ABNORMAL
IMM GRANULOCYTES # BLD AUTO: ABNORMAL K/UL (ref 0–0.04)
IMM GRANULOCYTES NFR BLD AUTO: ABNORMAL % (ref 0–0.5)
KETONES UR QL STRIP: ABNORMAL
LEUKOCYTE ESTERASE UR QL STRIP: ABNORMAL
LYMPHOCYTES # BLD AUTO: ABNORMAL K/UL (ref 1–4.8)
LYMPHOCYTES NFR BLD: 45 % (ref 18–48)
MAGNESIUM SERPL-MCNC: 1.5 MG/DL (ref 1.6–2.6)
MCH RBC QN AUTO: 28.2 PG (ref 27–31)
MCHC RBC AUTO-ENTMCNC: 29.8 G/DL (ref 32–36)
MCV RBC AUTO: 95 FL (ref 82–98)
METAMYELOCYTES NFR BLD MANUAL: 4 %
MICROSCOPIC COMMENT: ABNORMAL
MONOCYTES # BLD AUTO: ABNORMAL K/UL (ref 0.3–1)
MONOCYTES NFR BLD: 14 % (ref 4–15)
MYELOCYTES NFR BLD MANUAL: 4 %
NEUTROPHILS NFR BLD: 18 % (ref 38–73)
NEUTS BAND NFR BLD MANUAL: 9 %
NITRITE UR QL STRIP: NEGATIVE
NRBC BLD-RTO: 0 /100 WBC
OVALOCYTES BLD QL SMEAR: ABNORMAL
PATH REV BLD -IMP: NORMAL
PH UR STRIP: 5 [PH] (ref 5–8)
PHOSPHATE SERPL-MCNC: 2.6 MG/DL (ref 2.7–4.5)
PLATELET # BLD AUTO: 181 K/UL (ref 150–450)
PLATELET BLD QL SMEAR: ABNORMAL
PMV BLD AUTO: 12.7 FL (ref 9.2–12.9)
POIKILOCYTOSIS BLD QL SMEAR: ABNORMAL
POLYCHROMASIA BLD QL SMEAR: ABNORMAL
POTASSIUM SERPL-SCNC: 4.4 MMOL/L (ref 3.5–5.1)
PROT SERPL-MCNC: 6.5 G/DL (ref 6–8.4)
PROT UR QL STRIP: ABNORMAL
PROT UR-MCNC: 97 MG/DL (ref 0–15)
PROT/CREAT UR: 0.22 MG/G{CREAT} (ref 0–0.2)
RBC # BLD AUTO: 3.97 M/UL (ref 4.6–6.2)
RBC #/AREA URNS AUTO: 1 /HPF (ref 0–4)
SCHISTOCYTES BLD QL SMEAR: ABNORMAL
SCHISTOCYTES BLD QL SMEAR: PRESENT
SODIUM SERPL-SCNC: 143 MMOL/L (ref 136–145)
SP GR UR STRIP: 1.02 (ref 1–1.03)
TACROLIMUS BLD-MCNC: 10 NG/ML (ref 5–15)
URN SPEC COLLECT METH UR: ABNORMAL
WBC # BLD AUTO: 1.35 K/UL (ref 3.9–12.7)
WBC #/AREA URNS AUTO: 23 /HPF (ref 0–5)
WBC OTHER NFR BLD MANUAL: 2 %

## 2022-05-24 PROCEDURE — 87086 URINE CULTURE/COLONY COUNT: CPT | Performed by: INTERNAL MEDICINE

## 2022-05-24 NOTE — TELEPHONE ENCOUNTER
Spoke to patient's mother since patient is unavailable at the moment.  Asked Mrs. Saenz to have patient administer a dose of filgrastim today and one tomorrow.  When asked, Mrs. Saenz did not report any knowledge of the patient having any symptoms of a UTI.  Patient is usually pretty good at reporting any changes.  Informed Mrs. aSenz that we will order a urine culture if patient can go to the lab some time today for a urine collection.  Mrs. Saenz verbalized understanding and will speak to the patient.        ----- Message from Mo Mi MD sent at 5/24/2022  7:26 AM CDT -----  Lets get a urine culture due to neutropenia, Does he report any symptoms of UTI?

## 2022-05-24 NOTE — TELEPHONE ENCOUNTER
Good morning. We had one of your pt to drop a UA specimen off this morning but there's no order to attach  Please advise

## 2022-05-24 NOTE — TELEPHONE ENCOUNTER
----- Message from Mo Mi MD sent at 5/24/2022  7:20 AM CDT -----  Still neutropenic proceed with neupogen per protocol x 2 doses and repeat cbc with diff Thursday

## 2022-05-24 NOTE — TELEPHONE ENCOUNTER
Pt's mother stated doctor called her today stating to refill Nivestym. Specialty Pharmacy - Refill Coordination    Specialty Medication Orders Linked to Encounter    Flowsheet Row Most Recent Value   Medication #1 filgrastim-aafi 480 mcg/0.8 mL Syrg (Order#[id number], Rx#[id number])          Refill Questions - Documented Responses    Flowsheet Row Most Recent Value   Patient Availability and HIPAA Verification    Does patient want to proceed with activity? Yes   HIPAA/medical authority confirmed? Yes   Relationship to patient of person spoken to? Self          Current Outpatient Medications   Medication Sig    atovaquone (MEPRON) 750 mg/5 mL Susp Take 10 mLs (1,500 mg total) by mouth once daily. STOP 8/24/22    cinacalcet (SENSIPAR) 30 MG Tab Take 1 tablet (30 mg total) by mouth every evening.    famotidine (PEPCID) 20 MG tablet Take 1 tablet (20 mg total) by mouth every evening.    filgrastim-aafi 480 mcg/0.8 mL Syrg Inject 0.8 mLs (480 mcg total) into the skin every 24 hours as needed (as directed by the transplant clinic).    gabapentin (NEURONTIN) 300 MG capsule Take 1 capsule (300 mg total) by mouth 2 (two) times daily.    k phos di & mono-sod phos mono (K-PHOS-NEUTRAL) 250 mg Tab Take 2 tablets by mouth 3 (three) times daily.    nebivoloL (BYSTOLIC) 5 MG Tab Take 1 tablet (5 mg total) by mouth once daily.    NIFEdipine (PROCARDIA-XL) 30 MG (OSM) 24 hr tablet Take 1 tablet (30 mg total) by mouth 2 (two) times a day.    predniSONE (DELTASONE) 5 MG tablet Take by mouth daily: 20mg 3/1-3/31, 15mg 4/1-4/30, 10mg 5/1-5/31, 5mg 6/1/2022-    sodium zirconium cyclosilicate (LOKELMA) 10 gram packet Take 1 packet (10g) by mouth twice a day for 2 days, then once daily.  Mix entire contents of packet(s) into drinking glass containing 3 tablespoons of water; stir well and drink immediately. Add water and repeat until no powder remains to receive entire dose.    tacrolimus (PROGRAF) 1 MG Cap Take 3 capsules (3 mg  total) by mouth every 12 (twelve) hours.   Last reviewed on 5/19/2022 11:02 AM by Lelo Graham PharmLARS    Review of patient's allergies indicates:  No Known Allergies Last reviewed on  5/19/2022 11:00 AM by Lelo Graham      Tasks added this encounter   5/27/2022 - Refill Call (Auto Added)   Tasks due within next 3 months   No tasks due.     Robyn Granados, Patient Care Assistant  [facility]  [address] [place], [place] [address]  [facility] [address]  Phone: [phone]  Fax: [phone]

## 2022-05-25 ENCOUNTER — TELEPHONE (OUTPATIENT)
Dept: TRANSPLANT | Facility: CLINIC | Age: 22
End: 2022-05-25
Payer: MEDICARE

## 2022-05-25 DIAGNOSIS — D70.9 NEUTROPENIA, UNSPECIFIED TYPE: Primary | ICD-10-CM

## 2022-05-25 LAB
BACTERIA UR CULT: NO GROWTH
CMV DNA SPEC QL NAA+PROBE: NOT DETECTED
CYTOMEGALOVIRUS LOG (IU/ML): NOT DETECTED LOGIU/ML
CYTOMEGALOVIRUS PCR, QUANT: NOT DETECTED IU/ML

## 2022-05-25 NOTE — TELEPHONE ENCOUNTER
Returned call to Mrs. Saenz and informed her that the urine test has not resulted yet and may take a couple of days.  Will notify the patient when we receive the results.  Mrs. Saenz verbalized understanding.     ----- Message from Shon Briones sent at 5/25/2022 11:17 AM CDT -----  Regarding: lab results  Patient's mom, Darcie, calling to get lab results. Requesting a call back.        Call: [phone] (Mobile

## 2022-05-26 ENCOUNTER — PATIENT MESSAGE (OUTPATIENT)
Dept: TRANSPLANT | Facility: CLINIC | Age: 22
End: 2022-05-26
Payer: MEDICARE

## 2022-05-26 ENCOUNTER — LAB VISIT (OUTPATIENT)
Dept: FAMILY MEDICINE | Facility: CLINIC | Age: 22
End: 2022-05-26
Payer: MEDICARE

## 2022-05-26 DIAGNOSIS — D70.9 NEUTROPENIA, UNSPECIFIED TYPE: ICD-10-CM

## 2022-05-26 DIAGNOSIS — Z94.0 KIDNEY REPLACED BY TRANSPLANT: ICD-10-CM

## 2022-05-26 LAB
ANISOCYTOSIS BLD QL SMEAR: SLIGHT
BASOPHILS NFR BLD: 0 % (ref 0–1.9)
BURR CELLS BLD QL SMEAR: ABNORMAL
DACRYOCYTES BLD QL SMEAR: ABNORMAL
DIFFERENTIAL METHOD: ABNORMAL
EOSINOPHIL NFR BLD: 1 % (ref 0–8)
ERYTHROCYTE [DISTWIDTH] IN BLOOD BY AUTOMATED COUNT: 13.4 % (ref 11.5–14.5)
HCT VFR BLD AUTO: 36.2 % (ref 40–54)
HGB BLD-MCNC: 11.6 G/DL (ref 14–18)
HYPOCHROMIA BLD QL SMEAR: ABNORMAL
IMM GRANULOCYTES # BLD AUTO: ABNORMAL K/UL (ref 0–0.04)
IMM GRANULOCYTES NFR BLD AUTO: ABNORMAL % (ref 0–0.5)
LYMPHOCYTES NFR BLD: 10 % (ref 18–48)
MCH RBC QN AUTO: 28.4 PG (ref 27–31)
MCHC RBC AUTO-ENTMCNC: 32 G/DL (ref 32–36)
MCV RBC AUTO: 89 FL (ref 82–98)
METAMYELOCYTES NFR BLD MANUAL: 4 %
MONOCYTES NFR BLD: 14 % (ref 4–15)
MYELOCYTES NFR BLD MANUAL: 5 %
NEUTROPHILS NFR BLD: 56 % (ref 38–73)
NEUTS BAND NFR BLD MANUAL: 9 %
NRBC BLD-RTO: 0 /100 WBC
OVALOCYTES BLD QL SMEAR: ABNORMAL
PLATELET # BLD AUTO: 194 K/UL (ref 150–450)
PLATELET BLD QL SMEAR: ABNORMAL
PMV BLD AUTO: 11.6 FL (ref 9.2–12.9)
POIKILOCYTOSIS BLD QL SMEAR: SLIGHT
POLYCHROMASIA BLD QL SMEAR: ABNORMAL
PROMYELOCYTES NFR BLD MANUAL: 1 %
RBC # BLD AUTO: 4.08 M/UL (ref 4.6–6.2)
SCHISTOCYTES BLD QL SMEAR: ABNORMAL
SCHISTOCYTES BLD QL SMEAR: PRESENT
WBC # BLD AUTO: 6.02 K/UL (ref 3.9–12.7)

## 2022-05-26 PROCEDURE — 36415 COLL VENOUS BLD VENIPUNCTURE: CPT | Mod: S$GLB,,, | Performed by: INTERNAL MEDICINE

## 2022-05-26 PROCEDURE — 85027 COMPLETE CBC AUTOMATED: CPT | Performed by: INTERNAL MEDICINE

## 2022-05-26 PROCEDURE — 85007 BL SMEAR W/DIFF WBC COUNT: CPT | Performed by: INTERNAL MEDICINE

## 2022-05-26 PROCEDURE — 36415 PR COLLECTION VENOUS BLOOD,VENIPUNCTURE: ICD-10-PCS | Mod: S$GLB,,, | Performed by: INTERNAL MEDICINE

## 2022-05-26 NOTE — PROGRESS NOTES
Venipuncture performed with 21 gauge butterfly, x's 1 attempt.  Successful venipuncture to R Upper Arm vein.  Specimens collected per orders.      Pressure dressing applied to site, instructed patient to remove dressing in 10-15 minutes, OK to re-adjust dressing if pressure causing any discomfort, to observe closely for numbness and/or discoloration to hand or fingers, and to notify provider if bleeding persists after applying constant pressure lasting 30 minutes.

## 2022-05-27 ENCOUNTER — OFFICE VISIT (OUTPATIENT)
Dept: TRANSPLANT | Facility: CLINIC | Age: 22
End: 2022-05-27
Payer: MEDICARE

## 2022-05-27 ENCOUNTER — PATIENT MESSAGE (OUTPATIENT)
Dept: TRANSPLANT | Facility: CLINIC | Age: 22
End: 2022-05-27

## 2022-05-27 ENCOUNTER — TELEPHONE (OUTPATIENT)
Dept: TRANSPLANT | Facility: CLINIC | Age: 22
End: 2022-05-27
Payer: MEDICARE

## 2022-05-27 VITALS
DIASTOLIC BLOOD PRESSURE: 72 MMHG | OXYGEN SATURATION: 97 % | HEIGHT: 69 IN | WEIGHT: 187.81 LBS | SYSTOLIC BLOOD PRESSURE: 143 MMHG | BODY MASS INDEX: 27.82 KG/M2 | HEART RATE: 75 BPM | RESPIRATION RATE: 18 BRPM | TEMPERATURE: 97 F

## 2022-05-27 DIAGNOSIS — Z91.89 AT RISK FOR OPPORTUNISTIC INFECTIONS: ICD-10-CM

## 2022-05-27 DIAGNOSIS — Z29.89 PROPHYLACTIC IMMUNOTHERAPY: ICD-10-CM

## 2022-05-27 DIAGNOSIS — I15.1 HYPERTENSION SECONDARY TO OTHER RENAL DISORDERS: ICD-10-CM

## 2022-05-27 DIAGNOSIS — D70.9 NEUTROPENIA, UNSPECIFIED TYPE: ICD-10-CM

## 2022-05-27 DIAGNOSIS — Z94.0 S/P KIDNEY TRANSPLANT: Primary | ICD-10-CM

## 2022-05-27 DIAGNOSIS — N25.81 SECONDARY HYPERPARATHYROIDISM OF RENAL ORIGIN: Chronic | ICD-10-CM

## 2022-05-27 DIAGNOSIS — D84.821 IMMUNOSUPPRESSION DUE TO DRUG THERAPY: ICD-10-CM

## 2022-05-27 DIAGNOSIS — Z79.899 IMMUNOSUPPRESSION DUE TO DRUG THERAPY: ICD-10-CM

## 2022-05-27 PROBLEM — D69.6 THROMBOCYTOPENIA: Status: RESOLVED | Noted: 2021-03-26 | Resolved: 2022-05-27

## 2022-05-27 PROBLEM — D63.1 ANEMIA IN STAGE 2 CHRONIC KIDNEY DISEASE: Chronic | Status: ACTIVE | Noted: 2017-11-12

## 2022-05-27 PROBLEM — N18.2 ANEMIA IN STAGE 2 CHRONIC KIDNEY DISEASE: Chronic | Status: ACTIVE | Noted: 2017-11-12

## 2022-05-27 PROBLEM — N18.6 BENIGN HYPERTENSION WITH ESRD (END-STAGE RENAL DISEASE): Chronic | Status: RESOLVED | Noted: 2017-08-03 | Resolved: 2022-05-27

## 2022-05-27 PROBLEM — I12.0 BENIGN HYPERTENSION WITH ESRD (END-STAGE RENAL DISEASE): Chronic | Status: RESOLVED | Noted: 2017-08-03 | Resolved: 2022-05-27

## 2022-05-27 PROCEDURE — 99215 OFFICE O/P EST HI 40 MIN: CPT | Mod: PBBFAC | Performed by: NURSE PRACTITIONER

## 2022-05-27 PROCEDURE — 99999 PR PBB SHADOW E&M-EST. PATIENT-LVL V: CPT | Mod: PBBFAC,,, | Performed by: NURSE PRACTITIONER

## 2022-05-27 PROCEDURE — 99215 PR OFFICE/OUTPT VISIT, EST, LEVL V, 40-54 MIN: ICD-10-PCS | Mod: S$PBB,,, | Performed by: NURSE PRACTITIONER

## 2022-05-27 PROCEDURE — 99999 PR PBB SHADOW E&M-EST. PATIENT-LVL V: ICD-10-PCS | Mod: PBBFAC,,, | Performed by: NURSE PRACTITIONER

## 2022-05-27 PROCEDURE — 99215 OFFICE O/P EST HI 40 MIN: CPT | Mod: S$PBB,,, | Performed by: NURSE PRACTITIONER

## 2022-05-27 NOTE — TELEPHONE ENCOUNTER
Written order received from Dr. Mi.  Message sent to patient reviewing cbc results.  Instructed patient to stop the filgrastim injections and to store the rest properly.  We will continue to monitor cbc with the next lab scheduled for Monday 5/30.  Asked pt to contact us with any questions.         ----- Message from Mo Mi MD sent at 5/27/2022  7:19 AM CDT -----  D/c neupogen injections cbc with diff and renal function test next week

## 2022-05-27 NOTE — LETTER
May 27, 2022        Kathy Cruz  [address] [place] [address]   [address]  [facility] [address]  Phone: [phone]  Fax: [phone]             [facility]  [address]  [facility] [address]  Phone: [phone]   Patient: Tameka Saenz   MR Number: [id number]   YOB: 2000   Date of Visit: 5/27/2022       Dear Dr. Kathy Cruz    Thank you for referring Tameka Saenz to me for evaluation. Attached you will find relevant portions of my assessment and plan of care.    If you have questions, please do not hesitate to call me. I look forward to following Tameka Saenz along with you.    Sincerely,    Jenni Lazcano, NP    Enclosure    If you would like to receive this communication electronically, please contact [email or web address] or [phone] to request [employer] access.    Powered [employer] is a tool which provides read-only access to select patient information with whom you have a relationship. Its easy to use and provides real time access to review your patients record including encounter summaries, notes, results, and demographic information.    If you feel you have received this communication in error or would no longer like to receive these types of communications, please e-mail [email or web address]

## 2022-05-27 NOTE — PROGRESS NOTES
Kidney Post-Transplant Assessment    Referring Physician: Yury Sotelo  Current Nephrologist: Kathy Cruz    ORGAN: RIGHT KIDNEY  Donor Type: donation after brain death  PHS Increased Risk: no  Cold Ischemia: 424 mins  Induction Medications: thymoglobulin    Subjective:   CC:  Reassessment of renal allograft function and management of chronic immunosuppression.    HPI:  Mr. Saenz is a 21 y.o. 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 His most recent creatinine is 1.5. He takes mycophenolate mofetil, prednisone and tacrolimus for maintenance immunosuppression. His post transplant course has been complicated by neutropenia.    Transplant History:  -ESRD secondary to HTN  -on dialysis 12/2015 until DBD kidney transplant on 2/25/22 (Thymo induction, CIT 7 hr. 4 min., CPRA 54%, KDPI 4%, CMV D+,R-).  -history of hepatosplenomegaly with thrombocytopenia, given 1 unit platelets intra-op.   -re-admit 3/7 for hyperkalemia    -ureteral stent removed 3/14.   -Kidney biopsy 3/24/2022 (for sub optimal creatinine): diffuse acute tubular injury, no rejection.     Interval History    Neutropenia noted on 5/20/2022/CBC-- has received  neupogen injections  x5  CMV (-), 5/24 urine cx(-), MMF remains on hold  Lab Results   Component Value Date    WBC 6.02 05/26/2022     Intake-adequate amount water   UOP-no problems reported   Peripheral edema-none  Weight -stable  Appetite-good   Overall feels well. No health concerns today.   Denies chest pain, SOB, leg pain, abdominal pain or LUTs. Denies s/s infection.     BP Readings from Last 3 Encounters:   05/27/22 (!) 143/72   04/16/22 (!) 172/84   04/14/22 130/70     Lab /diagnostic results reviewed with patient today.  All questions answered.      Current Outpatient Medications   Medication Sig Dispense Refill    atovaquone (MEPRON) 750 mg/5 mL Susp Take 10 mLs (1,500 mg total) by mouth once daily. STOP  8/24/22 300 mL 5    cinacalcet (SENSIPAR) 30 MG Tab Take 1 tablet (30 mg total) by mouth every evening. 30 tablet 11    famotidine (PEPCID) 20 MG tablet Take 1 tablet (20 mg total) by mouth every evening. 30 tablet 1    gabapentin (NEURONTIN) 300 MG capsule Take 1 capsule (300 mg total) by mouth 2 (two) times daily. 60 capsule 2    k phos di & mono-sod phos mono (K-PHOS-NEUTRAL) 250 mg Tab Take 2 tablets by mouth 3 (three) times daily. 180 tablet 3    nebivoloL (BYSTOLIC) 5 MG Tab Take 1 tablet (5 mg total) by mouth once daily. 30 tablet 11    NIFEdipine (PROCARDIA-XL) 30 MG (OSM) 24 hr tablet Take 1 tablet (30 mg total) by mouth 2 (two) times a day. 60 tablet 11    predniSONE (DELTASONE) 5 MG tablet Take by mouth daily: 20mg 3/1-3/31, 15mg 4/1-4/30, 10mg 5/1-5/31, 5mg 6/1/2022- 120 tablet 11    sodium zirconium cyclosilicate (LOKELMA) 10 gram packet Take 1 packet (10g) by mouth twice a day for 2 days, then once daily.  Mix entire contents of packet(s) into drinking glass containing 3 tablespoons of water; stir well and drink immediately. Add water and repeat until no powder remains to receive entire dose. 30 packet 11    tacrolimus (PROGRAF) 1 MG Cap Take 3 capsules (3 mg total) by mouth every 12 (twelve) hours. 540 capsule 3    filgrastim-aafi 480 mcg/0.8 mL Syrg Inject 0.8 mLs (480 mcg total) into the skin every 24 hours as needed (as directed by the transplant clinic). (Patient not taking: Reported on 5/27/2022) 4 mL 1     No current facility-administered medications for this visit.       Past Medical History:   Diagnosis Date    Acne     Anemia of renal disease     Dialysis patient     peritoneal daily at night. followed by Dr. Sotelo    ESRD on dialysis since 12/16/15     Hepatosplenomegaly     Hypertension     Kidney disease     Seasonal allergies     Secondary hyperparathyroidism of renal origin     Thrombocytopenia, unspecified        Review of Systems   Constitutional: Negative for  "activity change, appetite change and fever.   HENT: Negative for congestion, mouth sores and sore throat.    Eyes: Negative for visual disturbance.   Respiratory: Negative for cough, chest tightness and shortness of breath.    Cardiovascular: Negative for chest pain, palpitations and leg swelling.   Gastrointestinal: Negative for abdominal distention, abdominal pain, constipation, diarrhea and nausea.   Genitourinary: Negative for difficulty urinating, frequency and hematuria.   Musculoskeletal: Negative for arthralgias and gait problem.   Skin: Negative for wound.   Allergic/Immunologic: Positive for immunocompromised state. Negative for environmental allergies and food allergies.   Neurological: Negative for dizziness, weakness and numbness.   Psychiatric/Behavioral: Negative for sleep disturbance. The patient is not nervous/anxious.        Objective:   Blood pressure (!) 143/72, pulse 75, temperature 97.3 °F (36.3 °C), temperature source Temporal, resp. rate 18, height 5' 9" (1.753 m), weight 85.2 kg (187 lb 13.3 oz), SpO2 97 %.body mass index is 27.74 kg/m².  Wt Readings from Last 3 Encounters:   05/27/22 85.2 kg (187 lb 13.3 oz)   04/15/22 82.8 kg (182 lb 8.7 oz)   04/14/22 87.5 kg (193 lb)     Temp Readings from Last 3 Encounters:   05/27/22 97.3 °F (36.3 °C) (Temporal)   04/16/22 99.6 °F (37.6 °C) (Oral)   04/14/22 98 °F (36.7 °C) (Oral)     BP Readings from Last 3 Encounters:   05/27/22 (!) 143/72   04/16/22 (!) 172/84   04/14/22 130/70     Pulse Readings from Last 3 Encounters:   05/27/22 75   04/16/22 107   04/14/22 80       Physical Exam  Vitals reviewed.   Constitutional:       Appearance: Normal appearance. He is well-developed.   HENT:      Head: Normocephalic.   Eyes:      Pupils: Pupils are equal, round, and reactive to light.   Cardiovascular:      Rate and Rhythm: Normal rate and regular rhythm.      Heart sounds: Normal heart sounds.   Pulmonary:      Effort: Pulmonary effort is normal.      " Breath sounds: Normal breath sounds.   Abdominal:      General: Bowel sounds are normal.      Palpations: Abdomen is soft.      Comments: No bruit noted over the allograft      Musculoskeletal:         General: Normal range of motion.        Arms:       Cervical back: Normal range of motion and neck supple.   Skin:     General: Skin is warm and dry.   Neurological:      Mental Status: He is alert and oriented to person, place, and time.      Motor: No abnormal muscle tone.   Psychiatric:         Behavior: Behavior normal.       Deferred due to AV visit  Labs:  Lab Results   Component Value Date    WBC 6.02 05/26/2022    HGB 11.6 (L) 05/26/2022    HCT 36.2 (L) 05/26/2022     05/23/2022    K 4.4 05/23/2022     05/23/2022    CO2 22 (L) 05/23/2022    BUN 15 05/23/2022    CREATININE 1.6 (H) 05/23/2022    EGFRNONAA 59.8 (A) 05/23/2022    CALCIUM 9.3 05/23/2022    PHOS 2.6 (L) 05/23/2022    MG 1.5 (L) 05/23/2022    ALBUMIN 3.5 05/23/2022    ALBUMIN 3.5 05/23/2022    AST 13 05/23/2022    ALT 9 (L) 05/23/2022    UTPCR 0.22 (H) 05/23/2022    .2 (H) 02/25/2022    TACROLIMUS 10.0 05/23/2022       Labs were reviewed with the patient    Assessment:     1. S/P kidney transplant    2. Immunosuppression due to drug therapy    3. Hypertension secondary to other renal disorders    4. Secondary hyperparathyroidism of renal origin    5. At risk for opportunistic infections    6. Prophylactic immunotherapy    7. Neutropenia, unspecified type        Plan:      His kidney biopsy showed ATI but over 1 month ago. Get an allosure now and monthly for three months  Please order DSA's      Neutropenia noted on 5/20/2022/CBC-- has received  neupogen injections  x5  CMV (-), 5/24 urine cx(-), MMF and Valcyte  remain on hold    OK to stop pepcid     continue labs as scheduled     Follow-up:   1. CKD stage: 3 stable      2. Immunosuppression: Prograf trough 10.0, which is therapeutic (target 8-10). Continue Prograf 3/3, MMF  On  hold leukopenia  and Prednisone taper. Will continue to monitor for drug toxicities    allosure 5/9/2022 0.12%  DSAs 5/9/2022              3. Allograft Function: stable. Continue good po hydration.      Lab Results   Component Value Date    CREATININE 1.6 (H) 05/23/2022 5/23/2022  POD 87   eGFR if African American >60 mL/min/1.73 m^2 >60.0       4. Hypertension management: advise low salt diet and home BP monitoring    bystolic 5 mg QD, Nifedipine 30 mg BID     5. Metabolic Bone Disease/Secondary Hyperparathyroidism:stable  Sensipar 30 mg QD  Lab Results   Component Value Date    .2 (H) 02/25/2022    CALCIUM 9.3 05/23/2022    PHOS 2.6 (L) 05/23/2022 5/23/2022  POD 87   Magnesium 1.6 - 2.6 mg/dL 1.5 (A)       6. Electrolytes:  Will monitor /guidelines  lokelma as prescribed  Lab Results   Component Value Date     05/23/2022    K 4.4 05/23/2022     05/23/2022    CO2 22 (L) 05/23/2022        7. Anemia:  neutropenia, will continue to monitor   Lab Results   Component Value Date    WBC 6.02 05/26/2022    HGB 11.6 (L) 05/26/2022    HCT 36.2 (L) 05/26/2022    MCV 89 05/26/2022     05/26/2022       8.  Cytopenias: no significant cytopenias will monitor as per our guidelines. Medicine list reviewed including potential causes of drug-induced cytopenias    9.Proteinuria: continue p/c ratio as per guidelines        5/23/2022  POD 87   Prot/Creat Ratio, Urine 0.00 - 0.20 0.22 (A)     10. BK virus infection screening:  will continue to monitor/ guidelines       5/23/2022  POD 87   Cytomegalovirus PCR, Quant <50 IU/mL Not Detected        4/27/2022  POD 61   BK Virus DNA, Blood Not detected Not detected       4/27/2022  POD 61   BK Virus DNA PCR, Quant, Blood <125 Copies/mL <125          11. Weight education: provided during the clinic visit   Body mass index is 27.74 kg/m².     12.Patient safety education regarding immunosuppression including prophylaxis posttransplant for CMV, PCP :  Education provided about vaccination and prevention of infections     PCP ppx: Mepron until 8/24/22  CMV ppx: Valcyte until 8/24/22 stopped on 5/17/2022 for neutropenia  Fungal ppx: Nystatin until 3/31/22       Follow-up:   Clinic: return to transplant clinic weekly for the first month after transplant; every 2 weeks during months 2-3; then at 6-, 9-, 12-, 18-, 24-, and 36- months post-transplant to reassess for complications from immunosuppression toxicity and monitor for rejection.  Annually thereafter.    Labs: since patient remains at high risk for rejection and drug-related complications that warrant close monitoring, labs will be ordered as follows: continue twice weekly CBC, renal panel, and drug level for first month; then same labs once weekly through 3rd month post-transplant.  Urine for UA and protein/creatinine ratio monthly.  Serum BK - PCR at 1-, 3-, 6-, 9-, 12-, 18-, 24-, 36-, 48-, and 60 months post-transplant.  Hepatic panel at 1-, 2-, 3-, 6-, 9-, 12-, 18-, 24-, and 36- months post-transplant.    Jenni Lazcano NP       Education:   Material provided to the patient.  Patient reminded to call with any health changes since these can be early signs of significant complications.  Also, I advised the patient to be sure any new medications or changes of old medications are discussed with either a pharmacist or physician knowledgeable with transplant to avoid rejection/drug toxicity related to significant drug interactions.    Patient advised that it is recommended that all transplanted patients, and their close contacts and household members receive Covid vaccination.

## 2022-05-30 ENCOUNTER — LAB VISIT (OUTPATIENT)
Dept: FAMILY MEDICINE | Facility: CLINIC | Age: 22
End: 2022-05-30
Payer: MEDICARE

## 2022-05-30 DIAGNOSIS — Z94.0 KIDNEY REPLACED BY TRANSPLANT: Primary | ICD-10-CM

## 2022-05-30 DIAGNOSIS — Z94.0 KIDNEY REPLACED BY TRANSPLANT: ICD-10-CM

## 2022-05-30 LAB
ALBUMIN SERPL BCP-MCNC: 3.6 G/DL (ref 3.5–5.2)
ANION GAP SERPL CALC-SCNC: 9 MMOL/L (ref 8–16)
BASOPHILS # BLD AUTO: ABNORMAL K/UL (ref 0–0.2)
BASOPHILS NFR BLD: 0 % (ref 0–1.9)
BUN SERPL-MCNC: 19 MG/DL (ref 6–20)
CALCIUM SERPL-MCNC: 9.5 MG/DL (ref 8.7–10.5)
CHLORIDE SERPL-SCNC: 109 MMOL/L (ref 95–110)
CO2 SERPL-SCNC: 26 MMOL/L (ref 23–29)
CREAT SERPL-MCNC: 1.9 MG/DL (ref 0.5–1.4)
DIFFERENTIAL METHOD: ABNORMAL
EOSINOPHIL # BLD AUTO: ABNORMAL K/UL (ref 0–0.5)
EOSINOPHIL NFR BLD: 0 % (ref 0–8)
ERYTHROCYTE [DISTWIDTH] IN BLOOD BY AUTOMATED COUNT: 14 % (ref 11.5–14.5)
EST. GFR  (AFRICAN AMERICAN): 56.2 ML/MIN/1.73 M^2
EST. GFR  (NON AFRICAN AMERICAN): 48.6 ML/MIN/1.73 M^2
GLUCOSE SERPL-MCNC: 92 MG/DL (ref 70–110)
HCT VFR BLD AUTO: 35.4 % (ref 40–54)
HGB BLD-MCNC: 10.7 G/DL (ref 14–18)
IMM GRANULOCYTES # BLD AUTO: ABNORMAL K/UL (ref 0–0.04)
IMM GRANULOCYTES NFR BLD AUTO: ABNORMAL % (ref 0–0.5)
LYMPHOCYTES # BLD AUTO: ABNORMAL K/UL (ref 1–4.8)
LYMPHOCYTES NFR BLD: 16 % (ref 18–48)
MCH RBC QN AUTO: 27.7 PG (ref 27–31)
MCHC RBC AUTO-ENTMCNC: 30.2 G/DL (ref 32–36)
MCV RBC AUTO: 92 FL (ref 82–98)
METAMYELOCYTES NFR BLD MANUAL: 7 %
MONOCYTES # BLD AUTO: ABNORMAL K/UL (ref 0.3–1)
MONOCYTES NFR BLD: 10 % (ref 4–15)
MYELOCYTES NFR BLD MANUAL: 1 %
NEUTROPHILS NFR BLD: 58 % (ref 38–73)
NEUTS BAND NFR BLD MANUAL: 8 %
NRBC BLD-RTO: 0 /100 WBC
PHOSPHATE SERPL-MCNC: 2.7 MG/DL (ref 2.7–4.5)
PLATELET # BLD AUTO: 210 K/UL (ref 150–450)
PLATELET BLD QL SMEAR: ABNORMAL
PMV BLD AUTO: 12.5 FL (ref 9.2–12.9)
POTASSIUM SERPL-SCNC: 4.1 MMOL/L (ref 3.5–5.1)
RBC # BLD AUTO: 3.86 M/UL (ref 4.6–6.2)
SODIUM SERPL-SCNC: 144 MMOL/L (ref 136–145)
WBC # BLD AUTO: 5.88 K/UL (ref 3.9–12.7)

## 2022-05-30 PROCEDURE — 80069 RENAL FUNCTION PANEL: CPT | Performed by: INTERNAL MEDICINE

## 2022-05-30 PROCEDURE — 85027 COMPLETE CBC AUTOMATED: CPT | Performed by: INTERNAL MEDICINE

## 2022-05-30 PROCEDURE — 36415 PR COLLECTION VENOUS BLOOD,VENIPUNCTURE: ICD-10-PCS | Mod: S$GLB,,, | Performed by: INTERNAL MEDICINE

## 2022-05-30 PROCEDURE — 85007 BL SMEAR W/DIFF WBC COUNT: CPT | Performed by: INTERNAL MEDICINE

## 2022-05-30 PROCEDURE — 36415 COLL VENOUS BLD VENIPUNCTURE: CPT | Mod: S$GLB,,, | Performed by: INTERNAL MEDICINE

## 2022-06-06 ENCOUNTER — LAB VISIT (OUTPATIENT)
Dept: FAMILY MEDICINE | Facility: CLINIC | Age: 22
End: 2022-06-06
Payer: MEDICARE

## 2022-06-06 DIAGNOSIS — Z94.0 KIDNEY REPLACED BY TRANSPLANT: ICD-10-CM

## 2022-06-06 LAB
BASOPHILS # BLD AUTO: 0.1 K/UL (ref 0–0.2)
BASOPHILS NFR BLD: 1.5 % (ref 0–1.9)
DIFFERENTIAL METHOD: ABNORMAL
EOSINOPHIL # BLD AUTO: 0.1 K/UL (ref 0–0.5)
EOSINOPHIL NFR BLD: 0.9 % (ref 0–8)
ERYTHROCYTE [DISTWIDTH] IN BLOOD BY AUTOMATED COUNT: 14.7 % (ref 11.5–14.5)
HCT VFR BLD AUTO: 39.9 % (ref 40–54)
HGB BLD-MCNC: 11.9 G/DL (ref 14–18)
IMM GRANULOCYTES # BLD AUTO: 0.32 K/UL (ref 0–0.04)
IMM GRANULOCYTES NFR BLD AUTO: 4.9 % (ref 0–0.5)
LYMPHOCYTES # BLD AUTO: 1 K/UL (ref 1–4.8)
LYMPHOCYTES NFR BLD: 15.6 % (ref 18–48)
MCH RBC QN AUTO: 28.6 PG (ref 27–31)
MCHC RBC AUTO-ENTMCNC: 29.8 G/DL (ref 32–36)
MCV RBC AUTO: 96 FL (ref 82–98)
MONOCYTES # BLD AUTO: 0.4 K/UL (ref 0.3–1)
MONOCYTES NFR BLD: 6.4 % (ref 4–15)
NEUTROPHILS # BLD AUTO: 4.6 K/UL (ref 1.8–7.7)
NEUTROPHILS NFR BLD: 70.7 % (ref 38–73)
NRBC BLD-RTO: 0 /100 WBC
PLATELET # BLD AUTO: 184 K/UL (ref 150–450)
PMV BLD AUTO: 12.1 FL (ref 9.2–12.9)
RBC # BLD AUTO: 4.16 M/UL (ref 4.6–6.2)
WBC # BLD AUTO: 6.53 K/UL (ref 3.9–12.7)

## 2022-06-06 PROCEDURE — 86833 HLA CLASS II HIGH DEFIN QUAL: CPT | Mod: PO | Performed by: INTERNAL MEDICINE

## 2022-06-06 PROCEDURE — 36415 PR COLLECTION VENOUS BLOOD,VENIPUNCTURE: ICD-10-PCS | Mod: S$GLB,,, | Performed by: INTERNAL MEDICINE

## 2022-06-06 PROCEDURE — 36415 COLL VENOUS BLD VENIPUNCTURE: CPT | Mod: S$GLB,,, | Performed by: INTERNAL MEDICINE

## 2022-06-06 PROCEDURE — 86977 RBC SERUM PRETX INCUBJ/INHIB: CPT | Mod: PO | Performed by: INTERNAL MEDICINE

## 2022-06-06 PROCEDURE — 86832 HLA CLASS I HIGH DEFIN QUAL: CPT | Mod: PO | Performed by: INTERNAL MEDICINE

## 2022-06-06 PROCEDURE — 85025 COMPLETE CBC W/AUTO DIFF WBC: CPT | Performed by: INTERNAL MEDICINE

## 2022-06-07 ENCOUNTER — PATIENT MESSAGE (OUTPATIENT)
Dept: TRANSPLANT | Facility: CLINIC | Age: 22
End: 2022-06-07
Payer: MEDICARE

## 2022-06-07 DIAGNOSIS — Z29.89 PROPHYLACTIC IMMUNOTHERAPY: ICD-10-CM

## 2022-06-07 DIAGNOSIS — Z94.0 KIDNEY REPLACED BY TRANSPLANT: Primary | ICD-10-CM

## 2022-06-07 LAB
CLASS I ANTIBODY COMMENTS - LUMINEX: NORMAL
CLASS II ANTIBODY COMMENTS - LUMINEX: NORMAL
DSA1 TESTING DATE: NORMAL
DSA12 TESTING DATE: NORMAL
DSA2 TESTING DATE: NORMAL
SERUM COLLECTION DT - LUMINEX CLASS I: NORMAL
SERUM COLLECTION DT - LUMINEX CLASS II: NORMAL

## 2022-06-07 RX ORDER — MYCOPHENOLATE MOFETIL 250 MG/1
500 CAPSULE ORAL 2 TIMES DAILY
Qty: 120 CAPSULE | Refills: 11 | Status: SHIPPED | OUTPATIENT
Start: 2022-06-07 | End: 2023-01-18

## 2022-06-07 NOTE — TELEPHONE ENCOUNTER
Received written order from Dr. Mi. Message sent to patient instructing him to restart Cellcept 500 mg twice a day starting today.  Continue to hold Valcyte for now and weekly labs.  Patient responded stating understanding.       ----- Message from Mo Mi MD sent at 6/7/2022  8:42 AM CDT -----  Start  bid and CMV pcr now and weekly, if he tolerates MMF we can start valcyte in 1 to 2 weeks   ----- Message -----  From: Nicole KEEN Do, RN  Sent: 6/7/2022   7:59 AM CDT  To: Mo Mi MD    MMF and Valcyte still on hold for severe leukopenia.  Prior dose was MMF 1000 mg BID.  When can he restart MMF?  You wanted to keep him off Valcyte and check CMV pcr weekly until end of prophy period.

## 2022-06-13 ENCOUNTER — LAB VISIT (OUTPATIENT)
Dept: FAMILY MEDICINE | Facility: CLINIC | Age: 22
End: 2022-06-13
Payer: MEDICARE

## 2022-06-13 DIAGNOSIS — Z94.0 KIDNEY REPLACED BY TRANSPLANT: ICD-10-CM

## 2022-06-13 LAB
BASOPHILS # BLD AUTO: 0.04 K/UL (ref 0–0.2)
BASOPHILS NFR BLD: 0.7 % (ref 0–1.9)
DIFFERENTIAL METHOD: ABNORMAL
EOSINOPHIL # BLD AUTO: 0.1 K/UL (ref 0–0.5)
EOSINOPHIL NFR BLD: 2.3 % (ref 0–8)
ERYTHROCYTE [DISTWIDTH] IN BLOOD BY AUTOMATED COUNT: 14.6 % (ref 11.5–14.5)
HCT VFR BLD AUTO: 39.2 % (ref 40–54)
HGB BLD-MCNC: 11.9 G/DL (ref 14–18)
IMM GRANULOCYTES # BLD AUTO: 0.02 K/UL (ref 0–0.04)
IMM GRANULOCYTES NFR BLD AUTO: 0.3 % (ref 0–0.5)
LYMPHOCYTES # BLD AUTO: 0.9 K/UL (ref 1–4.8)
LYMPHOCYTES NFR BLD: 14.8 % (ref 18–48)
MCH RBC QN AUTO: 28.4 PG (ref 27–31)
MCHC RBC AUTO-ENTMCNC: 30.4 G/DL (ref 32–36)
MCV RBC AUTO: 94 FL (ref 82–98)
MONOCYTES # BLD AUTO: 0.5 K/UL (ref 0.3–1)
MONOCYTES NFR BLD: 9.2 % (ref 4–15)
NEUTROPHILS # BLD AUTO: 4.2 K/UL (ref 1.8–7.7)
NEUTROPHILS NFR BLD: 72.7 % (ref 38–73)
NRBC BLD-RTO: 0 /100 WBC
PLATELET # BLD AUTO: 183 K/UL (ref 150–450)
PMV BLD AUTO: 13.1 FL (ref 9.2–12.9)
RBC # BLD AUTO: 4.19 M/UL (ref 4.6–6.2)
WBC # BLD AUTO: 5.74 K/UL (ref 3.9–12.7)

## 2022-06-13 PROCEDURE — 36415 PR COLLECTION VENOUS BLOOD,VENIPUNCTURE: ICD-10-PCS | Mod: S$GLB,,, | Performed by: INTERNAL MEDICINE

## 2022-06-13 PROCEDURE — 36415 COLL VENOUS BLD VENIPUNCTURE: CPT | Mod: S$GLB,,, | Performed by: INTERNAL MEDICINE

## 2022-06-13 PROCEDURE — 85025 COMPLETE CBC W/AUTO DIFF WBC: CPT | Performed by: INTERNAL MEDICINE

## 2022-06-21 ENCOUNTER — PATIENT MESSAGE (OUTPATIENT)
Dept: TRANSPLANT | Facility: CLINIC | Age: 22
End: 2022-06-21
Payer: MEDICARE

## 2022-06-21 ENCOUNTER — LAB VISIT (OUTPATIENT)
Dept: FAMILY MEDICINE | Facility: CLINIC | Age: 22
End: 2022-06-21
Payer: MEDICARE

## 2022-06-21 DIAGNOSIS — Z94.0 KIDNEY REPLACED BY TRANSPLANT: ICD-10-CM

## 2022-06-21 PROCEDURE — 80069 RENAL FUNCTION PANEL: CPT | Performed by: INTERNAL MEDICINE

## 2022-06-21 PROCEDURE — 80197 ASSAY OF TACROLIMUS: CPT | Performed by: INTERNAL MEDICINE

## 2022-06-21 PROCEDURE — 36415 COLL VENOUS BLD VENIPUNCTURE: CPT | Mod: S$GLB,,, | Performed by: INTERNAL MEDICINE

## 2022-06-21 PROCEDURE — 36415 PR COLLECTION VENOUS BLOOD,VENIPUNCTURE: ICD-10-PCS | Mod: S$GLB,,, | Performed by: INTERNAL MEDICINE

## 2022-06-21 PROCEDURE — 83735 ASSAY OF MAGNESIUM: CPT | Performed by: INTERNAL MEDICINE

## 2022-06-21 PROCEDURE — 85025 COMPLETE CBC W/AUTO DIFF WBC: CPT | Performed by: INTERNAL MEDICINE

## 2022-06-21 NOTE — PROGRESS NOTES
Venipuncture performed with 21 gauge butterfly, x's 1 attempt,  to R Antecubital vein.  Specimens collected per orders.      Pressure dressing applied to site, instructed patient to remove dressing in 10-15 minutes, OK to re-adjust dressing if pressure causing any discomfort, to observe closely for numbness and/or discoloration to hand or fingers, and to notify provider if bleeding persists after applying constant pressure lasting 30 minutes.

## 2022-06-22 ENCOUNTER — PATIENT MESSAGE (OUTPATIENT)
Dept: TRANSPLANT | Facility: CLINIC | Age: 22
End: 2022-06-22
Payer: MEDICARE

## 2022-06-22 DIAGNOSIS — D70.2 OTHER DRUG-INDUCED NEUTROPENIA: ICD-10-CM

## 2022-06-22 LAB
ALBUMIN SERPL BCP-MCNC: 4.2 G/DL (ref 3.5–5.2)
ANION GAP SERPL CALC-SCNC: 9 MMOL/L (ref 8–16)
BASOPHILS # BLD AUTO: 0.03 K/UL (ref 0–0.2)
BASOPHILS NFR BLD: 0.5 % (ref 0–1.9)
BUN SERPL-MCNC: 22 MG/DL (ref 6–20)
CALCIUM SERPL-MCNC: 9.6 MG/DL (ref 8.7–10.5)
CHLORIDE SERPL-SCNC: 109 MMOL/L (ref 95–110)
CMV DNA SPEC QL NAA+PROBE: NOT DETECTED
CO2 SERPL-SCNC: 24 MMOL/L (ref 23–29)
CREAT SERPL-MCNC: 2 MG/DL (ref 0.5–1.4)
CYTOMEGALOVIRUS LOG (IU/ML): NOT DETECTED LOGIU/ML
CYTOMEGALOVIRUS PCR, QUANT: NOT DETECTED IU/ML
DIFFERENTIAL METHOD: ABNORMAL
EOSINOPHIL # BLD AUTO: 0.1 K/UL (ref 0–0.5)
EOSINOPHIL NFR BLD: 1.3 % (ref 0–8)
ERYTHROCYTE [DISTWIDTH] IN BLOOD BY AUTOMATED COUNT: 15 % (ref 11.5–14.5)
EST. GFR  (AFRICAN AMERICAN): 52.8 ML/MIN/1.73 M^2
EST. GFR  (NON AFRICAN AMERICAN): 45.7 ML/MIN/1.73 M^2
GLUCOSE SERPL-MCNC: 116 MG/DL (ref 70–110)
HCT VFR BLD AUTO: 43 % (ref 40–54)
HGB BLD-MCNC: 12.8 G/DL (ref 14–18)
IMM GRANULOCYTES # BLD AUTO: 0.01 K/UL (ref 0–0.04)
IMM GRANULOCYTES NFR BLD AUTO: 0.2 % (ref 0–0.5)
LYMPHOCYTES # BLD AUTO: 0.7 K/UL (ref 1–4.8)
LYMPHOCYTES NFR BLD: 11.4 % (ref 18–48)
MAGNESIUM SERPL-MCNC: 1.7 MG/DL (ref 1.6–2.6)
MCH RBC QN AUTO: 28.8 PG (ref 27–31)
MCHC RBC AUTO-ENTMCNC: 29.8 G/DL (ref 32–36)
MCV RBC AUTO: 97 FL (ref 82–98)
MONOCYTES # BLD AUTO: 0.5 K/UL (ref 0.3–1)
MONOCYTES NFR BLD: 7.2 % (ref 4–15)
NEUTROPHILS # BLD AUTO: 5 K/UL (ref 1.8–7.7)
NEUTROPHILS NFR BLD: 79.4 % (ref 38–73)
NRBC BLD-RTO: 0 /100 WBC
PHOSPHATE SERPL-MCNC: 2.8 MG/DL (ref 2.7–4.5)
PLATELET # BLD AUTO: 143 K/UL (ref 150–450)
PMV BLD AUTO: 12.1 FL (ref 9.2–12.9)
POTASSIUM SERPL-SCNC: 4.5 MMOL/L (ref 3.5–5.1)
RBC # BLD AUTO: 4.44 M/UL (ref 4.6–6.2)
SODIUM SERPL-SCNC: 142 MMOL/L (ref 136–145)
TACROLIMUS BLD-MCNC: 7.8 NG/ML (ref 5–15)
WBC # BLD AUTO: 6.29 K/UL (ref 3.9–12.7)

## 2022-06-22 RX ORDER — FILGRASTIM-AAFI 480 UG/.8ML
480 INJECTION, SOLUTION SUBCUTANEOUS
Qty: 4 ML | Refills: 1 | Status: CANCELLED | OUTPATIENT
Start: 2022-06-22

## 2022-06-27 ENCOUNTER — SPECIALTY PHARMACY (OUTPATIENT)
Dept: PHARMACY | Facility: CLINIC | Age: 22
End: 2022-06-27
Payer: MEDICARE

## 2022-06-27 DIAGNOSIS — Z94.0 KIDNEY REPLACED BY TRANSPLANT: ICD-10-CM

## 2022-06-27 DIAGNOSIS — Z91.89 AT RISK FOR OPPORTUNISTIC INFECTIONS: Primary | ICD-10-CM

## 2022-06-27 RX ORDER — VALGANCICLOVIR 450 MG/1
900 TABLET, FILM COATED ORAL DAILY
Qty: 60 TABLET | Refills: 2 | Status: SHIPPED | OUTPATIENT
Start: 2022-06-27 | End: 2022-09-02

## 2022-06-27 NOTE — TELEPHONE ENCOUNTER
Patient believes that he is no longer going to need Nivestym. Messaged provider to confirm that therapy was discontinued.  Routing to [facility].

## 2022-06-27 NOTE — TELEPHONE ENCOUNTER
Had a nice conversation with patient explaining the effects of the medications that he is taking at the moment, CMV and to avoid pregnancy induction at this time.  Patient verbalized understanding.      Instructed patient to restart Valcyte 900 mg daily with a tentative end date of 8/25/22.  Patient states will restart today.        ----- Message from Ana Lilia Piedra PharmD sent at 6/24/2022  4:16 PM CDT -----  900 mg daily  ----- Message -----  From: Nicole KEEN Do, RN  Sent: 6/24/2022   2:13 PM CDT  To: Nixon SotoD    Ana Lilia, should he resume at 900 mg or 450 mg daily?  ----- Message -----  From: Mo Mi MD  Sent: 6/22/2022  11:53 AM CDT  To: Ana Lilia Piedra PharmD, Nicole KEEN Do, RN    Agree with Ana Lilia    ----- Message -----  From: Ana Lilia Piedra PharmD  Sent: 6/22/2022   9:35 AM CDT  To: Mo Mi MD, Nicole KEEN Do, RN    This is a bit more complicated than it appears.  He is a CMV mismatch and should be on valganciclovir prophylaxis until 8/24/2022.  It is currently on hold due to leukopenia, however, when possible, he should resume it.   Males should avoid inducing pregnancy while on valganciclovir and for 90 days following therapy.    Ideally, he should wait until at least 11/24/2022. Should he become CMV positive that would be problematic, as well.  As a CMV mismatch he is at risk, as well as, at risk for late onset CMV.       ----- Message -----  From: Nicole KEEN Do, RN  Sent: 6/22/2022   7:53 AM CDT  To: Mo Mi MD, #    Patient and his fiance would like to start trying for baby.  Please let me know what changes we need to make to his meds.    Su

## 2022-06-28 ENCOUNTER — LAB VISIT (OUTPATIENT)
Dept: FAMILY MEDICINE | Facility: CLINIC | Age: 22
End: 2022-06-28
Payer: MEDICARE

## 2022-06-28 DIAGNOSIS — Z94.0 KIDNEY REPLACED BY TRANSPLANT: ICD-10-CM

## 2022-06-28 LAB
ALBUMIN SERPL BCP-MCNC: 4.1 G/DL (ref 3.5–5.2)
ANION GAP SERPL CALC-SCNC: 6 MMOL/L (ref 8–16)
BASOPHILS # BLD AUTO: 0.03 K/UL (ref 0–0.2)
BASOPHILS NFR BLD: 0.8 % (ref 0–1.9)
BUN SERPL-MCNC: 21 MG/DL (ref 6–20)
CALCIUM SERPL-MCNC: 9.2 MG/DL (ref 8.7–10.5)
CHLORIDE SERPL-SCNC: 108 MMOL/L (ref 95–110)
CO2 SERPL-SCNC: 25 MMOL/L (ref 23–29)
CREAT SERPL-MCNC: 2.1 MG/DL (ref 0.5–1.4)
DIFFERENTIAL METHOD: ABNORMAL
EOSINOPHIL # BLD AUTO: 0 K/UL (ref 0–0.5)
EOSINOPHIL NFR BLD: 0.8 % (ref 0–8)
ERYTHROCYTE [DISTWIDTH] IN BLOOD BY AUTOMATED COUNT: 14.2 % (ref 11.5–14.5)
EST. GFR  (AFRICAN AMERICAN): 49.8 ML/MIN/1.73 M^2
EST. GFR  (NON AFRICAN AMERICAN): 43.1 ML/MIN/1.73 M^2
GLUCOSE SERPL-MCNC: 78 MG/DL (ref 70–110)
HCT VFR BLD AUTO: 40.1 % (ref 40–54)
HGB BLD-MCNC: 12.6 G/DL (ref 14–18)
IMM GRANULOCYTES # BLD AUTO: 0.01 K/UL (ref 0–0.04)
IMM GRANULOCYTES NFR BLD AUTO: 0.3 % (ref 0–0.5)
LYMPHOCYTES # BLD AUTO: 0.4 K/UL (ref 1–4.8)
LYMPHOCYTES NFR BLD: 11.2 % (ref 18–48)
MCH RBC QN AUTO: 28.5 PG (ref 27–31)
MCHC RBC AUTO-ENTMCNC: 31.4 G/DL (ref 32–36)
MCV RBC AUTO: 91 FL (ref 82–98)
MONOCYTES # BLD AUTO: 0.4 K/UL (ref 0.3–1)
MONOCYTES NFR BLD: 9.1 % (ref 4–15)
NEUTROPHILS # BLD AUTO: 3 K/UL (ref 1.8–7.7)
NEUTROPHILS NFR BLD: 77.8 % (ref 38–73)
NRBC BLD-RTO: 0 /100 WBC
PHOSPHATE SERPL-MCNC: 2.2 MG/DL (ref 2.7–4.5)
PLATELET # BLD AUTO: 132 K/UL (ref 150–450)
PMV BLD AUTO: 12 FL (ref 9.2–12.9)
POTASSIUM SERPL-SCNC: 4.4 MMOL/L (ref 3.5–5.1)
RBC # BLD AUTO: 4.42 M/UL (ref 4.6–6.2)
SODIUM SERPL-SCNC: 139 MMOL/L (ref 136–145)
WBC # BLD AUTO: 3.83 K/UL (ref 3.9–12.7)

## 2022-06-28 PROCEDURE — 80069 RENAL FUNCTION PANEL: CPT | Performed by: INTERNAL MEDICINE

## 2022-06-28 PROCEDURE — 36415 COLL VENOUS BLD VENIPUNCTURE: CPT | Mod: S$GLB,,, | Performed by: INTERNAL MEDICINE

## 2022-06-28 PROCEDURE — 85025 COMPLETE CBC W/AUTO DIFF WBC: CPT | Performed by: INTERNAL MEDICINE

## 2022-06-28 PROCEDURE — 36415 PR COLLECTION VENOUS BLOOD,VENIPUNCTURE: ICD-10-PCS | Mod: S$GLB,,, | Performed by: INTERNAL MEDICINE

## 2022-07-01 ENCOUNTER — TELEPHONE (OUTPATIENT)
Dept: TRANSPLANT | Facility: CLINIC | Age: 22
End: 2022-07-01
Payer: MEDICARE

## 2022-07-01 NOTE — TELEPHONE ENCOUNTER
----- Message from Sudha Ignacio sent at 7/1/2022  9:21 AM CDT -----  Regarding: Nivestym  Good morning!    We sent a refill request for the patient's Nivestym. If appropriate, would you be able to send over a new prescription for the patient's refill?     Thank you!  Sudha Ignacio  Pharmacy Intern  [facility]  [phone]

## 2022-07-01 NOTE — TELEPHONE ENCOUNTER
Nicole Lozoya RN replied that the patient restarted Valcyte, so he may need Nivestym soon. Sent a follow up message regarding a new prescription.

## 2022-07-05 ENCOUNTER — LAB VISIT (OUTPATIENT)
Dept: FAMILY MEDICINE | Facility: CLINIC | Age: 22
End: 2022-07-05
Payer: MEDICARE

## 2022-07-05 DIAGNOSIS — D70.2 OTHER DRUG-INDUCED NEUTROPENIA: ICD-10-CM

## 2022-07-05 DIAGNOSIS — Z94.0 KIDNEY REPLACED BY TRANSPLANT: ICD-10-CM

## 2022-07-05 PROCEDURE — 36415 PR COLLECTION VENOUS BLOOD,VENIPUNCTURE: ICD-10-PCS | Mod: S$GLB,,, | Performed by: INTERNAL MEDICINE

## 2022-07-05 PROCEDURE — 85025 COMPLETE CBC W/AUTO DIFF WBC: CPT | Performed by: INTERNAL MEDICINE

## 2022-07-05 PROCEDURE — 36415 COLL VENOUS BLD VENIPUNCTURE: CPT | Mod: S$GLB,,, | Performed by: INTERNAL MEDICINE

## 2022-07-05 RX ORDER — FILGRASTIM-AAFI 480 UG/.8ML
480 INJECTION, SOLUTION SUBCUTANEOUS
Qty: 4 ML | Refills: 1 | Status: CANCELLED | OUTPATIENT
Start: 2022-07-05

## 2022-07-05 NOTE — TELEPHONE ENCOUNTER
Was able to reach pt's mom, Darcie, who confirmed that patient is not in need of his Nivestym at this time. Based on labs, pt is doing very well. Will change patient to 'Dispense Only' and confirmed with mom that patient will call [facility] when he is in need of his next injection.

## 2022-07-06 DIAGNOSIS — E83.39 HYPOPHOSPHATEMIA: ICD-10-CM

## 2022-07-06 LAB
BASOPHILS # BLD AUTO: 0.02 K/UL (ref 0–0.2)
BASOPHILS NFR BLD: 0.5 % (ref 0–1.9)
DIFFERENTIAL METHOD: ABNORMAL
EOSINOPHIL # BLD AUTO: 0.1 K/UL (ref 0–0.5)
EOSINOPHIL NFR BLD: 1.5 % (ref 0–8)
ERYTHROCYTE [DISTWIDTH] IN BLOOD BY AUTOMATED COUNT: 14.6 % (ref 11.5–14.5)
HCT VFR BLD AUTO: 44.6 % (ref 40–54)
HGB BLD-MCNC: 13.6 G/DL (ref 14–18)
IMM GRANULOCYTES # BLD AUTO: 0.01 K/UL (ref 0–0.04)
IMM GRANULOCYTES NFR BLD AUTO: 0.2 % (ref 0–0.5)
LYMPHOCYTES # BLD AUTO: 0.6 K/UL (ref 1–4.8)
LYMPHOCYTES NFR BLD: 14.6 % (ref 18–48)
MCH RBC QN AUTO: 28.8 PG (ref 27–31)
MCHC RBC AUTO-ENTMCNC: 30.5 G/DL (ref 32–36)
MCV RBC AUTO: 95 FL (ref 82–98)
MONOCYTES # BLD AUTO: 0.4 K/UL (ref 0.3–1)
MONOCYTES NFR BLD: 9.2 % (ref 4–15)
NEUTROPHILS # BLD AUTO: 3 K/UL (ref 1.8–7.7)
NEUTROPHILS NFR BLD: 74 % (ref 38–73)
NRBC BLD-RTO: 0 /100 WBC
PLATELET # BLD AUTO: 168 K/UL (ref 150–450)
PMV BLD AUTO: 12.5 FL (ref 9.2–12.9)
RBC # BLD AUTO: 4.72 M/UL (ref 4.6–6.2)
WBC # BLD AUTO: 4.04 K/UL (ref 3.9–12.7)

## 2022-07-06 NOTE — TELEPHONE ENCOUNTER
KPN refill request received from pharmacy.    Lab reviewed - please advise patient to decrease KPN to 2 tabs BID and cont high phos diet. Rx already sent to pharmacy.

## 2022-07-07 ENCOUNTER — PATIENT MESSAGE (OUTPATIENT)
Dept: TRANSPLANT | Facility: CLINIC | Age: 22
End: 2022-07-07
Payer: MEDICARE

## 2022-07-11 ENCOUNTER — LAB VISIT (OUTPATIENT)
Dept: FAMILY MEDICINE | Facility: CLINIC | Age: 22
End: 2022-07-11
Payer: MEDICARE

## 2022-07-11 DIAGNOSIS — Z94.0 KIDNEY REPLACED BY TRANSPLANT: ICD-10-CM

## 2022-07-11 LAB
BASOPHILS # BLD AUTO: 0.01 K/UL (ref 0–0.2)
BASOPHILS NFR BLD: 0.3 % (ref 0–1.9)
DIFFERENTIAL METHOD: ABNORMAL
EOSINOPHIL # BLD AUTO: 0.1 K/UL (ref 0–0.5)
EOSINOPHIL NFR BLD: 3.8 % (ref 0–8)
ERYTHROCYTE [DISTWIDTH] IN BLOOD BY AUTOMATED COUNT: 14.4 % (ref 11.5–14.5)
HCT VFR BLD AUTO: 42.7 % (ref 40–54)
HGB BLD-MCNC: 13.3 G/DL (ref 14–18)
IMM GRANULOCYTES # BLD AUTO: 0.01 K/UL (ref 0–0.04)
IMM GRANULOCYTES NFR BLD AUTO: 0.3 % (ref 0–0.5)
LYMPHOCYTES # BLD AUTO: 0.4 K/UL (ref 1–4.8)
LYMPHOCYTES NFR BLD: 10.3 % (ref 18–48)
MCH RBC QN AUTO: 28.5 PG (ref 27–31)
MCHC RBC AUTO-ENTMCNC: 31.1 G/DL (ref 32–36)
MCV RBC AUTO: 91 FL (ref 82–98)
MONOCYTES # BLD AUTO: 0.5 K/UL (ref 0.3–1)
MONOCYTES NFR BLD: 12.7 % (ref 4–15)
NEUTROPHILS # BLD AUTO: 2.7 K/UL (ref 1.8–7.7)
NEUTROPHILS NFR BLD: 72.6 % (ref 38–73)
NRBC BLD-RTO: 0 /100 WBC
PLATELET # BLD AUTO: 157 K/UL (ref 150–450)
PMV BLD AUTO: 12 FL (ref 9.2–12.9)
RBC # BLD AUTO: 4.67 M/UL (ref 4.6–6.2)
WBC # BLD AUTO: 3.69 K/UL (ref 3.9–12.7)

## 2022-07-11 PROCEDURE — 36415 PR COLLECTION VENOUS BLOOD,VENIPUNCTURE: ICD-10-PCS | Mod: S$GLB,,, | Performed by: INTERNAL MEDICINE

## 2022-07-11 PROCEDURE — 85025 COMPLETE CBC W/AUTO DIFF WBC: CPT | Performed by: INTERNAL MEDICINE

## 2022-07-11 PROCEDURE — 36415 COLL VENOUS BLD VENIPUNCTURE: CPT | Mod: S$GLB,,, | Performed by: INTERNAL MEDICINE

## 2022-07-11 NOTE — PROGRESS NOTES
Venipuncture performed with 23 gauge butterfly, x's 1 attempt,  to R Antecubital vein.  Specimens collected per orders.      Pressure dressing applied to site, instructed patient to remove dressing in 10-15 minutes, OK to re-adjust dressing if pressure causing any discomfort, to observe closely for numbness and/or discoloration to hand or fingers, and to notify provider if bleeding persists after applying constant pressure lasting 30 minutes.

## 2022-07-14 ENCOUNTER — OCCUPATIONAL HEALTH (OUTPATIENT)
Dept: URGENT CARE | Facility: CLINIC | Age: 22
End: 2022-07-14

## 2022-07-14 PROCEDURE — 80305 DRUG TEST PRSMV DIR OPT OBS: CPT | Mod: S$GLB,,, | Performed by: EMERGENCY MEDICINE

## 2022-07-14 PROCEDURE — 80305 PR COLLECTION ONLY DRUG SCREEN: ICD-10-PCS | Mod: S$GLB,,, | Performed by: EMERGENCY MEDICINE

## 2022-07-18 ENCOUNTER — PATIENT MESSAGE (OUTPATIENT)
Dept: TRANSPLANT | Facility: CLINIC | Age: 22
End: 2022-07-18
Payer: MEDICARE

## 2022-07-20 ENCOUNTER — LAB VISIT (OUTPATIENT)
Dept: FAMILY MEDICINE | Facility: CLINIC | Age: 22
End: 2022-07-20
Payer: MEDICARE

## 2022-07-20 DIAGNOSIS — Z94.0 KIDNEY REPLACED BY TRANSPLANT: ICD-10-CM

## 2022-07-20 LAB
ALBUMIN SERPL BCP-MCNC: 4.2 G/DL (ref 3.5–5.2)
ANION GAP SERPL CALC-SCNC: 9 MMOL/L (ref 8–16)
BASOPHILS # BLD AUTO: 0.02 K/UL (ref 0–0.2)
BASOPHILS NFR BLD: 0.5 % (ref 0–1.9)
BUN SERPL-MCNC: 34 MG/DL (ref 6–20)
CALCIUM SERPL-MCNC: 9.9 MG/DL (ref 8.7–10.5)
CHLORIDE SERPL-SCNC: 107 MMOL/L (ref 95–110)
CO2 SERPL-SCNC: 23 MMOL/L (ref 23–29)
CREAT SERPL-MCNC: 2.1 MG/DL (ref 0.5–1.4)
DIFFERENTIAL METHOD: ABNORMAL
EOSINOPHIL # BLD AUTO: 0.2 K/UL (ref 0–0.5)
EOSINOPHIL NFR BLD: 3.6 % (ref 0–8)
ERYTHROCYTE [DISTWIDTH] IN BLOOD BY AUTOMATED COUNT: 13.7 % (ref 11.5–14.5)
EST. GFR  (AFRICAN AMERICAN): 49.8 ML/MIN/1.73 M^2
EST. GFR  (NON AFRICAN AMERICAN): 43.1 ML/MIN/1.73 M^2
GLUCOSE SERPL-MCNC: 84 MG/DL (ref 70–110)
HCT VFR BLD AUTO: 43.3 % (ref 40–54)
HGB BLD-MCNC: 13.9 G/DL (ref 14–18)
IMM GRANULOCYTES # BLD AUTO: 0.01 K/UL (ref 0–0.04)
IMM GRANULOCYTES NFR BLD AUTO: 0.2 % (ref 0–0.5)
LYMPHOCYTES # BLD AUTO: 0.5 K/UL (ref 1–4.8)
LYMPHOCYTES NFR BLD: 12.1 % (ref 18–48)
MAGNESIUM SERPL-MCNC: 1.7 MG/DL (ref 1.6–2.6)
MCH RBC QN AUTO: 28.7 PG (ref 27–31)
MCHC RBC AUTO-ENTMCNC: 32.1 G/DL (ref 32–36)
MCV RBC AUTO: 90 FL (ref 82–98)
MONOCYTES # BLD AUTO: 0.4 K/UL (ref 0.3–1)
MONOCYTES NFR BLD: 8.9 % (ref 4–15)
NEUTROPHILS # BLD AUTO: 3.3 K/UL (ref 1.8–7.7)
NEUTROPHILS NFR BLD: 74.7 % (ref 38–73)
NRBC BLD-RTO: 0 /100 WBC
PHOSPHATE SERPL-MCNC: 3.8 MG/DL (ref 2.7–4.5)
PLATELET # BLD AUTO: 183 K/UL (ref 150–450)
PMV BLD AUTO: 12.2 FL (ref 9.2–12.9)
POTASSIUM SERPL-SCNC: 4.8 MMOL/L (ref 3.5–5.1)
RBC # BLD AUTO: 4.84 M/UL (ref 4.6–6.2)
SODIUM SERPL-SCNC: 139 MMOL/L (ref 136–145)
WBC # BLD AUTO: 4.39 K/UL (ref 3.9–12.7)

## 2022-07-20 PROCEDURE — 36415 PR COLLECTION VENOUS BLOOD,VENIPUNCTURE: ICD-10-PCS | Mod: S$GLB,,, | Performed by: INTERNAL MEDICINE

## 2022-07-20 PROCEDURE — 80069 RENAL FUNCTION PANEL: CPT | Performed by: INTERNAL MEDICINE

## 2022-07-20 PROCEDURE — 85025 COMPLETE CBC W/AUTO DIFF WBC: CPT | Performed by: INTERNAL MEDICINE

## 2022-07-20 PROCEDURE — 36415 COLL VENOUS BLD VENIPUNCTURE: CPT | Mod: S$GLB,,, | Performed by: INTERNAL MEDICINE

## 2022-07-20 PROCEDURE — 83735 ASSAY OF MAGNESIUM: CPT | Performed by: INTERNAL MEDICINE

## 2022-07-20 PROCEDURE — 80197 ASSAY OF TACROLIMUS: CPT | Performed by: INTERNAL MEDICINE

## 2022-07-21 DIAGNOSIS — Z94.0 KIDNEY REPLACED BY TRANSPLANT: ICD-10-CM

## 2022-07-21 DIAGNOSIS — Z94.0 KIDNEY REPLACED BY TRANSPLANT: Primary | ICD-10-CM

## 2022-07-21 LAB — TACROLIMUS BLD-MCNC: 11.3 NG/ML (ref 5–15)

## 2022-07-21 RX ORDER — TACROLIMUS 1 MG/1
CAPSULE ORAL
Qty: 150 CAPSULE | Refills: 11 | Status: SHIPPED | OUTPATIENT
Start: 2022-07-21 | End: 2022-10-03

## 2022-07-21 NOTE — TELEPHONE ENCOUNTER
Message sent to pt instructing him to change prograf dose to 3mg AM and 2 mg PM. Will repeat lab next week. Encouraged PO hydration.  Prescription sent to provider.  ----- Message from Robyn Piper MD sent at 7/21/2022  7:53 AM CDT -----  Tac to 3/2 mg if it is a true level. Check tac level in a week

## 2022-07-25 ENCOUNTER — LAB VISIT (OUTPATIENT)
Dept: FAMILY MEDICINE | Facility: CLINIC | Age: 22
End: 2022-07-25
Payer: MEDICARE

## 2022-07-25 DIAGNOSIS — Z94.0 KIDNEY REPLACED BY TRANSPLANT: ICD-10-CM

## 2022-07-25 LAB
ALBUMIN SERPL BCP-MCNC: 4.2 G/DL (ref 3.5–5.2)
ANION GAP SERPL CALC-SCNC: 10 MMOL/L (ref 8–16)
BASOPHILS # BLD AUTO: 0.03 K/UL (ref 0–0.2)
BASOPHILS NFR BLD: 0.6 % (ref 0–1.9)
BUN SERPL-MCNC: 32 MG/DL (ref 6–20)
CALCIUM SERPL-MCNC: 9.9 MG/DL (ref 8.7–10.5)
CHLORIDE SERPL-SCNC: 107 MMOL/L (ref 95–110)
CO2 SERPL-SCNC: 22 MMOL/L (ref 23–29)
CREAT SERPL-MCNC: 2 MG/DL (ref 0.5–1.4)
DIFFERENTIAL METHOD: ABNORMAL
EOSINOPHIL # BLD AUTO: 0.3 K/UL (ref 0–0.5)
EOSINOPHIL NFR BLD: 6.3 % (ref 0–8)
ERYTHROCYTE [DISTWIDTH] IN BLOOD BY AUTOMATED COUNT: 14.5 % (ref 11.5–14.5)
EST. GFR  (AFRICAN AMERICAN): 52.8 ML/MIN/1.73 M^2
EST. GFR  (NON AFRICAN AMERICAN): 45.7 ML/MIN/1.73 M^2
GLUCOSE SERPL-MCNC: 88 MG/DL (ref 70–110)
HCT VFR BLD AUTO: 41.8 % (ref 40–54)
HGB BLD-MCNC: 13.7 G/DL (ref 14–18)
IMM GRANULOCYTES # BLD AUTO: 0.02 K/UL (ref 0–0.04)
IMM GRANULOCYTES NFR BLD AUTO: 0.4 % (ref 0–0.5)
LYMPHOCYTES # BLD AUTO: 0.7 K/UL (ref 1–4.8)
LYMPHOCYTES NFR BLD: 13.1 % (ref 18–48)
MCH RBC QN AUTO: 29.4 PG (ref 27–31)
MCHC RBC AUTO-ENTMCNC: 32.8 G/DL (ref 32–36)
MCV RBC AUTO: 90 FL (ref 82–98)
MONOCYTES # BLD AUTO: 0.5 K/UL (ref 0.3–1)
MONOCYTES NFR BLD: 9.3 % (ref 4–15)
NEUTROPHILS # BLD AUTO: 3.8 K/UL (ref 1.8–7.7)
NEUTROPHILS NFR BLD: 70.3 % (ref 38–73)
NRBC BLD-RTO: 0 /100 WBC
PHOSPHATE SERPL-MCNC: 4.3 MG/DL (ref 2.7–4.5)
PLATELET # BLD AUTO: 167 K/UL (ref 150–450)
PMV BLD AUTO: 12.5 FL (ref 9.2–12.9)
POTASSIUM SERPL-SCNC: 4.7 MMOL/L (ref 3.5–5.1)
RBC # BLD AUTO: 4.66 M/UL (ref 4.6–6.2)
SODIUM SERPL-SCNC: 139 MMOL/L (ref 136–145)
WBC # BLD AUTO: 5.36 K/UL (ref 3.9–12.7)

## 2022-07-25 PROCEDURE — 85025 COMPLETE CBC W/AUTO DIFF WBC: CPT | Performed by: INTERNAL MEDICINE

## 2022-07-25 PROCEDURE — 80197 ASSAY OF TACROLIMUS: CPT | Performed by: INTERNAL MEDICINE

## 2022-07-25 PROCEDURE — 36415 COLL VENOUS BLD VENIPUNCTURE: CPT | Mod: S$GLB,,, | Performed by: INTERNAL MEDICINE

## 2022-07-25 PROCEDURE — 80069 RENAL FUNCTION PANEL: CPT | Performed by: INTERNAL MEDICINE

## 2022-07-25 PROCEDURE — 36415 PR COLLECTION VENOUS BLOOD,VENIPUNCTURE: ICD-10-PCS | Mod: S$GLB,,, | Performed by: INTERNAL MEDICINE

## 2022-07-25 NOTE — PROGRESS NOTES
Venipuncture performed with 21 gauge butterfly, x's 1 attempt.  Successful venipuncture to L Antecubital vein.  Specimens collected per orders.      Pressure dressing applied to site, instructed patient to remove dressing in 10-15 minutes, OK to re-adjust dressing if pressure causing any discomfort, to observe closely for numbness and/or discoloration to hand or fingers, and to notify provider if bleeding persists after applying constant pressure lasting 30 minutes.

## 2022-07-26 DIAGNOSIS — Z94.0 KIDNEY REPLACED BY TRANSPLANT: Primary | ICD-10-CM

## 2022-07-26 LAB — TACROLIMUS BLD-MCNC: 9.4 NG/ML (ref 5–15)

## 2022-07-27 ENCOUNTER — OCCUPATIONAL HEALTH (OUTPATIENT)
Dept: URGENT CARE | Facility: CLINIC | Age: 22
End: 2022-07-27

## 2022-07-27 ENCOUNTER — DOCUMENTATION ONLY (OUTPATIENT)
Dept: TRANSPLANT | Facility: CLINIC | Age: 22
End: 2022-07-27
Payer: MEDICARE

## 2022-07-27 DIAGNOSIS — Z00.00 ENCOUNTER FOR PHYSICAL EXAMINATION: Primary | ICD-10-CM

## 2022-07-27 LAB — COLLECTION ONLY: NORMAL

## 2022-07-27 PROCEDURE — 99499 UNLISTED E&M SERVICE: CPT | Mod: S$GLB,,, | Performed by: NURSE PRACTITIONER

## 2022-07-27 PROCEDURE — 99499 DOT PHYSICAL: ICD-10-PCS | Mod: S$GLB,,, | Performed by: NURSE PRACTITIONER

## 2022-08-03 ENCOUNTER — PATIENT MESSAGE (OUTPATIENT)
Dept: TRANSPLANT | Facility: CLINIC | Age: 22
End: 2022-08-03
Payer: MEDICARE

## 2022-08-08 ENCOUNTER — LAB VISIT (OUTPATIENT)
Dept: FAMILY MEDICINE | Facility: CLINIC | Age: 22
End: 2022-08-08
Payer: MEDICARE

## 2022-08-08 DIAGNOSIS — Z94.0 KIDNEY REPLACED BY TRANSPLANT: ICD-10-CM

## 2022-08-08 LAB
BASOPHILS # BLD AUTO: 0.02 K/UL (ref 0–0.2)
BASOPHILS NFR BLD: 0.4 % (ref 0–1.9)
DIFFERENTIAL METHOD: ABNORMAL
EOSINOPHIL # BLD AUTO: 0.1 K/UL (ref 0–0.5)
EOSINOPHIL NFR BLD: 2.5 % (ref 0–8)
ERYTHROCYTE [DISTWIDTH] IN BLOOD BY AUTOMATED COUNT: 14.5 % (ref 11.5–14.5)
HCT VFR BLD AUTO: 43.2 % (ref 40–54)
HGB BLD-MCNC: 13.8 G/DL (ref 14–18)
IMM GRANULOCYTES # BLD AUTO: 0.01 K/UL (ref 0–0.04)
IMM GRANULOCYTES NFR BLD AUTO: 0.2 % (ref 0–0.5)
LYMPHOCYTES # BLD AUTO: 0.6 K/UL (ref 1–4.8)
LYMPHOCYTES NFR BLD: 13.1 % (ref 18–48)
MCH RBC QN AUTO: 28.3 PG (ref 27–31)
MCHC RBC AUTO-ENTMCNC: 31.9 G/DL (ref 32–36)
MCV RBC AUTO: 89 FL (ref 82–98)
MONOCYTES # BLD AUTO: 0.5 K/UL (ref 0.3–1)
MONOCYTES NFR BLD: 11 % (ref 4–15)
NEUTROPHILS # BLD AUTO: 3.6 K/UL (ref 1.8–7.7)
NEUTROPHILS NFR BLD: 72.8 % (ref 38–73)
NRBC BLD-RTO: 0 /100 WBC
PLATELET # BLD AUTO: 167 K/UL (ref 150–450)
PMV BLD AUTO: 12.5 FL (ref 9.2–12.9)
RBC # BLD AUTO: 4.88 M/UL (ref 4.6–6.2)
WBC # BLD AUTO: 4.89 K/UL (ref 3.9–12.7)

## 2022-08-08 PROCEDURE — 85025 COMPLETE CBC W/AUTO DIFF WBC: CPT | Performed by: INTERNAL MEDICINE

## 2022-08-09 ENCOUNTER — PATIENT MESSAGE (OUTPATIENT)
Dept: TRANSPLANT | Facility: CLINIC | Age: 22
End: 2022-08-09
Payer: MEDICARE

## 2022-08-19 ENCOUNTER — OCCUPATIONAL HEALTH (OUTPATIENT)
Dept: URGENT CARE | Facility: CLINIC | Age: 22
End: 2022-08-19

## 2022-08-19 PROCEDURE — 80305 PR COLLECTION ONLY DRUG SCREEN: ICD-10-PCS | Mod: S$GLB,,, | Performed by: EMERGENCY MEDICINE

## 2022-08-19 PROCEDURE — 80305 DRUG TEST PRSMV DIR OPT OBS: CPT | Mod: S$GLB,,, | Performed by: EMERGENCY MEDICINE

## 2022-08-22 ENCOUNTER — CLINICAL SUPPORT (OUTPATIENT)
Dept: FAMILY MEDICINE | Facility: CLINIC | Age: 22
End: 2022-08-22
Payer: MEDICARE

## 2022-08-22 ENCOUNTER — LAB VISIT (OUTPATIENT)
Dept: FAMILY MEDICINE | Facility: CLINIC | Age: 22
End: 2022-08-22
Payer: MEDICARE

## 2022-08-22 DIAGNOSIS — Z94.0 KIDNEY REPLACED BY TRANSPLANT: ICD-10-CM

## 2022-08-22 LAB
ALBUMIN SERPL BCP-MCNC: 4.1 G/DL (ref 3.5–5.2)
ALBUMIN SERPL BCP-MCNC: 4.1 G/DL (ref 3.5–5.2)
ALP SERPL-CCNC: 84 U/L (ref 55–135)
ALT SERPL W/O P-5'-P-CCNC: 28 U/L (ref 10–44)
ANION GAP SERPL CALC-SCNC: 8 MMOL/L (ref 8–16)
AST SERPL-CCNC: 17 U/L (ref 10–40)
BACTERIA #/AREA URNS AUTO: ABNORMAL /HPF
BASOPHILS # BLD AUTO: 0.01 K/UL (ref 0–0.2)
BASOPHILS NFR BLD: 0.2 % (ref 0–1.9)
BILIRUB DIRECT SERPL-MCNC: 0.1 MG/DL (ref 0.1–0.3)
BILIRUB SERPL-MCNC: 0.4 MG/DL (ref 0.1–1)
BILIRUB UR QL STRIP: NEGATIVE
BUN SERPL-MCNC: 26 MG/DL (ref 6–20)
CALCIUM SERPL-MCNC: 9.8 MG/DL (ref 8.7–10.5)
CHLORIDE SERPL-SCNC: 105 MMOL/L (ref 95–110)
CLARITY UR REFRACT.AUTO: CLEAR
CO2 SERPL-SCNC: 24 MMOL/L (ref 23–29)
COLOR UR AUTO: YELLOW
CREAT SERPL-MCNC: 1.9 MG/DL (ref 0.5–1.4)
CREAT UR-MCNC: 120 MG/DL (ref 23–375)
DIFFERENTIAL METHOD: ABNORMAL
EOSINOPHIL # BLD AUTO: 0.1 K/UL (ref 0–0.5)
EOSINOPHIL NFR BLD: 1.7 % (ref 0–8)
ERYTHROCYTE [DISTWIDTH] IN BLOOD BY AUTOMATED COUNT: 14.7 % (ref 11.5–14.5)
EST. GFR  (NO RACE VARIABLE): 50.2 ML/MIN/1.73 M^2
GLUCOSE SERPL-MCNC: 116 MG/DL (ref 70–110)
GLUCOSE UR QL STRIP: NEGATIVE
HCT VFR BLD AUTO: 42 % (ref 40–54)
HGB BLD-MCNC: 13.9 G/DL (ref 14–18)
HGB UR QL STRIP: NEGATIVE
IMM GRANULOCYTES # BLD AUTO: 0.01 K/UL (ref 0–0.04)
IMM GRANULOCYTES NFR BLD AUTO: 0.2 % (ref 0–0.5)
KETONES UR QL STRIP: NEGATIVE
LEUKOCYTE ESTERASE UR QL STRIP: ABNORMAL
LYMPHOCYTES # BLD AUTO: 0.5 K/UL (ref 1–4.8)
LYMPHOCYTES NFR BLD: 11.7 % (ref 18–48)
MAGNESIUM SERPL-MCNC: 1.6 MG/DL (ref 1.6–2.6)
MCH RBC QN AUTO: 28.4 PG (ref 27–31)
MCHC RBC AUTO-ENTMCNC: 33.1 G/DL (ref 32–36)
MCV RBC AUTO: 86 FL (ref 82–98)
MICROSCOPIC COMMENT: ABNORMAL
MONOCYTES # BLD AUTO: 0.3 K/UL (ref 0.3–1)
MONOCYTES NFR BLD: 8.2 % (ref 4–15)
NEUTROPHILS # BLD AUTO: 3.1 K/UL (ref 1.8–7.7)
NEUTROPHILS NFR BLD: 78 % (ref 38–73)
NITRITE UR QL STRIP: NEGATIVE
NRBC BLD-RTO: 0 /100 WBC
PH UR STRIP: 6 [PH] (ref 5–8)
PHOSPHATE SERPL-MCNC: 2.5 MG/DL (ref 2.7–4.5)
PLATELET # BLD AUTO: 152 K/UL (ref 150–450)
PMV BLD AUTO: 10.9 FL (ref 9.2–12.9)
POTASSIUM SERPL-SCNC: 4.3 MMOL/L (ref 3.5–5.1)
PROT SERPL-MCNC: 7.3 G/DL (ref 6–8.4)
PROT UR QL STRIP: NEGATIVE
PROT UR-MCNC: 10 MG/DL (ref 0–15)
PROT/CREAT UR: 0.08 MG/G{CREAT} (ref 0–0.2)
RBC # BLD AUTO: 4.9 M/UL (ref 4.6–6.2)
SODIUM SERPL-SCNC: 137 MMOL/L (ref 136–145)
SP GR UR STRIP: 1.01 (ref 1–1.03)
URN SPEC COLLECT METH UR: ABNORMAL
WBC # BLD AUTO: 4.03 K/UL (ref 3.9–12.7)
WBC #/AREA URNS AUTO: 49 /HPF (ref 0–5)
WBC CLUMPS UR QL AUTO: ABNORMAL
YEAST UR QL AUTO: ABNORMAL

## 2022-08-22 PROCEDURE — 87799 DETECT AGENT NOS DNA QUANT: CPT | Performed by: INTERNAL MEDICINE

## 2022-08-22 PROCEDURE — 80197 ASSAY OF TACROLIMUS: CPT | Performed by: INTERNAL MEDICINE

## 2022-08-22 PROCEDURE — 36415 PR COLLECTION VENOUS BLOOD,VENIPUNCTURE: ICD-10-PCS | Mod: S$GLB,,, | Performed by: INTERNAL MEDICINE

## 2022-08-22 PROCEDURE — 80076 HEPATIC FUNCTION PANEL: CPT | Mod: 59 | Performed by: INTERNAL MEDICINE

## 2022-08-22 PROCEDURE — 85025 COMPLETE CBC W/AUTO DIFF WBC: CPT | Performed by: INTERNAL MEDICINE

## 2022-08-22 PROCEDURE — 80069 RENAL FUNCTION PANEL: CPT | Performed by: INTERNAL MEDICINE

## 2022-08-22 PROCEDURE — 81001 URINALYSIS AUTO W/SCOPE: CPT | Performed by: INTERNAL MEDICINE

## 2022-08-22 PROCEDURE — 36415 COLL VENOUS BLD VENIPUNCTURE: CPT | Mod: S$GLB,,, | Performed by: INTERNAL MEDICINE

## 2022-08-22 PROCEDURE — 82570 ASSAY OF URINE CREATININE: CPT | Performed by: INTERNAL MEDICINE

## 2022-08-22 PROCEDURE — 83735 ASSAY OF MAGNESIUM: CPT | Performed by: INTERNAL MEDICINE

## 2022-08-23 ENCOUNTER — PATIENT MESSAGE (OUTPATIENT)
Dept: TRANSPLANT | Facility: CLINIC | Age: 22
End: 2022-08-23
Payer: MEDICARE

## 2022-08-23 LAB — TACROLIMUS BLD-MCNC: 7.6 NG/ML (ref 5–15)

## 2022-08-25 LAB
BKV DNA SERPL NAA+PROBE-ACNC: <125 COPIES/ML
BKV DNA SERPL NAA+PROBE-LOG#: <2.1 LOG (10) COPIES/ML
BKV DNA SERPL QL NAA+PROBE: NOT DETECTED

## 2022-08-29 ENCOUNTER — PATIENT MESSAGE (OUTPATIENT)
Dept: TRANSPLANT | Facility: CLINIC | Age: 22
End: 2022-08-29
Payer: MEDICARE

## 2022-08-29 PROBLEM — N18.2 ANEMIA IN STAGE 2 CHRONIC KIDNEY DISEASE: Chronic | Status: RESOLVED | Noted: 2017-11-12 | Resolved: 2022-08-29

## 2022-08-29 PROBLEM — N18.31 STAGE 3A CHRONIC KIDNEY DISEASE: Chronic | Status: ACTIVE | Noted: 2022-08-29

## 2022-08-29 PROBLEM — D63.1 ANEMIA IN STAGE 2 CHRONIC KIDNEY DISEASE: Chronic | Status: RESOLVED | Noted: 2017-11-12 | Resolved: 2022-08-29

## 2022-08-30 NOTE — PROGRESS NOTES
Kidney Post-Transplant Assessment    Referring Physician: Yury Sotelo  Current Nephrologist: Kathy Cruz    ORGAN: RIGHT KIDNEY  Donor Type: donation after brain death  PHS Increased Risk: no  Cold Ischemia: 424 mins  Induction Medications: thymoglobulin    Subjective:   CC:  Reassessment of renal allograft function and management of chronic immunosuppression.    HPI:  Mr. Saenz is a 21 y.o. 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 His most recent creatinine is 1.9. He takes mycophenolate mofetil, prednisone and tacrolimus for maintenance immunosuppression. His post transplant course has been complicated by neutropenia .    Transplant History:  -ESRD secondary to HTN  -on dialysis 12/2015 until DBD kidney transplant on 2/25/22 (Thymo induction, CIT 7 hr. 4 min., CPRA 54%, KDPI 4%, CMV D+,R-).  -history of hepatosplenomegaly with thrombocytopenia, given 1 unit platelets intra-op.   -re-admit 3/7 for hyperkalemia    -ureteral stent removed 3/14.   -Kidney biopsy 3/24/2022 (for sub optimal creatinine): diffuse acute tubular injury, no rejection.   -Neutropenia noted on 5/20/2022/CBC-- has received  neupogen injections  x5    Interval History      Intake-adequate amount water   UOP-no problems reported   Peripheral edema-none  Weight -stable  Appetite-good   Overall feels well. No health concerns today.   Denies chest pain, SOB, leg pain, abdominal pain or LUTs. Denies s/s infection.     BP Readings from Last 3 Encounters:   09/02/22 (!) 184/92   05/27/22 (!) 143/72   04/16/22 (!) 172/84     Lab /diagnostic results reviewed with patient today.  All questions answered.           Past Medical History:   Diagnosis Date    Acne     Anemia of renal disease     Dialysis patient     peritoneal daily at night. followed by Dr. Sotelo    ESRD on dialysis since 12/16/15     Hepatosplenomegaly     Hypertension     Kidney disease     Seasonal  "allergies     Secondary hyperparathyroidism of renal origin     Thrombocytopenia, unspecified        Review of Systems   Constitutional:  Negative for activity change, appetite change and fever.   HENT:  Negative for congestion, mouth sores and sore throat.    Eyes:  Negative for visual disturbance.   Respiratory:  Negative for cough, chest tightness and shortness of breath.    Cardiovascular:  Negative for chest pain, palpitations and leg swelling.   Gastrointestinal:  Negative for abdominal distention, abdominal pain, constipation, diarrhea and nausea.   Genitourinary:  Negative for difficulty urinating, frequency and hematuria.   Musculoskeletal:  Negative for arthralgias and gait problem.   Skin:  Negative for wound.   Allergic/Immunologic: Positive for immunocompromised state. Negative for environmental allergies and food allergies.   Neurological:  Negative for dizziness, weakness and numbness.   Psychiatric/Behavioral:  Negative for sleep disturbance. The patient is not nervous/anxious.      Objective:   Blood pressure (!) 184/92, pulse 84, temperature 97.3 °F (36.3 °C), temperature source Temporal, resp. rate 16, height 5' 9" (1.753 m), weight 96 kg (211 lb 10.3 oz), SpO2 99 %.body mass index is 31.25 kg/m².  Wt Readings from Last 3 Encounters:   09/02/22 96 kg (211 lb 10.3 oz)   05/27/22 85.2 kg (187 lb 13.3 oz)   04/15/22 82.8 kg (182 lb 8.7 oz)     Temp Readings from Last 3 Encounters:   09/02/22 97.3 °F (36.3 °C) (Temporal)   05/27/22 97.3 °F (36.3 °C) (Temporal)   04/16/22 99.6 °F (37.6 °C) (Oral)     BP Readings from Last 3 Encounters:   09/02/22 (!) 184/92   05/27/22 (!) 143/72   04/16/22 (!) 172/84     Pulse Readings from Last 3 Encounters:   09/02/22 84   05/27/22 75   04/16/22 107       Physical Exam  Vitals reviewed.   Constitutional:       Appearance: Normal appearance. He is well-developed.   HENT:      Head: Normocephalic.   Eyes:      Pupils: Pupils are equal, round, and reactive to light. "   Cardiovascular:      Rate and Rhythm: Normal rate and regular rhythm.      Heart sounds: Normal heart sounds.   Pulmonary:      Effort: Pulmonary effort is normal.      Breath sounds: Normal breath sounds.   Abdominal:      General: Bowel sounds are normal.      Palpations: Abdomen is soft.      Comments: No bruit noted over the allograft      Musculoskeletal:         General: Normal range of motion.        Arms:       Cervical back: Normal range of motion and neck supple.   Skin:     General: Skin is warm and dry.   Neurological:      Mental Status: He is alert and oriented to person, place, and time.      Motor: No abnormal muscle tone.   Psychiatric:         Behavior: Behavior normal.     Deferred due to AV visit  Labs:  Lab Results   Component Value Date    WBC 4.03 08/22/2022    HGB 13.9 (L) 08/22/2022    HCT 42.0 08/22/2022     08/22/2022    K 4.3 08/22/2022     08/22/2022    CO2 24 08/22/2022    BUN 26 (H) 08/22/2022    CREATININE 1.9 (H) 08/22/2022    EGFRNONAA 45.7 (A) 07/25/2022    CALCIUM 9.8 08/22/2022    PHOS 2.5 (L) 08/22/2022    MG 1.6 08/22/2022    ALBUMIN 4.1 08/22/2022    ALBUMIN 4.1 08/22/2022    AST 17 08/22/2022    ALT 28 08/22/2022    UTPCR 0.08 08/22/2022    .2 (H) 02/25/2022    TACROLIMUS 7.6 08/22/2022       Labs were reviewed with the patient    Assessment:     1. S/P kidney transplant    2. Immunosuppression due to drug therapy    3. At risk for opportunistic infections    4. Hypertension secondary to other renal disorders    5. Stage 3a chronic kidney disease    6. Secondary hyperparathyroidism of renal origin    7. Elevated serum creatinine          Plan:    Per Dr Mi 5/3/2022-result review  kidney biopsy showed ATI but over 1 month ago. Get an allosure now and monthly for three months   Last allosure on 5/9/22,    allosure today  Needs repeat next month (October)    -stop atovaquone tx completed   -stop Valcyte tx completed   Follow-up:   1. CKD stage: 3 stable       2. Immunosuppression: Prograf trough 7.6 which is therapeutic (target 7-9). Continue Prograf 3/2 MMF  500 mg BID,   and Prednisone taper. Will continue to monitor for drug toxicities    allosure 5/9/2022 0.12%  DSAs 6/6/22    Class I Antibody Comments - Luminex NO DSA DETECTED      Class II Antibody Comments - Luminex DSA DETECTED: DQA1*05(3725)          3. Allograft Function: stable. Continue good po hydration.      Lab Results   Component Value Date    CREATININE 1.9 (H) 08/22/2022 8/22/2022  5mo 4wk   eGFR >60 mL/min/1.73 m^2 50.2 (A)       4. Hypertension management: advise low salt diet and home BP monitoring    bystolic 5 mg QD, Nifedipine 30 mg BID     5. Metabolic Bone Disease/Secondary Hyperparathyroidism:stable  Sensipar 30 mg QD, [facility]  Lab Results   Component Value Date    .2 (H) 02/25/2022    CALCIUM 9.8 08/22/2022    PHOS 2.5 (L) 08/22/2022 8/22/2022  5mo 4wk   Magnesium 1.6 - 2.6 mg/dL 1.6         6. Electrolytes:  Will monitor /guidelines  K acceptable-->lokelma as prescribed  Lab Results   Component Value Date     08/22/2022    K 4.3 08/22/2022     08/22/2022    CO2 24 08/22/2022        7. Anemia:  HX neutropenia, WBC now stable-->will continue to monitor   Lab Results   Component Value Date    WBC 4.03 08/22/2022    HGB 13.9 (L) 08/22/2022    HCT 42.0 08/22/2022    MCV 86 08/22/2022     08/22/2022       8.  Cytopenias: no significant cytopenias will monitor as per our guidelines. Medicine list reviewed including potential causes of drug-induced cytopenias  ANC 3100    9.Proteinuria: continue p/c ratio as per guidelines         8/22/2022  5mo 4wk   Prot/Creat Ratio, Urine 0.00 - 0.20 0.08       10. BK virus infection screening:  will continue to monitor/ guidelines         8/22/2022  5mo 4wk   BK Virus DNA, Blood Not detected Not detected       8/22/2022  5mo 4wk   BK Virus DNA PCR, Quant, Blood <125 Copies/mL <125       6/28/2022  4mo 0wk    Cytomegalovirus PCR, Quant <50 IU/mL Not Detected            11. Weight education: provided during the clinic visit   Body mass index is 31.25 kg/m².     12.Patient safety education regarding immunosuppression including prophylaxis posttransplant for CMV, PCP : Education provided about vaccination and prevention of infections     PCP ppx: Mepron until 8/24/22  CMV ppx: Valcyte until 8/24/22 stopped on 5/17/2022 for neutropenia  Fungal ppx: Nystatin until 3/31/22       Follow-up:   Clinic: return to transplant clinic weekly for the first month after transplant; every 2 weeks during months 2-3; then at 6-, 9-, 12-, 18-, 24-, and 36- months post-transplant to reassess for complications from immunosuppression toxicity and monitor for rejection.  Annually thereafter.    Labs: since patient remains at high risk for rejection and drug-related complications that warrant close monitoring, labs will be ordered as follows: continue twice weekly CBC, renal panel, and drug level for first month; then same labs once weekly through 3rd month post-transplant.  Urine for UA and protein/creatinine ratio monthly.  Serum BK - PCR at 1-, 3-, 6-, 9-, 12-, 18-, 24-, 36-, 48-, and 60 months post-transplant.  Hepatic panel at 1-, 2-, 3-, 6-, 9-, 12-, 18-, 24-, and 36- months post-transplant.    Jenni Lazcano NP       Education:   Material provided to the patient.  Patient reminded to call with any health changes since these can be early signs of significant complications.  Also, I advised the patient to be sure any new medications or changes of old medications are discussed with either a pharmacist or physician knowledgeable with transplant to avoid rejection/drug toxicity related to significant drug interactions.    Patient advised that it is recommended that all transplanted patients, and their close contacts and household members receive Covid vaccination.

## 2022-09-02 ENCOUNTER — OFFICE VISIT (OUTPATIENT)
Dept: TRANSPLANT | Facility: CLINIC | Age: 22
End: 2022-09-02
Payer: MEDICARE

## 2022-09-02 ENCOUNTER — LAB VISIT (OUTPATIENT)
Dept: LAB | Facility: HOSPITAL | Age: 22
End: 2022-09-02
Attending: INTERNAL MEDICINE
Payer: MEDICARE

## 2022-09-02 VITALS
TEMPERATURE: 97 F | DIASTOLIC BLOOD PRESSURE: 92 MMHG | HEIGHT: 69 IN | HEART RATE: 84 BPM | BODY MASS INDEX: 31.34 KG/M2 | OXYGEN SATURATION: 99 % | RESPIRATION RATE: 16 BRPM | SYSTOLIC BLOOD PRESSURE: 184 MMHG | WEIGHT: 211.63 LBS

## 2022-09-02 DIAGNOSIS — D84.821 IMMUNOSUPPRESSION DUE TO DRUG THERAPY: ICD-10-CM

## 2022-09-02 DIAGNOSIS — Z94.0 KIDNEY REPLACED BY TRANSPLANT: Primary | ICD-10-CM

## 2022-09-02 DIAGNOSIS — I15.1 HYPERTENSION SECONDARY TO OTHER RENAL DISORDERS: ICD-10-CM

## 2022-09-02 DIAGNOSIS — Z94.0 KIDNEY REPLACED BY TRANSPLANT: ICD-10-CM

## 2022-09-02 DIAGNOSIS — N25.81 SECONDARY HYPERPARATHYROIDISM OF RENAL ORIGIN: Chronic | ICD-10-CM

## 2022-09-02 DIAGNOSIS — Z79.899 IMMUNOSUPPRESSION DUE TO DRUG THERAPY: ICD-10-CM

## 2022-09-02 DIAGNOSIS — R79.89 ELEVATED SERUM CREATININE: ICD-10-CM

## 2022-09-02 DIAGNOSIS — Z91.89 AT RISK FOR OPPORTUNISTIC INFECTIONS: ICD-10-CM

## 2022-09-02 DIAGNOSIS — N18.31 STAGE 3A CHRONIC KIDNEY DISEASE: Chronic | ICD-10-CM

## 2022-09-02 DIAGNOSIS — Z94.0 S/P KIDNEY TRANSPLANT: Primary | ICD-10-CM

## 2022-09-02 PROCEDURE — 99215 OFFICE O/P EST HI 40 MIN: CPT | Mod: S$PBB,,, | Performed by: NURSE PRACTITIONER

## 2022-09-02 PROCEDURE — 99999 PR PBB SHADOW E&M-EST. PATIENT-LVL V: ICD-10-PCS | Mod: PBBFAC,,, | Performed by: NURSE PRACTITIONER

## 2022-09-02 PROCEDURE — 99999 PR PBB SHADOW E&M-EST. PATIENT-LVL V: CPT | Mod: PBBFAC,,, | Performed by: NURSE PRACTITIONER

## 2022-09-02 PROCEDURE — 99215 PR OFFICE/OUTPT VISIT, EST, LEVL V, 40-54 MIN: ICD-10-PCS | Mod: S$PBB,,, | Performed by: NURSE PRACTITIONER

## 2022-09-02 PROCEDURE — 36415 COLL VENOUS BLD VENIPUNCTURE: CPT | Performed by: INTERNAL MEDICINE

## 2022-09-02 PROCEDURE — 99215 OFFICE O/P EST HI 40 MIN: CPT | Mod: PBBFAC | Performed by: NURSE PRACTITIONER

## 2022-09-02 NOTE — LETTER
[facility]   [address] [place] [address]   Phone [phone]   Fax [phone]                         September 2, 2022    Tameka Saenz  [address]  [facility] [address]      [facility]  [address]  [facility] [address]  Phone: [phone] Tameka Saenz    Was treated here on 09/02/2022    May Return to work/school on 9/6/2022    No Restrictions        Sincerely,    Jenni Lazcano, NP

## 2022-09-02 NOTE — LETTER
September 2, 2022        Kathy Cruz  [address] [place] [address]   [address]  [facility] [address]  Phone: [phone]  Fax: [phone]             [facility] 1st Fl  [address]  [facility] [address]  Phone: [phone]   Patient: Tameka Saenz   MR Number: [id number]   YOB: 2000   Date of Visit: 9/2/2022       Dear Dr. Kathy Cruz    Thank you for referring Tameka Saenz to me for evaluation. Attached you will find relevant portions of my assessment and plan of care.    If you have questions, please do not hesitate to call me. I look forward to following Tameka Saenz along with you.    Sincerely,    Jenni Lazcano, NP    Enclosure    If you would like to receive this communication electronically, please contact [email or web address] or [phone] to request [employer] access.    [employer] is a tool which provides read-only access to select patient information with whom you have a relationship. Its easy to use and provides real time access to review your patients record including encounter summaries, notes, results, and demographic information.    If you feel you have received this communication in error or would no longer like to receive these types of communications, please e-mail [email or web address]

## 2022-09-04 LAB
ALLOSURE COMMENT: NORMAL
ALLOSURE SCORE KIDNEY: <0.12 %
INFORMATION: NORMAL

## 2022-09-08 DIAGNOSIS — Z94.0 KIDNEY REPLACED BY TRANSPLANT: Primary | ICD-10-CM

## 2022-09-28 ENCOUNTER — PATIENT MESSAGE (OUTPATIENT)
Dept: TRANSPLANT | Facility: CLINIC | Age: 22
End: 2022-09-28
Payer: MEDICARE

## 2022-09-30 ENCOUNTER — LAB VISIT (OUTPATIENT)
Dept: FAMILY MEDICINE | Facility: CLINIC | Age: 22
End: 2022-09-30
Payer: MEDICARE

## 2022-09-30 DIAGNOSIS — Z94.0 KIDNEY REPLACED BY TRANSPLANT: ICD-10-CM

## 2022-09-30 LAB
ALBUMIN SERPL BCP-MCNC: 4.2 G/DL (ref 3.5–5.2)
ANION GAP SERPL CALC-SCNC: 7 MMOL/L (ref 8–16)
BASOPHILS # BLD AUTO: 0.01 K/UL (ref 0–0.2)
BASOPHILS NFR BLD: 0.2 % (ref 0–1.9)
BUN SERPL-MCNC: 26 MG/DL (ref 6–20)
CALCIUM SERPL-MCNC: 9.7 MG/DL (ref 8.7–10.5)
CHLORIDE SERPL-SCNC: 110 MMOL/L (ref 95–110)
CO2 SERPL-SCNC: 24 MMOL/L (ref 23–29)
CREAT SERPL-MCNC: 2.2 MG/DL (ref 0.5–1.4)
DIFFERENTIAL METHOD: ABNORMAL
EOSINOPHIL # BLD AUTO: 0.1 K/UL (ref 0–0.5)
EOSINOPHIL NFR BLD: 1.5 % (ref 0–8)
ERYTHROCYTE [DISTWIDTH] IN BLOOD BY AUTOMATED COUNT: 14.1 % (ref 11.5–14.5)
EST. GFR  (NO RACE VARIABLE): 42.1 ML/MIN/1.73 M^2
GLUCOSE SERPL-MCNC: 100 MG/DL (ref 70–110)
HCT VFR BLD AUTO: 40.5 % (ref 40–54)
HGB BLD-MCNC: 13.3 G/DL (ref 14–18)
IMM GRANULOCYTES # BLD AUTO: 0 K/UL (ref 0–0.04)
IMM GRANULOCYTES NFR BLD AUTO: 0 % (ref 0–0.5)
LYMPHOCYTES # BLD AUTO: 0.5 K/UL (ref 1–4.8)
LYMPHOCYTES NFR BLD: 9.5 % (ref 18–48)
MAGNESIUM SERPL-MCNC: 1.6 MG/DL (ref 1.6–2.6)
MCH RBC QN AUTO: 29.3 PG (ref 27–31)
MCHC RBC AUTO-ENTMCNC: 32.8 G/DL (ref 32–36)
MCV RBC AUTO: 89 FL (ref 82–98)
MONOCYTES # BLD AUTO: 0.4 K/UL (ref 0.3–1)
MONOCYTES NFR BLD: 8.7 % (ref 4–15)
NEUTROPHILS # BLD AUTO: 3.8 K/UL (ref 1.8–7.7)
NEUTROPHILS NFR BLD: 80.1 % (ref 38–73)
NRBC BLD-RTO: 0 /100 WBC
PHOSPHATE SERPL-MCNC: 2.8 MG/DL (ref 2.7–4.5)
PLATELET # BLD AUTO: 144 K/UL (ref 150–450)
PMV BLD AUTO: 11.4 FL (ref 9.2–12.9)
POTASSIUM SERPL-SCNC: 4.9 MMOL/L (ref 3.5–5.1)
RBC # BLD AUTO: 4.54 M/UL (ref 4.6–6.2)
SODIUM SERPL-SCNC: 141 MMOL/L (ref 136–145)
WBC # BLD AUTO: 4.73 K/UL (ref 3.9–12.7)

## 2022-09-30 PROCEDURE — 85025 COMPLETE CBC W/AUTO DIFF WBC: CPT | Performed by: INTERNAL MEDICINE

## 2022-09-30 PROCEDURE — 36415 COLL VENOUS BLD VENIPUNCTURE: CPT | Mod: S$GLB,,, | Performed by: INTERNAL MEDICINE

## 2022-09-30 PROCEDURE — 36415 PR COLLECTION VENOUS BLOOD,VENIPUNCTURE: ICD-10-PCS | Mod: S$GLB,,, | Performed by: INTERNAL MEDICINE

## 2022-09-30 PROCEDURE — 80197 ASSAY OF TACROLIMUS: CPT | Performed by: INTERNAL MEDICINE

## 2022-09-30 PROCEDURE — 80069 RENAL FUNCTION PANEL: CPT | Performed by: INTERNAL MEDICINE

## 2022-09-30 PROCEDURE — 83735 ASSAY OF MAGNESIUM: CPT | Performed by: INTERNAL MEDICINE

## 2022-10-01 LAB — TACROLIMUS BLD-MCNC: 5.7 NG/ML (ref 5–15)

## 2022-10-03 ENCOUNTER — PATIENT MESSAGE (OUTPATIENT)
Dept: TRANSPLANT | Facility: CLINIC | Age: 22
End: 2022-10-03
Payer: MEDICARE

## 2022-10-03 DIAGNOSIS — Z94.0 KIDNEY REPLACED BY TRANSPLANT: ICD-10-CM

## 2022-10-03 RX ORDER — TACROLIMUS 1 MG/1
CAPSULE ORAL
Qty: 210 CAPSULE | Refills: 11 | Status: SHIPPED | OUTPATIENT
Start: 2022-10-03 | End: 2022-10-24

## 2022-10-03 NOTE — TELEPHONE ENCOUNTER
Message sent to pt instructing him to increase prograf to 4/3. Encouraged increase in PO hydration. Repeat labs 10/17  ----- Message from Mo Mi MD sent at 10/1/2022  5:51 PM CDT -----  Please increase prograf to 4/3, repeat labs in 2 weeks encourage hydration to 3 lt.

## 2022-10-13 ENCOUNTER — PATIENT MESSAGE (OUTPATIENT)
Dept: TRANSPLANT | Facility: CLINIC | Age: 22
End: 2022-10-13
Payer: MEDICARE

## 2022-10-13 DIAGNOSIS — Z94.0 S/P KIDNEY TRANSPLANT: Primary | ICD-10-CM

## 2022-10-21 ENCOUNTER — LAB VISIT (OUTPATIENT)
Dept: FAMILY MEDICINE | Facility: CLINIC | Age: 22
End: 2022-10-21
Payer: MEDICARE

## 2022-10-21 DIAGNOSIS — Z94.0 KIDNEY REPLACED BY TRANSPLANT: ICD-10-CM

## 2022-10-21 DIAGNOSIS — Z94.0 S/P KIDNEY TRANSPLANT: ICD-10-CM

## 2022-10-21 LAB
ALBUMIN SERPL BCP-MCNC: 4.2 G/DL (ref 3.5–5.2)
ANION GAP SERPL CALC-SCNC: 10 MMOL/L (ref 8–16)
BASOPHILS # BLD AUTO: 0.02 K/UL (ref 0–0.2)
BASOPHILS NFR BLD: 0.4 % (ref 0–1.9)
BUN SERPL-MCNC: 23 MG/DL (ref 6–20)
CALCIUM SERPL-MCNC: 10 MG/DL (ref 8.7–10.5)
CHLORIDE SERPL-SCNC: 105 MMOL/L (ref 95–110)
CO2 SERPL-SCNC: 22 MMOL/L (ref 23–29)
CREAT SERPL-MCNC: 2.1 MG/DL (ref 0.5–1.4)
DIFFERENTIAL METHOD: ABNORMAL
EOSINOPHIL # BLD AUTO: 0.1 K/UL (ref 0–0.5)
EOSINOPHIL NFR BLD: 1 % (ref 0–8)
ERYTHROCYTE [DISTWIDTH] IN BLOOD BY AUTOMATED COUNT: 13.6 % (ref 11.5–14.5)
EST. GFR  (NO RACE VARIABLE): 44.5 ML/MIN/1.73 M^2
GLUCOSE SERPL-MCNC: 86 MG/DL (ref 70–110)
HCT VFR BLD AUTO: 42 % (ref 40–54)
HGB BLD-MCNC: 13.7 G/DL (ref 14–18)
IMM GRANULOCYTES # BLD AUTO: 0.01 K/UL (ref 0–0.04)
IMM GRANULOCYTES NFR BLD AUTO: 0.2 % (ref 0–0.5)
LYMPHOCYTES # BLD AUTO: 0.6 K/UL (ref 1–4.8)
LYMPHOCYTES NFR BLD: 11.5 % (ref 18–48)
MAGNESIUM SERPL-MCNC: 1.8 MG/DL (ref 1.6–2.6)
MCH RBC QN AUTO: 28.8 PG (ref 27–31)
MCHC RBC AUTO-ENTMCNC: 32.6 G/DL (ref 32–36)
MCV RBC AUTO: 88 FL (ref 82–98)
MONOCYTES # BLD AUTO: 0.5 K/UL (ref 0.3–1)
MONOCYTES NFR BLD: 9.5 % (ref 4–15)
NEUTROPHILS # BLD AUTO: 3.8 K/UL (ref 1.8–7.7)
NEUTROPHILS NFR BLD: 77.4 % (ref 38–73)
NRBC BLD-RTO: 0 /100 WBC
PHOSPHATE SERPL-MCNC: 2.9 MG/DL (ref 2.7–4.5)
PLATELET # BLD AUTO: 181 K/UL (ref 150–450)
PMV BLD AUTO: 12 FL (ref 9.2–12.9)
POTASSIUM SERPL-SCNC: 5.1 MMOL/L (ref 3.5–5.1)
RBC # BLD AUTO: 4.76 M/UL (ref 4.6–6.2)
SODIUM SERPL-SCNC: 137 MMOL/L (ref 136–145)
WBC # BLD AUTO: 4.95 K/UL (ref 3.9–12.7)

## 2022-10-21 PROCEDURE — 36415 PR COLLECTION VENOUS BLOOD,VENIPUNCTURE: ICD-10-PCS | Mod: S$GLB,,, | Performed by: INTERNAL MEDICINE

## 2022-10-21 PROCEDURE — 80069 RENAL FUNCTION PANEL: CPT | Performed by: INTERNAL MEDICINE

## 2022-10-21 PROCEDURE — 36415 COLL VENOUS BLD VENIPUNCTURE: CPT | Mod: S$GLB,,, | Performed by: INTERNAL MEDICINE

## 2022-10-21 PROCEDURE — 85025 COMPLETE CBC W/AUTO DIFF WBC: CPT | Performed by: INTERNAL MEDICINE

## 2022-10-21 PROCEDURE — 80197 ASSAY OF TACROLIMUS: CPT | Performed by: INTERNAL MEDICINE

## 2022-10-21 PROCEDURE — 83735 ASSAY OF MAGNESIUM: CPT | Performed by: INTERNAL MEDICINE

## 2022-10-21 NOTE — PROGRESS NOTES
Venipuncture performed with 21 gauge butterfly, x's 1 attempt.  Successful venipuncture to L Basilic vein.  Specimens collected per orders.      Pressure dressing applied to site, instructed patient to remove dressing in 10-15 minutes, OK to re-adjust dressing if pressure causing any discomfort, to observe closely for numbness and/or discoloration to hand or fingers, and to notify provider if bleeding persists after applying constant pressure lasting 30 minutes.

## 2022-10-22 LAB — TACROLIMUS BLD-MCNC: 2.9 NG/ML (ref 5–15)

## 2022-10-23 NOTE — PROGRESS NOTES
If this level is true trough and no missing doses please  increase prograf to 5/4 repeat a level in 10 days

## 2022-10-24 ENCOUNTER — TELEPHONE (OUTPATIENT)
Dept: TRANSPLANT | Facility: CLINIC | Age: 22
End: 2022-10-24
Payer: MEDICARE

## 2022-10-24 ENCOUNTER — PATIENT MESSAGE (OUTPATIENT)
Dept: TRANSPLANT | Facility: CLINIC | Age: 22
End: 2022-10-24
Payer: MEDICARE

## 2022-10-24 DIAGNOSIS — Z94.0 KIDNEY REPLACED BY TRANSPLANT: ICD-10-CM

## 2022-10-24 RX ORDER — TACROLIMUS 1 MG/1
CAPSULE ORAL
Qty: 270 CAPSULE | Refills: 11 | Status: CANCELLED | OUTPATIENT
Start: 2022-10-24

## 2022-10-24 RX ORDER — TACROLIMUS 1 MG/1
3 CAPSULE ORAL EVERY 12 HOURS
Qty: 180 CAPSULE | Refills: 11 | Status: SHIPPED | OUTPATIENT
Start: 2022-10-24 | End: 2022-11-14

## 2022-10-24 NOTE — TELEPHONE ENCOUNTER
----- Message from Mo Mi MD sent at 10/24/2022 12:46 PM CDT -----  3 mg PO bid please with repeat level in 2 weeks   ----- Message -----  From: Maribel Stern RN  Sent: 10/24/2022  10:04 AM CDT  To: Mo Mi MD    Patient reports he has been incorrectly taking 2mg BID instead of 4/3mg. How would you like to proceed with dose change?  ----- Message -----  From: Mo Mi MD  Sent: 10/23/2022   7:02 AM CDT  To: [facility] Post-Kidney Transplant Clinical    If this level is true trough and no missing doses please  increase prograf to 5/4 repeat a level in 10 days

## 2022-10-24 NOTE — TELEPHONE ENCOUNTER
Message sent to pt about low prograf dose. Pt reports he has been taking 2mg BID instead of 4/3mg as previously instructed. Instructed pt to increase prograf dose to 3mg BID, per Dr. Mi. Also ensured pt is taking MMF and pred correctly. Pt verbalized understanding. Will recheck labs 11/8  ----- Message from Mo Mi MD sent at 10/23/2022  7:02 AM CDT -----  If this level is true trough and no missing doses please  increase prograf to 5/4 repeat a level in 10 days

## 2022-10-26 LAB
ALLOSURE COMMENT: NORMAL
ALLOSURE SCORE KIDNEY: 0.12 %
INFORMATION: NORMAL

## 2022-10-31 ENCOUNTER — OCCUPATIONAL HEALTH (OUTPATIENT)
Dept: URGENT CARE | Facility: CLINIC | Age: 22
End: 2022-10-31
Payer: MEDICARE

## 2022-10-31 PROCEDURE — 80305 DRUG TEST PRSMV DIR OPT OBS: CPT | Mod: S$GLB,,, | Performed by: EMERGENCY MEDICINE

## 2022-10-31 PROCEDURE — 80305 PR COLLECTION ONLY DRUG SCREEN: ICD-10-PCS | Mod: S$GLB,,, | Performed by: EMERGENCY MEDICINE

## 2022-11-01 ENCOUNTER — PATIENT MESSAGE (OUTPATIENT)
Dept: TRANSPLANT | Facility: CLINIC | Age: 22
End: 2022-11-01
Payer: MEDICARE

## 2022-11-07 ENCOUNTER — PATIENT MESSAGE (OUTPATIENT)
Dept: TRANSPLANT | Facility: CLINIC | Age: 22
End: 2022-11-07
Payer: MEDICARE

## 2022-11-08 ENCOUNTER — TELEPHONE (OUTPATIENT)
Dept: TRANSPLANT | Facility: CLINIC | Age: 22
End: 2022-11-08
Payer: MEDICARE

## 2022-11-08 DIAGNOSIS — E83.42 HYPOMAGNESEMIA: ICD-10-CM

## 2022-11-08 DIAGNOSIS — Z94.0 KIDNEY REPLACED BY TRANSPLANT: Primary | ICD-10-CM

## 2022-11-08 NOTE — TELEPHONE ENCOUNTER
Returned call to patient's mother who asked what OTC meds can the patient have for allergies and sinus congestion.  Recommended safe OTC meds to take are antihistamines such as Benadryl, Claritin or Zyrtec.  Ms. Saenz asked if tylenol cold was ok to take.  Advised ok to take but should not take with an antihistamine since they could contain some of the same components.  Advise to consult with the pharmacist at the drug store when making her purchase.  Ms. Saenz verbalized understanding.         ----- Message from Shon Briones sent at 11/8/2022  4:21 PM CST -----  Regarding: Patient advice  Patient's mom calling to speak to coordinator regarding taking meds for a cold.  Patient needs it today. Requesting a call ASAP. Please advise.        Call: [phone]

## 2022-11-11 ENCOUNTER — CLINICAL SUPPORT (OUTPATIENT)
Dept: FAMILY MEDICINE | Facility: CLINIC | Age: 22
End: 2022-11-11
Payer: MEDICARE

## 2022-11-11 ENCOUNTER — LAB VISIT (OUTPATIENT)
Dept: FAMILY MEDICINE | Facility: CLINIC | Age: 22
End: 2022-11-11
Payer: MEDICARE

## 2022-11-11 DIAGNOSIS — Z94.0 KIDNEY REPLACED BY TRANSPLANT: ICD-10-CM

## 2022-11-11 DIAGNOSIS — E83.42 HYPOMAGNESEMIA: ICD-10-CM

## 2022-11-11 DIAGNOSIS — Z94.0 S/P KIDNEY TRANSPLANT: ICD-10-CM

## 2022-11-11 LAB
ALBUMIN SERPL BCP-MCNC: 4 G/DL (ref 3.5–5.2)
ALBUMIN SERPL BCP-MCNC: 4 G/DL (ref 3.5–5.2)
ALP SERPL-CCNC: 75 U/L (ref 55–135)
ALT SERPL W/O P-5'-P-CCNC: 16 U/L (ref 10–44)
ANION GAP SERPL CALC-SCNC: 10 MMOL/L (ref 8–16)
AST SERPL-CCNC: 17 U/L (ref 10–40)
BASOPHILS # BLD AUTO: 0.03 K/UL (ref 0–0.2)
BASOPHILS NFR BLD: 0.6 % (ref 0–1.9)
BILIRUB DIRECT SERPL-MCNC: 0.2 MG/DL (ref 0.1–0.3)
BILIRUB SERPL-MCNC: 0.4 MG/DL (ref 0.1–1)
BILIRUB UR QL STRIP: NEGATIVE
BUN SERPL-MCNC: 12 MG/DL (ref 6–20)
CALCIUM SERPL-MCNC: 9.7 MG/DL (ref 8.7–10.5)
CHLORIDE SERPL-SCNC: 106 MMOL/L (ref 95–110)
CLARITY UR REFRACT.AUTO: CLEAR
CO2 SERPL-SCNC: 23 MMOL/L (ref 23–29)
COLOR UR AUTO: YELLOW
CREAT SERPL-MCNC: 1.9 MG/DL (ref 0.5–1.4)
CREAT UR-MCNC: 134 MG/DL (ref 23–375)
DIFFERENTIAL METHOD: ABNORMAL
EOSINOPHIL # BLD AUTO: 0.2 K/UL (ref 0–0.5)
EOSINOPHIL NFR BLD: 3.2 % (ref 0–8)
ERYTHROCYTE [DISTWIDTH] IN BLOOD BY AUTOMATED COUNT: 14.4 % (ref 11.5–14.5)
EST. GFR  (NO RACE VARIABLE): 50.2 ML/MIN/1.73 M^2
GLUCOSE SERPL-MCNC: 87 MG/DL (ref 70–110)
GLUCOSE UR QL STRIP: NEGATIVE
HCT VFR BLD AUTO: 42.3 % (ref 40–54)
HGB BLD-MCNC: 13.3 G/DL (ref 14–18)
HGB UR QL STRIP: NEGATIVE
IMM GRANULOCYTES # BLD AUTO: 0.02 K/UL (ref 0–0.04)
IMM GRANULOCYTES NFR BLD AUTO: 0.4 % (ref 0–0.5)
KETONES UR QL STRIP: NEGATIVE
LEUKOCYTE ESTERASE UR QL STRIP: NEGATIVE
LYMPHOCYTES # BLD AUTO: 0.6 K/UL (ref 1–4.8)
LYMPHOCYTES NFR BLD: 12.3 % (ref 18–48)
MAGNESIUM SERPL-MCNC: 1.8 MG/DL (ref 1.6–2.6)
MCH RBC QN AUTO: 29.1 PG (ref 27–31)
MCHC RBC AUTO-ENTMCNC: 31.4 G/DL (ref 32–36)
MCV RBC AUTO: 93 FL (ref 82–98)
MONOCYTES # BLD AUTO: 0.5 K/UL (ref 0.3–1)
MONOCYTES NFR BLD: 11.1 % (ref 4–15)
NEUTROPHILS # BLD AUTO: 3.4 K/UL (ref 1.8–7.7)
NEUTROPHILS NFR BLD: 72.4 % (ref 38–73)
NITRITE UR QL STRIP: NEGATIVE
NRBC BLD-RTO: 0 /100 WBC
PH UR STRIP: 7 [PH] (ref 5–8)
PHOSPHATE SERPL-MCNC: 2.5 MG/DL (ref 2.7–4.5)
PLATELET # BLD AUTO: 168 K/UL (ref 150–450)
PMV BLD AUTO: 12.1 FL (ref 9.2–12.9)
POTASSIUM SERPL-SCNC: 4 MMOL/L (ref 3.5–5.1)
PROT SERPL-MCNC: 7.2 G/DL (ref 6–8.4)
PROT UR QL STRIP: NEGATIVE
PROT UR-MCNC: 10 MG/DL (ref 0–15)
PROT/CREAT UR: 0.07 MG/G{CREAT} (ref 0–0.2)
RBC # BLD AUTO: 4.57 M/UL (ref 4.6–6.2)
SODIUM SERPL-SCNC: 139 MMOL/L (ref 136–145)
SP GR UR STRIP: 1.01 (ref 1–1.03)
URN SPEC COLLECT METH UR: NORMAL
WBC # BLD AUTO: 4.7 K/UL (ref 3.9–12.7)

## 2022-11-11 PROCEDURE — 84156 ASSAY OF PROTEIN URINE: CPT | Performed by: INTERNAL MEDICINE

## 2022-11-11 PROCEDURE — 36415 COLL VENOUS BLD VENIPUNCTURE: CPT | Mod: S$GLB,,, | Performed by: INTERNAL MEDICINE

## 2022-11-11 PROCEDURE — 36415 PR COLLECTION VENOUS BLOOD,VENIPUNCTURE: ICD-10-PCS | Mod: S$GLB,,, | Performed by: INTERNAL MEDICINE

## 2022-11-11 PROCEDURE — 84075 ASSAY ALKALINE PHOSPHATASE: CPT | Performed by: INTERNAL MEDICINE

## 2022-11-11 PROCEDURE — 83735 ASSAY OF MAGNESIUM: CPT | Performed by: INTERNAL MEDICINE

## 2022-11-11 PROCEDURE — 80197 ASSAY OF TACROLIMUS: CPT | Performed by: INTERNAL MEDICINE

## 2022-11-11 PROCEDURE — 81003 URINALYSIS AUTO W/O SCOPE: CPT | Performed by: INTERNAL MEDICINE

## 2022-11-11 PROCEDURE — 80069 RENAL FUNCTION PANEL: CPT | Performed by: INTERNAL MEDICINE

## 2022-11-11 PROCEDURE — 87799 DETECT AGENT NOS DNA QUANT: CPT | Performed by: INTERNAL MEDICINE

## 2022-11-11 PROCEDURE — 85025 COMPLETE CBC W/AUTO DIFF WBC: CPT | Performed by: INTERNAL MEDICINE

## 2022-11-12 LAB — TACROLIMUS BLD-MCNC: 3.4 NG/ML (ref 5–15)

## 2022-11-14 ENCOUNTER — PATIENT MESSAGE (OUTPATIENT)
Dept: TRANSPLANT | Facility: CLINIC | Age: 22
End: 2022-11-14
Payer: MEDICARE

## 2022-11-14 DIAGNOSIS — Z94.0 KIDNEY REPLACED BY TRANSPLANT: ICD-10-CM

## 2022-11-14 RX ORDER — TACROLIMUS 1 MG/1
CAPSULE ORAL
Qty: 210 CAPSULE | Refills: 11 | Status: SHIPPED | OUTPATIENT
Start: 2022-11-14 | End: 2022-11-25

## 2022-11-14 NOTE — TELEPHONE ENCOUNTER
Pt reports taking 3/2mg prograf instead of 3mg BID as prescribed. Per Dr. Mi, increase dose to 4/3mg. Message sent to pt with dose change. Pt verified understanding. Will re-check level in 2 weeks. Reminded pt he also had clinic appt 11/28  ----- Message from Mo Mi MD sent at 11/14/2022 10:07 AM CST -----  Ok lets update the med list, increase to 4/3 and repeat a level in 2 weeks  ----- Message -----  From: Maribel Stern RN  Sent: 11/14/2022   9:40 AM CST  To: Mo Mi MD    Pt reports incorrectly taking 3/2mg prograf instead of 3mg BID as prescribed- how would you like to proceed with dose change?  ----- Message -----  From: Mo Mi MD  Sent: 11/14/2022   9:05 AM CST  To: [facility] Post-Kidney Transplant Clinical    Please increase prograf to 4 mg PO bid and repeat a level next week. Please verify he is taking dose correctly, not missing doses and also ask for new medications.

## 2022-11-14 NOTE — PROGRESS NOTES
Please increase prograf to 4 mg PO bid and repeat a level next week. Please verify he is taking dose correctly, not missing doses and also ask for new medications.

## 2022-11-17 LAB
ALLOSURE COMMENT: NORMAL
ALLOSURE SCORE KIDNEY: NORMAL

## 2022-11-18 ENCOUNTER — DOCUMENTATION ONLY (OUTPATIENT)
Dept: TRANSPLANT | Facility: CLINIC | Age: 22
End: 2022-11-18
Payer: MEDICARE

## 2022-11-18 LAB — EXT ALLOSURE: 0.15

## 2022-11-21 ENCOUNTER — PATIENT MESSAGE (OUTPATIENT)
Dept: TRANSPLANT | Facility: CLINIC | Age: 22
End: 2022-11-21
Payer: MEDICARE

## 2022-11-21 ENCOUNTER — LAB VISIT (OUTPATIENT)
Dept: FAMILY MEDICINE | Facility: CLINIC | Age: 22
End: 2022-11-21
Payer: MEDICARE

## 2022-11-21 DIAGNOSIS — Z94.0 S/P KIDNEY TRANSPLANT: ICD-10-CM

## 2022-11-21 PROCEDURE — 36415 PR COLLECTION VENOUS BLOOD,VENIPUNCTURE: ICD-10-PCS | Mod: S$GLB,,, | Performed by: INTERNAL MEDICINE

## 2022-11-21 PROCEDURE — 36415 COLL VENOUS BLD VENIPUNCTURE: CPT | Mod: S$GLB,,, | Performed by: INTERNAL MEDICINE

## 2022-11-21 PROCEDURE — 80197 ASSAY OF TACROLIMUS: CPT | Performed by: INTERNAL MEDICINE

## 2022-11-21 NOTE — PROGRESS NOTES
Kidney Post-Transplant Assessment    Referring Physician: Yury Sotelo  Current Nephrologist: Kathy Cruz    ORGAN: RIGHT KIDNEY  Donor Type: donation after brain death  PHS Increased Risk: no  Cold Ischemia: 424 mins  Induction Medications: thymoglobulin    Subjective:   CC:  Reassessment of renal allograft function and management of chronic immunosuppression.    HPI:  Mr. Saenz is a 21 y.o. year old Black or  male with PMH ESRD 2/2 HTN, who received a donation after brain death kidney transplant on 2/25/22. His most recent creatinine is 1.9, BL ~ 1.7-2.1. He takes mycophenolate mofetil, prednisone and tacrolimus for maintenance immunosuppression. His post transplant course has been complicated by neutropenia .      Transplant History:  -ESRD secondary to HTN  -on dialysis 12/2015 until DBD kidney transplant on 2/25/22 (Thymo induction, CIT 7 hr. 4 min., CPRA 54%, KDPI 4%, CMV D+,R-).  -history of hepatosplenomegaly with thrombocytopenia, given 1 unit platelets intra-op.   -re-admit 3/7 for hyperkalemia    -ureteral stent removed 3/14.   -Kidney biopsy 3/24/2022 (for sub optimal creatinine): diffuse acute tubular injury, no rejection.   -Neutropenia noted on 5/20/2022/CBC-- has received  neupogen injections  x5    Interval History    Intake-adequate amount water   UOP-no problems reported   Peripheral edema-none    Overall feels well. No health concerns today.   Denies chest pain, SOB, leg pain, abdominal pain or LUTs. Denies s/s infection.   At home BP ~ 140/60  BP Readings from Last 3 Encounters:   11/25/22 (!) 160/73   09/02/22 (!) 184/92   05/27/22 (!) 143/72     Lab /diagnostic results reviewed with patient today.  All questions answered.           Past Medical History:   Diagnosis Date    Acne     Anemia of renal disease     Dialysis patient     peritoneal daily at night. followed by Dr. Sotelo    ESRD on dialysis since 12/16/15     Hepatosplenomegaly     Hypertension      "Kidney disease     Seasonal allergies     Secondary hyperparathyroidism of renal origin     Thrombocytopenia, unspecified        Review of Systems   Constitutional:  Negative for activity change, appetite change and fever.   HENT:  Negative for congestion, mouth sores and sore throat.    Eyes:  Negative for visual disturbance.   Respiratory:  Negative for cough, chest tightness and shortness of breath.    Cardiovascular:  Negative for chest pain, palpitations and leg swelling.   Gastrointestinal:  Negative for abdominal distention, abdominal pain, constipation, diarrhea and nausea.   Genitourinary:  Negative for difficulty urinating, frequency and hematuria.   Musculoskeletal:  Negative for arthralgias and gait problem.   Skin:  Negative for wound.   Allergic/Immunologic: Positive for immunocompromised state. Negative for environmental allergies and food allergies.   Neurological:  Negative for dizziness, weakness and numbness.   Psychiatric/Behavioral:  Negative for sleep disturbance. The patient is not nervous/anxious.      Objective:   Blood pressure (!) 160/73, pulse 85, temperature 97.3 °F (36.3 °C), temperature source Temporal, resp. rate 16, height 5' 9" (1.753 m), weight 105.2 kg (231 lb 14.8 oz), SpO2 97 %.body mass index is 34.25 kg/m².       Physical Exam  Vitals reviewed.   Constitutional:       Appearance: Normal appearance. He is well-developed.   HENT:      Head: Normocephalic.   Eyes:      Pupils: Pupils are equal, round, and reactive to light.   Cardiovascular:      Rate and Rhythm: Normal rate and regular rhythm.      Heart sounds: Normal heart sounds.   Pulmonary:      Effort: Pulmonary effort is normal.      Breath sounds: Normal breath sounds.   Abdominal:      General: Bowel sounds are normal.      Palpations: Abdomen is soft.      Comments: No bruit noted over the allograft      Musculoskeletal:         General: Normal range of motion.        Arms:       Cervical back: Normal range of motion " and neck supple.   Skin:     General: Skin is warm and dry.   Neurological:      Mental Status: He is alert and oriented to person, place, and time.      Motor: No abnormal muscle tone.   Psychiatric:         Behavior: Behavior normal.     Deferred due to AV visit  Labs:  Lab Results   Component Value Date    WBC 4.70 11/11/2022    HGB 13.3 (L) 11/11/2022    HCT 42.3 11/11/2022     11/11/2022    K 4.0 11/11/2022     11/11/2022    CO2 23 11/11/2022    BUN 12 11/11/2022    CREATININE 1.9 (H) 11/11/2022    EGFRNONAA 45.7 (A) 07/25/2022    CALCIUM 9.7 11/11/2022    PHOS 2.5 (L) 11/11/2022    MG 1.8 11/11/2022    ALBUMIN 4.0 11/11/2022    ALBUMIN 4.0 11/11/2022    AST 17 11/11/2022    ALT 16 11/11/2022    UTPCR 0.07 11/11/2022    .2 (H) 02/25/2022    TACROLIMUS 3.6 (L) 11/21/2022       Labs were reviewed with the patient    Assessment:     1. S/P kidney transplant    2. Immunosuppression due to drug therapy    3. At risk for opportunistic infections    4. Hypertension secondary to other renal disorders    5. Stage 3a chronic kidney disease    6. Secondary hyperparathyroidism of renal origin    7. Prophylactic immunotherapy            Plan:       Pt reports taking P 4/3, denies missed doses   --instructed to increase dose to P5/5   Repeat trough scheduled on Monday,  11/28/22  Will discuss with Dr Mi; Subtherapeutic prograf level-->? Candidate to switch to Envarsus and/or add ketoconazole if level remains low      Next clinic apt needs to be scheduled with [facility] nephrologist    Follow-up:   1. CKD stage: 3 stable      2. Immunosuppression: Prograf trough pending, therapeutic (target 7-9). Dose increased to Prograf 5/5  MMF  500 mg BID,   and Prednisone 5 mg qd. Will continue to monitor for drug toxicities    allosure  10/21/22 0.12%     3. Allograft Function: stable. Continue good po hydration.      Lab Results   Component Value Date    CREATININE 1.9 (H) 11/11/2022 11/11/2022  8mo 2wk    eGFR >60 mL/min/1.73 m^2 50.2 (A)         4. Hypertension management: advise low salt diet and home BP monitoring    bystolic 5 mg QD, Nifedipine 30 mg BID     5. Metabolic Bone Disease/Secondary Hyperparathyroidism:stable  Sensipar 30 mg QD, [facility]  Lab Results   Component Value Date    .2 (H) 02/25/2022    CALCIUM 9.7 11/11/2022    PHOS 2.5 (L) 11/11/2022 11/11/2022  8mo 2wk   Magnesium 1.6 - 2.6 mg/dL 1.8         6. Electrolytes:  Will monitor /guidelines  K acceptable-->lokelma as prescribed  Lab Results   Component Value Date     11/11/2022    K 4.0 11/11/2022     11/11/2022    CO2 23 11/11/2022        7. Anemia:   -->will continue to monitor   Lab Results   Component Value Date    WBC 4.70 11/11/2022    HGB 13.3 (L) 11/11/2022    HCT 42.3 11/11/2022    MCV 93 11/11/2022     11/11/2022       8.  Cytopenias: no significant cytopenias will monitor as per our guidelines. Medicine list reviewed including potential causes of drug-induced cytopenias   ANC 3400    9.Proteinuria: continue p/c ratio as per guidelines     11/11/2022  8mo 2wk   Prot/Creat Ratio, Urine 0.00 - 0.20 0.07       10. BK virus infection screening:  will continue to monitor/ guidelines           11/11/2022  8mo 2wk   BK Virus DNA, Blood Not detected Not detected       11/11/2022  8mo 2wk   BK Virus DNA PCR, Quant, Blood <125 Copies/mL <125            6/28/2022  4mo 0wk   Cytomegalovirus PCR, Quant <50 IU/mL Not Detected          11. Weight education: provided during the clinic visit   Body mass index is 34.25 kg/m².     12.Patient safety education regarding immunosuppression including prophylaxis posttransplant for CMV, PCP : Education provided about vaccination and prevention of infections     PCP ppx: Mepron until 8/24/22  CMV ppx: Valcyte until 8/24/22 stopped on 5/17/2022 for neutropenia  Fungal ppx: Nystatin until 3/31/22       Follow-up:   Clinic: return to transplant clinic weekly for the first month after  transplant; every 2 weeks during months 2-3; then at 6-, 9-, 12-, 18-, 24-, and 36- months post-transplant to reassess for complications from immunosuppression toxicity and monitor for rejection.  Annually thereafter.    Labs: since patient remains at high risk for rejection and drug-related complications that warrant close monitoring, labs will be ordered as follows: continue twice weekly CBC, renal panel, and drug level for first month; then same labs once weekly through 3rd month post-transplant.  Urine for UA and protein/creatinine ratio monthly.  Serum BK - PCR at 1-, 3-, 6-, 9-, 12-, 18-, 24-, 36-, 48-, and 60 months post-transplant.  Hepatic panel at 1-, 2-, 3-, 6-, 9-, 12-, 18-, 24-, and 36- months post-transplant.    Jenni Lazcano NP       Education:   Material provided to the patient.  Patient reminded to call with any health changes since these can be early signs of significant complications.  Also, I advised the patient to be sure any new medications or changes of old medications are discussed with either a pharmacist or physician knowledgeable with transplant to avoid rejection/drug toxicity related to significant drug interactions.    Patient advised that it is recommended that all transplanted patients, and their close contacts and household members receive Covid vaccination.

## 2022-11-22 ENCOUNTER — PATIENT MESSAGE (OUTPATIENT)
Dept: TRANSPLANT | Facility: CLINIC | Age: 22
End: 2022-11-22
Payer: MEDICARE

## 2022-11-22 LAB — TACROLIMUS BLD-MCNC: 3.6 NG/ML (ref 5–15)

## 2022-11-23 ENCOUNTER — PATIENT MESSAGE (OUTPATIENT)
Dept: TRANSPLANT | Facility: CLINIC | Age: 22
End: 2022-11-23
Payer: MEDICARE

## 2022-11-25 ENCOUNTER — TELEPHONE (OUTPATIENT)
Dept: TRANSPLANT | Facility: CLINIC | Age: 22
End: 2022-11-25

## 2022-11-25 ENCOUNTER — OFFICE VISIT (OUTPATIENT)
Dept: TRANSPLANT | Facility: CLINIC | Age: 22
End: 2022-11-25
Payer: MEDICARE

## 2022-11-25 VITALS
OXYGEN SATURATION: 97 % | HEART RATE: 85 BPM | TEMPERATURE: 97 F | RESPIRATION RATE: 16 BRPM | WEIGHT: 231.94 LBS | HEIGHT: 69 IN | DIASTOLIC BLOOD PRESSURE: 73 MMHG | BODY MASS INDEX: 34.35 KG/M2 | SYSTOLIC BLOOD PRESSURE: 160 MMHG

## 2022-11-25 DIAGNOSIS — D84.821 IMMUNOSUPPRESSION DUE TO DRUG THERAPY: ICD-10-CM

## 2022-11-25 DIAGNOSIS — Z94.0 S/P KIDNEY TRANSPLANT: Primary | ICD-10-CM

## 2022-11-25 DIAGNOSIS — I15.1 HYPERTENSION SECONDARY TO OTHER RENAL DISORDERS: ICD-10-CM

## 2022-11-25 DIAGNOSIS — Z91.89 AT RISK FOR OPPORTUNISTIC INFECTIONS: ICD-10-CM

## 2022-11-25 DIAGNOSIS — Z79.899 IMMUNOSUPPRESSION DUE TO DRUG THERAPY: ICD-10-CM

## 2022-11-25 DIAGNOSIS — Z29.89 PROPHYLACTIC IMMUNOTHERAPY: ICD-10-CM

## 2022-11-25 DIAGNOSIS — N25.81 SECONDARY HYPERPARATHYROIDISM OF RENAL ORIGIN: Chronic | ICD-10-CM

## 2022-11-25 DIAGNOSIS — N18.31 STAGE 3A CHRONIC KIDNEY DISEASE: Chronic | ICD-10-CM

## 2022-11-25 DIAGNOSIS — Z94.0 KIDNEY REPLACED BY TRANSPLANT: ICD-10-CM

## 2022-11-25 PROCEDURE — 99215 PR OFFICE/OUTPT VISIT, EST, LEVL V, 40-54 MIN: ICD-10-PCS | Mod: S$PBB,,, | Performed by: NURSE PRACTITIONER

## 2022-11-25 PROCEDURE — 99999 PR PBB SHADOW E&M-EST. PATIENT-LVL IV: CPT | Mod: PBBFAC,,, | Performed by: NURSE PRACTITIONER

## 2022-11-25 PROCEDURE — 99999 PR PBB SHADOW E&M-EST. PATIENT-LVL IV: ICD-10-PCS | Mod: PBBFAC,,, | Performed by: NURSE PRACTITIONER

## 2022-11-25 PROCEDURE — 99214 OFFICE O/P EST MOD 30 MIN: CPT | Mod: PBBFAC | Performed by: NURSE PRACTITIONER

## 2022-11-25 PROCEDURE — 99215 OFFICE O/P EST HI 40 MIN: CPT | Mod: S$PBB,,, | Performed by: NURSE PRACTITIONER

## 2022-11-25 RX ORDER — TACROLIMUS 1 MG/1
CAPSULE ORAL
Qty: 300 CAPSULE | Refills: 11 | Status: SHIPPED | OUTPATIENT
Start: 2022-11-25 | End: 2022-11-30

## 2022-11-25 NOTE — LETTER
November 25, 2022        Kathy Cruz  [address] [place] [address]   [address]  [facility] [address]  Phone: [phone]  Fax: [phone]             [facility] 1st Fl  [address]  [facility] [address]  Phone: [phone]   Patient: Tameka Saenz   MR Number: [id number]   YOB: 2000   Date of Visit: 11/25/2022       Dear Dr. Kathy Cruz    Thank you for referring Tameka Saenz to me for evaluation. Attached you will find relevant portions of my assessment and plan of care.    If you have questions, please do not hesitate to call me. I look forward to following Tameka Saenz along with you.    Sincerely,    Jenni Lazcano, NP    Enclosure    If you would like to receive this communication electronically, please contact [email or web address] or [phone] to request [employer] access.    [employer] is a tool which provides read-only access to select patient information with whom you have a relationship. Its easy to use and provides real time access to review your patients record including encounter summaries, notes, results, and demographic information.    If you feel you have received this communication in error or would no longer like to receive these types of communications, please e-mail [email or web address]

## 2022-11-25 NOTE — Clinical Note
Pt reports taking P 4/3, denies missed doses  --instructed to increase dose to P5/5  Repeat trough scheduled on Monday,  11/28/22  Subtherapeutic prograf level-->? Candidate to switch to Envarsus and/or add ketoconazole,  if level remains low    Has only seen [facility] since txp-->Next clinic apt needs to be scheduled with [facility] nephrologist

## 2022-11-25 NOTE — TELEPHONE ENCOUNTER
Changes made in clinic. Repeat tacro level 11/28  ----- Message from Maribel Stern RN sent at 11/22/2022  4:33 PM CST -----    ----- Message -----  From: Mo Mi MD  Sent: 11/22/2022  10:23 AM CST  To: [facility] Post-Kidney Transplant Clinical    Please increase prograf to 5 mg PO bid repeat a level next Monday

## 2022-11-29 ENCOUNTER — LAB VISIT (OUTPATIENT)
Dept: FAMILY MEDICINE | Facility: CLINIC | Age: 22
End: 2022-11-29
Payer: MEDICARE

## 2022-11-29 DIAGNOSIS — Z94.0 S/P KIDNEY TRANSPLANT: ICD-10-CM

## 2022-11-29 PROCEDURE — 36415 PR COLLECTION VENOUS BLOOD,VENIPUNCTURE: ICD-10-PCS | Mod: S$GLB,,, | Performed by: INTERNAL MEDICINE

## 2022-11-29 PROCEDURE — 36415 COLL VENOUS BLD VENIPUNCTURE: CPT | Mod: S$GLB,,, | Performed by: INTERNAL MEDICINE

## 2022-11-29 PROCEDURE — 80197 ASSAY OF TACROLIMUS: CPT | Performed by: INTERNAL MEDICINE

## 2022-11-30 ENCOUNTER — PATIENT MESSAGE (OUTPATIENT)
Dept: TRANSPLANT | Facility: CLINIC | Age: 22
End: 2022-11-30
Payer: MEDICARE

## 2022-11-30 DIAGNOSIS — Z94.0 KIDNEY REPLACED BY TRANSPLANT: ICD-10-CM

## 2022-11-30 LAB — TACROLIMUS BLD-MCNC: 6.7 NG/ML (ref 5–15)

## 2022-11-30 RX ORDER — TACROLIMUS 1 MG/1
CAPSULE ORAL
Qty: 330 CAPSULE | Refills: 11 | Status: SHIPPED | OUTPATIENT
Start: 2022-11-30 | End: 2022-12-20

## 2022-11-30 NOTE — TELEPHONE ENCOUNTER
Message sent to pt instructing him to increase prograf to 6/5mg. Asked pt to inform us when he receives allosure kit  ----- Message from Mo Mi MD sent at 11/30/2022  9:33 AM CST -----  Increase prograf to 6/5

## 2022-12-08 ENCOUNTER — TELEPHONE (OUTPATIENT)
Dept: TRANSPLANT | Facility: CLINIC | Age: 22
End: 2022-12-08
Payer: MEDICARE

## 2022-12-08 NOTE — TELEPHONE ENCOUNTER
"----- Message from Maribel Stern RN sent at 2022  4:24 PM CST -----  Regarding: FW: Family Planning    ----- Message -----  From: Mo Mi MD  Sent: 2022  11:03 AM CST  To: Ana Lilia Piedra, PharmD, Maribel Stern RN  Subject: RE: Family Planning                              Maribel, Your question is about "fatherhood after kidney transplantation"?    I am not aware that male transplant recipients need to be converted to Imuran before they father a child. To my knowledge the registry data shows that outcomes of the offspring fathered by kidney transplant recipients on MMF at the time of conception have been comparable with those in the general public in terms of gestational age, weight,  complications and incidence of birth defects.    Now sirolimus can cause lower sperm counts, dysmotility and reduced spontaneous pregnancy rates. Patients ON SIROLIMUS should be counseled about fertility.    (CJASN 2022)     Let me CC Ana Lilia to see if she has a different opinion about this matter. Great question. Thanks for bringing this to my attention.    Mo   ----- Message -----  From: Maribel Stern RN  Sent: 2022   9:25 AM CST  To: Mo Mi MD  Subject: Family Planning                                  Morning Dyllan Maher has reached out to inform us he and his fiance wish to start trying for a baby. His kidney is from 22. He is currently on MMF and will need to switch to Imuran. Please advise.  Thank you,  Maribel"

## 2022-12-14 ENCOUNTER — PATIENT MESSAGE (OUTPATIENT)
Dept: TRANSPLANT | Facility: CLINIC | Age: 22
End: 2022-12-14
Payer: MEDICARE

## 2022-12-15 ENCOUNTER — TELEPHONE (OUTPATIENT)
Dept: TRANSPLANT | Facility: CLINIC | Age: 22
End: 2022-12-15
Payer: MEDICARE

## 2022-12-15 DIAGNOSIS — Z94.0 KIDNEY REPLACED BY TRANSPLANT: Primary | ICD-10-CM

## 2022-12-15 NOTE — TELEPHONE ENCOUNTER
Message sent to pt explaining the need to switch from cellcept to imuran. V.V. appt made with Dr. Mi 12/22 to discuss further.  ----- Message from Maribel Stern RN sent at 12/14/2022  4:07 PM CST -----  Regarding: FW: Family Planning    ----- Message -----  From: Mo Mi MD  Sent: 12/14/2022  11:19 AM CST  To: Ana Lilia Piedra PharmD, Maribel Stern RN  Subject: RE: Family Planning                              Please educate the patient about this. See message below. He can to clinic or we can do a video visit if he wants. I'll be available in a week or 2  ----- Message -----  From: Ana Lilia Piedra PharmD  Sent: 12/14/2022  10:41 AM CST  To: Mo Mi MD, Maribel Stern RN  Subject: RE: Family Planning                              Yes.  I would stop it.  Previously, this recommendation only appeared in the  packaging.  It now appears in the [place] packaging, as well.    I suppose azathioprine is the best sub.  Should happened 90 days prior to conception.  This is a bit risky in this young  male less than a year out and with a high cPRA, however, we should just be able to put him back on MMF once his partner is pregnant.    We are working on updating our education book, as well, to include a page about MMF/rapa/Valcyte and pregnancy, FYI.      ----- Message -----  From: Maribel Stern RN  Sent: 12/12/2022   5:05 PM CST  To: Mo Mi MD, #  Subject: RE: Family Planning                              So how should we proceed with this patient who trying to conceive? Will we need to take him off cellcept?   ----- Message -----  From: Ana Lilia Piedra PharmD  Sent: 12/12/2022   9:25 AM CST  To: Mo Mi MD, Maribel Stern RN  Subject: RE: Family Planning                                For Cellcept.....according to the , sexually active male patients and/or their female partners should use effective contraception during treatment of the male  "patient and for at least 90 days after last dose.     For Valcyte (I believe he completed therapy in August) Male patients should use condoms during treatment and for at least 90 days after valganciclovir therapy.           ----- Message -----  From: Mo Mi MD  Sent: 2022  11:03 AM CST  To: Ana Lilia Piedra, PharmD, Maribel Stern RN  Subject: RE: Family Planning                              Maribel, Your question is about "fatherhood after kidney transplantation"?    I am not aware that male transplant recipients need to be converted to Imuran before they father a child. To my knowledge the registry data shows that outcomes of the offspring fathered by kidney transplant recipients on MMF at the time of conception have been comparable with those in the general public in terms of gestational age, weight,  complications and incidence of birth defects.    Now sirolimus can cause lower sperm counts, dysmotility and reduced spontaneous pregnancy rates. Patients ON SIROLIMUS should be counseled about fertility.    (CJASN 2022)     Let me CC Ana Lilia to see if she has a different opinion about this matter. Great question. Thanks for bringing this to my attention.    Mo   ----- Message -----  From: Maribel Stern RN  Sent: 2022   9:25 AM CST  To: Mo Mi MD  Subject: Family Planning                                  Morning Dyllan Maher has reached out to inform us he and his fiance wish to start trying for a baby. His kidney is from 22. He is currently on MMF and will need to switch to Imuran. Please advise.  Thank you,  Maribel"

## 2022-12-18 DIAGNOSIS — Z94.0 KIDNEY REPLACED BY TRANSPLANT: ICD-10-CM

## 2022-12-19 ENCOUNTER — LAB VISIT (OUTPATIENT)
Dept: FAMILY MEDICINE | Facility: CLINIC | Age: 22
End: 2022-12-19
Payer: MEDICARE

## 2022-12-19 DIAGNOSIS — Z94.0 KIDNEY REPLACED BY TRANSPLANT: ICD-10-CM

## 2022-12-19 DIAGNOSIS — Z94.0 S/P KIDNEY TRANSPLANT: ICD-10-CM

## 2022-12-19 DIAGNOSIS — E83.42 HYPOMAGNESEMIA: ICD-10-CM

## 2022-12-19 LAB
ALBUMIN SERPL BCP-MCNC: 4.3 G/DL (ref 3.5–5.2)
ANION GAP SERPL CALC-SCNC: 8 MMOL/L (ref 8–16)
BASOPHILS # BLD AUTO: 0.02 K/UL (ref 0–0.2)
BASOPHILS NFR BLD: 0.4 % (ref 0–1.9)
BUN SERPL-MCNC: 26 MG/DL (ref 6–20)
CALCIUM SERPL-MCNC: 9.7 MG/DL (ref 8.7–10.5)
CHLORIDE SERPL-SCNC: 110 MMOL/L (ref 95–110)
CO2 SERPL-SCNC: 22 MMOL/L (ref 23–29)
CREAT SERPL-MCNC: 1.9 MG/DL (ref 0.5–1.4)
DIFFERENTIAL METHOD: ABNORMAL
EOSINOPHIL # BLD AUTO: 0.1 K/UL (ref 0–0.5)
EOSINOPHIL NFR BLD: 1.2 % (ref 0–8)
ERYTHROCYTE [DISTWIDTH] IN BLOOD BY AUTOMATED COUNT: 14.3 % (ref 11.5–14.5)
EST. GFR  (NO RACE VARIABLE): 49.9 ML/MIN/1.73 M^2
GLUCOSE SERPL-MCNC: 101 MG/DL (ref 70–110)
HCT VFR BLD AUTO: 48 % (ref 40–54)
HGB BLD-MCNC: 15 G/DL (ref 14–18)
IMM GRANULOCYTES # BLD AUTO: 0.02 K/UL (ref 0–0.04)
IMM GRANULOCYTES NFR BLD AUTO: 0.4 % (ref 0–0.5)
LYMPHOCYTES # BLD AUTO: 0.5 K/UL (ref 1–4.8)
LYMPHOCYTES NFR BLD: 9.9 % (ref 18–48)
MAGNESIUM SERPL-MCNC: 1.7 MG/DL (ref 1.6–2.6)
MCH RBC QN AUTO: 28.8 PG (ref 27–31)
MCHC RBC AUTO-ENTMCNC: 31.3 G/DL (ref 32–36)
MCV RBC AUTO: 92 FL (ref 82–98)
MONOCYTES # BLD AUTO: 0.4 K/UL (ref 0.3–1)
MONOCYTES NFR BLD: 7.8 % (ref 4–15)
NEUTROPHILS # BLD AUTO: 4 K/UL (ref 1.8–7.7)
NEUTROPHILS NFR BLD: 80.3 % (ref 38–73)
NRBC BLD-RTO: 0 /100 WBC
PHOSPHATE SERPL-MCNC: 2.5 MG/DL (ref 2.7–4.5)
PLATELET # BLD AUTO: 152 K/UL (ref 150–450)
PMV BLD AUTO: 12.4 FL (ref 9.2–12.9)
POTASSIUM SERPL-SCNC: 4.5 MMOL/L (ref 3.5–5.1)
RBC # BLD AUTO: 5.2 M/UL (ref 4.6–6.2)
SODIUM SERPL-SCNC: 140 MMOL/L (ref 136–145)
WBC # BLD AUTO: 4.97 K/UL (ref 3.9–12.7)

## 2022-12-19 PROCEDURE — 80197 ASSAY OF TACROLIMUS: CPT | Performed by: INTERNAL MEDICINE

## 2022-12-19 PROCEDURE — 83735 ASSAY OF MAGNESIUM: CPT | Performed by: INTERNAL MEDICINE

## 2022-12-19 PROCEDURE — 85025 COMPLETE CBC W/AUTO DIFF WBC: CPT | Performed by: INTERNAL MEDICINE

## 2022-12-19 PROCEDURE — 80069 RENAL FUNCTION PANEL: CPT | Performed by: INTERNAL MEDICINE

## 2022-12-20 DIAGNOSIS — Z94.0 KIDNEY REPLACED BY TRANSPLANT: ICD-10-CM

## 2022-12-20 LAB — TACROLIMUS BLD-MCNC: 3.1 NG/ML (ref 5–15)

## 2022-12-20 RX ORDER — TACROLIMUS 1 MG/1
CAPSULE ORAL
Qty: 990 CAPSULE | Refills: 4 | OUTPATIENT
Start: 2022-12-20

## 2022-12-20 RX ORDER — TACROLIMUS 1 MG/1
5 CAPSULE ORAL EVERY 12 HOURS
Qty: 900 CAPSULE | Refills: 3 | Status: SHIPPED | OUTPATIENT
Start: 2022-12-20 | End: 2022-12-30

## 2022-12-20 NOTE — TELEPHONE ENCOUNTER
Message sent to pt instructing him to take 5mg prograf BID. Re-iterated the importance of a therapeutic level of prograf to prevent kidney rejection. Also reminded pt of V.V. Thurs. Pt verbalized understanding.   ----- Message from Mo Mi MD sent at 12/20/2022  1:41 PM CST -----  Please educate this young man again, if we do not get a appropriate prograf level he can lose his kidney. Increase to 5 mg PO bid and repeat a level next week . Thanks for the update   ----- Message -----  From: Maribel Stern RN  Sent: 12/20/2022   1:15 PM CST  To: Mo Mi MD    Pt reports he is taking incorrect prograf dose 4/3 mg (was supposed to be taking 6/5) - how would you like to proceed with dose change? Also FYI pt has V.V. with you Thursday.   ----- Message -----  From: Mo Mi MD  Sent: 12/20/2022   9:29 AM CST  To: [facility] Post-Kidney Transplant Clinical    If the level is reliable and he is not missing doses increase prograf to 7 mg PO bid and repeat a level next week

## 2022-12-20 NOTE — PROGRESS NOTES
If the level is reliable and he is not missing doses increase prograf to 7 mg PO bid and repeat a level next week  Pt alerted with low SpO2 of 84%. Called pt, she was still sleeping.  While talking to pt her oxygen went back to 94%. Will continue to monitor.

## 2022-12-22 DIAGNOSIS — Z94.0 KIDNEY REPLACED BY TRANSPLANT: Primary | ICD-10-CM

## 2022-12-22 PROBLEM — I10 ESSENTIAL HYPERTENSION: Status: ACTIVE | Noted: 2022-12-22

## 2022-12-22 LAB
ALLOSURE COMMENT: NORMAL
ALLOSURE SCORE KIDNEY: 0.16 %
INFORMATION: NORMAL

## 2022-12-23 ENCOUNTER — PATIENT MESSAGE (OUTPATIENT)
Dept: TRANSPLANT | Facility: CLINIC | Age: 22
End: 2022-12-23
Payer: MEDICARE

## 2022-12-27 ENCOUNTER — LAB VISIT (OUTPATIENT)
Dept: FAMILY MEDICINE | Facility: CLINIC | Age: 22
End: 2022-12-27
Payer: MEDICARE

## 2022-12-27 ENCOUNTER — CLINICAL SUPPORT (OUTPATIENT)
Dept: FAMILY MEDICINE | Facility: CLINIC | Age: 22
End: 2022-12-27
Payer: MEDICARE

## 2022-12-27 DIAGNOSIS — Z94.0 KIDNEY REPLACED BY TRANSPLANT: ICD-10-CM

## 2022-12-27 DIAGNOSIS — Z94.0 S/P KIDNEY TRANSPLANT: ICD-10-CM

## 2022-12-27 LAB
CREAT UR-MCNC: 112 MG/DL (ref 23–375)
PROT UR-MCNC: 13 MG/DL (ref 0–15)
PROT/CREAT UR: 0.12 MG/G{CREAT} (ref 0–0.2)

## 2022-12-27 PROCEDURE — 36415 PR COLLECTION VENOUS BLOOD,VENIPUNCTURE: ICD-10-PCS | Mod: S$GLB,,, | Performed by: INTERNAL MEDICINE

## 2022-12-27 PROCEDURE — 82570 ASSAY OF URINE CREATININE: CPT | Performed by: INTERNAL MEDICINE

## 2022-12-27 PROCEDURE — 36415 COLL VENOUS BLD VENIPUNCTURE: CPT | Mod: S$GLB,,, | Performed by: INTERNAL MEDICINE

## 2022-12-27 PROCEDURE — 80197 ASSAY OF TACROLIMUS: CPT | Performed by: INTERNAL MEDICINE

## 2022-12-28 LAB — TACROLIMUS BLD-MCNC: 4.5 NG/ML (ref 5–15)

## 2022-12-29 ENCOUNTER — PATIENT MESSAGE (OUTPATIENT)
Dept: TRANSPLANT | Facility: CLINIC | Age: 22
End: 2022-12-29
Payer: MEDICARE

## 2022-12-30 ENCOUNTER — PATIENT MESSAGE (OUTPATIENT)
Dept: TRANSPLANT | Facility: CLINIC | Age: 22
End: 2022-12-30
Payer: MEDICARE

## 2022-12-30 DIAGNOSIS — Z94.0 KIDNEY REPLACED BY TRANSPLANT: ICD-10-CM

## 2022-12-30 LAB
NUDT15 GENOTYPE: NORMAL
NUDT15 PHENOTYPE: NORMAL
TPMT ADDITIONAL INFORMATION: NORMAL
TPMT DISCLAIMER: NORMAL
TPMT GENOTYPE RESULT: NORMAL
TPMT INTERPRETATION: NORMAL
TPMT METHOD: NORMAL
TPMT PHENOTYPE: NORMAL
TPMT REVIEWED BY: NORMAL

## 2022-12-30 RX ORDER — TACROLIMUS 1 MG/1
7 CAPSULE ORAL EVERY 12 HOURS
Qty: 1260 CAPSULE | Refills: 3 | Status: SHIPPED | OUTPATIENT
Start: 2022-12-30 | End: 2023-08-25

## 2022-12-30 NOTE — TELEPHONE ENCOUNTER
Pt verified he is taking 5mg BID - instructed pt to increase dose to 7mg BID and repeat level in 1 week on 1/5/23. Pt verbalized understanding.  ----- Message from Maureen Villatoro RN sent at 12/30/2022  1:18 PM CST -----    ----- Message -----  From: Salina Joe MD  Sent: 12/30/2022   1:11 PM CST  To: [facility] Post-Kidney Transplant Clinical    Increase tacro + 2 mg BID. Repeat lab in 1 week

## 2023-01-03 ENCOUNTER — PATIENT MESSAGE (OUTPATIENT)
Dept: TRANSPLANT | Facility: CLINIC | Age: 23
End: 2023-01-03
Payer: MEDICARE

## 2023-01-03 LAB
6-METHYLMERCAPTOPURINE RIBOSIDE: 5.69 NMOL/ML/H (ref 5.04–9.57)
6-METHYLMERCAPTOPURINE: 3.43 NMOL/ML/H (ref 3–6.66)
6-METHYLTHIOGUANINE RIBOSIDE: 5.36 NMOL/ML/H (ref 2.7–5.84)
TPMT INTERPRETATION: NORMAL
TPMT REVIEWED BY: NORMAL

## 2023-01-05 DIAGNOSIS — Z94.0 KIDNEY REPLACED BY TRANSPLANT: Primary | ICD-10-CM

## 2023-01-05 NOTE — PROGRESS NOTES
Kidney Post-Transplant Assessment    Referring Physician: Yury Sotelo  Current Nephrologist: Kathy Cruz    ORGAN: RIGHT KIDNEY  Donor Type: donation after brain death  PHS Increased Risk: no  Cold Ischemia: 424 mins  Induction Medications: thymoglobulin    Subjective:     CC:  Reassessment of renal allograft function and management of chronic immunosuppression.    HPI:  Mr. Saenz is a 22 y.o. 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  He has CKD stage 3 - GFR 30-59 and his baseline creatinine is between please see below chart b. He takes mycophenolate mofetil, prednisone, and tacrolimus for maintenance immunosuppression. He denies any recent hospitalizations or ER visits since his previous clinic visit.    Patient was initially scheduled to discuss switch to Imuran from MMF per  recommendation. He wanted to father a child.    2 weeks ago he called to report that his wife was already pregnant and her pregnancy is already 6 weeks advanced    He is a  and sometimes gets confused with the changes made to his prograf dose. His level has been erratic. We discussed this issue in detail.    Otherwise he is very active with strong muscular complexity.    States that is following a healthy diet      No nausea vomit or diarrhea    No chest pain or SOB    No urinary symptoms              Current Outpatient Medications on File Prior to Visit   Medication Sig Dispense Refill    cinacalcet (SENSIPAR) 30 MG Tab Take 1 tablet (30 mg total) by mouth every evening. 30 tablet 11    famotidine (PEPCID) 20 MG tablet Take 1 tablet (20 mg total) by mouth every evening. 30 tablet 1    k phos di & mono-sod phos mono (PHOSPHA 250 NEUTRAL) 250 mg Tab Take 2 tablets by mouth 2 (two) times a day. 120 tablet 3    mycophenolate (CELLCEPT) 250 mg Cap Take 2 capsules (500 mg total) by mouth 2 (two) times daily. 120 capsule 11    nebivoloL  "(BYSTOLIC) 5 MG Tab Take 1 tablet (5 mg total) by mouth once daily. 30 tablet 11    NIFEdipine (PROCARDIA-XL) 30 MG (OSM) 24 hr tablet Take 1 tablet (30 mg total) by mouth 2 (two) times a day. 60 tablet 11    predniSONE (DELTASONE) 5 MG tablet Take by mouth daily: 20mg 3/1-3/31, 15mg 4/1-4/30, 10mg 5/1-5/31, 5mg 6/1/2022- 120 tablet 11    sodium zirconium cyclosilicate (LOKELMA) 10 gram packet Take 1 packet (10g) by mouth twice a day for 2 days, then once daily.  Mix entire contents of packet(s) into drinking glass containing 3 tablespoons of water; stir well and drink immediately. Add water and repeat until no powder remains to receive entire dose. 30 packet 11    tacrolimus (PROGRAF) 1 MG Cap Take 7 capsules (7 mg total) by mouth every 12 (twelve) hours. 1260 capsule 3    [DISCONTINUED] doxazosin (CARDURA) 8 MG Tab Take 8 mg by mouth nightly.      [DISCONTINUED] sodium bicarbonate 650 MG tablet Take 2 tablets (1,300 mg total) by mouth 2 (two) times daily. 120 tablet 11    [DISCONTINUED] terbinafine HCL (LAMISIL) 1 % cream Apply topically 2 (two) times daily. 24 g 3     No current facility-administered medications on file prior to visit.      Review of Systems    Skin: no skin rash  CNS; no headaches, blurred vision, seizure, or syncope  ENT: No JVD,  Adenopathies,  nasal congestion. No oral lesions  Cardiac: No chest pain, dyspnea, claudication, edema or palpitations  Respiratory: No SOB, cough, hemoptysis   Gastro-intestinal: No diarrhea, constipation, abdominal pain, nausea, vomit. No ascitis  Genitourinary: no hematuria, dysuria, frequency, frequency  Musculoskeletal: joint pain, arthritis or vasculitic changes  Psych: alert awake, oriented, No cranial nerves deficit.      Objective:         Physical Exam    BP (!) 148/82 (BP Location: Right arm, Patient Position: Sitting, BP Method: Medium (Automatic))   Pulse 74   Temp 97.3 °F (36.3 °C) (Tympanic)   Resp 16   Ht 5' 9" (1.753 m)   Wt 104.7 kg (230 lb 13.2 " oz)   SpO2 95%   BMI 34.09 kg/m²       Head: normocephalic  Neck: No JVD, cervical axillary, or femoral adenopathies  Heart: no murmurs, Normal s1 and s2, No gallops, no rubs, No murmurs  Lungs; CTA, good respiratory effort, no crackles  Abdomen: soft, non tender, no splenomegaly or hepatomegaly, no massess, no bruits  Extremities: No edema, skin rash, joint pain. LUE AVF  SNC: awake, alert oriented. Cranial nerves are intact, no focalized, sensitivity and strength preserved      Labs:  Lab Results   Component Value Date    WBC 4.97 12/19/2022    HGB 15.0 12/19/2022    HCT 48.0 12/19/2022     12/19/2022    K 4.5 12/19/2022     12/19/2022    CO2 22 (L) 12/19/2022    BUN 26 (H) 12/19/2022    CREATININE 1.9 (H) 12/19/2022    EGFRNONAA 45.7 (A) 07/25/2022    CALCIUM 9.7 12/19/2022    PHOS 2.5 (L) 12/19/2022    MG 1.7 12/19/2022    ALBUMIN 4.3 12/19/2022    AST 17 11/11/2022    ALT 16 11/11/2022    UTPCR 0.12 12/27/2022    .2 (H) 02/25/2022    TACROLIMUS 4.5 (L) 12/27/2022       No results found for: EXTANC, EXTWBC, EXTSEGS, EXTPLATELETS, EXTHEMOGLOBI, EXTHEMATOCRI, EXTCREATININ, EXTSODIUM, EXTPOTASSIUM, EXTBUN, EXTCO2, EXTCALCIUM, EXTPHOSPHORU, EXTGLUCOSE, EXTALBUMIN, EXTAST, EXTALT, EXTBILITOTAL, EXTLIPASE, EXTAMYLASE    No results found for: EXTCYCLOSLVL, EXTSIROLIMUS, EXTTACROLVL, EXTPROTCRE, EXTPTHINTACT, EXTPROTEINUA, EXTWBCUA, EXTRBCUA    Labs were reviewed with the patient.    Assessment:     1. Stage 3a chronic kidney disease    2. Secondary hyperparathyroidism of renal origin    3. Hypertension secondary to other renal disorders    4. Immunosuppression due to drug therapy    5. S/P kidney transplant        Plan:   No need to switch to Imuran  We discussed  recommendation versus data available. Data available establish that MMF is safe to father a child but the company advise to hold MMF several weeks before trying to father a child. This was explained to the patient and  wife.  We discussed need of consistency with tacro dose and time  Healthy diet  Low salt diet BP monitoring  Appropriate fluid intake     1. CKD stage 3 with stable GFR creatinine 1.9: will continue follow up as per our center guidelines. patient to continue close follow up with the local General nephrologist. Education provided in appropriate fluid intake, potassium intake. Continue with oral hydration.    1A. Pancreatic Function: N/A for non-pancreas allograft recipients:     2. High risk immunosuppression medication for organ transplantation, requiring regular intensive follow up and monitoring :  bid, prograf dose adjusted   Lab Results   Component Value Date    TACROLIMUS 4.5 (L) 12/27/2022    TACROLIMUS 3.1 (L) 12/19/2022    TACROLIMUS 6.7 11/29/2022     No results found for: CYCLOSPORINE  @   Will closely monitor for toxicities, education provided about adherence to medicines and need to communicate any side effects to the transplant nurse or physician.    3. Allograft Function:stable at baseline for the patient. Continue follow up as per our guidelines and with the local General nephrologist. Communication will be sent today.  Lab Results   Component Value Date    CREATININE 1.9 (H) 12/19/2022    CREATININE 1.9 (H) 11/11/2022    CREATININE 2.1 (H) 10/21/2022     Lab Results   Component Value Date    AMYLASE 81 03/09/2020    LIPASE 164 11/12/2017       4. Hypertension management:  Continue with home blood pressure monitoring, low salt and healthy life discussed with the patient..    5. Metabolic Bone Disease/Secondary Hyperparathyroidism:calcium and phosphorus level discussed with the patient, patient will continue follow up with the general nephrologist for management of metabolic bone disease. Will monitor PTH and Vit D per our transplant center guidelines.      Lab Results   Component Value Date    .2 (H) 02/25/2022    CALCIUM 9.7 12/19/2022    PHOS 2.5 (L) 12/19/2022    PHOS 2.5 (L)  11/11/2022    PHOS 2.9 10/21/2022       6. Electrolytes and acid base balance: reviewed with the patient, essentially within the normal range no need for acute changes today, will monitor as per our center guidelines.     Lab Results   Component Value Date     12/19/2022    K 4.5 12/19/2022     12/19/2022    CO2 22 (L) 12/19/2022    CO2 23 11/11/2022    CO2 22 (L) 10/21/2022       7. Anemia: will continue monitoring as per our center guidelines. No indication for acute intervention today.     Lab Results   Component Value Date    WBC 4.97 12/19/2022    HGB 15.0 12/19/2022    HCT 48.0 12/19/2022    MCV 92 12/19/2022     12/19/2022       8.Proteinuria: will continue with pr/cr ratio as per our center guidelines.  Lab Results   Component Value Date    PROTEINURINE 13 12/27/2022    CREATRANDUR 112.0 12/27/2022    UTPCR 0.12 12/27/2022    UTPCR 0.07 11/11/2022    UTPCR 0.08 08/22/2022        9. BK virus infection screening: will continue with urine or blood PCR as per our guidelines to prevent BK virus viremia and allograft dysfunction  Lab Results   Component Value Date    BKVIRUSPCRQB <125 11/11/2022         10. Weight and metabolic management: education provided provided during the clinic visit.   There is no height or weight on file to calculate BMI.       11.Patient safety education regarding immunosuppression including prophylaxis posttransplant for CMV, PCP : Education provided about vaccination and prevention of infections.    12.  Cytopenias: no significant cytopenias will monitor as per our guidelines. Medicine list reviewed including potential causes of drug-induced cytopenias     Lab Results   Component Value Date    WBC 4.97 12/19/2022    HGB 15.0 12/19/2022    HCT 48.0 12/19/2022    MCV 92 12/19/2022     12/19/2022       13. Post-transplant Prophylaxis; CMV Infection, PJP and Candida mucosistis and other indicated for this particular patient. OFF prophylaxis     I spoke with the  patient for 30 minutes. More than half dedicated to counseling and education. All questions answered    Mo Mi MD  Transplant Nephrology            Follow-up:   Clinic: return to transplant clinic weekly for the first month after transplant; every 2 weeks during months 2-3; then at 6-, 9-, 12-, 18-, 24-, and 36- months post-transplant to reassess for complications from immunosuppression toxicity and monitor for rejection.  Annually thereafter.    Labs: since patient remains at high risk for rejection and drug-related complications that warrant close monitoring, labs will be ordered as follows: continue twice weekly CBC, renal panel, and drug level for first month; then same labs once weekly through 3rd month post-transplant.  Urine for UA and protein/creatinine ratio monthly.  Serum BK - PCR at 1-, 3-, 6-, 9-, 12-, 18-, 24-, 36- 48-, and 60 months post-transplant.  Hepatic panel at 1-, 2-, 3-, 6-, 9-, 12-, 18-, 24-, and 36- months post-transplant.    Mo Mi MD       Education:   Material provided to the patient.  Patient reminded to call with any health changes since these can be early signs of significant complications.  Also, I advised the patient to be sure any new medications or changes of old medications are discussed with either a pharmacist or physician knowledgeable with transplant to avoid rejection/drug toxicity related to significant drug interactions.    Patient advised that it is recommended that all transplanted patients, and their close contacts and household members receive Covid vaccination.    UNOS Patient Status  Functional Status: 100% - Normal, no complaints, no evidence of disease  Physical Capacity: No Limitations

## 2023-01-06 ENCOUNTER — LAB VISIT (OUTPATIENT)
Dept: LAB | Facility: HOSPITAL | Age: 23
End: 2023-01-06
Attending: INTERNAL MEDICINE
Payer: MEDICARE

## 2023-01-06 ENCOUNTER — OFFICE VISIT (OUTPATIENT)
Dept: TRANSPLANT | Facility: CLINIC | Age: 23
End: 2023-01-06
Payer: MEDICARE

## 2023-01-06 VITALS
SYSTOLIC BLOOD PRESSURE: 148 MMHG | HEART RATE: 74 BPM | DIASTOLIC BLOOD PRESSURE: 82 MMHG | RESPIRATION RATE: 16 BRPM | HEIGHT: 69 IN | BODY MASS INDEX: 34.18 KG/M2 | WEIGHT: 230.81 LBS | TEMPERATURE: 97 F | OXYGEN SATURATION: 95 %

## 2023-01-06 DIAGNOSIS — Z94.0 S/P KIDNEY TRANSPLANT: ICD-10-CM

## 2023-01-06 DIAGNOSIS — N25.81 SECONDARY HYPERPARATHYROIDISM OF RENAL ORIGIN: Chronic | ICD-10-CM

## 2023-01-06 DIAGNOSIS — D84.821 IMMUNOSUPPRESSION DUE TO DRUG THERAPY: ICD-10-CM

## 2023-01-06 DIAGNOSIS — Z94.0 KIDNEY REPLACED BY TRANSPLANT: ICD-10-CM

## 2023-01-06 DIAGNOSIS — Z79.899 IMMUNOSUPPRESSION DUE TO DRUG THERAPY: ICD-10-CM

## 2023-01-06 DIAGNOSIS — N18.31 STAGE 3A CHRONIC KIDNEY DISEASE: Primary | Chronic | ICD-10-CM

## 2023-01-06 DIAGNOSIS — I15.1 HYPERTENSION SECONDARY TO OTHER RENAL DISORDERS: ICD-10-CM

## 2023-01-06 LAB
PTH-INTACT SERPL-MCNC: 396.1 PG/ML (ref 9–77)
TACROLIMUS BLD-MCNC: 8.3 NG/ML (ref 5–15)

## 2023-01-06 PROCEDURE — 99213 OFFICE O/P EST LOW 20 MIN: CPT | Mod: PBBFAC | Performed by: INTERNAL MEDICINE

## 2023-01-06 PROCEDURE — 99999 PR PBB SHADOW E&M-EST. PATIENT-LVL III: CPT | Mod: PBBFAC,,, | Performed by: INTERNAL MEDICINE

## 2023-01-06 PROCEDURE — 36415 COLL VENOUS BLD VENIPUNCTURE: CPT | Performed by: INTERNAL MEDICINE

## 2023-01-06 PROCEDURE — 99215 PR OFFICE/OUTPT VISIT, EST, LEVL V, 40-54 MIN: ICD-10-PCS | Mod: S$PBB,,, | Performed by: INTERNAL MEDICINE

## 2023-01-06 PROCEDURE — 80197 ASSAY OF TACROLIMUS: CPT | Performed by: INTERNAL MEDICINE

## 2023-01-06 PROCEDURE — 99215 OFFICE O/P EST HI 40 MIN: CPT | Mod: S$PBB,,, | Performed by: INTERNAL MEDICINE

## 2023-01-06 PROCEDURE — 99999 PR PBB SHADOW E&M-EST. PATIENT-LVL III: ICD-10-PCS | Mod: PBBFAC,,, | Performed by: INTERNAL MEDICINE

## 2023-01-06 PROCEDURE — 83970 ASSAY OF PARATHORMONE: CPT | Performed by: INTERNAL MEDICINE

## 2023-01-06 NOTE — LETTER
January 6, 2023        Kathy Cruz  [address] [place] [address]   [address]  [facility] [address]  Phone: [phone]  Fax: [phone]             [facility] 1st Fl  [address]  [facility] [address]  Phone: [phone]   Patient: Tameka Saenz   MR Number: [id number]   YOB: 2000   Date of Visit: 1/6/2023       Dear Dr. Kathy Cruz    Thank you for referring Tameka Saenz to me for evaluation. Attached you will find relevant portions of my assessment and plan of care.    If you have questions, please do not hesitate to call me. I look forward to following Tameka Saenz along with you.    Sincerely,    Mo Mi MD    Enclosure    If you would like to receive this communication electronically, please contact [email or web address] or [phone] to request [employer] access.    [employer] is a tool which provides read-only access to select patient information with whom you have a relationship. Its easy to use and provides real time access to review your patients record including encounter summaries, notes, results, and demographic information.    If you feel you have received this communication in error or would no longer like to receive these types of communications, please e-mail [email or web address]

## 2023-01-06 NOTE — LETTER
January 6, 2023      [facility] 1st Fl  [address]  [facility] [address]  Phone: [phone]       Patient: Tameka Saenz   YOB: 2000  Date of Visit: 01/06/2023    To Whom It May Concern:    Stephanie Saenz  was at [facility] on 01/06/2023. The patient may return to work/school on 1/7/2023 with no restrictions. If you have any questions or concerns, or if I can be of further assistance, please do not hesitate to contact me.    Sincerely,    Nida Daily LPN

## 2023-01-13 ENCOUNTER — PATIENT MESSAGE (OUTPATIENT)
Dept: TRANSPLANT | Facility: CLINIC | Age: 23
End: 2023-01-13
Payer: MEDICARE

## 2023-01-17 ENCOUNTER — CLINICAL SUPPORT (OUTPATIENT)
Dept: FAMILY MEDICINE | Facility: CLINIC | Age: 23
End: 2023-01-17
Payer: MEDICARE

## 2023-01-17 ENCOUNTER — LAB VISIT (OUTPATIENT)
Dept: FAMILY MEDICINE | Facility: CLINIC | Age: 23
End: 2023-01-17
Payer: MEDICARE

## 2023-01-17 DIAGNOSIS — E83.42 HYPOMAGNESEMIA: ICD-10-CM

## 2023-01-17 DIAGNOSIS — Z94.0 S/P KIDNEY TRANSPLANT: ICD-10-CM

## 2023-01-17 DIAGNOSIS — Z94.0 KIDNEY REPLACED BY TRANSPLANT: ICD-10-CM

## 2023-01-17 LAB
CREAT UR-MCNC: 231 MG/DL (ref 23–375)
PROT UR-MCNC: 36 MG/DL (ref 0–15)
PROT/CREAT UR: 0.16 MG/G{CREAT} (ref 0–0.2)

## 2023-01-17 PROCEDURE — 80197 ASSAY OF TACROLIMUS: CPT | Performed by: INTERNAL MEDICINE

## 2023-01-17 PROCEDURE — 83735 ASSAY OF MAGNESIUM: CPT | Performed by: INTERNAL MEDICINE

## 2023-01-17 PROCEDURE — 80069 RENAL FUNCTION PANEL: CPT | Performed by: INTERNAL MEDICINE

## 2023-01-17 PROCEDURE — 36415 PR COLLECTION VENOUS BLOOD,VENIPUNCTURE: ICD-10-PCS | Mod: S$GLB,,, | Performed by: INTERNAL MEDICINE

## 2023-01-17 PROCEDURE — 82570 ASSAY OF URINE CREATININE: CPT | Performed by: INTERNAL MEDICINE

## 2023-01-17 PROCEDURE — 85025 COMPLETE CBC W/AUTO DIFF WBC: CPT | Performed by: INTERNAL MEDICINE

## 2023-01-17 PROCEDURE — 36415 COLL VENOUS BLD VENIPUNCTURE: CPT | Mod: S$GLB,,, | Performed by: INTERNAL MEDICINE

## 2023-01-18 ENCOUNTER — PATIENT MESSAGE (OUTPATIENT)
Dept: TRANSPLANT | Facility: CLINIC | Age: 23
End: 2023-01-18
Payer: MEDICARE

## 2023-01-18 DIAGNOSIS — Z94.0 KIDNEY REPLACED BY TRANSPLANT: ICD-10-CM

## 2023-01-18 DIAGNOSIS — Z29.89 PROPHYLACTIC IMMUNOTHERAPY: ICD-10-CM

## 2023-01-18 DIAGNOSIS — E87.5 HYPERKALEMIA: ICD-10-CM

## 2023-01-18 LAB
ALBUMIN SERPL BCP-MCNC: 4.3 G/DL (ref 3.5–5.2)
ANION GAP SERPL CALC-SCNC: 8 MMOL/L (ref 8–16)
BASOPHILS # BLD AUTO: 0.02 K/UL (ref 0–0.2)
BASOPHILS NFR BLD: 0.3 % (ref 0–1.9)
BUN SERPL-MCNC: 15 MG/DL (ref 6–20)
CALCIUM SERPL-MCNC: 10 MG/DL (ref 8.7–10.5)
CHLORIDE SERPL-SCNC: 107 MMOL/L (ref 95–110)
CO2 SERPL-SCNC: 25 MMOL/L (ref 23–29)
CREAT SERPL-MCNC: 2 MG/DL (ref 0.5–1.4)
DIFFERENTIAL METHOD: ABNORMAL
EOSINOPHIL # BLD AUTO: 0.1 K/UL (ref 0–0.5)
EOSINOPHIL NFR BLD: 1.9 % (ref 0–8)
ERYTHROCYTE [DISTWIDTH] IN BLOOD BY AUTOMATED COUNT: 13.9 % (ref 11.5–14.5)
EST. GFR  (NO RACE VARIABLE): 46.9 ML/MIN/1.73 M^2
GLUCOSE SERPL-MCNC: 88 MG/DL (ref 70–110)
HCT VFR BLD AUTO: 47 % (ref 40–54)
HGB BLD-MCNC: 15.3 G/DL (ref 14–18)
IMM GRANULOCYTES # BLD AUTO: 0.01 K/UL (ref 0–0.04)
IMM GRANULOCYTES NFR BLD AUTO: 0.2 % (ref 0–0.5)
LYMPHOCYTES # BLD AUTO: 0.7 K/UL (ref 1–4.8)
LYMPHOCYTES NFR BLD: 11 % (ref 18–48)
MAGNESIUM SERPL-MCNC: 1.7 MG/DL (ref 1.6–2.6)
MCH RBC QN AUTO: 29.4 PG (ref 27–31)
MCHC RBC AUTO-ENTMCNC: 32.6 G/DL (ref 32–36)
MCV RBC AUTO: 90 FL (ref 82–98)
MONOCYTES # BLD AUTO: 0.6 K/UL (ref 0.3–1)
MONOCYTES NFR BLD: 10.1 % (ref 4–15)
NEUTROPHILS # BLD AUTO: 4.5 K/UL (ref 1.8–7.7)
NEUTROPHILS NFR BLD: 76.5 % (ref 38–73)
NRBC BLD-RTO: 0 /100 WBC
PHOSPHATE SERPL-MCNC: 2.2 MG/DL (ref 2.7–4.5)
PLATELET # BLD AUTO: 150 K/UL (ref 150–450)
PMV BLD AUTO: 12.2 FL (ref 9.2–12.9)
POTASSIUM SERPL-SCNC: 4.7 MMOL/L (ref 3.5–5.1)
RBC # BLD AUTO: 5.21 M/UL (ref 4.6–6.2)
SODIUM SERPL-SCNC: 140 MMOL/L (ref 136–145)
TACROLIMUS BLD-MCNC: 5 NG/ML (ref 5–15)
WBC # BLD AUTO: 5.93 K/UL (ref 3.9–12.7)

## 2023-01-18 RX ORDER — MYCOPHENOLATE MOFETIL 250 MG/1
1000 CAPSULE ORAL 2 TIMES DAILY
Qty: 240 CAPSULE | Refills: 11 | Status: SHIPPED | OUTPATIENT
Start: 2023-01-18 | End: 2023-10-17

## 2023-01-18 NOTE — TELEPHONE ENCOUNTER
Message sent to pt instructing him to increase cellcept to 1000mg BID. Informed pt we will be repeating monthly AlloSure and to let me know when he receives the kit in the mail.  ----- Message from Mo Mi MD sent at 1/18/2023 10:13 AM CST -----  Will increase MMF to 1000 bid and get a repeat allosure monthly x 3 . The allosure's for the most part are acceptable  ----- Message -----  From: Maribel Stern RN  Sent: 1/18/2023  10:10 AM CST  To: Mo Mi MD    Hey I just wanted to make sure we are ok with his FK 5 (he is 11 months out)  ----- Message -----  From: Mo Mi MD  Sent: 1/18/2023  10:03 AM CST  To: [facility] Post-Kidney Transplant Clinical    Labs and diagnostic tests were reviewed. No action/changes indicated.

## 2023-01-31 ENCOUNTER — PATIENT MESSAGE (OUTPATIENT)
Dept: TRANSPLANT | Facility: CLINIC | Age: 23
End: 2023-01-31
Payer: MEDICARE

## 2023-02-11 ENCOUNTER — PATIENT MESSAGE (OUTPATIENT)
Dept: TRANSPLANT | Facility: CLINIC | Age: 23
End: 2023-02-11
Payer: MEDICARE

## 2023-02-13 DIAGNOSIS — Z94.0 KIDNEY REPLACED BY TRANSPLANT: Primary | ICD-10-CM

## 2023-03-09 ENCOUNTER — PATIENT MESSAGE (OUTPATIENT)
Dept: TRANSPLANT | Facility: CLINIC | Age: 23
End: 2023-03-09
Payer: MEDICARE

## 2023-03-13 DIAGNOSIS — Z94.0 KIDNEY REPLACED BY TRANSPLANT: ICD-10-CM

## 2023-03-13 DIAGNOSIS — I15.1 HYPERTENSION SECONDARY TO OTHER RENAL DISORDERS: ICD-10-CM

## 2023-03-13 RX ORDER — NEBIVOLOL 5 MG/1
5 TABLET ORAL DAILY
Qty: 30 TABLET | Refills: 11 | Status: SHIPPED | OUTPATIENT
Start: 2023-03-13

## 2023-03-13 RX ORDER — NIFEDIPINE 30 MG/1
30 TABLET, EXTENDED RELEASE ORAL 2 TIMES DAILY
Qty: 60 TABLET | Refills: 11 | Status: SHIPPED | OUTPATIENT
Start: 2023-03-13 | End: 2024-01-31 | Stop reason: SDUPTHER

## 2023-03-15 NOTE — PROGRESS NOTES
Kidney Post-Transplant Assessment    Referring Physician: Yury Sotelo  Current Nephrologist: Kathy Cruz    ORGAN: RIGHT KIDNEY  Donor Type: donation after brain death  PHS Increased Risk: no  Cold Ischemia: 424 mins  Induction Medications: thymoglobulin    Subjective:     CC:  Reassessment of renal allograft function and management of chronic immunosuppression.    HPI:  Mr. Saenz is a 22 y.o. 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  He has CKD stage 3 - GFR 30-59 and his baseline creatinine is between please see below chart b. He takes mycophenolate mofetil, prednisone, and tacrolimus for maintenance immunosuppression. He denies any recent hospitalizations or ER visits since his previous clinic visit.    Patient reports doing well. 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 Reports his girlfriend is 4 months pregnant and doing well. Reports drinking 1 gallon of water a day. 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 States that is following a healthy diet . No nausea vomit or diarrhea. No chest pain or SOB. No urinary symptoms      Current Outpatient Medications on File Prior to Visit   Medication Sig Dispense Refill    cinacalcet (SENSIPAR) 30 MG Tab Take 1 tablet (30 mg total) by mouth every evening. 30 tablet 11    famotidine (PEPCID) 20 MG tablet Take 1 tablet (20 mg total) by mouth every evening. 30 tablet 1    k phos di & mono-sod phos mono (PHOSPHA 250 NEUTRAL) 250 mg Tab Take 2 tablets by mouth 2 (two) times daily. 120 tablet 11    mycophenolate (CELLCEPT) 250 mg Cap Take 4 capsules (1,000 mg total) by mouth 2 (two) times daily. 240 capsule 11    nebivoloL (BYSTOLIC) 5 MG Tab Take 1 tablet (5 mg total) by mouth once daily. 30 tablet 11    NIFEdipine (PROCARDIA-XL) 30 MG (OSM) 24 hr tablet Take 1 tablet (30 mg total) by mouth 2 (two) times a day. 60  "tablet 11    predniSONE (DELTASONE) 5 MG tablet Take by mouth daily: 20mg 3/1-3/31, 15mg 4/1-4/30, 10mg 5/1-5/31, 5mg 6/1/2022- (Patient taking differently: Take 5 mg by mouth once daily. Take by mouth daily: 20mg 3/1-3/31, 15mg 4/1-4/30, 10mg 5/1-5/31, 5mg 6/1/2022-) 120 tablet 11    sodium zirconium cyclosilicate (LOKELMA) 10 gram packet Take 1 packet (10g) by mouth twice a day for 2 days, then once daily.  Mix entire contents of packet(s) into drinking glass containing 3 tablespoons of water; stir well and drink immediately. Add water and repeat until no powder remains to receive entire dose. 30 packet 11    tacrolimus (PROGRAF) 1 MG Cap Take 7 capsules (7 mg total) by mouth every 12 (twelve) hours. 1260 capsule 3    [DISCONTINUED] doxazosin (CARDURA) 8 MG Tab Take 8 mg by mouth nightly.      [DISCONTINUED] sodium bicarbonate 650 MG tablet Take 2 tablets (1,300 mg total) by mouth 2 (two) times daily. 120 tablet 11    [DISCONTINUED] terbinafine HCL (LAMISIL) 1 % cream Apply topically 2 (two) times daily. 24 g 3     No current facility-administered medications on file prior to visit.      Review of Systems    Skin: no skin rash  CNS; no headaches, blurred vision, seizure, or syncope  ENT: No JVD,  Adenopathies,  nasal congestion. No oral lesions  Cardiac: No chest pain, dyspnea, claudication, edema or palpitations  Respiratory: No SOB, cough, hemoptysis   Gastro-intestinal: No diarrhea, constipation, abdominal pain, nausea, vomit. No ascitis  Genitourinary: no hematuria, dysuria, frequency, frequency  Musculoskeletal: joint pain, arthritis or vasculitic changes  Psych: alert awake, oriented, No cranial nerves deficit.      Objective:         Physical Exam    BP (!) 153/76 (BP Location: Right arm, Patient Position: Sitting, BP Method: Medium (Automatic))   Pulse 75   Temp 97.5 °F (36.4 °C) (Skin)   Resp 16   Ht 5' 9" (1.753 m)   Wt 100.5 kg (221 lb 9 oz)   SpO2 96%   BMI 32.72 kg/m²       Head: " normocephalic  Neck: No JVD, cervical axillary, or femoral adenopathies  Heart: no murmurs, Normal s1 and s2, No gallops, no rubs, No murmurs  Lungs; CTA, good respiratory effort, no crackles  Abdomen: soft, non tender, no splenomegaly or hepatomegaly, no massess, no bruits  Extremities: No edema, skin rash, joint pain. LUE AVF  SNC: awake, alert oriented. Cranial nerves are intact, no focalized, sensitivity and strength preserved      Labs:  Lab Results   Component Value Date    WBC 2.09 (L) 03/20/2023    HGB 15.2 03/20/2023    HCT 47.3 03/20/2023     03/20/2023    K 4.4 03/20/2023     03/20/2023    CO2 26 03/20/2023    BUN 16 03/20/2023    CREATININE 1.9 (H) 03/20/2023    EGFRNONAA 45.7 (A) 07/25/2022    CALCIUM 9.8 03/20/2023    PHOS 2.5 (L) 03/20/2023    MG 1.5 (L) 03/20/2023    ALBUMIN 4.2 03/20/2023    ALBUMIN 4.2 03/20/2023    AST 33 03/20/2023    ALT 45 (H) 03/20/2023    UTPCR 0.16 01/17/2023    .1 (H) 01/06/2023    TACROLIMUS 5.0 01/17/2023       No results found for: EXTANC, EXTWBC, EXTSEGS, EXTPLATELETS, EXTHEMOGLOBI, EXTHEMATOCRI, EXTCREATININ, EXTSODIUM, EXTPOTASSIUM, EXTBUN, EXTCO2, EXTCALCIUM, EXTPHOSPHORU, EXTGLUCOSE, EXTALBUMIN, EXTAST, EXTALT, EXTBILITOTAL, EXTLIPASE, EXTAMYLASE    No results found for: EXTCYCLOSLVL, EXTSIROLIMUS, EXTTACROLVL, EXTPROTCRE, EXTPTHINTACT, EXTPROTEINUA, EXTWBCUA, EXTRBCUA    Labs were reviewed with the patient.    Assessment:     1. S/P kidney transplant    2. Stage 3a chronic kidney disease    3. Immunosuppression due to drug therapy    4. Essential hypertension        Plan:   Pending labs today  WBC 2.09 no recent illness. MMF was increased to 1000mg (wbc corrected) in jan 2023. MMF has been at 500mg since June 2022 for lower WBCs. Needs repeat CBC.   Healthy diet  Low salt diet BP monitoring  Appropriate fluid intake       1. CKD stage 3 with stable GFR creatinine 1.9: will continue follow up as per our center guidelines. patient to continue close  follow up with the local General nephrologist. Education provided in appropriate fluid intake, potassium intake. Continue with oral hydration.    1A. Pancreatic Function: N/A for non-pancreas allograft recipients:     2. High risk immunosuppression medication for organ transplantation, requiring regular intensive follow up and monitoring : MMF 1000 bid, prograf dose adjusted Pred  Lab Results   Component Value Date    TACROLIMUS 5.0 01/17/2023    TACROLIMUS 8.3 01/06/2023    TACROLIMUS 4.5 (L) 12/27/2022   Will closely monitor for toxicities, education provided about adherence to medicines and need to communicate any side effects to the transplant nurse or physician.    3. Allograft Function:stable at baseline for the patient. Continue follow up as per our guidelines and with the local General nephrologist. Communication will be sent today.  Lab Results   Component Value Date    CREATININE 1.9 (H) 03/20/2023    CREATININE 2.0 (H) 01/17/2023    CREATININE 1.9 (H) 12/19/2022     Lab Results   Component Value Date    AMYLASE 81 03/09/2020    LIPASE 164 11/12/2017       4. Hypertension management:  Continue with home blood pressure monitoring, low salt and healthy life discussed with the patient..    5. Metabolic Bone Disease/Secondary Hyperparathyroidism:calcium and phosphorus level discussed with the patient, patient will continue follow up with the general nephrologist for management of metabolic bone disease. Will monitor PTH and Vit D per our transplant center guidelines.      Lab Results   Component Value Date    .1 (H) 01/06/2023    CALCIUM 9.8 03/20/2023    PHOS 2.5 (L) 03/20/2023    PHOS 2.2 (L) 01/17/2023    PHOS 2.5 (L) 12/19/2022       6. Electrolytes and acid base balance: reviewed with the patient, essentially within the normal range no need for acute changes today, will monitor as per our center guidelines.     Lab Results   Component Value Date     03/20/2023    K 4.4 03/20/2023      03/20/2023    CO2 26 03/20/2023    CO2 25 01/17/2023    CO2 22 (L) 12/19/2022       7. Anemia: will continue monitoring as per our center guidelines. No indication for acute intervention today.     Lab Results   Component Value Date    WBC 2.09 (L) 03/20/2023    HGB 15.2 03/20/2023    HCT 47.3 03/20/2023    MCV 88 03/20/2023    PLT 95 (L) 03/20/2023       8.Proteinuria: will continue with pr/cr ratio as per our center guidelines.  Lab Results   Component Value Date    PROTEINURINE 36 (H) 01/17/2023    CREATRANDUR 231.0 01/17/2023    UTPCR 0.16 01/17/2023    UTPCR 0.12 12/27/2022    UTPCR 0.07 11/11/2022        9. BK virus infection screening: will continue with urine or blood PCR as per our guidelines to prevent BK virus viremia and allograft dysfunction  Lab Results   Component Value Date    BKVIRUSPCRQB <125 11/11/2022         10. Weight and metabolic management: education provided provided during the clinic visit.   Body mass index is 32.72 kg/m².       11.Patient safety education regarding immunosuppression including prophylaxis posttransplant for CMV, PCP : Education provided about vaccination and prevention of infections.    12.  Cytopenias: no significant cytopenias will monitor as per our guidelines. Medicine list reviewed including potential causes of drug-induced cytopenias     Lab Results   Component Value Date    WBC 2.09 (L) 03/20/2023    HGB 15.2 03/20/2023    HCT 47.3 03/20/2023    MCV 88 03/20/2023    PLT 95 (L) 03/20/2023       13. Post-transplant Prophylaxis; CMV Infection, PJP and Candida mucosistis and other indicated for this particular patient. OFF prophylaxis     Follow-up:   Clinic: return to transplant clinic weekly for the first month after transplant; every 2 weeks during months 2-3; then at 6-, 9-, 12-, 18-, 24-, and 36- months post-transplant to reassess for complications from immunosuppression toxicity and monitor for rejection.  Annually thereafter.    Labs: since patient remains at  high risk for rejection and drug-related complications that warrant close monitoring, labs will be ordered as follows: continue twice weekly CBC, renal panel, and drug level for first month; then same labs once weekly through 3rd month post-transplant.  Urine for UA and protein/creatinine ratio monthly.  Serum BK - PCR at 1-, 3-, 6-, 9-, 12-, 18-, 24-, 36- 48-, and 60 months post-transplant.  Hepatic panel at 1-, 2-, 3-, 6-, 9-, 12-, 18-, 24-, and 36- months post-transplant.    Marisela Bucio NP       Education:   Material provided to the patient.  Patient reminded to call with any health changes since these can be early signs of significant complications.  Also, I advised the patient to be sure any new medications or changes of old medications are discussed with either a pharmacist or physician knowledgeable with transplant to avoid rejection/drug toxicity related to significant drug interactions.    Patient advised that it is recommended that all transplanted patients, and their close contacts and household members receive Covid vaccination.    UNOS Patient Status  Functional Status: 100% - Normal, no complaints, no evidence of disease  Physical Capacity: No Limitations

## 2023-03-20 ENCOUNTER — OFFICE VISIT (OUTPATIENT)
Dept: TRANSPLANT | Facility: CLINIC | Age: 23
End: 2023-03-20
Payer: MEDICARE

## 2023-03-20 ENCOUNTER — TELEPHONE (OUTPATIENT)
Dept: TRANSPLANT | Facility: CLINIC | Age: 23
End: 2023-03-20

## 2023-03-20 ENCOUNTER — LAB VISIT (OUTPATIENT)
Dept: LAB | Facility: HOSPITAL | Age: 23
End: 2023-03-20
Attending: INTERNAL MEDICINE
Payer: MEDICARE

## 2023-03-20 VITALS
HEART RATE: 75 BPM | HEIGHT: 69 IN | RESPIRATION RATE: 16 BRPM | SYSTOLIC BLOOD PRESSURE: 153 MMHG | OXYGEN SATURATION: 96 % | TEMPERATURE: 98 F | BODY MASS INDEX: 32.82 KG/M2 | DIASTOLIC BLOOD PRESSURE: 76 MMHG | WEIGHT: 221.56 LBS

## 2023-03-20 DIAGNOSIS — Z94.0 S/P KIDNEY TRANSPLANT: ICD-10-CM

## 2023-03-20 DIAGNOSIS — D84.821 IMMUNOSUPPRESSION DUE TO DRUG THERAPY: ICD-10-CM

## 2023-03-20 DIAGNOSIS — Z94.0 S/P KIDNEY TRANSPLANT: Primary | ICD-10-CM

## 2023-03-20 DIAGNOSIS — N18.31 STAGE 3A CHRONIC KIDNEY DISEASE: Chronic | ICD-10-CM

## 2023-03-20 DIAGNOSIS — Z94.0 KIDNEY REPLACED BY TRANSPLANT: ICD-10-CM

## 2023-03-20 DIAGNOSIS — E83.42 HYPOMAGNESEMIA: ICD-10-CM

## 2023-03-20 DIAGNOSIS — Z79.899 IMMUNOSUPPRESSION DUE TO DRUG THERAPY: ICD-10-CM

## 2023-03-20 DIAGNOSIS — I10 ESSENTIAL HYPERTENSION: ICD-10-CM

## 2023-03-20 LAB
ALBUMIN SERPL BCP-MCNC: 4.2 G/DL (ref 3.5–5.2)
ALBUMIN SERPL BCP-MCNC: 4.2 G/DL (ref 3.5–5.2)
ALP SERPL-CCNC: 76 U/L (ref 55–135)
ALT SERPL W/O P-5'-P-CCNC: 45 U/L (ref 10–44)
ANION GAP SERPL CALC-SCNC: 5 MMOL/L (ref 8–16)
AST SERPL-CCNC: 33 U/L (ref 10–40)
BASOPHILS # BLD AUTO: 0.01 K/UL (ref 0–0.2)
BASOPHILS NFR BLD: 0.5 % (ref 0–1.9)
BILIRUB DIRECT SERPL-MCNC: 0.2 MG/DL (ref 0.1–0.3)
BILIRUB SERPL-MCNC: 0.7 MG/DL (ref 0.1–1)
BUN SERPL-MCNC: 16 MG/DL (ref 6–20)
CALCIUM SERPL-MCNC: 9.8 MG/DL (ref 8.7–10.5)
CHLORIDE SERPL-SCNC: 106 MMOL/L (ref 95–110)
CO2 SERPL-SCNC: 26 MMOL/L (ref 23–29)
CREAT SERPL-MCNC: 1.9 MG/DL (ref 0.5–1.4)
DIFFERENTIAL METHOD: ABNORMAL
EOSINOPHIL # BLD AUTO: 0 K/UL (ref 0–0.5)
EOSINOPHIL NFR BLD: 1.9 % (ref 0–8)
ERYTHROCYTE [DISTWIDTH] IN BLOOD BY AUTOMATED COUNT: 13.8 % (ref 11.5–14.5)
EST. GFR  (NO RACE VARIABLE): 49.9 ML/MIN/1.73 M^2
GLUCOSE SERPL-MCNC: 88 MG/DL (ref 70–110)
HCT VFR BLD AUTO: 47.3 % (ref 40–54)
HGB BLD-MCNC: 15.2 G/DL (ref 14–18)
IMM GRANULOCYTES # BLD AUTO: 0 K/UL (ref 0–0.04)
IMM GRANULOCYTES NFR BLD AUTO: 0 % (ref 0–0.5)
LYMPHOCYTES # BLD AUTO: 0.6 K/UL (ref 1–4.8)
LYMPHOCYTES NFR BLD: 26.8 % (ref 18–48)
MAGNESIUM SERPL-MCNC: 1.5 MG/DL (ref 1.6–2.6)
MCH RBC QN AUTO: 28.3 PG (ref 27–31)
MCHC RBC AUTO-ENTMCNC: 32.1 G/DL (ref 32–36)
MCV RBC AUTO: 88 FL (ref 82–98)
MONOCYTES # BLD AUTO: 0.3 K/UL (ref 0.3–1)
MONOCYTES NFR BLD: 15.3 % (ref 4–15)
NEUTROPHILS # BLD AUTO: 1.2 K/UL (ref 1.8–7.7)
NEUTROPHILS NFR BLD: 55.5 % (ref 38–73)
NRBC BLD-RTO: 0 /100 WBC
PHOSPHATE SERPL-MCNC: 2.5 MG/DL (ref 2.7–4.5)
PLATELET # BLD AUTO: 95 K/UL (ref 150–450)
PLATELET BLD QL SMEAR: ABNORMAL
PMV BLD AUTO: 11.6 FL (ref 9.2–12.9)
POTASSIUM SERPL-SCNC: 4.4 MMOL/L (ref 3.5–5.1)
PROT SERPL-MCNC: 7.6 G/DL (ref 6–8.4)
RBC # BLD AUTO: 5.38 M/UL (ref 4.6–6.2)
SODIUM SERPL-SCNC: 137 MMOL/L (ref 136–145)
TACROLIMUS BLD-MCNC: 9.7 NG/ML (ref 5–15)
WBC # BLD AUTO: 2.09 K/UL (ref 3.9–12.7)

## 2023-03-20 PROCEDURE — 87799 DETECT AGENT NOS DNA QUANT: CPT | Performed by: INTERNAL MEDICINE

## 2023-03-20 PROCEDURE — 99213 OFFICE O/P EST LOW 20 MIN: CPT | Mod: PBBFAC | Performed by: NURSE PRACTITIONER

## 2023-03-20 PROCEDURE — 80197 ASSAY OF TACROLIMUS: CPT | Performed by: INTERNAL MEDICINE

## 2023-03-20 PROCEDURE — 36415 COLL VENOUS BLD VENIPUNCTURE: CPT | Performed by: INTERNAL MEDICINE

## 2023-03-20 PROCEDURE — 85025 COMPLETE CBC W/AUTO DIFF WBC: CPT | Performed by: INTERNAL MEDICINE

## 2023-03-20 PROCEDURE — 83735 ASSAY OF MAGNESIUM: CPT | Performed by: INTERNAL MEDICINE

## 2023-03-20 PROCEDURE — 84075 ASSAY ALKALINE PHOSPHATASE: CPT | Performed by: INTERNAL MEDICINE

## 2023-03-20 PROCEDURE — 99215 OFFICE O/P EST HI 40 MIN: CPT | Mod: S$PBB,,, | Performed by: NURSE PRACTITIONER

## 2023-03-20 PROCEDURE — 99999 PR PBB SHADOW E&M-EST. PATIENT-LVL III: CPT | Mod: PBBFAC,,, | Performed by: NURSE PRACTITIONER

## 2023-03-20 PROCEDURE — 80069 RENAL FUNCTION PANEL: CPT | Performed by: INTERNAL MEDICINE

## 2023-03-20 PROCEDURE — 99999 PR PBB SHADOW E&M-EST. PATIENT-LVL III: ICD-10-PCS | Mod: PBBFAC,,, | Performed by: NURSE PRACTITIONER

## 2023-03-20 PROCEDURE — 99215 PR OFFICE/OUTPT VISIT, EST, LEVL V, 40-54 MIN: ICD-10-PCS | Mod: S$PBB,,, | Performed by: NURSE PRACTITIONER

## 2023-03-20 NOTE — PROGRESS NOTES
REASON FOR VISIT:   S/p transplant; previously low phos, new labs drawn this AM     PAST MEDICAL HX:   S/p kidney transplant 2/25/22, HTN     LABS:   Phos 2.2 on 1/17/23    WT: 100.5 kg (221 lb)  BMI: 32.72 kg/m2     NUTRITION HX:   Pt present. Pt reports good appetite with no N/V/D/C.  lb. Pt reports intentional wt loss through exercise. Exercise consists of working out 5x/wk for 1.5 hrs. Pt goes to the gym to lift wts and run. When on the road 2/2 work, pt uses resistance bands and jumps rope. Pt states he likes many foods on the high phos list.     INTERVENTION/EDUCATION:  Phosphorus content of food list provided & reviewed w/ pt; encouraged to increase po intake of high phos foods and continue to monitor levels.  Encouraged physical activity daily, regular exercise as tolerated, stay mobile.    Patient voiced understanding of education & goals. Contact information was provided & will f/u as needed at clinic visits.     Consultation Time: 10 minutes

## 2023-03-20 NOTE — PROGRESS NOTES
I see his WBC and PLT count dropped?  Let's get a repeat CBC with diff  Did he report any acute illness?

## 2023-03-20 NOTE — TELEPHONE ENCOUNTER
No acute illness reported. WBC drop addressed by Marisela FRANCOIS in clinic: WBC drop possibly d/t MMF increase (from 500mg to 1000mg) in January. Will repeat CBC.  ----- Message from Mo Mi MD sent at 3/20/2023 11:05 AM CDT -----  I see his WBC and PLT count dropped?  Let's get a repeat CBC with diff  Did he report any acute illness?

## 2023-03-20 NOTE — PROGRESS NOTES
I see his WBC and PLT count dropped?  Let's get a repeat CBC with diff and also add a serum cmv pcr   Did he report any acute illness?

## 2023-03-20 NOTE — LETTER
March 20, 2023        Kathy Cruz  [address] [place] [address]   [address]  [facility] [address]  Phone: [phone]  Fax: [phone]             [facility] 1st Fl  [address]  [facility] [address]  Phone: [phone]   Patient: Tameka Saenz   MR Number: [id number]   YOB: 2000   Date of Visit: 3/20/2023       Dear Dr. Kathy Cruz    Thank you for referring Tameka Saenz to me for evaluation. Attached you will find relevant portions of my assessment and plan of care.    If you have questions, please do not hesitate to call me. I look forward to following Tameka Saenz along with you.    Sincerely,    Marisela Bucio, NP    Enclosure    If you would like to receive this communication electronically, please contact [email or web address] or [phone] to request [employer] Link access.    [employer] Link is a tool which provides read-only access to select patient information with whom you have a relationship. Its easy to use and provides real time access to review your patients record including encounter summaries, notes, results, and demographic information.    If you feel you have received this communication in error or would no longer like to receive these types of communications, please e-mail [email or web address]

## 2023-03-21 ENCOUNTER — DOCUMENTATION ONLY (OUTPATIENT)
Dept: TRANSPLANT | Facility: CLINIC | Age: 23
End: 2023-03-21
Payer: MEDICARE

## 2023-03-21 ENCOUNTER — TELEPHONE (OUTPATIENT)
Dept: TRANSPLANT | Facility: CLINIC | Age: 23
End: 2023-03-21
Payer: MEDICARE

## 2023-03-21 NOTE — TELEPHONE ENCOUNTER
Returned call to pt's mother who reported she had called [facility] for refills a week ago and when pt went to  meds yesterday, [facility] told pt medicine is not ready and a prior auth is required. No prior auth request was received by [facility]. Called [facility] this AM, rep on phone stated coverage went through and medicine should be filled but was unable to give an ETA on when medicine should be ready. Relayed message to pt mother.  ----- Message from Cosmo Price sent at 3/21/2023  9:17 AM CDT -----  Regarding: Medication Concern  Contact: Darcie  Patient's mother called in a little upset that patient was unable to obtain his refill on med [employer]. Pharmacy informed her that med requires a Prior Auth. Mother states patient is now out of med; did not have any to take with his morning meds today before getting back on the road for travel. Med Prior Auth has a status of denial in Epic w/o an appeal. Requesting a call back to advise/assist further.                Contact: [phone]

## 2023-03-22 LAB
ALLOSURE COMMENT: NORMAL
ALLOSURE SCORE KIDNEY: 0.18 %
INFORMATION: NORMAL

## 2023-03-29 ENCOUNTER — PATIENT MESSAGE (OUTPATIENT)
Dept: TRANSPLANT | Facility: CLINIC | Age: 23
End: 2023-03-29
Payer: MEDICARE

## 2023-04-03 ENCOUNTER — PATIENT MESSAGE (OUTPATIENT)
Dept: TRANSPLANT | Facility: CLINIC | Age: 23
End: 2023-04-03
Payer: MEDICARE

## 2023-04-04 ENCOUNTER — TELEPHONE (OUTPATIENT)
Dept: TRANSPLANT | Facility: CLINIC | Age: 23
End: 2023-04-04
Payer: MEDICARE

## 2023-04-04 NOTE — TELEPHONE ENCOUNTER
Attempted to return call, BETH.   ----- Message from Cosmo Price sent at 4/4/2023 11:52 AM CDT -----  Regarding: Speak to Nurse  Patient's mother called in requesting to speak with nurse; she has a few questions she'd like to answer on behalf of patient since he isn't available most of the time. No other info provided. Requested a call back to discuss further.                    Contact: [phone]

## 2023-04-24 ENCOUNTER — LAB VISIT (OUTPATIENT)
Dept: LAB | Facility: HOSPITAL | Age: 23
End: 2023-04-24
Attending: INTERNAL MEDICINE
Payer: MEDICARE

## 2023-04-24 DIAGNOSIS — Z94.0 S/P KIDNEY TRANSPLANT: ICD-10-CM

## 2023-04-24 DIAGNOSIS — Z94.0 KIDNEY REPLACED BY TRANSPLANT: ICD-10-CM

## 2023-04-24 LAB
ALBUMIN SERPL BCP-MCNC: 4 G/DL (ref 3.5–5.2)
ANION GAP SERPL CALC-SCNC: 6 MMOL/L (ref 8–16)
BASOPHILS # BLD AUTO: 0.02 K/UL (ref 0–0.2)
BASOPHILS NFR BLD: 0.5 % (ref 0–1.9)
BUN SERPL-MCNC: 32 MG/DL (ref 6–20)
CALCIUM SERPL-MCNC: 9.7 MG/DL (ref 8.7–10.5)
CHLORIDE SERPL-SCNC: 108 MMOL/L (ref 95–110)
CO2 SERPL-SCNC: 25 MMOL/L (ref 23–29)
CREAT SERPL-MCNC: 1.9 MG/DL (ref 0.5–1.4)
DIFFERENTIAL METHOD: ABNORMAL
EOSINOPHIL # BLD AUTO: 0.1 K/UL (ref 0–0.5)
EOSINOPHIL NFR BLD: 2.9 % (ref 0–8)
ERYTHROCYTE [DISTWIDTH] IN BLOOD BY AUTOMATED COUNT: 14.8 % (ref 11.5–14.5)
EST. GFR  (NO RACE VARIABLE): 49.9 ML/MIN/1.73 M^2
GLUCOSE SERPL-MCNC: 97 MG/DL (ref 70–110)
HCT VFR BLD AUTO: 43.5 % (ref 40–54)
HGB BLD-MCNC: 14.1 G/DL (ref 14–18)
IMM GRANULOCYTES # BLD AUTO: 0.02 K/UL (ref 0–0.04)
IMM GRANULOCYTES NFR BLD AUTO: 0.5 % (ref 0–0.5)
LYMPHOCYTES # BLD AUTO: 1.1 K/UL (ref 1–4.8)
LYMPHOCYTES NFR BLD: 27.8 % (ref 18–48)
MCH RBC QN AUTO: 28.6 PG (ref 27–31)
MCHC RBC AUTO-ENTMCNC: 32.4 G/DL (ref 32–36)
MCV RBC AUTO: 88 FL (ref 82–98)
MONOCYTES # BLD AUTO: 0.5 K/UL (ref 0.3–1)
MONOCYTES NFR BLD: 12.1 % (ref 4–15)
NEUTROPHILS # BLD AUTO: 2.1 K/UL (ref 1.8–7.7)
NEUTROPHILS NFR BLD: 56.2 % (ref 38–73)
NRBC BLD-RTO: 0 /100 WBC
PHOSPHATE SERPL-MCNC: 2.6 MG/DL (ref 2.7–4.5)
PLATELET # BLD AUTO: 138 K/UL (ref 150–450)
PMV BLD AUTO: 12 FL (ref 9.2–12.9)
POTASSIUM SERPL-SCNC: 4.8 MMOL/L (ref 3.5–5.1)
RBC # BLD AUTO: 4.93 M/UL (ref 4.6–6.2)
SODIUM SERPL-SCNC: 139 MMOL/L (ref 136–145)
WBC # BLD AUTO: 3.81 K/UL (ref 3.9–12.7)

## 2023-04-24 PROCEDURE — 80069 RENAL FUNCTION PANEL: CPT | Performed by: INTERNAL MEDICINE

## 2023-04-24 PROCEDURE — 80197 ASSAY OF TACROLIMUS: CPT | Performed by: INTERNAL MEDICINE

## 2023-04-24 PROCEDURE — 85025 COMPLETE CBC W/AUTO DIFF WBC: CPT | Performed by: INTERNAL MEDICINE

## 2023-04-24 PROCEDURE — 36415 COLL VENOUS BLD VENIPUNCTURE: CPT | Mod: PO | Performed by: INTERNAL MEDICINE

## 2023-04-25 LAB — TACROLIMUS BLD-MCNC: 8.4 NG/ML (ref 5–15)

## 2023-04-26 ENCOUNTER — DOCUMENTATION ONLY (OUTPATIENT)
Dept: TRANSPLANT | Facility: CLINIC | Age: 23
End: 2023-04-26
Payer: MEDICARE

## 2023-04-26 LAB — EXT ALLOSURE: 0.31

## 2023-05-09 LAB — ALLOSURE SCORE KIDNEY: NORMAL

## 2023-06-06 ENCOUNTER — TELEPHONE (OUTPATIENT)
Dept: TRANSPLANT | Facility: CLINIC | Age: 23
End: 2023-06-06
Payer: MEDICARE

## 2023-06-06 NOTE — TELEPHONE ENCOUNTER
Returned call to patient's mother.  Mrs. Saenz asked about routine visits and labs now that patient has made a year.  Explained that patient will now be seen with full routine labs every 6 months until he makes 2 years.  Labs and testing may be recommended in between visits as needed.  Mrs. Saenz verbalized understanding and did not have any further questions at this time.      ----- Message from Char Sin sent at 6/6/2023  8:14 AM CDT -----  Regarding: Speak to coordinator  Contact: Darcie  Pt mother Darcie is calling to speak to coordinator regarding, Medication, Labs, Status, and some other matters, Please advise. Requesting a call back.        Darcie Saenz (Mother)   [phone] (Mobile)

## 2023-08-24 ENCOUNTER — TELEPHONE (OUTPATIENT)
Dept: TRANSPLANT | Facility: CLINIC | Age: 23
End: 2023-08-24
Payer: MEDICARE

## 2023-08-24 ENCOUNTER — LAB VISIT (OUTPATIENT)
Dept: LAB | Facility: HOSPITAL | Age: 23
End: 2023-08-24
Attending: INTERNAL MEDICINE
Payer: MEDICARE

## 2023-08-24 DIAGNOSIS — Z94.0 KIDNEY REPLACED BY TRANSPLANT: ICD-10-CM

## 2023-08-24 LAB
ALBUMIN SERPL BCP-MCNC: 3.9 G/DL (ref 3.5–5.2)
ANION GAP SERPL CALC-SCNC: 8 MMOL/L (ref 8–16)
BASOPHILS # BLD AUTO: 0.02 K/UL (ref 0–0.2)
BASOPHILS NFR BLD: 0.4 % (ref 0–1.9)
BUN SERPL-MCNC: 17 MG/DL (ref 6–20)
CALCIUM SERPL-MCNC: 9.8 MG/DL (ref 8.7–10.5)
CHLORIDE SERPL-SCNC: 109 MMOL/L (ref 95–110)
CO2 SERPL-SCNC: 26 MMOL/L (ref 23–29)
CREAT SERPL-MCNC: 1.8 MG/DL (ref 0.5–1.4)
DIFFERENTIAL METHOD: NORMAL
EOSINOPHIL # BLD AUTO: 0.1 K/UL (ref 0–0.5)
EOSINOPHIL NFR BLD: 1 % (ref 0–8)
ERYTHROCYTE [DISTWIDTH] IN BLOOD BY AUTOMATED COUNT: 13.8 % (ref 11.5–14.5)
EST. GFR  (NO RACE VARIABLE): 53.2 ML/MIN/1.73 M^2
GLUCOSE SERPL-MCNC: 86 MG/DL (ref 70–110)
HCT VFR BLD AUTO: 45.1 % (ref 40–54)
HGB BLD-MCNC: 14.6 G/DL (ref 14–18)
IMM GRANULOCYTES # BLD AUTO: 0.01 K/UL (ref 0–0.04)
IMM GRANULOCYTES NFR BLD AUTO: 0.2 % (ref 0–0.5)
LYMPHOCYTES # BLD AUTO: 1.4 K/UL (ref 1–4.8)
LYMPHOCYTES NFR BLD: 28.9 % (ref 18–48)
MCH RBC QN AUTO: 29.6 PG (ref 27–31)
MCHC RBC AUTO-ENTMCNC: 32.4 G/DL (ref 32–36)
MCV RBC AUTO: 91 FL (ref 82–98)
MONOCYTES # BLD AUTO: 0.5 K/UL (ref 0.3–1)
MONOCYTES NFR BLD: 10.1 % (ref 4–15)
NEUTROPHILS # BLD AUTO: 2.9 K/UL (ref 1.8–7.7)
NEUTROPHILS NFR BLD: 59.4 % (ref 38–73)
NRBC BLD-RTO: 0 /100 WBC
PHOSPHATE SERPL-MCNC: 2.6 MG/DL (ref 2.7–4.5)
PLATELET # BLD AUTO: 164 K/UL (ref 150–450)
PMV BLD AUTO: 11.1 FL (ref 9.2–12.9)
POTASSIUM SERPL-SCNC: 4.3 MMOL/L (ref 3.5–5.1)
RBC # BLD AUTO: 4.94 M/UL (ref 4.6–6.2)
SODIUM SERPL-SCNC: 143 MMOL/L (ref 136–145)
WBC # BLD AUTO: 4.84 K/UL (ref 3.9–12.7)

## 2023-08-24 PROCEDURE — 87799 DETECT AGENT NOS DNA QUANT: CPT | Performed by: INTERNAL MEDICINE

## 2023-08-24 PROCEDURE — 80197 ASSAY OF TACROLIMUS: CPT | Performed by: INTERNAL MEDICINE

## 2023-08-24 PROCEDURE — 80069 RENAL FUNCTION PANEL: CPT | Performed by: INTERNAL MEDICINE

## 2023-08-24 PROCEDURE — 85025 COMPLETE CBC W/AUTO DIFF WBC: CPT | Performed by: INTERNAL MEDICINE

## 2023-08-24 NOTE — TELEPHONE ENCOUNTER
Called and R/S appt to V.V.  ----- Message from Chra Sin sent at 8/24/2023  2:41 PM CDT -----  Regarding: Speak to staff  Contact: Darcie  Regarding: Speak to staff    Pt: Tameka     Name Of Caller: Darcie        Contact Preference: [phone] (home)   [phone] ( mom)       Nature of call:  pt mother Darcie is calling to speak to staff regarding changing his appt to a virtual on 08/25/2023, because he is having car issues, and will not have transportation. Please advise.

## 2023-08-25 ENCOUNTER — PATIENT MESSAGE (OUTPATIENT)
Dept: TRANSPLANT | Facility: CLINIC | Age: 23
End: 2023-08-25

## 2023-08-25 ENCOUNTER — OFFICE VISIT (OUTPATIENT)
Dept: TRANSPLANT | Facility: CLINIC | Age: 23
End: 2023-08-25
Payer: MEDICARE

## 2023-08-25 ENCOUNTER — TELEPHONE (OUTPATIENT)
Dept: TRANSPLANT | Facility: CLINIC | Age: 23
End: 2023-08-25

## 2023-08-25 DIAGNOSIS — Z91.89 AT RISK FOR OPPORTUNISTIC INFECTIONS: ICD-10-CM

## 2023-08-25 DIAGNOSIS — Z94.0 KIDNEY REPLACED BY TRANSPLANT: ICD-10-CM

## 2023-08-25 DIAGNOSIS — Z79.899 IMMUNOSUPPRESSION DUE TO DRUG THERAPY: ICD-10-CM

## 2023-08-25 DIAGNOSIS — D84.821 IMMUNOSUPPRESSION DUE TO DRUG THERAPY: ICD-10-CM

## 2023-08-25 DIAGNOSIS — Z94.0 S/P KIDNEY TRANSPLANT: Primary | ICD-10-CM

## 2023-08-25 DIAGNOSIS — I15.1 HYPERTENSION SECONDARY TO OTHER RENAL DISORDERS: ICD-10-CM

## 2023-08-25 DIAGNOSIS — N18.31 STAGE 3A CHRONIC KIDNEY DISEASE: Chronic | ICD-10-CM

## 2023-08-25 DIAGNOSIS — N25.81 SECONDARY HYPERPARATHYROIDISM OF RENAL ORIGIN: Chronic | ICD-10-CM

## 2023-08-25 LAB — TACROLIMUS BLD-MCNC: 9.4 NG/ML (ref 5–15)

## 2023-08-25 PROCEDURE — 99214 PR OFFICE/OUTPT VISIT, EST, LEVL IV, 30-39 MIN: ICD-10-PCS | Mod: 95,,, | Performed by: NURSE PRACTITIONER

## 2023-08-25 PROCEDURE — 99214 OFFICE O/P EST MOD 30 MIN: CPT | Mod: 95,,, | Performed by: NURSE PRACTITIONER

## 2023-08-25 RX ORDER — TACROLIMUS 1 MG/1
CAPSULE ORAL
Qty: 1170 CAPSULE | Refills: 3 | Status: SHIPPED | OUTPATIENT
Start: 2023-08-25 | End: 2024-08-24

## 2023-08-25 NOTE — LETTER
[facility] [place] [address] [place] [address]   Phone [phone]   Fax [phone]                         August 25, 2023    Tameka Saenz  [address]  [facility] [address]      [facility]- Transplant 1st Fl  [address]  [facility] [address]  Phone: [phone] Tameka Saenz    Was treated here on 08/25/2023       Sincerely,    Jenni Lazcano NP

## 2023-08-25 NOTE — TELEPHONE ENCOUNTER
Prograf dose lowered to 7/6mg by PRISCILA Arteaga during clinic appointment, repeat level in 2 weeks  ----- Message from Mo Mi MD sent at 8/25/2023 10:27 AM CDT -----  Please lower prograf to 6/5 please

## 2023-08-25 NOTE — PROGRESS NOTES
Kidney Post-Transplant Assessment    Referring Physician: Yury Sotelo  Current Nephrologist: Kathy Cruz    ORGAN: RIGHT KIDNEY  Donor Type: donation after brain death  PHS Increased Risk: no  Cold Ischemia: 424 mins  Induction Medications: thymoglobulin    Subjective:     The patient location is: [facility]   The chief complaint leading to consultation is: Kidney Post-Transplant Assessment    Visit type: audiovisual    Face to Face time with patient: 20  30 minutes of total time spent on the encounter, which includes face to face time and non-face to face time preparing to see the patient (eg, review of tests), Obtaining and/or reviewing separately obtained history, Documenting clinical information in the electronic or other health record, Independently interpreting results (not separately reported) and communicating results to the patient/family/caregiver, or Care coordination (not separately reported).         Each patient to whom he or she provides medical services by telemedicine is:  (1) informed of the relationship between the physician and patient and the respective role of any other health care provider with respect to management of the patient; and (2) notified that he or she may decline to receive medical services by telemedicine and may withdraw from such care at any time.    Notes:    CC:  Reassessment of renal allograft function and management of chronic immunosuppression.    HPI:  Mr. Saenz is a 22 y.o. 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  He has CKD stage 3 - GFR 30-59 and his baseline creatinine is between  ~1.8-2.0. He takes mycophenolate mofetil, prednisone, and tacrolimus for maintenance immunosuppression. He denies any recent hospitalizations or ER visits since his previous clinic visit.    LOV 3/15/23    Interval HX:  Has re established with Dr REE Cruz/ general nephrology, next apt  9/2023  Intake-adequate intake  UOP-no problems reported   BP   BP Readings from Last 3 Encounters:   04/24/23 120/80   03/20/23 (!) 153/76   01/06/23 (!) 148/82       Peripheral edema-no  Weight-stable  Appetite-good     fx assessment -cont working as   Lab /diagnostic results reviewed with patient today.   All questions answered       Review of Systems   Constitutional:  Negative for activity change, appetite change, chills, fatigue, fever and unexpected weight change.   HENT:  Negative for congestion, facial swelling, postnasal drip, rhinorrhea, sinus pressure, sore throat and trouble swallowing.    Eyes:  Negative for pain, redness and visual disturbance.   Respiratory:  Negative for cough, chest tightness, shortness of breath and wheezing.    Cardiovascular: Negative.  Negative for chest pain, palpitations and leg swelling.   Gastrointestinal:  Negative for abdominal pain, diarrhea, nausea and vomiting.   Genitourinary:  Negative for dysuria, flank pain and urgency.   Musculoskeletal:  Negative for gait problem, neck pain and neck stiffness.   Skin:  Negative for rash.   Allergic/Immunologic: Positive for immunocompromised state. Negative for environmental allergies and food allergies.   Neurological:  Negative for dizziness, weakness, light-headedness and headaches.   Psychiatric/Behavioral:  Negative for agitation and confusion. The patient is not nervous/anxious.           Objective:     Physical Exam    There were no vitals taken for this visit.  Deferred d/t VV     Labs:  Lab Results   Component Value Date    WBC 4.84 08/24/2023    HGB 14.6 08/24/2023    HCT 45.1 08/24/2023     08/24/2023    K 4.3 08/24/2023     08/24/2023    CO2 26 08/24/2023    BUN 17 08/24/2023    CREATININE 1.8 (H) 08/24/2023    EGFRNONAA 45.7 (A) 07/25/2022    CALCIUM 9.8 08/24/2023    PHOS 2.6 (L) 08/24/2023    MG 1.5 (L) 03/20/2023    ALBUMIN 3.9 08/24/2023    AST 33 03/20/2023    ALT 45 (H) 03/20/2023     "UTPCR Unable to calculate 08/24/2023    .1 (H) 01/06/2023    TACROLIMUS 9.4 08/24/2023       No results found for: "EXTANC", "EXTWBC", "EXTSEGS", "EXTPLATELETS", "EXTHEMOGLOBI", "EXTHEMATOCRI", "EXTCREATININ", "EXTSODIUM", "EXTPOTASSIUM", "EXTBUN", "EXTCO2", "EXTCALCIUM", "EXTPHOSPHORU", "EXTGLUCOSE", "EXTALBUMIN", "EXTAST", "EXTALT", "EXTBILITOTAL", "EXTLIPASE", "EXTAMYLASE"    No results found for: "EXTCYCLOSLVL", "EXTSIROLIMUS", "EXTTACROLVL", "EXTPROTCRE", "EXTPTHINTACT", "EXTPROTEINUA", "EXTWBCUA", "EXTRBCUA"    Labs were reviewed with the patient.    Assessment:     1. S/P kidney transplant    2. Stage 3a chronic kidney disease    3. Immunosuppression due to drug therapy    4. Hypertension secondary to other renal disorders    5. Secondary hyperparathyroidism of renal origin    6. At risk for opportunistic infections          Plan:    Lower F/K 7/6  Repeat trough 2 weeks      1. CKD stage 3a with stable GFR creatinine 1.8: will continue follow up as per our center guidelines. patient to continue close follow up with the local General nephrologist. Education provided in appropriate fluid intake, potassium intake. Continue with oral hydration.         2. High risk immunosuppression medication for organ transplantation, requiring regular intensive follow up and monitoring : tough supra therapeutic, target b/w 4-7, lower FK 7/6, repeat trough ~ 2 weeks, cont MMF 1000 bid,  and Pred 5 mg QD  Lab Results   Component Value Date    TACROLIMUS 9.4 08/24/2023    TACROLIMUS 8.4 04/24/2023    TACROLIMUS 9.7 03/20/2023   Will closely monitor for toxicities, education provided about adherence to medicines and need to communicate any side effects to the transplant nurse or physician.    3. Allograft Function:stable , CKD 3a, and Remains at baseline for the patient. Continue follow up as per our guidelines and with the local General nephrologist. Communication will be sent today.  Lab Results   Component Value Date    " CREATININE 1.8 (H) 08/24/2023    CREATININE 1.9 (H) 04/24/2023    CREATININE 1.9 (H) 03/20/2023       Latest Reference Range & Units 1yr 5mo,   Kidney-Post 2 Year  08/24/23   eGFR >60 mL/min/1.73 m^2 53.2 !          4. Hypertension management:  Continue with home blood pressure monitoring, low salt and healthy life discussed with the patient..Cont Bystolic and nifedipine as prescribed -->MGMT deferred to general nephrology      5. Metabolic Bone Disease/Secondary Hyperparathyroidism:calcium and phosphorus level discussed with the patient, patient will continue follow up with the general nephrologist for management of metabolic bone disease. Will monitor PTH and Vit D per our transplant center guidelines.    Cont sensipar, Mg ox and KPN as prescribed -->MGMT deferred to general nephrology    Lab Results   Component Value Date    .1 (H) 01/06/2023    CALCIUM 9.8 08/24/2023    PHOS 2.6 (L) 08/24/2023    PHOS 2.6 (L) 04/24/2023    PHOS 2.5 (L) 03/20/2023       6. Electrolytes and acid base balance: reviewed with the patient, essentially within the normal range no need for acute changes today, will monitor as per our center guidelines.   Cont veltassa as prescribed, -->MGMT deferred to general nephrology    Lab Results   Component Value Date     08/24/2023    K 4.3 08/24/2023     08/24/2023    CO2 26 08/24/2023    CO2 25 04/24/2023    CO2 26 03/20/2023       7. Anemia: will continue monitoring as per our center guidelines. No indication for acute intervention today.   -->MGMT deferred to general nephrology    Lab Results   Component Value Date    WBC 4.84 08/24/2023    HGB 14.6 08/24/2023    HCT 45.1 08/24/2023    MCV 91 08/24/2023     08/24/2023       8.Proteinuria: will continue with pr/cr ratio as per our center guidelines.  Lab Results   Component Value Date    PROTEINURINE <7 08/24/2023    CREATRANDUR 143.0 08/24/2023    UTPCR Unable to calculate 08/24/2023    UTPCR 0.12 03/20/2023    UTPCR  0.16 01/17/2023        9. BK virus infection screening: will continue with urine or blood PCR as per our guidelines to prevent BK virus viremia and allograft dysfunction  Lab Results   Component Value Date    BKVIRUSPCRQB <125 03/20/2023         10. Weight and metabolic management: education provided provided during the clinic visit.   There is no height or weight on file to calculate BMI.       11.Patient safety education regarding immunosuppression including prophylaxis posttransplant for CMV, PCP : Education provided about vaccination and prevention of infections.    12.  Cytopenias: no significant cytopenias will monitor as per our guidelines. Medicine list reviewed including potential causes of drug-induced cytopenias     Lab Results   Component Value Date    WBC 4.84 08/24/2023    HGB 14.6 08/24/2023    HCT 45.1 08/24/2023    MCV 91 08/24/2023     08/24/2023       13. Post-transplant Prophylaxis; CMV Infection, PJP and Candida mucosistis and other indicated for this particular patient. OFF prophylaxis     Follow-up:   Clinic: return to transplant clinic weekly for the first month after transplant; every 2 weeks during months 2-3; then at 6-, 9-, 12-, 18-, 24-, and 36- months post-transplant to reassess for complications from immunosuppression toxicity and monitor for rejection.  Annually thereafter.    Labs: since patient remains at high risk for rejection and drug-related complications that warrant close monitoring, labs will be ordered as follows: continue twice weekly CBC, renal panel, and drug level for first month; then same labs once weekly through 3rd month post-transplant.  Urine for UA and protein/creatinine ratio monthly.  Serum BK - PCR at 1-, 3-, 6-, 9-, 12-, 18-, 24-, 36- 48-, and 60 months post-transplant.  Hepatic panel at 1-, 2-, 3-, 6-, 9-, 12-, 18-, 24-, and 36- months post-transplant.    Jenni Lazcano NP       Education:   Material provided to the patient.  Patient reminded to call  with any health changes since these can be early signs of significant complications.  Also, I advised the patient to be sure any new medications or changes of old medications are discussed with either a pharmacist or physician knowledgeable with transplant to avoid rejection/drug toxicity related to significant drug interactions.    Patient advised that it is recommended that all transplanted patients, and their close contacts and household members receive Covid vaccination.    UNOS Patient Status  Functional Status: 100% - Normal, no complaints, no evidence of disease  Physical Capacity: No Limitations

## 2023-08-25 NOTE — LETTER
August 25, 2023        Kathy Cruz  [address] [place] [address]   [address]  [facility] [address]  Phone: [phone]  Fax: [phone]             [facility] 1st Fl  [address]  [facility] [address]  Phone: [phone]   Patient: Tameka Saenz   MR Number: [id number]   YOB: 2000   Date of Visit: 8/25/2023       Dear Dr. Kathy Cruz    Thank you for referring Tameka Saenz to me for evaluation. Attached you will find relevant portions of my assessment and plan of care.    If you have questions, please do not hesitate to call me. I look forward to following Tameka Saenz along with you.    Sincerely,    Jenni Lazcano, NP    Enclosure    If you would like to receive this communication electronically, please contact [email or web address] or [phone] to request [facility] [employer] access.    [employer] is a tool which provides read-only access to select patient information with whom you have a relationship. Its easy to use and provides real time access to review your patients record including encounter summaries, notes, results, and demographic information.    If you feel you have received this communication in error or would no longer like to receive these types of communications, please e-mail [email or web address]

## 2023-09-21 ENCOUNTER — TELEPHONE (OUTPATIENT)
Dept: TRANSPLANT | Facility: CLINIC | Age: 23
End: 2023-09-21
Payer: MEDICARE

## 2023-10-05 ENCOUNTER — DOCUMENTATION ONLY (OUTPATIENT)
Dept: TRANSPLANT | Facility: CLINIC | Age: 23
End: 2023-10-05
Payer: MEDICARE

## 2023-10-13 DIAGNOSIS — Z94.0 KIDNEY REPLACED BY TRANSPLANT: ICD-10-CM

## 2023-10-13 DIAGNOSIS — Z29.89 PROPHYLACTIC IMMUNOTHERAPY: ICD-10-CM

## 2023-10-17 RX ORDER — MYCOPHENOLATE MOFETIL 250 MG/1
1000 CAPSULE ORAL 2 TIMES DAILY
Qty: 240 CAPSULE | Refills: 11 | Status: SHIPPED | OUTPATIENT
Start: 2023-10-17 | End: 2023-11-03 | Stop reason: SDUPTHER

## 2023-11-03 DIAGNOSIS — Z94.0 KIDNEY REPLACED BY TRANSPLANT: ICD-10-CM

## 2023-11-03 DIAGNOSIS — Z29.89 PROPHYLACTIC IMMUNOTHERAPY: ICD-10-CM

## 2023-11-05 RX ORDER — MYCOPHENOLATE MOFETIL 250 MG/1
1000 CAPSULE ORAL 2 TIMES DAILY
Qty: 240 CAPSULE | Refills: 11 | Status: SHIPPED | OUTPATIENT
Start: 2023-11-05

## 2024-02-21 ENCOUNTER — TELEPHONE (OUTPATIENT)
Dept: TRANSPLANT | Facility: CLINIC | Age: 24
End: 2024-02-21
Payer: MEDICARE

## 2024-02-21 DIAGNOSIS — Z94.0 KIDNEY REPLACED BY TRANSPLANT: Primary | ICD-10-CM

## 2024-02-21 NOTE — TELEPHONE ENCOUNTER
Returned call to patient's mother to discuss immunosuppression and effect on conceiving. Pt and fiance are getting  and plan to start family soon.  Consulted with Dr. Mi and PharmD - informed pt and mother that research shows that paternal exposure to MPA did not increase the risk of adverse birth outcomes in children fathered by male kidney transplanted patient. Pt and mother verbalized understanding.  ----- Message -----  From: Manuela Dominguez  Sent: 2/21/2024  12:19 PM CST  To: [facility] Post-Kidney Transplant Clinical  Subject: Patient advice                                             Name of Caller:  Alberto     Contact Preference:  [phone]    Nature of Call:  Requesting a call back have a few questions regarding medication

## 2024-02-28 PROBLEM — T45.1X5A IMMUNODEFICIENCY DUE TO LONG TERM IMMUNOSUPPRESSIVE DRUG THERAPY: Status: ACTIVE | Noted: 2022-02-26

## 2024-03-08 ENCOUNTER — LAB VISIT (OUTPATIENT)
Dept: LAB | Facility: HOSPITAL | Age: 24
End: 2024-03-08
Attending: INTERNAL MEDICINE
Payer: MEDICARE

## 2024-03-08 DIAGNOSIS — E83.42 HYPOMAGNESEMIA: ICD-10-CM

## 2024-03-08 DIAGNOSIS — N18.31 STAGE 3A CHRONIC KIDNEY DISEASE: ICD-10-CM

## 2024-03-08 DIAGNOSIS — E21.0 PRIMARY HYPERPARATHYROIDISM: ICD-10-CM

## 2024-03-08 LAB
25(OH)D3+25(OH)D2 SERPL-MCNC: 11 NG/ML (ref 30–96)
ALBUMIN SERPL BCP-MCNC: 4 G/DL (ref 3.5–5.2)
ANION GAP SERPL CALC-SCNC: 7 MMOL/L (ref 8–16)
BASOPHILS # BLD AUTO: 0.01 K/UL (ref 0–0.2)
BASOPHILS NFR BLD: 0.2 % (ref 0–1.9)
BUN SERPL-MCNC: 19 MG/DL (ref 6–20)
CALCIUM SERPL-MCNC: 10 MG/DL (ref 8.7–10.5)
CHLORIDE SERPL-SCNC: 109 MMOL/L (ref 95–110)
CO2 SERPL-SCNC: 25 MMOL/L (ref 23–29)
CREAT SERPL-MCNC: 1.7 MG/DL (ref 0.5–1.4)
DIFFERENTIAL METHOD BLD: ABNORMAL
EOSINOPHIL # BLD AUTO: 0.1 K/UL (ref 0–0.5)
EOSINOPHIL NFR BLD: 1.2 % (ref 0–8)
ERYTHROCYTE [DISTWIDTH] IN BLOOD BY AUTOMATED COUNT: 13.5 % (ref 11.5–14.5)
EST. GFR  (NO RACE VARIABLE): 56.7 ML/MIN/1.73 M^2
GLUCOSE SERPL-MCNC: 82 MG/DL (ref 70–110)
HCT VFR BLD AUTO: 49.6 % (ref 40–54)
HGB BLD-MCNC: 16.1 G/DL (ref 14–18)
IMM GRANULOCYTES # BLD AUTO: 0.01 K/UL (ref 0–0.04)
IMM GRANULOCYTES NFR BLD AUTO: 0.2 % (ref 0–0.5)
LYMPHOCYTES # BLD AUTO: 1.5 K/UL (ref 1–4.8)
LYMPHOCYTES NFR BLD: 38 % (ref 18–48)
MAGNESIUM SERPL-MCNC: 1.9 MG/DL (ref 1.6–2.6)
MCH RBC QN AUTO: 30 PG (ref 27–31)
MCHC RBC AUTO-ENTMCNC: 32.5 G/DL (ref 32–36)
MCV RBC AUTO: 93 FL (ref 82–98)
MONOCYTES # BLD AUTO: 0.4 K/UL (ref 0.3–1)
MONOCYTES NFR BLD: 10.4 % (ref 4–15)
NEUTROPHILS # BLD AUTO: 2 K/UL (ref 1.8–7.7)
NEUTROPHILS NFR BLD: 50 % (ref 38–73)
NRBC BLD-RTO: 0 /100 WBC
PHOSPHATE SERPL-MCNC: 2.7 MG/DL (ref 2.7–4.5)
PLATELET # BLD AUTO: 144 K/UL (ref 150–450)
PMV BLD AUTO: 11.2 FL (ref 9.2–12.9)
POTASSIUM SERPL-SCNC: 4.8 MMOL/L (ref 3.5–5.1)
PTH-INTACT SERPL-MCNC: 327 PG/ML (ref 9–77)
RBC # BLD AUTO: 5.36 M/UL (ref 4.6–6.2)
SODIUM SERPL-SCNC: 141 MMOL/L (ref 136–145)
WBC # BLD AUTO: 4.05 K/UL (ref 3.9–12.7)

## 2024-03-08 PROCEDURE — 83970 ASSAY OF PARATHORMONE: CPT | Performed by: INTERNAL MEDICINE

## 2024-03-08 PROCEDURE — 82306 VITAMIN D 25 HYDROXY: CPT | Performed by: INTERNAL MEDICINE

## 2024-03-08 PROCEDURE — 36415 COLL VENOUS BLD VENIPUNCTURE: CPT | Mod: PO | Performed by: INTERNAL MEDICINE

## 2024-03-08 PROCEDURE — 80069 RENAL FUNCTION PANEL: CPT | Performed by: INTERNAL MEDICINE

## 2024-03-08 PROCEDURE — 83735 ASSAY OF MAGNESIUM: CPT | Performed by: INTERNAL MEDICINE

## 2024-03-08 PROCEDURE — 85025 COMPLETE CBC W/AUTO DIFF WBC: CPT | Performed by: INTERNAL MEDICINE

## 2024-03-22 ENCOUNTER — LAB VISIT (OUTPATIENT)
Dept: LAB | Facility: HOSPITAL | Age: 24
End: 2024-03-22
Attending: INTERNAL MEDICINE
Payer: MEDICARE

## 2024-03-22 DIAGNOSIS — Z94.0 KIDNEY REPLACED BY TRANSPLANT: ICD-10-CM

## 2024-03-22 LAB
ALBUMIN SERPL BCP-MCNC: 3.9 G/DL (ref 3.5–5.2)
ANION GAP SERPL CALC-SCNC: 7 MMOL/L (ref 8–16)
BUN SERPL-MCNC: 22 MG/DL (ref 6–20)
CALCIUM SERPL-MCNC: 10 MG/DL (ref 8.7–10.5)
CHLORIDE SERPL-SCNC: 109 MMOL/L (ref 95–110)
CO2 SERPL-SCNC: 24 MMOL/L (ref 23–29)
CREAT SERPL-MCNC: 1.9 MG/DL (ref 0.5–1.4)
EST. GFR  (NO RACE VARIABLE): 49.6 ML/MIN/1.73 M^2
GLUCOSE SERPL-MCNC: 78 MG/DL (ref 70–110)
PHOSPHATE SERPL-MCNC: 2.3 MG/DL (ref 2.7–4.5)
POTASSIUM SERPL-SCNC: 5.2 MMOL/L (ref 3.5–5.1)
SODIUM SERPL-SCNC: 140 MMOL/L (ref 136–145)

## 2024-03-22 PROCEDURE — 36415 COLL VENOUS BLD VENIPUNCTURE: CPT | Mod: PO | Performed by: INTERNAL MEDICINE

## 2024-03-22 PROCEDURE — 80197 ASSAY OF TACROLIMUS: CPT | Performed by: INTERNAL MEDICINE

## 2024-03-22 PROCEDURE — 80069 RENAL FUNCTION PANEL: CPT | Performed by: INTERNAL MEDICINE

## 2024-03-23 LAB — TACROLIMUS BLD-MCNC: 3.4 NG/ML (ref 5–15)

## 2024-03-24 DIAGNOSIS — Z94.0 KIDNEY REPLACED BY TRANSPLANT: ICD-10-CM

## 2024-03-25 ENCOUNTER — PATIENT MESSAGE (OUTPATIENT)
Dept: TRANSPLANT | Facility: CLINIC | Age: 24
End: 2024-03-25
Payer: MEDICAID

## 2024-04-03 NOTE — PROGRESS NOTES
Kidney Post-Transplant Assessment    Referring Physician: Yury Sotelo  Current Nephrologist: Kathy Cruz    ORGAN: RIGHT KIDNEY  Donor Type: donation after brain death  PHS Increased Risk: no  Cold Ischemia: 424 mins  Induction Medications: thymoglobulin    Subjective:     The patient location is: [facility]   The chief complaint leading to consultation is: Kidney Post-Transplant Assessment    Visit type: audiovisual    Face to Face time with patient: 20  30 minutes of total time spent on the encounter, which includes face to face time and non-face to face time preparing to see the patient (eg, review of tests), Obtaining and/or reviewing separately obtained history, Documenting clinical information in the electronic or other health record, Independently interpreting results (not separately reported) and communicating results to the patient/family/caregiver, or Care coordination (not separately reported).         Each patient to whom he or she provides medical services by telemedicine is:  (1) informed of the relationship between the physician and patient and the respective role of any other health care provider with respect to management of the patient; and (2) notified that he or she may decline to receive medical services by telemedicine and may withdraw from such care at any time.    Notes:    CC:  Reassessment of renal allograft function and management of chronic immunosuppression.    HPI:  Mr. Saenz is a 23 y.o. 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  He has CKD stage 3 - GFR 30-59 and his baseline creatinine is between  ~1.8-2.0. He takes mycophenolate mofetil, prednisone, and tacrolimus for maintenance immunosuppression. He denies any recent hospitalizations or ER visits since his previous clinic visit.    LOV 8/5/23    Interval HX:  Recently had a low trough level.  The patient reports missing 2 doses, prior to lab,  while traveling.     --discussed the importance in taking IS meds timely and as prescribed. Also reinforced that missing doses puts him at a greater risk of rejection and loss of the his kidney txp   Reviewed IS meds, Pt is uncertain of the dose he is taking of prograf and will message back in  [employer] and let the team know.   Has re established with Dr REE Cruz/ general nephrology,  LOV  9/2023  Intake-adequate intake  UOP-no problems reported   BP   BP Readings from Last 3 Encounters:   11/24/23 (!) 155/89   09/29/23 (!) 144/90   04/24/23 120/80       Peripheral edema-no  Weight-stable  Appetite-good     fx assessment -cont working as   Lab /diagnostic results reviewed with patient today.   All questions answered       Review of Systems   Constitutional:  Negative for activity change, appetite change, chills, fatigue, fever and unexpected weight change.   HENT:  Negative for congestion, facial swelling, postnasal drip, rhinorrhea, sinus pressure, sore throat and trouble swallowing.    Eyes:  Negative for pain, redness and visual disturbance.   Respiratory:  Negative for cough, chest tightness, shortness of breath and wheezing.    Cardiovascular: Negative.  Negative for chest pain, palpitations and leg swelling.   Gastrointestinal:  Negative for abdominal pain, diarrhea, nausea and vomiting.   Genitourinary:  Negative for dysuria, flank pain and urgency.   Musculoskeletal:  Negative for gait problem, neck pain and neck stiffness.   Skin:  Negative for rash.   Allergic/Immunologic: Positive for immunocompromised state. Negative for environmental allergies and food allergies.   Neurological:  Negative for dizziness, weakness, light-headedness and headaches.   Psychiatric/Behavioral:  Negative for agitation and confusion. The patient is not nervous/anxious.           Objective:     Physical Exam    There were no vitals taken for this visit.  Deferred d/t VV     Labs:  Lab Results   Component Value Date     "WBC 4.05 03/08/2024    HGB 16.1 03/08/2024    HCT 49.6 03/08/2024     03/22/2024    K 5.2 (H) 03/22/2024     03/22/2024    CO2 24 03/22/2024    BUN 22 (H) 03/22/2024    CREATININE 1.9 (H) 03/22/2024    EGFRNONAA 45.7 (A) 07/25/2022    CALCIUM 10.0 03/22/2024    PHOS 2.3 (L) 03/22/2024    MG 1.9 03/08/2024    ALBUMIN 3.9 03/22/2024    AST 33 03/20/2023    ALT 45 (H) 03/20/2023    UTPCR 0.09 03/08/2024    .0 (H) 03/08/2024    TACROLIMUS 3.4 (L) 03/22/2024       No results found for: "EXTANC", "EXTWBC", "EXTSEGS", "EXTPLATELETS", "EXTHEMOGLOBI", "EXTHEMATOCRI", "EXTCREATININ", "EXTSODIUM", "EXTPOTASSIUM", "EXTBUN", "EXTCO2", "EXTCALCIUM", "EXTPHOSPHORU", "EXTGLUCOSE", "EXTALBUMIN", "EXTAST", "EXTALT", "EXTBILITOTAL", "EXTLIPASE", "EXTAMYLASE"    No results found for: "EXTCYCLOSLVL", "EXTSIROLIMUS", "EXTTACROLVL", "EXTPROTCRE", "EXTPTHINTACT", "EXTPROTEINUA", "EXTWBCUA", "EXTRBCUA"    Labs were reviewed with the patient.    Assessment:     1. S/P kidney transplant    2. Immunodeficiency due to long term immunosuppressive drug therapy    3. Stage 3a chronic kidney disease    4. Benign hypertension with CKD (chronic kidney disease) stage III    5. Secondary hyperparathyroidism of renal origin    6. At risk for opportunistic infections        Plan:    Follows with Dr REE Cruz/ general nephrology, next apt 4/12/24    Patient missed 2 doses prior to lab while traveling.     --discussed the importance in taking IS meds timely and as prescribed. Also reinforced that missing doses puts him at a greater risk of rejection and loss of the his kidney txp     Reviewed IS meds, Pt is uncertain of the dose he is taking of prograf and will message back in  [employer] and let the team   -need to clarify Prograf and MMF dosing with the patient     -needs a repeat prograf trough 1 week      1. CKD stage 3a with stable GFR creatinine 1.9: will continue follow up as per our center guidelines. patient to continue close follow " up with the local General nephrologist. Education provided in appropriate fluid intake, potassium intake. Continue with oral hydration.     2. High risk immunosuppression medication for organ transplantation, requiring regular intensive follow up and monitoring : tough 3.4--sub  therapeutic , target b/w 4-7, FK  -pt needs to clarify what dose he is taking ,  cont MMF 1000 bid,  and Pred 5 mg QD  Lab Results   Component Value Date    TACROLIMUS 3.4 (L) 03/22/2024    TACROLIMUS 9.4 08/24/2023    TACROLIMUS 8.4 04/24/2023   Will closely monitor for toxicities, education provided about adherence to medicines and need to communicate any side effects to the transplant nurse or physician.    3. Allograft Function:stable , CKD 3a, and Remains at baseline for the patient. Continue follow up as per our guidelines and with the local General nephrologist. Communication will be sent today.  Lab Results   Component Value Date    CREATININE 1.9 (H) 03/22/2024    CREATININE 1.7 (H) 03/08/2024    CREATININE 1.8 (H) 08/24/2023           Latest Reference Range & Units 2yr 0mo,  Kidney-Post 3 Year  03/22/24 08:51   eGFR >60 mL/min/1.73 m^2 49.6 !   !: Data is abnormal      4. Hypertension management:  Continue with home blood pressure monitoring, low salt and healthy life discussed with the patient..Cont Bystolic and nifedipine as prescribed -->MGMT deferred to general nephrology      5. Metabolic Bone Disease/Secondary Hyperparathyroidism:calcium and phosphorus level discussed with the patient, patient will continue follow up with the general nephrologist for management of metabolic bone disease. Will monitor PTH and Vit D per our transplant center guidelines.    Cont sensipar, Mg ox and KPN as prescribed -->MGMT deferred to general nephrology    Lab Results   Component Value Date    .0 (H) 03/08/2024    CALCIUM 10.0 03/22/2024    PHOS 2.3 (L) 03/22/2024    PHOS 2.7 03/08/2024    PHOS 2.6 (L) 08/24/2023       6. Electrolytes  and acid base balance: reviewed with the patient, essentially within the normal range no need for acute changes today, will monitor as per our center guidelines.   Cont veltassa as prescribed, -->MGMT deferred to general nephrology  -Reinforced low K diet   Lab Results   Component Value Date     03/22/2024    K 5.2 (H) 03/22/2024     03/22/2024    CO2 24 03/22/2024    CO2 25 03/08/2024    CO2 26 08/24/2023       7. Anemia: will continue monitoring as per our center guidelines. No indication for acute intervention today.   -->MGMT deferred to general nephrology    Lab Results   Component Value Date    WBC 4.05 03/08/2024    HGB 16.1 03/08/2024    HCT 49.6 03/08/2024    MCV 93 03/08/2024     (L) 03/08/2024       8.Proteinuria: will continue with pr/cr ratio as per our center guidelines.  Lab Results   Component Value Date    PROTEINURINE 16 (H) 03/08/2024    CREATRANDUR 178.0 03/08/2024    UTPCR 0.09 03/08/2024    UTPCR Unable to calculate 08/24/2023    UTPCR 0.12 03/20/2023        9. BK virus infection screening: will continue with urine or blood PCR as per our guidelines to prevent BK virus viremia and allograft dysfunction  Lab Results   Component Value Date    BKVIRUSPCRQB <125 08/24/2023         10. Weight and metabolic management: education provided provided during the clinic visit.   There is no height or weight on file to calculate BMI.       11.Patient safety education regarding immunosuppression including prophylaxis posttransplant for CMV, PCP : Education provided about vaccination and prevention of infections.    12.  Cytopenias: no significant cytopenias will monitor as per our guidelines. Medicine list reviewed including potential causes of drug-induced cytopenias     Lab Results   Component Value Date    WBC 4.05 03/08/2024    HGB 16.1 03/08/2024    HCT 49.6 03/08/2024    MCV 93 03/08/2024     (L) 03/08/2024       13. Post-transplant Prophylaxis; CMV Infection, PJP and Candida  mucosistis and other indicated for this particular patient. OFF prophylaxis     Follow-up:   Clinic: return to transplant clinic weekly for the first month after transplant; every 2 weeks during months 2-3; then at 6-, 9-, 12-, 18-, 24-, and 36- months post-transplant to reassess for complications from immunosuppression toxicity and monitor for rejection.  Annually thereafter.    Labs: since patient remains at high risk for rejection and drug-related complications that warrant close monitoring, labs will be ordered as follows: continue twice weekly CBC, renal panel, and drug level for first month; then same labs once weekly through 3rd month post-transplant.  Urine for UA and protein/creatinine ratio monthly.  Serum BK - PCR at 1-, 3-, 6-, 9-, 12-, 18-, 24-, 36- 48-, and 60 months post-transplant.  Hepatic panel at 1-, 2-, 3-, 6-, 9-, 12-, 18-, 24-, and 36- months post-transplant.    Jenni Lazcano NP       Education:   Material provided to the patient.  Patient reminded to call with any health changes since these can be early signs of significant complications.  Also, I advised the patient to be sure any new medications or changes of old medications are discussed with either a pharmacist or physician knowledgeable with transplant to avoid rejection/drug toxicity related to significant drug interactions.    Patient advised that it is recommended that all transplanted patients, and their close contacts and household members receive Covid vaccination.    UNOS Patient Status  Functional Status: 100% - Normal, no complaints, no evidence of disease  Physical Capacity: No Limitations

## 2024-04-05 ENCOUNTER — PATIENT MESSAGE (OUTPATIENT)
Dept: TRANSPLANT | Facility: CLINIC | Age: 24
End: 2024-04-05
Payer: MEDICAID

## 2024-04-05 ENCOUNTER — OFFICE VISIT (OUTPATIENT)
Dept: TRANSPLANT | Facility: CLINIC | Age: 24
End: 2024-04-05
Payer: MEDICARE

## 2024-04-05 DIAGNOSIS — N25.81 SECONDARY HYPERPARATHYROIDISM OF RENAL ORIGIN: Chronic | ICD-10-CM

## 2024-04-05 DIAGNOSIS — T45.1X5A IMMUNODEFICIENCY DUE TO LONG TERM IMMUNOSUPPRESSIVE DRUG THERAPY: ICD-10-CM

## 2024-04-05 DIAGNOSIS — Z91.89 AT RISK FOR OPPORTUNISTIC INFECTIONS: ICD-10-CM

## 2024-04-05 DIAGNOSIS — Z94.0 S/P KIDNEY TRANSPLANT: Primary | ICD-10-CM

## 2024-04-05 DIAGNOSIS — I12.9 BENIGN HYPERTENSION WITH CKD (CHRONIC KIDNEY DISEASE) STAGE III: ICD-10-CM

## 2024-04-05 DIAGNOSIS — N18.31 STAGE 3A CHRONIC KIDNEY DISEASE: Chronic | ICD-10-CM

## 2024-04-05 DIAGNOSIS — D84.821 IMMUNODEFICIENCY DUE TO LONG TERM IMMUNOSUPPRESSIVE DRUG THERAPY: ICD-10-CM

## 2024-04-05 DIAGNOSIS — Z79.899 IMMUNODEFICIENCY DUE TO LONG TERM IMMUNOSUPPRESSIVE DRUG THERAPY: ICD-10-CM

## 2024-04-05 DIAGNOSIS — N18.30 BENIGN HYPERTENSION WITH CKD (CHRONIC KIDNEY DISEASE) STAGE III: ICD-10-CM

## 2024-04-05 PROCEDURE — 99214 OFFICE O/P EST MOD 30 MIN: CPT | Mod: 95,,, | Performed by: NURSE PRACTITIONER

## 2024-04-05 NOTE — LETTER
April 5, 2024        Kathy Cruz  [address] [place] [address]   [address]  [facility] [address]  Phone: [phone]  Fax: [phone]             Ambrosio Gamez- Transplant 1st Fl  [address]  [facility] [address]  Phone: [phone]   Patient: Tameka Saenz   MR Number: [id number]   YOB: 2000   Date of Visit: 4/5/2024       Dear Dr. Kathy Cruz    Thank you for referring Tameka Saenz to me for evaluation. Attached you will find relevant portions of my assessment and plan of care.    If you have questions, please do not hesitate to call me. I look forward to following Tameka Saenz along with you.    Sincerely,    Jenni Lazcano, NP    Enclosure    If you would like to receive this communication electronically, please contact [email or web address] or [phone] to request [employer] access.    [employer] Link is a tool which provides read-only access to select patient information with whom you have a relationship. Its easy to use and provides real time access to review your patients record including encounter summaries, notes, results, and demographic information.    If you feel you have received this communication in error or would no longer like to receive these types of communications, please e-mail [email or web address]

## 2024-04-29 RX ORDER — TACROLIMUS 1 MG/1
CAPSULE ORAL
Qty: 1170 CAPSULE | Refills: 3 | Status: SHIPPED | OUTPATIENT
Start: 2024-04-29 | End: 2025-04-29

## 2024-05-03 DIAGNOSIS — Z94.0 KIDNEY REPLACED BY TRANSPLANT: Primary | ICD-10-CM

## 2024-05-10 ENCOUNTER — LAB VISIT (OUTPATIENT)
Dept: LAB | Facility: HOSPITAL | Age: 24
End: 2024-05-10
Attending: INTERNAL MEDICINE
Payer: MEDICARE

## 2024-05-10 DIAGNOSIS — E83.42 HYPOMAGNESEMIA: ICD-10-CM

## 2024-05-10 DIAGNOSIS — N18.31 STAGE 3A CHRONIC KIDNEY DISEASE: ICD-10-CM

## 2024-05-10 DIAGNOSIS — E21.0 PRIMARY HYPERPARATHYROIDISM: ICD-10-CM

## 2024-05-10 DIAGNOSIS — E55.9 VITAMIN D DEFICIENCY, UNSPECIFIED: ICD-10-CM

## 2024-05-10 DIAGNOSIS — Z94.0 KIDNEY REPLACED BY TRANSPLANT: ICD-10-CM

## 2024-05-10 LAB
25(OH)D3+25(OH)D2 SERPL-MCNC: 15 NG/ML (ref 30–96)
ALBUMIN SERPL BCP-MCNC: 3.9 G/DL (ref 3.5–5.2)
ANION GAP SERPL CALC-SCNC: 8 MMOL/L (ref 8–16)
BASOPHILS # BLD AUTO: 0.02 K/UL (ref 0–0.2)
BASOPHILS NFR BLD: 0.4 % (ref 0–1.9)
BUN SERPL-MCNC: 19 MG/DL (ref 6–20)
CALCIUM SERPL-MCNC: 9.9 MG/DL (ref 8.7–10.5)
CHLORIDE SERPL-SCNC: 111 MMOL/L (ref 95–110)
CO2 SERPL-SCNC: 23 MMOL/L (ref 23–29)
CREAT SERPL-MCNC: 1.9 MG/DL (ref 0.5–1.4)
DIFFERENTIAL METHOD BLD: ABNORMAL
EOSINOPHIL # BLD AUTO: 0.1 K/UL (ref 0–0.5)
EOSINOPHIL NFR BLD: 1.3 % (ref 0–8)
ERYTHROCYTE [DISTWIDTH] IN BLOOD BY AUTOMATED COUNT: 14.2 % (ref 11.5–14.5)
EST. GFR  (NO RACE VARIABLE): 49.6 ML/MIN/1.73 M^2
GLUCOSE SERPL-MCNC: 84 MG/DL (ref 70–110)
HCT VFR BLD AUTO: 48.1 % (ref 40–54)
HGB BLD-MCNC: 15.9 G/DL (ref 14–18)
IMM GRANULOCYTES # BLD AUTO: 0.02 K/UL (ref 0–0.04)
IMM GRANULOCYTES NFR BLD AUTO: 0.4 % (ref 0–0.5)
LYMPHOCYTES # BLD AUTO: 1.6 K/UL (ref 1–4.8)
LYMPHOCYTES NFR BLD: 34.8 % (ref 18–48)
MAGNESIUM SERPL-MCNC: 1.8 MG/DL (ref 1.6–2.6)
MCH RBC QN AUTO: 29.6 PG (ref 27–31)
MCHC RBC AUTO-ENTMCNC: 33.1 G/DL (ref 32–36)
MCV RBC AUTO: 90 FL (ref 82–98)
MONOCYTES # BLD AUTO: 0.5 K/UL (ref 0.3–1)
MONOCYTES NFR BLD: 10.6 % (ref 4–15)
NEUTROPHILS # BLD AUTO: 2.4 K/UL (ref 1.8–7.7)
NEUTROPHILS NFR BLD: 52.5 % (ref 38–73)
NRBC BLD-RTO: 0 /100 WBC
PHOSPHATE SERPL-MCNC: 2.6 MG/DL (ref 2.7–4.5)
PLATELET # BLD AUTO: 140 K/UL (ref 150–450)
PMV BLD AUTO: 11.5 FL (ref 9.2–12.9)
POTASSIUM SERPL-SCNC: 4.4 MMOL/L (ref 3.5–5.1)
PTH-INTACT SERPL-MCNC: 234.5 PG/ML (ref 9–77)
RBC # BLD AUTO: 5.37 M/UL (ref 4.6–6.2)
SODIUM SERPL-SCNC: 142 MMOL/L (ref 136–145)
WBC # BLD AUTO: 4.54 K/UL (ref 3.9–12.7)

## 2024-05-10 PROCEDURE — 83735 ASSAY OF MAGNESIUM: CPT | Performed by: INTERNAL MEDICINE

## 2024-05-10 PROCEDURE — 80069 RENAL FUNCTION PANEL: CPT | Performed by: INTERNAL MEDICINE

## 2024-05-10 PROCEDURE — 85025 COMPLETE CBC W/AUTO DIFF WBC: CPT | Performed by: INTERNAL MEDICINE

## 2024-05-10 PROCEDURE — 83970 ASSAY OF PARATHORMONE: CPT | Performed by: INTERNAL MEDICINE

## 2024-05-10 PROCEDURE — 36415 COLL VENOUS BLD VENIPUNCTURE: CPT | Mod: PO | Performed by: INTERNAL MEDICINE

## 2024-05-10 PROCEDURE — 80197 ASSAY OF TACROLIMUS: CPT | Performed by: INTERNAL MEDICINE

## 2024-05-10 PROCEDURE — 82306 VITAMIN D 25 HYDROXY: CPT | Performed by: INTERNAL MEDICINE

## 2024-05-11 LAB — TACROLIMUS BLD-MCNC: 4.8 NG/ML (ref 5–15)

## 2024-06-14 ENCOUNTER — LAB VISIT (OUTPATIENT)
Dept: LAB | Facility: HOSPITAL | Age: 24
End: 2024-06-14
Attending: INTERNAL MEDICINE
Payer: MEDICARE

## 2024-06-14 DIAGNOSIS — E55.9 VITAMIN D DEFICIENCY, UNSPECIFIED: ICD-10-CM

## 2024-06-14 DIAGNOSIS — N18.31 STAGE 3A CHRONIC KIDNEY DISEASE: Chronic | ICD-10-CM

## 2024-06-14 DIAGNOSIS — N25.81 SECONDARY HYPERPARATHYROIDISM OF RENAL ORIGIN: Chronic | ICD-10-CM

## 2024-06-14 LAB
25(OH)D3+25(OH)D2 SERPL-MCNC: 20 NG/ML (ref 30–96)
ALBUMIN SERPL BCP-MCNC: 4 G/DL (ref 3.5–5.2)
ANION GAP SERPL CALC-SCNC: 7 MMOL/L (ref 8–16)
BASOPHILS # BLD AUTO: 0.01 K/UL (ref 0–0.2)
BASOPHILS NFR BLD: 0.2 % (ref 0–1.9)
BUN SERPL-MCNC: 20 MG/DL (ref 6–20)
CALCIUM SERPL-MCNC: 9.8 MG/DL (ref 8.7–10.5)
CHLORIDE SERPL-SCNC: 108 MMOL/L (ref 95–110)
CO2 SERPL-SCNC: 24 MMOL/L (ref 23–29)
CREAT SERPL-MCNC: 1.8 MG/DL (ref 0.5–1.4)
DIFFERENTIAL METHOD BLD: ABNORMAL
EOSINOPHIL # BLD AUTO: 0 K/UL (ref 0–0.5)
EOSINOPHIL NFR BLD: 0.7 % (ref 0–8)
ERYTHROCYTE [DISTWIDTH] IN BLOOD BY AUTOMATED COUNT: 14.3 % (ref 11.5–14.5)
EST. GFR  (NO RACE VARIABLE): 52.9 ML/MIN/1.73 M^2
GLUCOSE SERPL-MCNC: 92 MG/DL (ref 70–110)
HCT VFR BLD AUTO: 47 % (ref 40–54)
HGB BLD-MCNC: 15.7 G/DL (ref 14–18)
IMM GRANULOCYTES # BLD AUTO: 0.01 K/UL (ref 0–0.04)
IMM GRANULOCYTES NFR BLD AUTO: 0.2 % (ref 0–0.5)
LYMPHOCYTES # BLD AUTO: 1.4 K/UL (ref 1–4.8)
LYMPHOCYTES NFR BLD: 33.3 % (ref 18–48)
MAGNESIUM SERPL-MCNC: 1.7 MG/DL (ref 1.6–2.6)
MCH RBC QN AUTO: 30.3 PG (ref 27–31)
MCHC RBC AUTO-ENTMCNC: 33.4 G/DL (ref 32–36)
MCV RBC AUTO: 91 FL (ref 82–98)
MONOCYTES # BLD AUTO: 0.4 K/UL (ref 0.3–1)
MONOCYTES NFR BLD: 9.4 % (ref 4–15)
NEUTROPHILS # BLD AUTO: 2.4 K/UL (ref 1.8–7.7)
NEUTROPHILS NFR BLD: 56.2 % (ref 38–73)
NRBC BLD-RTO: 0 /100 WBC
PHOSPHATE SERPL-MCNC: 2.4 MG/DL (ref 2.7–4.5)
PLATELET # BLD AUTO: 146 K/UL (ref 150–450)
PMV BLD AUTO: 11.4 FL (ref 9.2–12.9)
POTASSIUM SERPL-SCNC: 4.3 MMOL/L (ref 3.5–5.1)
PTH-INTACT SERPL-MCNC: 166.1 PG/ML (ref 9–77)
RBC # BLD AUTO: 5.19 M/UL (ref 4.6–6.2)
SODIUM SERPL-SCNC: 139 MMOL/L (ref 136–145)
WBC # BLD AUTO: 4.26 K/UL (ref 3.9–12.7)

## 2024-06-14 PROCEDURE — 82306 VITAMIN D 25 HYDROXY: CPT | Performed by: INTERNAL MEDICINE

## 2024-06-14 PROCEDURE — 83970 ASSAY OF PARATHORMONE: CPT | Performed by: INTERNAL MEDICINE

## 2024-06-14 PROCEDURE — 83735 ASSAY OF MAGNESIUM: CPT | Performed by: INTERNAL MEDICINE

## 2024-06-14 PROCEDURE — 80069 RENAL FUNCTION PANEL: CPT | Performed by: INTERNAL MEDICINE

## 2024-06-14 PROCEDURE — 85025 COMPLETE CBC W/AUTO DIFF WBC: CPT | Performed by: INTERNAL MEDICINE

## 2024-06-14 PROCEDURE — 36415 COLL VENOUS BLD VENIPUNCTURE: CPT | Mod: PO | Performed by: INTERNAL MEDICINE

## 2024-07-19 DIAGNOSIS — Z94.0 KIDNEY REPLACED BY TRANSPLANT: ICD-10-CM

## 2024-07-19 DIAGNOSIS — N25.81 SECONDARY HYPERPARATHYROIDISM OF RENAL ORIGIN: ICD-10-CM

## 2024-08-16 RX ORDER — CINACALCET 30 MG/1
30 TABLET, FILM COATED ORAL NIGHTLY
Qty: 90 TABLET | Refills: 3 | OUTPATIENT
Start: 2024-08-16

## 2024-12-04 ENCOUNTER — LAB VISIT (OUTPATIENT)
Dept: LAB | Facility: HOSPITAL | Age: 24
End: 2024-12-04
Attending: INTERNAL MEDICINE
Payer: MEDICARE

## 2024-12-04 DIAGNOSIS — E21.0 PRIMARY HYPERPARATHYROIDISM: ICD-10-CM

## 2024-12-04 DIAGNOSIS — E83.42 HYPOMAGNESEMIA: ICD-10-CM

## 2024-12-04 DIAGNOSIS — N18.31 STAGE 3A CHRONIC KIDNEY DISEASE: ICD-10-CM

## 2024-12-04 LAB
ALBUMIN SERPL BCP-MCNC: 3.9 G/DL (ref 3.5–5.2)
ANION GAP SERPL CALC-SCNC: 9 MMOL/L (ref 8–16)
BUN SERPL-MCNC: 16 MG/DL (ref 6–20)
CALCIUM SERPL-MCNC: 9.9 MG/DL (ref 8.7–10.5)
CHLORIDE SERPL-SCNC: 107 MMOL/L (ref 95–110)
CO2 SERPL-SCNC: 24 MMOL/L (ref 23–29)
CREAT SERPL-MCNC: 1.8 MG/DL (ref 0.5–1.4)
EST. GFR  (NO RACE VARIABLE): 52.6 ML/MIN/1.73 M^2
GLUCOSE SERPL-MCNC: 92 MG/DL (ref 70–110)
MAGNESIUM SERPL-MCNC: 1.8 MG/DL (ref 1.6–2.6)
PHOSPHATE SERPL-MCNC: 2.8 MG/DL (ref 2.7–4.5)
POTASSIUM SERPL-SCNC: 4.5 MMOL/L (ref 3.5–5.1)
PTH-INTACT SERPL-MCNC: 208.5 PG/ML (ref 9–77)
SODIUM SERPL-SCNC: 140 MMOL/L (ref 136–145)

## 2024-12-04 PROCEDURE — 83735 ASSAY OF MAGNESIUM: CPT | Performed by: INTERNAL MEDICINE

## 2024-12-04 PROCEDURE — 80069 RENAL FUNCTION PANEL: CPT | Performed by: INTERNAL MEDICINE

## 2024-12-04 PROCEDURE — 36415 COLL VENOUS BLD VENIPUNCTURE: CPT | Mod: PO | Performed by: INTERNAL MEDICINE

## 2024-12-04 PROCEDURE — 83970 ASSAY OF PARATHORMONE: CPT | Performed by: INTERNAL MEDICINE

## 2024-12-06 ENCOUNTER — NURSE TRIAGE (OUTPATIENT)
Dept: ADMINISTRATIVE | Facility: CLINIC | Age: 24
End: 2024-12-06
Payer: MEDICAID

## 2024-12-06 DIAGNOSIS — Z29.89 PROPHYLACTIC IMMUNOTHERAPY: ICD-10-CM

## 2024-12-06 DIAGNOSIS — I15.1 HYPERTENSION SECONDARY TO OTHER RENAL DISORDERS: ICD-10-CM

## 2024-12-06 DIAGNOSIS — Z94.0 KIDNEY REPLACED BY TRANSPLANT: ICD-10-CM

## 2024-12-06 RX ORDER — MYCOPHENOLATE MOFETIL 250 MG/1
1000 CAPSULE ORAL 2 TIMES DAILY
Qty: 240 CAPSULE | Refills: 11 | Status: SHIPPED | OUTPATIENT
Start: 2024-12-06

## 2024-12-06 RX ORDER — NEBIVOLOL 5 MG/1
5 TABLET ORAL DAILY
Qty: 30 TABLET | Refills: 11 | Status: SHIPPED | OUTPATIENT
Start: 2024-12-06

## 2024-12-06 RX ORDER — TACROLIMUS 1 MG/1
CAPSULE ORAL
Qty: 1170 CAPSULE | Refills: 3 | Status: SHIPPED | OUTPATIENT
Start: 2024-12-06 | End: 2025-12-06

## 2024-12-06 RX ORDER — PREDNISONE 5 MG/1
5 TABLET ORAL DAILY
Qty: 30 TABLET | Refills: 11 | Status: SHIPPED | OUTPATIENT
Start: 2024-12-06

## 2024-12-06 RX ORDER — TACROLIMUS 1 MG/1
CAPSULE ORAL
Qty: 390 CAPSULE | Refills: 11 | OUTPATIENT
Start: 2024-12-06

## 2024-12-06 RX ORDER — TACROLIMUS 1 MG/1
CAPSULE ORAL
Qty: 390 CAPSULE | Refills: 11 | Status: SHIPPED | OUTPATIENT
Start: 2024-12-06 | End: 2025-12-06

## 2024-12-06 NOTE — TELEPHONE ENCOUNTER
Pt's mother reports she is calling for pt as he is currently driving, but his transplant med refills did not get sent to the [facility]/pharmacy #[id number] - [address] [facility] and the office told her earlier they were going to send them, pt is a  and is needing the meds by tomorrow. Call placed to [facility] KT On Call MD, Dr. Mo Mi, voicemail message was left for a call back then a Secure chat message was sent. Dr. Mi responded via secure chat to send the scripts to him and he will co-sign to have them sent for pt. Caller informed and encouraged to call back with any worsening symptoms or questions. She verbalized understanding. Following prescription refills sent:  prednisone 5 mg Oral Daily    mycophenolate mofetil 1,000 mg Oral 2 times daily    tacrolimus 1 MG Take 7 capsules (7 mg total) by mouth every morning AND 6 capsules (6 mg total) every evening.    nebivolol HCl 5 mg Oral Daily      Reason for Disposition   [1] Prescription refill request for ESSENTIAL medicine (i.e., likelihood of harm to patient if not taken) AND [2] triager unable to refill per department policy    Protocols used: Medication Refill and Renewal Call-A-

## 2025-01-29 DIAGNOSIS — Z94.0 KIDNEY REPLACED BY TRANSPLANT: Primary | ICD-10-CM

## 2025-02-07 ENCOUNTER — LAB VISIT (OUTPATIENT)
Dept: LAB | Facility: HOSPITAL | Age: 25
End: 2025-02-07
Attending: INTERNAL MEDICINE
Payer: MEDICARE

## 2025-02-07 DIAGNOSIS — Z94.0 KIDNEY REPLACED BY TRANSPLANT: ICD-10-CM

## 2025-02-07 LAB
BASOPHILS # BLD AUTO: 0.02 K/UL (ref 0–0.2)
BASOPHILS NFR BLD: 0.5 % (ref 0–1.9)
DIFFERENTIAL METHOD BLD: ABNORMAL
EOSINOPHIL # BLD AUTO: 0.1 K/UL (ref 0–0.5)
EOSINOPHIL NFR BLD: 1.6 % (ref 0–8)
ERYTHROCYTE [DISTWIDTH] IN BLOOD BY AUTOMATED COUNT: 13.8 % (ref 11.5–14.5)
HCT VFR BLD AUTO: 50.3 % (ref 40–54)
HGB BLD-MCNC: 16.8 G/DL (ref 14–18)
IMM GRANULOCYTES # BLD AUTO: 0.01 K/UL (ref 0–0.04)
IMM GRANULOCYTES NFR BLD AUTO: 0.3 % (ref 0–0.5)
LYMPHOCYTES # BLD AUTO: 1.5 K/UL (ref 1–4.8)
LYMPHOCYTES NFR BLD: 38.4 % (ref 18–48)
MCH RBC QN AUTO: 30.5 PG (ref 27–31)
MCHC RBC AUTO-ENTMCNC: 33.4 G/DL (ref 32–36)
MCV RBC AUTO: 91 FL (ref 82–98)
MONOCYTES # BLD AUTO: 0.4 K/UL (ref 0.3–1)
MONOCYTES NFR BLD: 10.8 % (ref 4–15)
NEUTROPHILS # BLD AUTO: 1.8 K/UL (ref 1.8–7.7)
NEUTROPHILS NFR BLD: 48.4 % (ref 38–73)
NRBC BLD-RTO: 0 /100 WBC
PLATELET # BLD AUTO: 149 K/UL (ref 150–450)
PMV BLD AUTO: 11.4 FL (ref 9.2–12.9)
RBC # BLD AUTO: 5.51 M/UL (ref 4.6–6.2)
WBC # BLD AUTO: 3.8 K/UL (ref 3.9–12.7)

## 2025-02-07 PROCEDURE — 80197 ASSAY OF TACROLIMUS: CPT | Performed by: INTERNAL MEDICINE

## 2025-02-07 PROCEDURE — 87799 DETECT AGENT NOS DNA QUANT: CPT | Performed by: INTERNAL MEDICINE

## 2025-02-07 PROCEDURE — 80069 RENAL FUNCTION PANEL: CPT | Performed by: INTERNAL MEDICINE

## 2025-02-07 PROCEDURE — 85025 COMPLETE CBC W/AUTO DIFF WBC: CPT | Performed by: INTERNAL MEDICINE

## 2025-02-08 LAB
ALBUMIN SERPL BCP-MCNC: 4 G/DL (ref 3.5–5.2)
ANION GAP SERPL CALC-SCNC: 9 MMOL/L (ref 8–16)
BUN SERPL-MCNC: 17 MG/DL (ref 6–20)
CALCIUM SERPL-MCNC: 10.2 MG/DL (ref 8.7–10.5)
CHLORIDE SERPL-SCNC: 109 MMOL/L (ref 95–110)
CO2 SERPL-SCNC: 21 MMOL/L (ref 23–29)
CREAT SERPL-MCNC: 1.6 MG/DL (ref 0.5–1.4)
EST. GFR  (NO RACE VARIABLE): >60 ML/MIN/1.73 M^2
GLUCOSE SERPL-MCNC: 88 MG/DL (ref 70–110)
PHOSPHATE SERPL-MCNC: 2 MG/DL (ref 2.7–4.5)
POTASSIUM SERPL-SCNC: 5.2 MMOL/L (ref 3.5–5.1)
SODIUM SERPL-SCNC: 139 MMOL/L (ref 136–145)
TACROLIMUS BLD-MCNC: 4.6 NG/ML (ref 5–15)

## 2025-02-14 NOTE — PROGRESS NOTES
Kidney Post-Transplant Assessment    Referring Physician: Yury Sotelo  Current Nephrologist: Kathy Cruz    ORGAN: RIGHT KIDNEY  Donor Type: donation after brain death  PHS Increased Risk: no  Cold Ischemia: 424 mins  Induction Medications: thymoglobulin    Subjective:   The patient location is: [place]  The chief complaint leading to consultation is:  Kidney Post-Transplant Assessment    Visit type: audiovisual    Face to Face time with patient:   30 minutes of total time spent on the encounter, which includes face to face time and non-face to face time preparing to see the patient (eg, review of tests), Obtaining and/or reviewing separately obtained history, Documenting clinical information in the electronic or other health record, Independently interpreting results (not separately reported) and communicating results to the patient/family/caregiver, or Care coordination (not separately reported).     Each patient to whom he or she provides medical services by telemedicine is:  (1) informed of the relationship between the physician and patient and the respective role of any other health care provider with respect to management of the patient; and (2) notified that he or she may decline to receive medical services by telemedicine and may withdraw from such care at any time.       CC:  Reassessment of renal allograft function and management of chronic immunosuppression.    HPI:  Mr. Saenz is a 24 y.o. year old Black or  male with history of ESRD secondary to HTN who received a donation after brain death kidney transplant on 2/25/22 (Thymo induction, CIT 7 hr. 4 min., CPRA 54%, KDPI 4%, CMV D+,R-).  He has CKD stage 2 - GFR 60-89 and his baseline creatinine is between 1.7-1.9. He takes mycophenolate mofetil, prednisone, and tacrolimus for maintenance immunosuppression. He denies any recent hospitalizations or ER visits since his previous clinic visit.    Following with general nephrology   [facility].    He is now 3 years post kidney transplant and feels great without complaints. Staying hydrated, no problems with urination. Home , no peripheral edema. Energy levels and appetite great. Tolerating IS without issue, no diarrhea or vomiting.      Current Outpatient Medications   Medication Sig Dispense Refill    amLODIPine (NORVASC) 10 MG tablet Take 1 tablet by mouth every evening.      cholecalciferol, vitamin D3, (VITAMIN D3) 125 mcg (5,000 unit) Tab Take 1 tablet (5,000 Units total) by mouth once daily.      cinacalcet (SENSIPAR) 30 MG Tab Take 1 tablet (30 mg total) by mouth daily with breakfast. 90 tablet 3    k phos di & mono-sod phos mono (PHOSPHA 250 NEUTRAL) 250 mg Tab Take 2 tablets by mouth 2 (two) times daily. 120 tablet 11    magnesium chloride (SLOW-MAG) 71.5 mg TbEC Take 72 mg by mouth 2 (two) times a day. 2 tablet 0    mycophenolate (CELLCEPT) 250 mg Cap Take 4 capsules (1,000 mg total) by mouth 2 (two) times daily. 240 capsule 11    nebivoloL (BYSTOLIC) 5 MG Tab Take 1 tablet (5 mg total) by mouth once daily. 30 tablet 11    NIFEdipine (PROCARDIA-XL) 60 MG (OSM) 24 hr tablet Take 1 tablet (60 mg total) by mouth every evening. 90 tablet 3    predniSONE (DELTASONE) 5 MG tablet Take 1 tablet (5 mg total) by mouth once daily. 30 tablet 11    tacrolimus (PROGRAF) 1 MG Cap Take 7 capsules (7 mg total) by mouth every morning AND 6 capsules (6 mg total) every evening. 390 capsule 11     No current facility-administered medications for this visit.       Past Medical History:   Diagnosis Date    Acne     Anemia of renal disease     Dialysis patient     peritoneal daily at night. followed by Dr. Sotelo    ESRD on dialysis since 12/16/15     Essential hypertension 12/22/2022    Hepatosplenomegaly     Hypertension     Kidney disease     Seasonal allergies     Secondary hyperparathyroidism of renal origin     Thrombocytopenia, unspecified        Review of Systems   Constitutional:  Negative for  activity change and fever.   Eyes:  Negative for visual disturbance.   Respiratory:  Negative for cough and shortness of breath.    Cardiovascular:  Negative for chest pain and leg swelling.   Gastrointestinal:  Negative for abdominal pain, constipation, diarrhea and nausea.   Genitourinary:  Negative for difficulty urinating, frequency and hematuria.   Musculoskeletal:  Negative for arthralgias and myalgias.   Skin:  Negative for wound.   Allergic/Immunologic: Positive for immunocompromised state.   Neurological:  Negative for weakness.   Psychiatric/Behavioral:  Negative for sleep disturbance.        Objective:   There were no vitals taken for this visit.body mass index is unknown because there is no height or weight on file.    Physical Exam - VIRTUAL VISIT    Labs:  Lab Results   Component Value Date    WBC 3.80 (L) 02/07/2025    HGB 16.8 02/07/2025    HCT 50.3 02/07/2025     02/07/2025    K 5.2 (H) 02/07/2025     02/07/2025    CO2 21 (L) 02/07/2025    BUN 17 02/07/2025    CREATININE 1.6 (H) 02/07/2025    EGFRNORACEVR >60.0 02/07/2025    CALCIUM 10.2 02/07/2025    PHOS 2.0 (L) 02/07/2025    MG 1.8 12/04/2024    ALBUMIN 4.0 02/07/2025    AST 33 03/20/2023    ALT 45 (H) 03/20/2023     Labs were reviewed with the patient.    Assessment:     1. S/P kidney transplant    2. CKD (chronic kidney disease), stage II    3. Long-term use of immunosuppressant medication    4. Essential hypertension    5. At risk for opportunistic infections      Plan:   Mr. Saenz is now 3 years post kidney transplant with stable allograft function. He will continue following with general nephrology for CKD care. IS refills x 1 year provided. Stressed importance of routine health maintenance as well as age appropriate cancer screenings while on immunosuppression. He understands that transplant will re-open his chart in the future if issues arise with allograft.         1. Immunosuppression: Prograf trough 4.6. Continue Prograf  7/7, MMF 1000 mg BID, and Prednisone 5 mg QD. Will continue to monitor for drug toxicities    2. Allograft Function: Stable. Continue good oral hydration.   - ESRD secondary to HTN s/p donation after brain death kidney transplant on 2/25/22 (Thymo induction, CIT 7 hr. 4 min., CPRA 54%, KDPI 4%, CMV D+,R-).  - baseline creatinine is between 1.7-1.9.   Lab Results   Component Value Date    CREATININE 1.6 (H) 02/07/2025       3. Hypertension management: advise low salt diet and home BP monitoring      4. Metabolic Bone Disease/Secondary Hyperparathyroidism:sensipar 30 mg daily  Lab Results   Component Value Date    .5 (H) 12/04/2024    CALCIUM 10.2 02/07/2025    PHOS 2.0 (L) 02/07/2025       5. Hypophosphatemia     -  mg BID   - high phosphorous diet     6. Anemia: stable. No need for intervention   Lab Results   Component Value Date    WBC 3.80 (L) 02/07/2025    HGB 16.8 02/07/2025    HCT 50.3 02/07/2025    MCV 91 02/07/2025     (L) 02/07/2025         7. Proteinuria: continue p/c ratio as per guidelines    - last UPC 0.14    8. BK virus infection screening:  BK PCR per protocol    - last PCR not detected    9. Weight education: provided during the clinic visit   There is no height or weight on file to calculate BMI.     10. Patient safety education regarding immunosuppression including prophylaxis posttransplant for CMV, PCP       Alcira Pierce NP

## 2025-02-17 ENCOUNTER — OFFICE VISIT (OUTPATIENT)
Dept: TRANSPLANT | Facility: CLINIC | Age: 25
End: 2025-02-17
Payer: MEDICARE

## 2025-02-17 ENCOUNTER — PATIENT MESSAGE (OUTPATIENT)
Dept: TRANSPLANT | Facility: CLINIC | Age: 25
End: 2025-02-17
Payer: MEDICAID

## 2025-02-17 DIAGNOSIS — Z94.0 S/P KIDNEY TRANSPLANT: Primary | ICD-10-CM

## 2025-02-17 DIAGNOSIS — Z79.60 LONG-TERM USE OF IMMUNOSUPPRESSANT MEDICATION: ICD-10-CM

## 2025-02-17 DIAGNOSIS — I10 ESSENTIAL HYPERTENSION: ICD-10-CM

## 2025-02-17 DIAGNOSIS — Z91.89 AT RISK FOR OPPORTUNISTIC INFECTIONS: ICD-10-CM

## 2025-02-17 DIAGNOSIS — N18.2 CKD (CHRONIC KIDNEY DISEASE), STAGE II: ICD-10-CM

## 2025-02-17 NOTE — LETTER
February 17, 2025        Kathy Cruz  [address] [place] [address]   [address]  [facility] [address]  Phone: [phone]  Fax: [phone]             Ambrosio Gamez- Transplant 1st Fl  [address]  [facility] [address]  Phone: [phone]   Patient: Tameka Saenz   MR Number: [id number]   YOB: 2000   Date of Visit: 2/17/2025       Dear Dr. Kathy Cruz    Thank you for referring Tameka Saenz to me for evaluation. Attached you will find relevant portions of my assessment and plan of care.    If you have questions, please do not hesitate to call me. I look forward to following Tameka Saenz along with you.    Sincerely,    Alcira Pierce, PRISCLIA    Enclosure    If you would like to receive this communication electronically, please contact [email or web address] or [phone] to request [employer] Link access.    [employer] Link is a tool which provides read-only access to select patient information with whom you have a relationship. Its easy to use and provides real time access to review your patients record including encounter summaries, notes, results, and demographic information.    If you feel you have received this communication in error or would no longer like to receive these types of communications, please e-mail [email or web address]

## 2025-02-18 ENCOUNTER — PATIENT MESSAGE (OUTPATIENT)
Dept: TRANSPLANT | Facility: CLINIC | Age: 25
End: 2025-02-18
Payer: MEDICAID

## 2025-06-13 NOTE — PROGRESS NOTES
Take the prednisone as prescribed.  Continue to use your Advair inhaler.  Follow-up closely with your doctor.  If you develop any worsening or concerning symptoms, please go to the nearest emergency department for further evaluation.   Venipuncture performed with 21 gauge butterfly, x's 1 attempt.  Successful venipuncture to L Antecubital vein.  Specimens collected per orders.      Pressure dressing applied to site, instructed patient to remove dressing in 10-15 minutes, OK to re-adjust dressing if pressure causing any discomfort, to observe closely for numbness and/or discoloration to hand or fingers, and to notify provider if bleeding persists after applying constant pressure lasting 30 minutes.        [FreeTextEntry1] : GENERAL: alert, cooperative, in NAD SKIN: The skin is intact, warm, pink and dry over the area examined. EYES: Normal conjunctiva, normal eyelids and pupils were equal and round. ENT: normal ears, normal nose and normal lips. CARDIOVASCULAR: brisk capillary refill, but no peripheral edema. RESPIRATORY: The patient is in no apparent respiratory distress. They're taking full deep breaths without use of accessory muscles or evidence of audible wheezes or stridor without the use of a stethoscope. Normal respiratory effort. ABDOMEN: not examined.   Bilateral lower extremities: Wide symmetric abduction of bilateral hips to greater than 60 degrees. Prone internal rotation to 45 degrees on the right and 20/25 degrees on the left Prone external rotation to 90 degrees on the right and 70 degrees on the left Thigh foot angle is -10 degrees bilaterally. Bilateral knees with full ROM. No ligamentous laxity in either knee. Negative Galeazzi. Bilateral ankle dorsiflexion to 5 Degrees. Patient has flat feet with standing.  The arches collapse and heals tip into valgus.  The arches form when sitting and on toe dorsiflexion.  Subtalar motion is full and free.  Gait: Patient is seen bearing full weight across the bilateral lower extremities with an intermittent toe walking gait.  He is able to walk with a heel-toe gait.  Poor coordination is noted.

## (undated) DEVICE — SYR ONLY LUER LOCK 20CC

## (undated) DEVICE — TOWEL OR XRAY WHITE 17X26IN

## (undated) DEVICE — SOL NS 1000CC

## (undated) DEVICE — PUNCH AORTIC 4.0MM 6/CASE

## (undated) DEVICE — SUT SILK 2-0 STRANDS 30IN

## (undated) DEVICE — SUT 1 36IN PDS II VIO MONO

## (undated) DEVICE — SET IRR URLGY 2LINE UNIV SPIKE

## (undated) DEVICE — SUT ETHILON 3-0 FS-1 30

## (undated) DEVICE — BLADE SURG CARBON STEEL SZ11

## (undated) DEVICE — ELECTRODE REM PLYHSV RETURN 9

## (undated) DEVICE — SUT ETHILON 3-0 PS2 18 BLK

## (undated) DEVICE — HANDSET ARGON PLUS

## (undated) DEVICE — SET DECANTER MEDICHOICE

## (undated) DEVICE — SEE MEDLINE ITEM 156900

## (undated) DEVICE — SUT SILK 3-0 STRANDS 30IN

## (undated) DEVICE — STAPLER SKIN PROXIMATE WIDE

## (undated) DEVICE — SUT 3-0 12-18IN SILK

## (undated) DEVICE — SUT PROLENE 5-0 36IN C-1

## (undated) DEVICE — PLUG CATHETER STERILE FOLEY

## (undated) DEVICE — CLIPPER BLADE MOD 4406 (CAREF)

## (undated) DEVICE — DRESSING ABSRBNT ISLAND 3.6X8

## (undated) DEVICE — SUT SILK 3-0 SH 18IN BLACK

## (undated) DEVICE — TIP YANKAUERS BULB NO VENT

## (undated) DEVICE — SUT 4/0 27IN PDS II VIO MON

## (undated) DEVICE — SUT PDS BV 6-0

## (undated) DEVICE — HEMOSTAT SURGICEL NU-KNIT 6X9

## (undated) DEVICE — SUT 4-0 12-18IN SILK BLACK

## (undated) DEVICE — GAUZE AVANT SPNG 4PLY STRL 4X4

## (undated) DEVICE — SUT PROLENE 6-0 BV-1 30IN

## (undated) DEVICE — FOLEY BLLN 20FR 3WAY 5CC

## (undated) DEVICE — SEE MEDLINE ITEM 146417

## (undated) DEVICE — SUT 2-0 12-18IN SILK

## (undated) DEVICE — STOCKINETTE 2INX36

## (undated) DEVICE — ADHESIVE DERMABOND ADVANCED

## (undated) DEVICE — PUNCH AORTIC 4.8MM

## (undated) DEVICE — EVACUATOR WOUND BULB 100CC

## (undated) DEVICE — Device

## (undated) DEVICE — TRAY FOLEY 16FR INFECTION CONT

## (undated) DEVICE — DRAPE SLUSH WARMER WITH DISC

## (undated) DEVICE — BELLOW CANN HEMOBLAST 1.65GR

## (undated) DEVICE — PATCH SEALANT EVARREST 2X4

## (undated) DEVICE — PAD ABDOMINAL STERILE 5X9IN